# Patient Record
Sex: MALE | Race: WHITE | NOT HISPANIC OR LATINO | URBAN - METROPOLITAN AREA
[De-identification: names, ages, dates, MRNs, and addresses within clinical notes are randomized per-mention and may not be internally consistent; named-entity substitution may affect disease eponyms.]

---

## 2020-01-29 ENCOUNTER — APPOINTMENT (OUTPATIENT)
Age: 40
Setting detail: DERMATOLOGY
End: 2020-01-30

## 2020-01-29 DIAGNOSIS — D485 NEOPLASM OF UNCERTAIN BEHAVIOR OF SKIN: ICD-10-CM

## 2020-01-29 DIAGNOSIS — L57.8 OTHER SKIN CHANGES DUE TO CHRONIC EXPOSURE TO NONIONIZING RADIATION: ICD-10-CM

## 2020-01-29 DIAGNOSIS — D22 MELANOCYTIC NEVI: ICD-10-CM

## 2020-01-29 DIAGNOSIS — Z71.89 OTHER SPECIFIED COUNSELING: ICD-10-CM

## 2020-01-29 PROBLEM — D48.5 NEOPLASM OF UNCERTAIN BEHAVIOR OF SKIN: Status: ACTIVE | Noted: 2020-01-29

## 2020-01-29 PROBLEM — D22.4 MELANOCYTIC NEVI OF SCALP AND NECK: Status: ACTIVE | Noted: 2020-01-29

## 2020-01-29 PROBLEM — D22.5 MELANOCYTIC NEVI OF TRUNK: Status: ACTIVE | Noted: 2020-01-29

## 2020-01-29 PROCEDURE — OTHER MIPS QUALITY: OTHER

## 2020-01-29 PROCEDURE — OTHER COUNSELING: OTHER

## 2020-01-29 PROCEDURE — 11102 TANGNTL BX SKIN SINGLE LES: CPT

## 2020-01-29 PROCEDURE — 11103 TANGNTL BX SKIN EA SEP/ADDL: CPT

## 2020-01-29 PROCEDURE — OTHER BIOPSY BY SHAVE METHOD: OTHER

## 2020-01-29 PROCEDURE — 99203 OFFICE O/P NEW LOW 30 MIN: CPT | Mod: 25

## 2020-01-29 ASSESSMENT — LOCATION SIMPLE DESCRIPTION DERM
LOCATION SIMPLE: LEFT UPPER BACK
LOCATION SIMPLE: TRAPEZIAL NECK
LOCATION SIMPLE: LEFT CHEEK
LOCATION SIMPLE: RIGHT UPPER ARM
LOCATION SIMPLE: RIGHT POSTERIOR THIGH
LOCATION SIMPLE: CHEST
LOCATION SIMPLE: RIGHT CHEEK
LOCATION SIMPLE: ANTERIOR SCALP
LOCATION SIMPLE: LEFT UPPER ARM
LOCATION SIMPLE: UPPER BACK
LOCATION SIMPLE: POSTERIOR SCALP

## 2020-01-29 ASSESSMENT — LOCATION DETAILED DESCRIPTION DERM
LOCATION DETAILED: MID-FRONTAL SCALP
LOCATION DETAILED: RIGHT CENTRAL MALAR CHEEK
LOCATION DETAILED: MID TRAPEZIAL NECK
LOCATION DETAILED: RIGHT DISTAL POSTERIOR THIGH
LOCATION DETAILED: STERNUM
LOCATION DETAILED: LEFT DISTAL POSTERIOR UPPER ARM
LOCATION DETAILED: SUPERIOR THORACIC SPINE
LOCATION DETAILED: RIGHT DISTAL POSTERIOR UPPER ARM
LOCATION DETAILED: RIGHT INFERIOR OCCIPITAL SCALP
LOCATION DETAILED: LEFT CENTRAL MALAR CHEEK
LOCATION DETAILED: LEFT SUPERIOR MEDIAL UPPER BACK

## 2020-01-29 ASSESSMENT — LOCATION ZONE DERM
LOCATION ZONE: FACE
LOCATION ZONE: ARM
LOCATION ZONE: NECK
LOCATION ZONE: LEG
LOCATION ZONE: SCALP
LOCATION ZONE: TRUNK

## 2020-01-29 NOTE — PROCEDURE: BIOPSY BY SHAVE METHOD
Destruction After The Procedure: No
Wound Care: Vaseline
Was A Bandage Applied: Yes
X Size Of Lesion In Cm: 0
Notification Instructions: Patient will be notified of biopsy results. However, patient instructed to call the office if not contacted within 2 weeks.
Silver Nitrate Text: The wound bed was treated with silver nitrate after the biopsy was performed.
Consent: Written consent was obtained and risks were reviewed including but not limited to scarring, infection, bleeding, scabbing, incomplete removal, nerve damage and allergy to anesthesia.
Electrodesiccation And Curettage Text: The wound bed was treated with electrodesiccation and curettage after the biopsy was performed.
Biopsy Type: H and E
Depth Of Biopsy: dermis
Anesthesia Volume In Cc (Will Not Render If 0): 0.3
Post-Care Instructions: I reviewed with the patient in detail post-care instructions. Patient is to keep the biopsy site dry overnight, and then apply bacitracin twice daily until healed. Patient may apply hydrogen peroxide soaks to remove any crusting.
Anesthesia Type: 1% lidocaine with epinephrine and a 1:10 solution of 8.4% sodium bicarbonate
Detail Level: Detailed
Type Of Destruction Used: Electrodesiccation
Dressing: Band-Aid
Electrodesiccation Text: The wound bed was treated with electrodesiccation after the biopsy was performed.
Cryotherapy Text: The wound bed was treated with cryotherapy after the biopsy was performed.
Hemostasis: Drysol
Curettage Text: The wound bed was treated with curettage after the biopsy was performed.
Billing Type: Third-Party Bill
Biopsy Method: double edge Personna blade

## 2020-11-03 ENCOUNTER — OFFICE VISIT (OUTPATIENT)
Dept: INTERNAL MEDICINE CLINIC | Facility: CLINIC | Age: 40
End: 2020-11-03
Payer: COMMERCIAL

## 2020-11-03 VITALS
OXYGEN SATURATION: 98 % | BODY MASS INDEX: 28.42 KG/M2 | TEMPERATURE: 98 F | HEART RATE: 101 BPM | WEIGHT: 203 LBS | HEIGHT: 71 IN | SYSTOLIC BLOOD PRESSURE: 102 MMHG | DIASTOLIC BLOOD PRESSURE: 72 MMHG

## 2020-11-03 DIAGNOSIS — I10 ESSENTIAL HYPERTENSION: Chronic | ICD-10-CM

## 2020-11-03 DIAGNOSIS — E34.9 TESTOSTERONE DEFICIENCY: ICD-10-CM

## 2020-11-03 DIAGNOSIS — E83.110 HEREDITARY HEMOCHROMATOSIS (HCC): Chronic | ICD-10-CM

## 2020-11-03 DIAGNOSIS — E11.9 TYPE 2 DIABETES MELLITUS WITHOUT COMPLICATION, WITHOUT LONG-TERM CURRENT USE OF INSULIN (HCC): Primary | Chronic | ICD-10-CM

## 2020-11-03 LAB
LEFT EYE DIABETIC RETINOPATHY: NORMAL
LEFT EYE IMAGE QUALITY: NORMAL
LEFT EYE MACULAR EDEMA: NORMAL
LEFT EYE OTHER RETINOPATHY: NORMAL
RIGHT EYE DIABETIC RETINOPATHY: NORMAL
RIGHT EYE IMAGE QUALITY: NORMAL
RIGHT EYE MACULAR EDEMA: NORMAL
RIGHT EYE OTHER RETINOPATHY: NORMAL
SEVERITY (EYE EXAM): NORMAL

## 2020-11-03 PROCEDURE — 92250 FUNDUS PHOTOGRAPHY W/I&R: CPT | Performed by: INTERNAL MEDICINE

## 2020-11-03 PROCEDURE — 99204 OFFICE O/P NEW MOD 45 MIN: CPT | Performed by: INTERNAL MEDICINE

## 2020-11-03 RX ORDER — EMPAGLIFLOZIN 25 MG/1
25 TABLET, FILM COATED ORAL DAILY
Qty: 90 TABLET | Refills: 3 | Status: SHIPPED | OUTPATIENT
Start: 2020-11-03 | End: 2021-03-09 | Stop reason: SDUPTHER

## 2020-11-03 RX ORDER — EXENATIDE 2 MG/.85ML
2 INJECTION, SUSPENSION, EXTENDED RELEASE SUBCUTANEOUS WEEKLY
Qty: 12 PEN | Refills: 3 | Status: SHIPPED | OUTPATIENT
Start: 2020-11-03 | End: 2020-11-19

## 2020-11-03 RX ORDER — EMPAGLIFLOZIN 25 MG/1
25 TABLET, FILM COATED ORAL DAILY
COMMUNITY
End: 2020-11-03 | Stop reason: SDUPTHER

## 2020-11-03 RX ORDER — EXENATIDE 2 MG/.85ML
INJECTION, SUSPENSION, EXTENDED RELEASE SUBCUTANEOUS WEEKLY
COMMUNITY
End: 2020-11-03 | Stop reason: SDUPTHER

## 2020-11-04 ENCOUNTER — TELEPHONE (OUTPATIENT)
Dept: INTERNAL MEDICINE CLINIC | Facility: CLINIC | Age: 40
End: 2020-11-04

## 2020-11-05 ENCOUNTER — TELEPHONE (OUTPATIENT)
Dept: SURGICAL ONCOLOGY | Facility: CLINIC | Age: 40
End: 2020-11-05

## 2020-11-09 ENCOUNTER — DOCUMENTATION (OUTPATIENT)
Dept: HEMATOLOGY ONCOLOGY | Facility: CLINIC | Age: 40
End: 2020-11-09

## 2020-11-12 ENCOUNTER — OFFICE VISIT (OUTPATIENT)
Dept: ENDOCRINOLOGY | Facility: CLINIC | Age: 40
End: 2020-11-12
Payer: COMMERCIAL

## 2020-11-12 VITALS
TEMPERATURE: 97.5 F | HEIGHT: 72 IN | SYSTOLIC BLOOD PRESSURE: 110 MMHG | DIASTOLIC BLOOD PRESSURE: 68 MMHG | WEIGHT: 201 LBS | BODY MASS INDEX: 27.22 KG/M2

## 2020-11-12 DIAGNOSIS — E11.9 TYPE 2 DIABETES MELLITUS WITHOUT COMPLICATION, WITHOUT LONG-TERM CURRENT USE OF INSULIN (HCC): Primary | Chronic | ICD-10-CM

## 2020-11-12 DIAGNOSIS — E34.9 TESTOSTERONE DEFICIENCY: ICD-10-CM

## 2020-11-12 DIAGNOSIS — E04.1 THYROID NODULE: ICD-10-CM

## 2020-11-12 DIAGNOSIS — R53.83 FATIGUE, UNSPECIFIED TYPE: ICD-10-CM

## 2020-11-12 PROCEDURE — 99245 OFF/OP CONSLTJ NEW/EST HI 55: CPT | Performed by: NURSE PRACTITIONER

## 2020-11-16 ENCOUNTER — TELEPHONE (OUTPATIENT)
Dept: INTERNAL MEDICINE CLINIC | Facility: CLINIC | Age: 40
End: 2020-11-16

## 2020-11-16 DIAGNOSIS — E11.9 TYPE 2 DIABETES MELLITUS WITHOUT COMPLICATION, WITHOUT LONG-TERM CURRENT USE OF INSULIN (HCC): Primary | Chronic | ICD-10-CM

## 2020-11-17 ENCOUNTER — TELEPHONE (OUTPATIENT)
Dept: ENDOCRINOLOGY | Facility: CLINIC | Age: 40
End: 2020-11-17

## 2020-11-24 ENCOUNTER — LAB (OUTPATIENT)
Dept: LAB | Facility: HOSPITAL | Age: 40
End: 2020-11-24
Payer: COMMERCIAL

## 2020-11-24 DIAGNOSIS — R53.83 FATIGUE, UNSPECIFIED TYPE: ICD-10-CM

## 2020-11-24 DIAGNOSIS — E34.9 TESTOSTERONE DEFICIENCY: ICD-10-CM

## 2020-11-24 LAB
25(OH)D3 SERPL-MCNC: 29.5 NG/ML (ref 30–100)
FSH SERPL-ACNC: 3.9 MIU/ML (ref 0.7–10.8)
LH SERPL-ACNC: 4.9 MIU/ML (ref 1.2–10.6)
PROLACTIN SERPL-MCNC: 7.4 NG/ML (ref 2.5–17.4)
PSA SERPL-MCNC: 0.9 NG/ML (ref 0–4)
TSH SERPL DL<=0.05 MIU/L-ACNC: 1.32 UIU/ML (ref 0.36–3.74)

## 2020-11-24 PROCEDURE — 84402 ASSAY OF FREE TESTOSTERONE: CPT

## 2020-11-24 PROCEDURE — 83002 ASSAY OF GONADOTROPIN (LH): CPT

## 2020-11-24 PROCEDURE — 36415 COLL VENOUS BLD VENIPUNCTURE: CPT

## 2020-11-24 PROCEDURE — 84146 ASSAY OF PROLACTIN: CPT

## 2020-11-24 PROCEDURE — 84403 ASSAY OF TOTAL TESTOSTERONE: CPT

## 2020-11-24 PROCEDURE — 82306 VITAMIN D 25 HYDROXY: CPT

## 2020-11-24 PROCEDURE — G0103 PSA SCREENING: HCPCS

## 2020-11-24 PROCEDURE — 84443 ASSAY THYROID STIM HORMONE: CPT

## 2020-11-24 PROCEDURE — 83001 ASSAY OF GONADOTROPIN (FSH): CPT

## 2020-11-24 PROCEDURE — 84305 ASSAY OF SOMATOMEDIN: CPT

## 2020-11-25 LAB
TESTOST FREE SERPL-MCNC: 11 PG/ML (ref 8.7–25.1)
TESTOST SERPL-MCNC: 320 NG/DL (ref 264–916)

## 2020-11-26 LAB — IGF-I SERPL-MCNC: 201 NG/ML (ref 90–278)

## 2020-12-15 ENCOUNTER — TELEPHONE (OUTPATIENT)
Dept: ENDOCRINOLOGY | Facility: CLINIC | Age: 40
End: 2020-12-15

## 2020-12-15 ENCOUNTER — OFFICE VISIT (OUTPATIENT)
Dept: ENDOCRINOLOGY | Facility: CLINIC | Age: 40
End: 2020-12-15
Payer: COMMERCIAL

## 2020-12-15 ENCOUNTER — TELEPHONE (OUTPATIENT)
Dept: HEMATOLOGY ONCOLOGY | Facility: CLINIC | Age: 40
End: 2020-12-15

## 2020-12-15 VITALS
WEIGHT: 203.2 LBS | DIASTOLIC BLOOD PRESSURE: 62 MMHG | SYSTOLIC BLOOD PRESSURE: 122 MMHG | HEART RATE: 68 BPM | BODY MASS INDEX: 27.95 KG/M2

## 2020-12-15 DIAGNOSIS — E34.9 TESTOSTERONE DEFICIENCY: Primary | Chronic | ICD-10-CM

## 2020-12-15 DIAGNOSIS — I10 ESSENTIAL HYPERTENSION: Chronic | ICD-10-CM

## 2020-12-15 DIAGNOSIS — E11.9 TYPE 2 DIABETES MELLITUS WITHOUT COMPLICATION, WITHOUT LONG-TERM CURRENT USE OF INSULIN (HCC): Chronic | ICD-10-CM

## 2020-12-15 PROCEDURE — 3078F DIAST BP <80 MM HG: CPT | Performed by: NURSE PRACTITIONER

## 2020-12-15 PROCEDURE — 99214 OFFICE O/P EST MOD 30 MIN: CPT | Performed by: NURSE PRACTITIONER

## 2020-12-15 PROCEDURE — 3074F SYST BP LT 130 MM HG: CPT | Performed by: NURSE PRACTITIONER

## 2020-12-16 ENCOUNTER — CONSULT (OUTPATIENT)
Dept: HEMATOLOGY ONCOLOGY | Facility: CLINIC | Age: 40
End: 2020-12-16
Payer: COMMERCIAL

## 2020-12-16 VITALS
TEMPERATURE: 97.5 F | HEIGHT: 71 IN | WEIGHT: 202.4 LBS | DIASTOLIC BLOOD PRESSURE: 85 MMHG | SYSTOLIC BLOOD PRESSURE: 130 MMHG | BODY MASS INDEX: 28.34 KG/M2 | OXYGEN SATURATION: 99 % | HEART RATE: 115 BPM | RESPIRATION RATE: 17 BRPM

## 2020-12-16 DIAGNOSIS — E83.110 HEREDITARY HEMOCHROMATOSIS (HCC): Chronic | ICD-10-CM

## 2020-12-16 PROCEDURE — 3008F BODY MASS INDEX DOCD: CPT | Performed by: PHYSICIAN ASSISTANT

## 2020-12-16 PROCEDURE — 99243 OFF/OP CNSLTJ NEW/EST LOW 30: CPT | Performed by: PHYSICIAN ASSISTANT

## 2020-12-16 PROCEDURE — 1036F TOBACCO NON-USER: CPT | Performed by: PHYSICIAN ASSISTANT

## 2020-12-21 DIAGNOSIS — E11.9 TYPE 2 DIABETES MELLITUS WITHOUT COMPLICATION, WITHOUT LONG-TERM CURRENT USE OF INSULIN (HCC): Chronic | ICD-10-CM

## 2021-01-07 ENCOUNTER — LAB (OUTPATIENT)
Dept: LAB | Facility: HOSPITAL | Age: 41
End: 2021-01-07
Payer: COMMERCIAL

## 2021-01-07 DIAGNOSIS — E34.9 TESTOSTERONE DEFICIENCY: Chronic | ICD-10-CM

## 2021-01-07 PROCEDURE — 36415 COLL VENOUS BLD VENIPUNCTURE: CPT

## 2021-01-07 PROCEDURE — 84402 ASSAY OF FREE TESTOSTERONE: CPT

## 2021-01-07 PROCEDURE — 84403 ASSAY OF TOTAL TESTOSTERONE: CPT

## 2021-01-08 ENCOUNTER — TELEPHONE (OUTPATIENT)
Dept: HEMATOLOGY ONCOLOGY | Facility: CLINIC | Age: 41
End: 2021-01-08

## 2021-01-08 LAB
TESTOST FREE SERPL-MCNC: 9.4 PG/ML (ref 8.7–25.1)
TESTOST SERPL-MCNC: 276 NG/DL (ref 264–916)

## 2021-01-08 NOTE — TELEPHONE ENCOUNTER
Spoke to patient to let him know that his appt with Ronaldo Saucedo PA-C has been rescheduled to Dr Jey Coleman on 1/20  I had given him a 10am on this day, but after I hung up with him and went to add him to the schedule, the 10am spot had been taken  I called patient back and left a message that I would no longer be able to offer him 10am, but was able to add him to the schedule for 1:20pm that day instead  Advised patient to call back if this new time does not work for him

## 2021-01-12 NOTE — RESULT ENCOUNTER NOTE
Results reviewed  Free and total testosterone are low normal  Awaiting further work up from [facility] prior to determining therapy  Sent to Maira Silva

## 2021-01-20 ENCOUNTER — TELEPHONE (OUTPATIENT)
Dept: HEMATOLOGY ONCOLOGY | Facility: CLINIC | Age: 41
End: 2021-01-20

## 2021-01-20 ENCOUNTER — OFFICE VISIT (OUTPATIENT)
Dept: HEMATOLOGY ONCOLOGY | Facility: CLINIC | Age: 41
End: 2021-01-20
Payer: COMMERCIAL

## 2021-01-20 ENCOUNTER — LAB (OUTPATIENT)
Dept: LAB | Facility: HOSPITAL | Age: 41
End: 2021-01-20
Payer: COMMERCIAL

## 2021-01-20 VITALS
TEMPERATURE: 97 F | SYSTOLIC BLOOD PRESSURE: 120 MMHG | OXYGEN SATURATION: 99 % | DIASTOLIC BLOOD PRESSURE: 80 MMHG | BODY MASS INDEX: 28.42 KG/M2 | RESPIRATION RATE: 18 BRPM | HEIGHT: 71 IN | WEIGHT: 203 LBS | HEART RATE: 98 BPM

## 2021-01-20 DIAGNOSIS — E83.110 HEREDITARY HEMOCHROMATOSIS (HCC): Chronic | ICD-10-CM

## 2021-01-20 DIAGNOSIS — E83.110 HEREDITARY HEMOCHROMATOSIS (HCC): Primary | Chronic | ICD-10-CM

## 2021-01-20 DIAGNOSIS — E11.9 TYPE 2 DIABETES MELLITUS WITHOUT COMPLICATION, WITHOUT LONG-TERM CURRENT USE OF INSULIN (HCC): Chronic | ICD-10-CM

## 2021-01-20 LAB
ALBUMIN SERPL BCP-MCNC: 4.4 G/DL (ref 3.5–5)
ALP SERPL-CCNC: 82 U/L (ref 46–116)
ALT SERPL W P-5'-P-CCNC: 35 U/L (ref 12–78)
ANION GAP SERPL CALCULATED.3IONS-SCNC: 7 MMOL/L (ref 4–13)
AST SERPL W P-5'-P-CCNC: 34 U/L (ref 5–45)
BASOPHILS # BLD AUTO: 0.05 THOUSANDS/ΜL (ref 0–0.1)
BASOPHILS NFR BLD AUTO: 1 % (ref 0–1)
BILIRUB SERPL-MCNC: 0.7 MG/DL (ref 0.2–1)
BUN SERPL-MCNC: 18 MG/DL (ref 5–25)
CALCIUM SERPL-MCNC: 9.3 MG/DL (ref 8.3–10.1)
CHLORIDE SERPL-SCNC: 106 MMOL/L (ref 100–108)
CO2 SERPL-SCNC: 30 MMOL/L (ref 21–32)
CREAT SERPL-MCNC: 1.08 MG/DL (ref 0.6–1.3)
EOSINOPHIL # BLD AUTO: 0.18 THOUSAND/ΜL (ref 0–0.61)
EOSINOPHIL NFR BLD AUTO: 3 % (ref 0–6)
ERYTHROCYTE [DISTWIDTH] IN BLOOD BY AUTOMATED COUNT: 12.6 % (ref 11.6–15.1)
FERRITIN SERPL-MCNC: 9 NG/ML (ref 8–388)
GFR SERPL CREATININE-BSD FRML MDRD: 86 ML/MIN/1.73SQ M
GLUCOSE P FAST SERPL-MCNC: 79 MG/DL (ref 65–99)
HCT VFR BLD AUTO: 43.7 % (ref 36.5–49.3)
HGB BLD-MCNC: 14.1 G/DL (ref 12–17)
IMM GRANULOCYTES # BLD AUTO: 0.02 THOUSAND/UL (ref 0–0.2)
IMM GRANULOCYTES NFR BLD AUTO: 0 % (ref 0–2)
LYMPHOCYTES # BLD AUTO: 2.09 THOUSANDS/ΜL (ref 0.6–4.47)
LYMPHOCYTES NFR BLD AUTO: 29 % (ref 14–44)
MAGNESIUM SERPL-MCNC: 2.1 MG/DL (ref 1.6–2.6)
MCH RBC QN AUTO: 27.9 PG (ref 26.8–34.3)
MCHC RBC AUTO-ENTMCNC: 32.3 G/DL (ref 31.4–37.4)
MCV RBC AUTO: 87 FL (ref 82–98)
MONOCYTES # BLD AUTO: 0.37 THOUSAND/ΜL (ref 0.17–1.22)
MONOCYTES NFR BLD AUTO: 5 % (ref 4–12)
NEUTROPHILS # BLD AUTO: 4.4 THOUSANDS/ΜL (ref 1.85–7.62)
NEUTS SEG NFR BLD AUTO: 62 % (ref 43–75)
NRBC BLD AUTO-RTO: 0 /100 WBCS
PLATELET # BLD AUTO: 250 THOUSANDS/UL (ref 149–390)
PMV BLD AUTO: 11.4 FL (ref 8.9–12.7)
POTASSIUM SERPL-SCNC: 3.5 MMOL/L (ref 3.5–5.3)
PROT SERPL-MCNC: 7.5 G/DL (ref 6.4–8.2)
RBC # BLD AUTO: 5.05 MILLION/UL (ref 3.88–5.62)
SODIUM SERPL-SCNC: 143 MMOL/L (ref 136–145)
WBC # BLD AUTO: 7.11 THOUSAND/UL (ref 4.31–10.16)

## 2021-01-20 PROCEDURE — 82728 ASSAY OF FERRITIN: CPT

## 2021-01-20 PROCEDURE — 85025 COMPLETE CBC W/AUTO DIFF WBC: CPT

## 2021-01-20 PROCEDURE — 99215 OFFICE O/P EST HI 40 MIN: CPT | Performed by: INTERNAL MEDICINE

## 2021-01-20 PROCEDURE — 3074F SYST BP LT 130 MM HG: CPT | Performed by: INTERNAL MEDICINE

## 2021-01-20 PROCEDURE — 80053 COMPREHEN METABOLIC PANEL: CPT

## 2021-01-20 PROCEDURE — 36415 COLL VENOUS BLD VENIPUNCTURE: CPT

## 2021-01-20 PROCEDURE — 83735 ASSAY OF MAGNESIUM: CPT

## 2021-01-20 PROCEDURE — 3008F BODY MASS INDEX DOCD: CPT | Performed by: INTERNAL MEDICINE

## 2021-01-20 PROCEDURE — 81256 HFE GENE: CPT

## 2021-01-20 PROCEDURE — 3079F DIAST BP 80-89 MM HG: CPT | Performed by: INTERNAL MEDICINE

## 2021-01-20 PROCEDURE — 1036F TOBACCO NON-USER: CPT | Performed by: INTERNAL MEDICINE

## 2021-01-20 NOTE — PROGRESS NOTES
[facility] - Hematology & Medical Oncology  Outpatient Visit Encounter Note      Antonio Smith 45 y o  male [id number] Date:  1/20/2021      VEL Cullen is a 40M is seeing me today after establishing hematological care with Dr Alethea Quan team in [facility]  Since he wants local follow-up, he is here at [address] [place] [address]  In review of the last note from 12/26/2020, this patient has moved from [address] [place] [address] to [place] and sees to continue care here now  Per the notes, he was diagnosed with hereditary hemochromatosis and had been undergoing phlebotomy  Per notes, the patient is a poor historian  He does not know if he is homozygous or heterozygous for hemochromatosis  He is getting monthly phlebotomy at [facility]  He says that his brother was tested negative  He tells me that all of this started 6-8 months ago  He says that he was seen by Endocrinology for new onset T2DM and during work-up, his HFE gene was positive per patient  He says that since he lost his insurance in September 2020, he came over to [place] and started getting his insurance and care here  He says that after he got the diagnosis, he was referred to Hematology  He saw them and they ordered MRI Liver and per him, it was negative  He also got 2D Echo which he says was normal as well  He says that his last phlebotomy was on 1/6/21  He has not had CBC, Ferritin checked in a "long time"  He is seeing Endocrinology right now and they are helping him  He denies headaches, f/c/n/v/cp/ap/sob/cough  His major complain is fatigue  I have reviewed the relevant past medical, surgical, social and family history  I have also reviewed allergies and medications for this patient  Review of Systems  Review of Systems   All other systems reviewed and are negative          OBJECTIVE     Physical Exam  Vitals:    01/20/21 1324   BP: 120/80   BP Location: Left arm   Patient Position: Sitting   Cuff Size: Large   Pulse: 98   Resp: 18   Temp: (!) 97 °F (36 1 °C)   TempSrc: Tympanic   SpO2: 99%   Weight: 92 1 kg (203 lb)   Height: 5' 11" (1 803 m)       Physical Exam  Constitutional:       General: He is not in acute distress  Appearance: He is not ill-appearing, toxic-appearing or diaphoretic  HENT:      Head: Normocephalic and atraumatic  Eyes:      Extraocular Movements: Extraocular movements intact  Neck:      Musculoskeletal: Normal range of motion and neck supple  Cardiovascular:      Rate and Rhythm: Normal rate  Pulmonary:      Effort: Pulmonary effort is normal  No respiratory distress  Abdominal:      General: There is no distension  Palpations: Abdomen is soft  Musculoskeletal: Normal range of motion  General: No tenderness  Right lower leg: No edema  Left lower leg: No edema  Skin:     General: Skin is dry  Neurological:      General: No focal deficit present  Mental Status: He is alert and oriented to person, place, and time  Imaging  Relevant imaging reviewed in chart    Labs  Relevant labs reviewed in chart   ASSESSMENT & PLAN      Diagnosis ICD-10-CM Associated Orders   1  Hereditary hemochromatosis ([facility] [address] )  E83 110 Hemochromatosis mutation     CBC and differential     Magnesium     Comprehensive metabolic panel     Ferritin   2  Type 2 diabetes mellitus without complication, without long-term current use of insulin (HCC)  E11 9        Hereditary Hemochromatosis, Unknown Genetic Status (Dx: Appears Mid-2020)  · Despite countless attempts by my staff and patient himself, no records have been sent over from his treating hematologist and endocrinologist at [facility]  Our attempts are still ongoing  · In the interim, to better guide his treatment and identifying his disease profile, I have ordered the hemochromatosis mutation     · Given no disease related labs ordered, will order CBC, CMP, Ferritin  · Will await results before getting MRI Liver and 2D Echo as he states he got them few months ago  · Last phlebotomy on 1/6/21  Await results to plan out further steps  Follow Up   3-4 weeks with Dr Chirs Mata (I communicated with him about this patient and he was supportive of seeing him)      All questions were answered to the patient's satisfaction during this encounter  They appreciated and thanked me for spending time with them  The patient knows the contact information for our office and know to reach out for any relevant concerns related to this encounter  For all other listed problems and medical diagnosis in his chart - they are managed by PCP and/or other specialists, which patient acknowledges  Dr Yordan Rojas MD  Hematology & Medical Oncology

## 2021-01-20 NOTE — TELEPHONE ENCOUNTER
Patient called to confirm the [facility] office fax #, patient was provided [phone]

## 2021-01-25 LAB — HFE GENE MUT ANL BLD/T: NORMAL

## 2021-02-07 NOTE — PROGRESS NOTES
HEMATOLOGY CLINIC NOTE    Primary Care Provider: Gabriela Rivera DO  Referring Provider:    MRN: [id number]  : 1980    Assessment / Plan:   1  Hereditary hemochromatosis ([facility] [address] ) -heterozygous H63D  This is a 44 y/o male following up regarding hemochromatosis  Patient was recently confirmed as heterozygous H63D  Patient most recent ferritin was 9  Patient has been getting monthly phlebotomies at [facility] with last one 21  Patient MRI liver, Echo in past were WNL  Will have patient hold phlebotomies for now  Will recheck Ferritin, BNP checked today  Will recheck iron panel, CBCD, CMP in 3 months and have patient follow up then  Will decide long term plan at that time  Patient will resume his low iron diet  - CBC and differential; Future  - Iron Panel (Includes Ferritin, Iron Sat%, Iron, and TIBC); Future  - B-Type Natriuretic Peptide ([address]); Future  - Comprehensive metabolic panel; Future  - Ferritin; Future    2  History of polycythemia - JAK2 negative  Patient has a history of a hemoglobin of 18 3 from last year via [place] records  Patient tested JAK2 negative  Patient notes he was traveling to higher altitude, taking testosterone, vaping during that time  Patient no longer partakes in any of the preceding  This high hemoglobin has resolved  Patient most recent Hgb 14 1, Hct 43 7  Will monitor  No additional lab work needed at this time other than repeat CBC  If Hgb, HCT increases in the future, will proceed with additional workup  - CBC and differential; Future    Patient will return to the office in 3 months with Dr Dania Doran     Patient voiced understanding and agreement to the above  The patient knows to call the office with any questions or concerns regarding the above      I have spent 30 minutes with Patient  today in which greater than 50% of this time was spent in counseling/coordination of care regarding Diagnostic results, Risks and benefits of tx options, Intructions for management and Patient and family education  Reason for visit:       Chief Complaint   Patient presents with    Follow-up       History of Hematology Illness:     Carolyn Saucedo is a 45 y o  male with a PMH of type 2 DM who came in for follow up regarding hereditary hemochromatosis  1  Hereditary hemochromatosis - heterozygous HFE gene H63D mutation  Patient is a poor historian per previous notes  Patient recently moved from [place] back to [place] in Sept 2020  Patient states in July/August 2020, an endocrinologist in [place] diagnosed patient with hereditary hemochromatosis after he came back positive with genetic studies  Patient was unsure if heterozygous or homozygous  Patient unsure if he has a family history of this  Brother did test negative for this  Patient was getting phlebotomies every 2 weeks then monthly at that time, as well as consuming low iron diet  Genetic test was redrawn 1/20 - found patient had single mutation for HFE gene, H63D mutation  Patient had an MRI of the liver in 08/2020 which was negative  Patient also notes he had an echo done in the past (we do not have this result) which was negative  Labs were found from [address] [place] [address],[address] [facility] in [place] as follows:    8/2020:   - Iron studies: TIBC 365, Iron 50, Iron sat % 14  At a previous drawing, patient iron level was 178  Ferritin 107  - Hgb 15 6 (previously 18 3), Hct 45 7 (previously 52 9)  - serum testosterone 173 (most recent 320 11/24/2020 by [facility])     1/20/2021:  - patient most recent Ferritin was 9, Hgb 14 1, Hct 43 7    2  History of Polycythemia  Patient previously had a history of polycythemia as once had a Hgb 18 3 as above  Resolved now  Patient states in [place], he was traveling to [place] 2-3 times a week which is higher in altitude (about 6500 feet he notes)  Patient was also vaping at the time and taking testosterone - no longer does either       Patient is a nonsmoker, denies sleep apnea, diuretic use  Patient tested JAK2 negative in [address] [facility] 8/2020  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[address] [facility]  Patient currently unemployed  - Hgb 15 6 (previously 18 3), Hct 45 7 (previously 52 9)  - 1/20/21: Hgb 14 1, Hct 43 7    Interval History:   02/08/21: Patient came in for follow up  Patient denies headaches, CP, SOB, palpitations, fevers, chills, night sweats, lumps, bumps  Patient denies flushing, redness in hands, blurred vision  Patient has occasional fatigue and numbness/tingling in his hands bilaterally he believes to be carpal tunnel  No loss in  bilaterally  Appears to occur after waking up or during activities with increased wrist flexion  Problem list:       Patient Active Problem List   Diagnosis    Type 2 diabetes mellitus, without long-term current use of insulin (HCC)    Hereditary hemochromatosis ([facility] [address] )    Essential hypertension    Testosterone deficiency    Thyroid nodule    Fatigue       REVIEW OF SYMPTOMS:   Review of Systems   Constitutional: Positive for fatigue (occassional)  Negative for activity change, appetite change, chills, diaphoresis, fever and unexpected weight change  HENT: Negative for nosebleeds  Respiratory: Negative for apnea, cough, chest tightness and shortness of breath  Cardiovascular: Negative for chest pain, palpitations and leg swelling  Gastrointestinal: Negative for abdominal distention, abdominal pain, anal bleeding, blood in stool, constipation, diarrhea, nausea and vomiting  Endocrine: Negative for cold intolerance  Genitourinary: Negative for hematuria  Musculoskeletal: Negative for arthralgias  Skin: Negative for color change, pallor and rash  Neurological: Negative for dizziness, weakness, light-headedness and headaches  Hematological: Negative for adenopathy  Does not bruise/bleed easily         PHYSICAL EXAMINATION:     Vital Signs:   /70 (BP Location: Left arm, Patient Position: Sitting, Cuff Size: Large)   Pulse 71   Temp (!) 97 1 °F (36 2 °C)   Resp 18   Ht 5' 11" (1 803 m)   Wt 94 8 kg (209 lb)   SpO2 98%   BMI 29 15 kg/m²   Body surface area is 2 15 meters squared  Ht Readings from Last 8 Encounters:   02/08/21 5' 11" (1 803 m)   01/20/21 5' 11" (1 803 m)   12/16/20 5' 11" (1 803 m)   11/12/20 5' 11 5" (1 816 m)   11/03/20 5' 10 5" (1 791 m)       Wt Readings from Last 8 Encounters:   02/08/21 94 8 kg (209 lb)   01/20/21 92 1 kg (203 lb)   12/16/20 91 8 kg (202 lb 6 4 oz)   12/15/20 92 2 kg (203 lb 3 2 oz)   11/12/20 91 2 kg (201 lb)   11/03/20 92 1 kg (203 lb)          Physical Exam  Constitutional:       General: He is not in acute distress  Appearance: Normal appearance  He is normal weight  He is not ill-appearing  HENT:      Head: Normocephalic and atraumatic  Eyes:      General: No scleral icterus  Extraocular Movements: Extraocular movements intact  Conjunctiva/sclera: Conjunctivae normal    Neck:      Musculoskeletal: Normal range of motion and neck supple  Cardiovascular:      Rate and Rhythm: Normal rate and regular rhythm  Heart sounds: Normal heart sounds  No murmur  Pulmonary:      Effort: Pulmonary effort is normal       Breath sounds: Normal breath sounds  Abdominal:      General: Bowel sounds are normal    Lymphadenopathy:      Cervical: No cervical adenopathy  Skin:     General: Skin is warm and dry  Neurological:      Mental Status: He is alert  Psychiatric:         Mood and Affect: Mood normal          Behavior: Behavior normal          Thought Content: Thought content normal        Reviewed historical information        PAST MEDICAL HISTORY:    Past Medical History:   Diagnosis Date    Diabetes mellitus ([address] )     Hemochromatosis     Testosterone deficiency        PAST SURGICAL HISTORY:    Past Surgical History:   Procedure Laterality Date    WISDOM TOOTH EXTRACTION           CURRENT MEDICATIONS:     Current Outpatient Medications:     Dulaglutide 1 5 MG/0 5ML SOPN, Inject 0 5 mL (1 5 mg total) under the skin once a week, Disp: 4 pen, Rfl: 5    Empagliflozin (Jardiance) 25 MG TABS, Take 1 tablet (25 mg total) by mouth daily, Disp: 90 tablet, Rfl: 3    SOCIAL HISTORY:    Social History     Tobacco Use    Smoking status: Former Smoker     Packs/day: 0 25     Years: 10 00     Pack years: 2 50     Types: Cigarettes    Smokeless tobacco: Never Used   Substance Use Topics    Alcohol use: Not Currently    Drug use: Not Currently       FAMILY HISTORY:    Family History   Problem Relation Age of Onset    No Known Problems Mother     No Known Problems Father     Diabetes Family     Hypertension Family        ALLERGIES:  No Known Allergies      LAB:    Lab Results   Component Value Date    WBC 7 11 01/20/2021    HGB 14 1 01/20/2021    HCT 43 7 01/20/2021    MCV 87 01/20/2021     01/20/2021       Lab Results   Component Value Date    SODIUM 143 01/20/2021    K 3 5 01/20/2021     01/20/2021    CO2 30 01/20/2021    AGAP 7 01/20/2021    BUN 18 01/20/2021    CREATININE 1 08 01/20/2021    GLUF 79 01/20/2021    CALCIUM 9 3 01/20/2021    AST 34 01/20/2021    ALT 35 01/20/2021    ALKPHOS 82 01/20/2021    TP 7 5 01/20/2021    TBILI 0 70 01/20/2021    EGFR 86 01/20/2021       IMAGING:  No image results found

## 2021-02-08 ENCOUNTER — APPOINTMENT (OUTPATIENT)
Dept: LAB | Facility: HOSPITAL | Age: 41
End: 2021-02-08
Payer: COMMERCIAL

## 2021-02-08 ENCOUNTER — OFFICE VISIT (OUTPATIENT)
Dept: HEMATOLOGY ONCOLOGY | Facility: CLINIC | Age: 41
End: 2021-02-08
Payer: COMMERCIAL

## 2021-02-08 VITALS
HEART RATE: 71 BPM | DIASTOLIC BLOOD PRESSURE: 70 MMHG | BODY MASS INDEX: 29.26 KG/M2 | HEIGHT: 71 IN | OXYGEN SATURATION: 98 % | SYSTOLIC BLOOD PRESSURE: 120 MMHG | WEIGHT: 209 LBS | RESPIRATION RATE: 18 BRPM | TEMPERATURE: 97.1 F

## 2021-02-08 DIAGNOSIS — Z86.2 HISTORY OF POLYCYTHEMIA: ICD-10-CM

## 2021-02-08 DIAGNOSIS — E83.110 HEREDITARY HEMOCHROMATOSIS (HCC): Primary | ICD-10-CM

## 2021-02-08 LAB
ALBUMIN SERPL BCP-MCNC: 4.2 G/DL (ref 3.5–5)
ALP SERPL-CCNC: 89 U/L (ref 46–116)
ALT SERPL W P-5'-P-CCNC: 34 U/L (ref 12–78)
ANION GAP SERPL CALCULATED.3IONS-SCNC: 11 MMOL/L (ref 4–13)
AST SERPL W P-5'-P-CCNC: 20 U/L (ref 5–45)
BASOPHILS # BLD AUTO: 0.05 THOUSANDS/ΜL (ref 0–0.1)
BASOPHILS NFR BLD AUTO: 1 % (ref 0–1)
BILIRUB SERPL-MCNC: 0.6 MG/DL (ref 0.2–1)
BUN SERPL-MCNC: 15 MG/DL (ref 5–25)
CALCIUM SERPL-MCNC: 9.6 MG/DL (ref 8.3–10.1)
CHLORIDE SERPL-SCNC: 105 MMOL/L (ref 100–108)
CO2 SERPL-SCNC: 27 MMOL/L (ref 21–32)
CREAT SERPL-MCNC: 1.02 MG/DL (ref 0.6–1.3)
EOSINOPHIL # BLD AUTO: 0.18 THOUSAND/ΜL (ref 0–0.61)
EOSINOPHIL NFR BLD AUTO: 3 % (ref 0–6)
ERYTHROCYTE [DISTWIDTH] IN BLOOD BY AUTOMATED COUNT: 12.7 % (ref 11.6–15.1)
FERRITIN SERPL-MCNC: 7 NG/ML (ref 8–388)
GFR SERPL CREATININE-BSD FRML MDRD: 92 ML/MIN/1.73SQ M
GLUCOSE P FAST SERPL-MCNC: 105 MG/DL (ref 65–99)
HCT VFR BLD AUTO: 47.5 % (ref 36.5–49.3)
HGB BLD-MCNC: 15.2 G/DL (ref 12–17)
IMM GRANULOCYTES # BLD AUTO: 0.02 THOUSAND/UL (ref 0–0.2)
IMM GRANULOCYTES NFR BLD AUTO: 0 % (ref 0–2)
IRON SATN MFR SERPL: 8 %
IRON SERPL-MCNC: 39 UG/DL (ref 65–175)
LYMPHOCYTES # BLD AUTO: 2.19 THOUSANDS/ΜL (ref 0.6–4.47)
LYMPHOCYTES NFR BLD AUTO: 31 % (ref 14–44)
MCH RBC QN AUTO: 27.8 PG (ref 26.8–34.3)
MCHC RBC AUTO-ENTMCNC: 32 G/DL (ref 31.4–37.4)
MCV RBC AUTO: 87 FL (ref 82–98)
MONOCYTES # BLD AUTO: 0.39 THOUSAND/ΜL (ref 0.17–1.22)
MONOCYTES NFR BLD AUTO: 6 % (ref 4–12)
NEUTROPHILS # BLD AUTO: 4.16 THOUSANDS/ΜL (ref 1.85–7.62)
NEUTS SEG NFR BLD AUTO: 59 % (ref 43–75)
NRBC BLD AUTO-RTO: 0 /100 WBCS
NT-PROBNP SERPL-MCNC: 29 PG/ML
PLATELET # BLD AUTO: 241 THOUSANDS/UL (ref 149–390)
PMV BLD AUTO: 10.8 FL (ref 8.9–12.7)
POTASSIUM SERPL-SCNC: 4.3 MMOL/L (ref 3.5–5.3)
PROT SERPL-MCNC: 7.9 G/DL (ref 6.4–8.2)
RBC # BLD AUTO: 5.46 MILLION/UL (ref 3.88–5.62)
SODIUM SERPL-SCNC: 143 MMOL/L (ref 136–145)
TIBC SERPL-MCNC: 490 UG/DL (ref 250–450)
WBC # BLD AUTO: 6.99 THOUSAND/UL (ref 4.31–10.16)

## 2021-02-08 PROCEDURE — 36415 COLL VENOUS BLD VENIPUNCTURE: CPT | Performed by: INTERNAL MEDICINE

## 2021-02-08 PROCEDURE — 99213 OFFICE O/P EST LOW 20 MIN: CPT | Performed by: INTERNAL MEDICINE

## 2021-02-08 PROCEDURE — 85025 COMPLETE CBC W/AUTO DIFF WBC: CPT | Performed by: INTERNAL MEDICINE

## 2021-02-08 PROCEDURE — 83880 ASSAY OF NATRIURETIC PEPTIDE: CPT

## 2021-02-08 PROCEDURE — 80053 COMPREHEN METABOLIC PANEL: CPT | Performed by: INTERNAL MEDICINE

## 2021-02-08 PROCEDURE — 82728 ASSAY OF FERRITIN: CPT | Performed by: INTERNAL MEDICINE

## 2021-02-08 PROCEDURE — 83540 ASSAY OF IRON: CPT | Performed by: INTERNAL MEDICINE

## 2021-02-08 PROCEDURE — 83550 IRON BINDING TEST: CPT | Performed by: INTERNAL MEDICINE

## 2021-02-09 ENCOUNTER — TELEPHONE (OUTPATIENT)
Dept: HEMATOLOGY ONCOLOGY | Facility: CLINIC | Age: 41
End: 2021-02-09

## 2021-02-09 NOTE — TELEPHONE ENCOUNTER
Called patient and reviewed lab results  Patient understands he does not need to change anything as his iron will likely increase over next few months due to history of hemochromatosis  Patient knows to refrain from phlebotomies  Patient will continue to follow with Dr Brian Hobbs in 3 months

## 2021-03-09 ENCOUNTER — LAB (OUTPATIENT)
Dept: LAB | Facility: HOSPITAL | Age: 41
End: 2021-03-09
Payer: COMMERCIAL

## 2021-03-09 ENCOUNTER — OFFICE VISIT (OUTPATIENT)
Dept: ENDOCRINOLOGY | Facility: CLINIC | Age: 41
End: 2021-03-09
Payer: COMMERCIAL

## 2021-03-09 VITALS
HEIGHT: 71 IN | OXYGEN SATURATION: 99 % | WEIGHT: 207.8 LBS | BODY MASS INDEX: 29.09 KG/M2 | DIASTOLIC BLOOD PRESSURE: 68 MMHG | SYSTOLIC BLOOD PRESSURE: 112 MMHG | HEART RATE: 93 BPM

## 2021-03-09 DIAGNOSIS — I10 ESSENTIAL HYPERTENSION: Chronic | ICD-10-CM

## 2021-03-09 DIAGNOSIS — R53.83 FATIGUE, UNSPECIFIED TYPE: ICD-10-CM

## 2021-03-09 DIAGNOSIS — E34.9 TESTOSTERONE DEFICIENCY: Chronic | ICD-10-CM

## 2021-03-09 DIAGNOSIS — E11.9 TYPE 2 DIABETES MELLITUS WITHOUT COMPLICATION, WITHOUT LONG-TERM CURRENT USE OF INSULIN (HCC): Primary | Chronic | ICD-10-CM

## 2021-03-09 DIAGNOSIS — E04.1 THYROID NODULE: ICD-10-CM

## 2021-03-09 DIAGNOSIS — E83.110 HEREDITARY HEMOCHROMATOSIS (HCC): ICD-10-CM

## 2021-03-09 DIAGNOSIS — E11.9 TYPE 2 DIABETES MELLITUS WITHOUT COMPLICATION, WITHOUT LONG-TERM CURRENT USE OF INSULIN (HCC): Chronic | ICD-10-CM

## 2021-03-09 DIAGNOSIS — E34.9 TESTOSTERONE DEFICIENCY: ICD-10-CM

## 2021-03-09 DIAGNOSIS — Z86.2 HISTORY OF POLYCYTHEMIA: ICD-10-CM

## 2021-03-09 LAB
ALBUMIN SERPL BCP-MCNC: 4.6 G/DL (ref 3.5–5.7)
ALP SERPL-CCNC: 65 U/L (ref 40–150)
ALT SERPL W P-5'-P-CCNC: 21 U/L (ref 7–52)
ANION GAP SERPL CALCULATED.3IONS-SCNC: 8 MMOL/L (ref 4–13)
AST SERPL W P-5'-P-CCNC: 19 U/L (ref 13–39)
BASOPHILS # BLD AUTO: 0 THOUSANDS/ΜL (ref 0–0.1)
BASOPHILS NFR BLD AUTO: 1 % (ref 0–2)
BILIRUB SERPL-MCNC: 0.7 MG/DL (ref 0.2–1)
BUN SERPL-MCNC: 19 MG/DL (ref 7–25)
CALCIUM SERPL-MCNC: 9.9 MG/DL (ref 8.6–10.5)
CHLORIDE SERPL-SCNC: 102 MMOL/L (ref 98–107)
CHOLEST SERPL-MCNC: 176 MG/DL (ref 0–200)
CO2 SERPL-SCNC: 31 MMOL/L (ref 21–31)
CREAT SERPL-MCNC: 1.1 MG/DL (ref 0.7–1.3)
CREAT UR-MCNC: 142 MG/DL
EOSINOPHIL # BLD AUTO: 0.2 THOUSAND/ΜL (ref 0–0.61)
EOSINOPHIL NFR BLD AUTO: 4 % (ref 0–5)
ERYTHROCYTE [DISTWIDTH] IN BLOOD BY AUTOMATED COUNT: 13.5 % (ref 11.5–14.5)
EST. AVERAGE GLUCOSE BLD GHB EST-MCNC: 120 MG/DL
FERRITIN SERPL-MCNC: 12 NG/ML (ref 8–388)
GFR SERPL CREATININE-BSD FRML MDRD: 84 ML/MIN/1.73SQ M
GLUCOSE P FAST SERPL-MCNC: 115 MG/DL (ref 65–99)
HBA1C MFR BLD: 5.8 %
HCT VFR BLD AUTO: 45.4 % (ref 42–47)
HDLC SERPL-MCNC: 42 MG/DL
HGB BLD-MCNC: 15.1 G/DL (ref 14–18)
IRON SATN MFR SERPL: 10 %
IRON SERPL-MCNC: 48 UG/DL (ref 65–175)
LDLC SERPL CALC-MCNC: 105 MG/DL (ref 0–100)
LYMPHOCYTES # BLD AUTO: 2 THOUSANDS/ΜL (ref 0.6–4.47)
LYMPHOCYTES NFR BLD AUTO: 34 % (ref 21–51)
MCH RBC QN AUTO: 28.1 PG (ref 26–34)
MCHC RBC AUTO-ENTMCNC: 33.3 G/DL (ref 31–37)
MCV RBC AUTO: 84 FL (ref 81–99)
MICROALBUMIN UR-MCNC: 8 MG/L (ref 0–20)
MICROALBUMIN/CREAT 24H UR: 6 MG/G CREATININE (ref 0–30)
MONOCYTES # BLD AUTO: 0.3 THOUSAND/ΜL (ref 0.17–1.22)
MONOCYTES NFR BLD AUTO: 6 % (ref 2–12)
NEUTROPHILS # BLD AUTO: 3.2 THOUSANDS/ΜL (ref 1.4–6.5)
NEUTS SEG NFR BLD AUTO: 56 % (ref 42–75)
PLATELET # BLD AUTO: 198 THOUSANDS/UL (ref 149–390)
PMV BLD AUTO: 9.5 FL (ref 8.6–11.7)
POTASSIUM SERPL-SCNC: 4.1 MMOL/L (ref 3.5–5.5)
PROT SERPL-MCNC: 7.2 G/DL (ref 6.4–8.9)
RBC # BLD AUTO: 5.38 MILLION/UL (ref 4.3–5.9)
SODIUM SERPL-SCNC: 141 MMOL/L (ref 134–143)
TESTOST SERPL-MCNC: 192 NG/DL (ref 113–1065)
TIBC SERPL-MCNC: 476 UG/DL (ref 250–450)
TRIGL SERPL-MCNC: 147 MG/DL (ref 44–166)
WBC # BLD AUTO: 5.8 THOUSAND/UL (ref 4.8–10.8)

## 2021-03-09 PROCEDURE — 3044F HG A1C LEVEL LT 7.0%: CPT | Performed by: PHYSICIAN ASSISTANT

## 2021-03-09 PROCEDURE — 80053 COMPREHEN METABOLIC PANEL: CPT

## 2021-03-09 PROCEDURE — 83540 ASSAY OF IRON: CPT

## 2021-03-09 PROCEDURE — 82728 ASSAY OF FERRITIN: CPT

## 2021-03-09 PROCEDURE — 82043 UR ALBUMIN QUANTITATIVE: CPT

## 2021-03-09 PROCEDURE — 80061 LIPID PANEL: CPT

## 2021-03-09 PROCEDURE — 85025 COMPLETE CBC W/AUTO DIFF WBC: CPT

## 2021-03-09 PROCEDURE — 84403 ASSAY OF TOTAL TESTOSTERONE: CPT

## 2021-03-09 PROCEDURE — 82570 ASSAY OF URINE CREATININE: CPT

## 2021-03-09 PROCEDURE — 83550 IRON BINDING TEST: CPT

## 2021-03-09 PROCEDURE — 83036 HEMOGLOBIN GLYCOSYLATED A1C: CPT

## 2021-03-09 PROCEDURE — 3061F NEG MICROALBUMINURIA REV: CPT | Performed by: PHYSICIAN ASSISTANT

## 2021-03-09 PROCEDURE — 99214 OFFICE O/P EST MOD 30 MIN: CPT | Performed by: NURSE PRACTITIONER

## 2021-03-09 PROCEDURE — 36415 COLL VENOUS BLD VENIPUNCTURE: CPT

## 2021-03-09 RX ORDER — EMPAGLIFLOZIN 25 MG/1
25 TABLET, FILM COATED ORAL DAILY
Qty: 90 TABLET | Refills: 3 | Status: SHIPPED | OUTPATIENT
Start: 2021-03-09 | End: 2021-05-29 | Stop reason: SDUPTHER

## 2021-03-09 NOTE — RESULT ENCOUNTER NOTE
Results reviewed  HgA1C is excellent at 5 8%  No changes to current medications required  There is no protein in the urine  Lipid panel is stable  Comprehensive metabolic panel is stable  Liver function and kidney function are good  Kidney function is slightly worse than last time -remember to remain well hydrated  Also, please be sure to be taking 2000 units of vitamin-D daily  These can be purchased over-the-counter  The testosterone that was ordered by your PCP is a total testosterone  This is in the low-normal range  At this time, I would not recommend starting testosterone injections  Sent to Julito Altamirano

## 2021-03-09 NOTE — PROGRESS NOTES
Established Patient Progress Note      Chief Complaint   Patient presents with    Diabetes Type 2    Hypogonadism        History of Present Illness:   Cele Moore is a 45 y o  male with hypogonadism, Vit D deficiency, thyroid nodules, hereditary hemochromatosis, and type 2 diabetes without long term use of insulin since 2010  Denies complications of diabetes  Denies recent illness or hospitalizations  Denies recent severe hypoglycemic or severe hyperglycemic episodes  Denies any issues with his current regimen  home glucose monitoring: are performed sporadically    Patient did not bring his sugar log or glucometer to today's visit for review  He reports that his blood sugars have been well controlled ranging between 100-140  Current regimen:     Trulicity 1 5 mg weekly  Jardiance 25 mg daily    Last Eye Exam:  Overdue; patient is starting a new job in his insurance is changing  He is waiting to establish further appointments until his insurance is finalized  Last Foot Exam:   Up-to-date    For his low testosterone, patient has had variable serum testosterone levels  On 01/07/2021, free testosterone was 9 4 pg/mL with a total testosterone of 276 ng/dL  Patient's PCP repeated a total testosterone level that was completed this morning at 7:55 a m     This resulted as 192 ng /dL  He continues to report low libido but denies excessive fatigue, erectile dysfunction or loss of spontaneous erections, or heat intolerance  Patient does have a remote history of anabolic steroid use  He is following with Hematology Oncology for hereditary hemochromatosis  Phlebotomy has been suspended at this time  His next appointment is with physician Dr Milana Chatman in May  For his thyroid nodules, he is due for a repeat ultrasound of his thyroid in June  He denies any compressive symptoms      Patient Active Problem List   Diagnosis    [facility] [address] [facility]    [facility]reditary hemochromatosis [facility] [address] [facility]    Essential hypertension    Testosterone deficiency    Thyroid nodule    Fatigue    Midline low back pain without sciatica    Situational anxiety      Past Medical History:   Diagnosis Date    [facility] [address] [facility]     Hemochromatosis     Testosterone deficiency       Past Surgical History:   Procedure Laterality Date    WISDOM TOOTH EXTRACTION        Family History   Problem Relation Age of Onset    No Known Problems Mother     No Known Problems Father     Diabetes Family     Hypertension Family      Social History     Tobacco Use    Smoking status: Former Smoker     Packs/day: 0 25     Years: 10 00     Pack years: 2 50     Types: Cigarettes    Smokeless tobacco: Never Used   Substance Use Topics    Alcohol use: Not Currently     No Known Allergies      Current Outpatient Medications:     Dulaglutide 1 5 MG/0 5ML SOPN, Inject 0 5 mL (1 5 mg total) under the skin once a week, Disp: 12 pen, Rfl: 1    Empagliflozin (Jardiance) 25 MG TABS, Take 1 tablet (25 mg total) by mouth daily, Disp: 90 tablet, Rfl: 3    Review of Systems   Constitutional: Negative for activity change, appetite change, fatigue and unexpected weight change  HENT: Negative for sore throat, trouble swallowing and voice change  Eyes: Negative for visual disturbance  Respiratory: Negative for chest tightness and shortness of breath  Cardiovascular: Negative for chest pain, palpitations and leg swelling  Gastrointestinal: Negative for constipation, diarrhea, nausea and vomiting  Endocrine: Negative for cold intolerance, heat intolerance, polydipsia, polyphagia and polyuria  Genitourinary: Negative for frequency  Musculoskeletal: Negative for arthralgias, back pain, joint swelling and myalgias  Skin: Negative for wound  Neurological: Negative for dizziness, weakness, light-headedness, numbness and headaches  Psychiatric/Behavioral: Negative for decreased concentration, dysphoric mood and sleep disturbance   The patient is not nervous/anxious  Physical Exam:  Body mass index is 28 98 kg/m²  /68 (BP Location: Left arm, Cuff Size: Adult)   Pulse 93   Ht 5' 11" (1 803 m)   Wt 94 3 kg (207 lb 12 8 oz)   SpO2 99%   BMI 28 98 kg/m²    Wt Readings from Last 3 Encounters:   03/09/21 94 3 kg (207 lb 12 8 oz)   02/08/21 94 8 kg (209 lb)   01/20/21 92 1 kg (203 lb)       Physical Exam  Vitals signs reviewed  Constitutional:       Appearance: He is well-developed  HENT:      Head: Normocephalic and atraumatic  Comments: Mask in place  Eyes:      Pupils: Pupils are equal, round, and reactive to light  Neck:      Musculoskeletal: Normal range of motion and neck supple  Thyroid: No thyromegaly  Cardiovascular:      Rate and Rhythm: Normal rate and regular rhythm  Pulses: Normal pulses  Heart sounds: Normal heart sounds  No murmur  Pulmonary:      Effort: Pulmonary effort is normal       Breath sounds: Normal breath sounds  Abdominal:      General: Bowel sounds are normal  There is no distension  Palpations: Abdomen is soft  Tenderness: There is no abdominal tenderness  Musculoskeletal:      Right lower leg: No edema  Left lower leg: No edema  Lymphadenopathy:      Cervical: No cervical adenopathy  Skin:     General: Skin is warm and dry  Capillary Refill: Capillary refill takes less than 2 seconds  Neurological:      Mental Status: He is alert and oriented to person, place, and time        Gait: Gait normal    Psychiatric:         Mood and Affect: Mood normal          Behavior: Behavior normal            Labs:   Lab Results   Component Value Date    HGBA1C 5 8 (H) 03/09/2021     Lab Results   Component Value Date    CREATININE 1 10 03/09/2021    CREATININE 1 02 02/08/2021    CREATININE 1 08 01/20/2021    BUN 19 03/09/2021    K 4 1 03/09/2021     03/09/2021    CO2 31 03/09/2021     eGFR   Date Value Ref Range Status   03/09/2021 84 ml/min/1 73sq m Final Lab Results   Component Value Date    HDL 42 03/09/2021    TRIG 147 03/09/2021     Lab Results   Component Value Date    ALT 21 03/09/2021    AST 19 03/09/2021    ALKPHOS 65 03/09/2021     Lab Results   Component Value Date    [id number] 1 316 11/24/2020     No results found for: FREET4, TSI    Impression & Plan:    Problem List Items Addressed This Visit        Endocrine    Diabetes mellitus without complication ([facility] [address] )     Due to a lack of data, no changes could be addressed at the time of the appointment  Patient reports feeling well  Denies symptoms of hyper or hypoglycemia  Will review labs when they result  Continue to focus on diet and lifestyle modification  Relevant Medications    Dulaglutide 1 5 MG/0 5ML SOPN    Empagliflozin (Jardiance) 25 MG TABS    Thyroid nodule     Reminded patient to complete US after June  Cardiovascular and Mediastinum    Essential hypertension - Primary (Chronic)     BP stable at 112/68  Other    Testosterone deficiency (Chronic)     Patient continues to c/o decreased libido  However, fatigue and ED have resolved  At this juncture, in the context of evolution of care for hemochromatosis, will defer testosterone injections at this time  Fatigue     Continue 2,000 iu Vit D daily  Discussed with the patient and all questioned fully answered  He will call me if any problems arise  Follow-up appointment in 4 months       Counseled patient on diagnostic results, prognosis, risk and benefit of treatment options, instruction for management, importance of treatment compliance, Risk  factor reduction and impressions    SHABANA Díaz

## 2021-03-09 NOTE — ASSESSMENT & PLAN NOTE
Due to a lack of data, no changes could be addressed at the time of the appointment  Patient reports feeling well  Denies symptoms of hyper or hypoglycemia  Will review labs when they result  Continue to focus on diet and lifestyle modification

## 2021-03-09 NOTE — ASSESSMENT & PLAN NOTE
Patient continues to c/o decreased libido  However, fatigue and ED have resolved  At this juncture, in the context of evolution of care for hemochromatosis, will defer testosterone injections at this time

## 2021-03-10 DIAGNOSIS — Z23 ENCOUNTER FOR IMMUNIZATION: ICD-10-CM

## 2021-03-18 ENCOUNTER — IMMUNIZATIONS (OUTPATIENT)
Dept: FAMILY MEDICINE CLINIC | Facility: HOSPITAL | Age: 41
End: 2021-03-18

## 2021-03-18 DIAGNOSIS — Z23 ENCOUNTER FOR IMMUNIZATION: Primary | ICD-10-CM

## 2021-03-18 PROCEDURE — 91301 SARS-COV-2 / COVID-19 MRNA VACCINE (MODERNA) 100 MCG: CPT

## 2021-03-18 PROCEDURE — 0011A SARS-COV-2 / COVID-19 MRNA VACCINE (MODERNA) 100 MCG: CPT

## 2021-03-22 ENCOUNTER — APPOINTMENT (OUTPATIENT)
Dept: LAB | Facility: CLINIC | Age: 41
End: 2021-03-22
Payer: COMMERCIAL

## 2021-03-22 ENCOUNTER — OFFICE VISIT (OUTPATIENT)
Dept: INTERNAL MEDICINE CLINIC | Facility: CLINIC | Age: 41
End: 2021-03-22
Payer: COMMERCIAL

## 2021-03-22 VITALS
HEART RATE: 108 BPM | TEMPERATURE: 97.9 F | BODY MASS INDEX: 28.84 KG/M2 | OXYGEN SATURATION: 99 % | SYSTOLIC BLOOD PRESSURE: 110 MMHG | HEIGHT: 71 IN | DIASTOLIC BLOOD PRESSURE: 70 MMHG | WEIGHT: 206 LBS

## 2021-03-22 DIAGNOSIS — E11.9 DIABETES MELLITUS WITHOUT COMPLICATION (HCC): Primary | ICD-10-CM

## 2021-03-22 DIAGNOSIS — E83.110 HEREDITARY HEMOCHROMATOSIS (HCC): Chronic | ICD-10-CM

## 2021-03-22 DIAGNOSIS — F41.8 SITUATIONAL ANXIETY: ICD-10-CM

## 2021-03-22 DIAGNOSIS — K92.1 BLOOD IN STOOL: ICD-10-CM

## 2021-03-22 DIAGNOSIS — I10 ESSENTIAL HYPERTENSION: Chronic | ICD-10-CM

## 2021-03-22 PROCEDURE — 99214 OFFICE O/P EST MOD 30 MIN: CPT | Performed by: PHYSICIAN ASSISTANT

## 2021-03-22 PROCEDURE — 1036F TOBACCO NON-USER: CPT | Performed by: PHYSICIAN ASSISTANT

## 2021-03-22 PROCEDURE — 85025 COMPLETE CBC W/AUTO DIFF WBC: CPT

## 2021-03-22 PROCEDURE — 36415 COLL VENOUS BLD VENIPUNCTURE: CPT

## 2021-03-22 NOTE — PROGRESS NOTES
Assessment/Plan: red to maroonish blood in the stool  Recent CBC normal will check a CBC stool for blood and GI referral   Currently asymptomatic physical exam unremarkable       Diagnoses and all orders for this visit:    Diabetes mellitus without complication ([facility] [address] )    Essential hypertension    Hereditary hemochromatosis ([facility] [address] )    Situational anxiety    Blood in stool  -     CBC and differential; Future  -     Occult Blood, Fecal Immunochemical; Future  -     Ambulatory referral to Gastroenterology; Future        No problem-specific Assessment & Plan notes found for this encounter  Subjective:      Patient ID: Saray Kumar is a 45 y o  male  He has noted reddish to maroonish looking stool for the past 2 weeks getting more noticeable no rectal bleeding in between bowel movements no abdominal pain or pain in the anus or genitals no fever chills or difficulty urinating  He feels fine  History of hemochromatosis that has resolved  A diabetic followed by endocrinology diabetes very well controlled  Working every day at a physical job without any problem  No previous history of rectal or GI bleeding  No other bruising or skin rash      The following portions of the patient's history were reviewed and updated as appropriate:   He has a past medical history of Diabetes mellitus ([facility] [address] ), Hemochromatosis, and Testosterone deficiency  ,  does not have any pertinent problems on file  ,   has a past surgical history that includes Colbert tooth extraction  ,  family history includes Diabetes in his family; Hypertension in his family; No Known Problems in his father and mother  ,   reports that he has quit smoking  His smoking use included cigarettes  He has a 2 50 pack-year smoking history  He has never used smokeless tobacco  He reports previous alcohol use  He reports previous drug use ,  has No Known Allergies     Current Outpatient Medications   Medication Sig Dispense Refill    Dulaglutide 1 5 MG/0 5ML SOPN Inject 0 5 mL (1 5 mg total) under the skin once a week 12 pen 1    Empagliflozin (Jardiance) 25 MG TABS Take 1 tablet (25 mg total) by mouth daily 90 tablet 3     No current facility-administered medications for this visit  Review of Systems   Constitutional: Negative for chills and fever  HENT: Negative for ear pain and sore throat  Eyes: Negative for pain and visual disturbance  Respiratory: Negative for cough and shortness of breath  Cardiovascular: Negative for chest pain and palpitations  Gastrointestinal: Positive for blood in stool  Negative for abdominal pain, anal bleeding, constipation, diarrhea, nausea, rectal pain and vomiting  Genitourinary: Negative for dysuria and hematuria  Musculoskeletal: Negative for arthralgias and back pain  Skin: Negative for color change, pallor and rash  Neurological: Negative for seizures and syncope  All other systems reviewed and are negative  Objective:  Vitals:    03/22/21 1625   BP: 110/70   BP Location: Left arm   Patient Position: Sitting   Pulse: (!) 108   Temp: 97 9 °F (36 6 °C)   TempSrc: Tympanic   SpO2: 99%   Weight: 93 4 kg (206 lb)   Height: 5' 11" (1 803 m)     Body mass index is 28 73 kg/m²  Physical Exam  Vitals signs reviewed  Constitutional:       Appearance: He is well-developed  HENT:      Head: Normocephalic  Right Ear: Tympanic membrane and external ear normal       Left Ear: Tympanic membrane and external ear normal       Nose: Nose normal       Mouth/Throat:      Mouth: Mucous membranes are moist    Eyes:      Extraocular Movements: Extraocular movements intact  Conjunctiva/sclera: Conjunctivae normal       Pupils: Pupils are equal, round, and reactive to light  Neck:      Musculoskeletal: Normal range of motion and neck supple  Thyroid: No thyromegaly  Cardiovascular:      Rate and Rhythm: Normal rate and regular rhythm  Pulses: Normal pulses        Heart sounds: Normal heart sounds  No murmur  Pulmonary:      Effort: Pulmonary effort is normal  No respiratory distress  Breath sounds: Normal breath sounds  No stridor  No wheezing or rhonchi  Abdominal:      General: Abdomen is flat  Bowel sounds are normal  There is no distension  Palpations: Abdomen is soft  Tenderness: There is no abdominal tenderness  Musculoskeletal: Normal range of motion  General: No swelling  Skin:     General: Skin is warm and dry  Neurological:      General: No focal deficit present  Mental Status: He is alert and oriented to person, place, and time  Deep Tendon Reflexes: Reflexes are normal and symmetric  Psychiatric:         Mood and Affect: Mood normal          Thought Content:  Thought content normal          Judgment: Judgment normal

## 2021-03-23 ENCOUNTER — APPOINTMENT (OUTPATIENT)
Dept: LAB | Facility: HOSPITAL | Age: 41
End: 2021-03-23
Payer: COMMERCIAL

## 2021-03-23 DIAGNOSIS — K92.1 BLOOD IN STOOL: ICD-10-CM

## 2021-03-23 LAB
BASOPHILS # BLD AUTO: 0.07 THOUSANDS/ΜL (ref 0–0.1)
BASOPHILS NFR BLD AUTO: 1 % (ref 0–1)
EOSINOPHIL # BLD AUTO: 0.29 THOUSAND/ΜL (ref 0–0.61)
EOSINOPHIL NFR BLD AUTO: 3 % (ref 0–6)
ERYTHROCYTE [DISTWIDTH] IN BLOOD BY AUTOMATED COUNT: 12.8 % (ref 11.6–15.1)
HCT VFR BLD AUTO: 46.6 % (ref 36.5–49.3)
HEMOCCULT STL QL IA: POSITIVE
HGB BLD-MCNC: 15.1 G/DL (ref 12–17)
IMM GRANULOCYTES # BLD AUTO: 0.02 THOUSAND/UL (ref 0–0.2)
IMM GRANULOCYTES NFR BLD AUTO: 0 % (ref 0–2)
LYMPHOCYTES # BLD AUTO: 2.93 THOUSANDS/ΜL (ref 0.6–4.47)
LYMPHOCYTES NFR BLD AUTO: 34 % (ref 14–44)
MCH RBC QN AUTO: 27.6 PG (ref 26.8–34.3)
MCHC RBC AUTO-ENTMCNC: 32.4 G/DL (ref 31.4–37.4)
MCV RBC AUTO: 85 FL (ref 82–98)
MONOCYTES # BLD AUTO: 0.54 THOUSAND/ΜL (ref 0.17–1.22)
MONOCYTES NFR BLD AUTO: 6 % (ref 4–12)
NEUTROPHILS # BLD AUTO: 4.88 THOUSANDS/ΜL (ref 1.85–7.62)
NEUTS SEG NFR BLD AUTO: 56 % (ref 43–75)
NRBC BLD AUTO-RTO: 0 /100 WBCS
PLATELET # BLD AUTO: 241 THOUSANDS/UL (ref 149–390)
PMV BLD AUTO: 11.5 FL (ref 8.9–12.7)
RBC # BLD AUTO: 5.47 MILLION/UL (ref 3.88–5.62)
WBC # BLD AUTO: 8.73 THOUSAND/UL (ref 4.31–10.16)

## 2021-03-23 PROCEDURE — G0328 FECAL BLOOD SCRN IMMUNOASSAY: HCPCS

## 2021-03-31 ENCOUNTER — CONSULT (OUTPATIENT)
Dept: GASTROENTEROLOGY | Facility: CLINIC | Age: 41
End: 2021-03-31
Payer: COMMERCIAL

## 2021-03-31 ENCOUNTER — PREP FOR PROCEDURE (OUTPATIENT)
Dept: GASTROENTEROLOGY | Facility: CLINIC | Age: 41
End: 2021-03-31

## 2021-03-31 VITALS
DIASTOLIC BLOOD PRESSURE: 80 MMHG | BODY MASS INDEX: 28.77 KG/M2 | HEIGHT: 71 IN | SYSTOLIC BLOOD PRESSURE: 118 MMHG | WEIGHT: 205.5 LBS | HEART RATE: 105 BPM

## 2021-03-31 DIAGNOSIS — K92.1 BLOOD IN STOOL: ICD-10-CM

## 2021-03-31 DIAGNOSIS — K92.1 BLOOD IN STOOL: Primary | ICD-10-CM

## 2021-03-31 PROCEDURE — 99244 OFF/OP CNSLTJ NEW/EST MOD 40: CPT | Performed by: INTERNAL MEDICINE

## 2021-03-31 PROCEDURE — 3008F BODY MASS INDEX DOCD: CPT | Performed by: PHYSICIAN ASSISTANT

## 2021-03-31 NOTE — PROGRESS NOTES
[facility] - Outpatient Consultation  Zay Buenrostro 45 y o  male MRN: [id number]  Encounter: [id number]          ASSESSMENT AND PLAN:      1  Blood in stool  - Ambulatory referral to Gastroenterology    2  Hemoccult positive stool    3  History of hereditary Hemochromatosis    Schedule colonoscopy with a bowel prep    ______________________________________________________________________    HPI:  This 45year old male has noticed dark red rectal bleeding on multiple occasions over the past 3 weeks  He never had any similar symptoms in the past    There has been no melena or hematemesis  He denies any change in the bowel habits or abdominal pain  There has been no episodes of nausea, vomiting, diarrhea, constipation, bloating, gas, heartburn, dysphagia, odynophagia, pain in the rectum, palpable external hemorrhoids  He does have occasional heartburn  He has a daily bowel movement  He has never undergone colonoscopy  There is no family history of colon cancer or colon polyps  SCULOSKELETAL: Denies any muscle or joint pain  SKIN: Denies skin rashes or itching  ENDOCRINE: Denies excessive thirst  Denies intolerance to heat or cold  PSYCHOSOCIAL: Denies depression or anxiety  Denies any recent memory loss         Historical Information   Past Medical History:   Diagnosis Date    Diabetes mellitus ([facility] [address] [facility]     Hemochromatosis     Testosterone deficiency      Past Surgical History:   Procedure Laterality Date    WISDOM TOOTH EXTRACTION       Social History   Social History     Substance and Sexual Activity   Alcohol Use Not Currently     Social History     Substance and Sexual Activity   Drug Use Not Currently     Social History     Tobacco Use   Smoking Status Former Smoker    Packs/day: 0 25    Years: 10 00    Pack years: 2 50    Types: Cigarettes   Smokeless Tobacco Never Used     Family History   Problem Relation Age of Onset    No Known Problems Mother    Sri Meghan No Known Problems Father     Diabetes Family     Hypertension Family        Meds/Allergies       Current Outpatient Medications:     Dulaglutide 1 5 MG/0 5ML SOPN    Empagliflozin (Jardiance) 25 MG TABS    No Known Allergies        Objective     Blood pressure 118/80, pulse 105, height 5' 11" (1 803 m), weight 93 2 kg (205 lb 8 oz)  Body mass index is 28 66 kg/m²  PHYSICAL EXAM:      General Appearance:   Alert, cooperative, no distress   HEENT:   Normocephalic, atraumatic, anicteric      Neck:  Supple, symmetrical, trachea midline   Lungs:   Clear to auscultation bilaterally; no rales, rhonchi or wheezing; respirations unlabored    Heart[de-identified]   Regular rate and rhythm; no murmur, rub, or gallop  Abdomen:   Soft, non-tender, non-distended; normal bowel sounds; no masses, no organomegaly    Genitalia:   Deferred    Rectal:   Deferred    Extremities:  No cyanosis, clubbing or edema    Pulses:  2+ and symmetric    Skin:  No jaundice, rashes, or lesions    Lymph nodes:  No palpable cervical lymphadenopathy        Lab Results:   No visits with results within 1 Day(s) from this visit  Latest known visit with results is:   Appointment on 03/23/2021   Component Date Value    OCCULT BLD, FECAL IMMUNO* 03/23/2021 Positive*         Radiology Results:   No results found

## 2021-04-13 ENCOUNTER — HOSPITAL ENCOUNTER (OUTPATIENT)
Dept: GASTROENTEROLOGY | Facility: HOSPITAL | Age: 41
Setting detail: OUTPATIENT SURGERY
Discharge: HOME | End: 2021-04-13
Attending: INTERNAL MEDICINE
Payer: COMMERCIAL

## 2021-04-13 ENCOUNTER — ANESTHESIA EVENT (OUTPATIENT)
Dept: GASTROENTEROLOGY | Facility: HOSPITAL | Age: 41
End: 2021-04-13

## 2021-04-13 ENCOUNTER — APPOINTMENT (OUTPATIENT)
Dept: LAB | Facility: HOSPITAL | Age: 41
End: 2021-04-13
Attending: INTERNAL MEDICINE
Payer: COMMERCIAL

## 2021-04-13 ENCOUNTER — TREATMENT (OUTPATIENT)
Dept: GASTROENTEROLOGY | Facility: CLINIC | Age: 41
End: 2021-04-13

## 2021-04-13 ENCOUNTER — ANESTHESIA (OUTPATIENT)
Dept: GASTROENTEROLOGY | Facility: HOSPITAL | Age: 41
End: 2021-04-13

## 2021-04-13 VITALS
BODY MASS INDEX: 28.97 KG/M2 | DIASTOLIC BLOOD PRESSURE: 70 MMHG | HEIGHT: 70 IN | TEMPERATURE: 97.7 F | RESPIRATION RATE: 18 BRPM | OXYGEN SATURATION: 99 % | SYSTOLIC BLOOD PRESSURE: 113 MMHG | WEIGHT: 202.38 LBS | HEART RATE: 68 BPM

## 2021-04-13 DIAGNOSIS — C18.7 CANCER OF SIGMOID COLON (HCC): ICD-10-CM

## 2021-04-13 DIAGNOSIS — C18.7 CANCER OF SIGMOID COLON (HCC): Primary | ICD-10-CM

## 2021-04-13 DIAGNOSIS — K92.1 BLOOD IN STOOL: ICD-10-CM

## 2021-04-13 LAB
CEA SERPL-MCNC: <0.5 NG/ML (ref 0–3)
GLUCOSE SERPL-MCNC: 107 MG/DL (ref 65–140)

## 2021-04-13 PROCEDURE — 88305 TISSUE EXAM BY PATHOLOGIST: CPT | Performed by: PATHOLOGY

## 2021-04-13 PROCEDURE — 45385 COLONOSCOPY W/LESION REMOVAL: CPT | Performed by: INTERNAL MEDICINE

## 2021-04-13 PROCEDURE — 88341 IMHCHEM/IMCYTCHM EA ADD ANTB: CPT | Performed by: PATHOLOGY

## 2021-04-13 PROCEDURE — 88342 IMHCHEM/IMCYTCHM 1ST ANTB: CPT | Performed by: PATHOLOGY

## 2021-04-13 PROCEDURE — 82948 REAGENT STRIP/BLOOD GLUCOSE: CPT

## 2021-04-13 PROCEDURE — 45381 COLONOSCOPY SUBMUCOUS NJX: CPT | Performed by: INTERNAL MEDICINE

## 2021-04-13 PROCEDURE — 36415 COLL VENOUS BLD VENIPUNCTURE: CPT

## 2021-04-13 PROCEDURE — 82378 CARCINOEMBRYONIC ANTIGEN: CPT

## 2021-04-13 PROCEDURE — 45380 COLONOSCOPY AND BIOPSY: CPT | Performed by: INTERNAL MEDICINE

## 2021-04-13 RX ORDER — PROPOFOL 10 MG/ML
INJECTION, EMULSION INTRAVENOUS AS NEEDED
Status: DISCONTINUED | OUTPATIENT
Start: 2021-04-13 | End: 2021-04-13

## 2021-04-13 RX ORDER — LIDOCAINE HYDROCHLORIDE 10 MG/ML
INJECTION, SOLUTION EPIDURAL; INFILTRATION; INTRACAUDAL; PERINEURAL AS NEEDED
Status: DISCONTINUED | OUTPATIENT
Start: 2021-04-13 | End: 2021-04-13

## 2021-04-13 RX ORDER — SODIUM CHLORIDE, SODIUM LACTATE, POTASSIUM CHLORIDE, CALCIUM CHLORIDE 600; 310; 30; 20 MG/100ML; MG/100ML; MG/100ML; MG/100ML
125 INJECTION, SOLUTION INTRAVENOUS CONTINUOUS
Status: CANCELLED | OUTPATIENT
Start: 2021-04-13

## 2021-04-13 RX ORDER — SODIUM CHLORIDE, SODIUM LACTATE, POTASSIUM CHLORIDE, CALCIUM CHLORIDE 600; 310; 30; 20 MG/100ML; MG/100ML; MG/100ML; MG/100ML
INJECTION, SOLUTION INTRAVENOUS CONTINUOUS PRN
Status: DISCONTINUED | OUTPATIENT
Start: 2021-04-13 | End: 2021-04-13

## 2021-04-13 RX ADMIN — PROPOFOL 30 MG: 10 INJECTION, EMULSION INTRAVENOUS at 07:54

## 2021-04-13 RX ADMIN — PROPOFOL 40 MG: 10 INJECTION, EMULSION INTRAVENOUS at 07:48

## 2021-04-13 RX ADMIN — PROPOFOL 20 MG: 10 INJECTION, EMULSION INTRAVENOUS at 08:05

## 2021-04-13 RX ADMIN — PROPOFOL 30 MG: 10 INJECTION, EMULSION INTRAVENOUS at 07:51

## 2021-04-13 RX ADMIN — PROPOFOL 30 MG: 10 INJECTION, EMULSION INTRAVENOUS at 07:57

## 2021-04-13 RX ADMIN — PROPOFOL 40 MG: 10 INJECTION, EMULSION INTRAVENOUS at 07:45

## 2021-04-13 RX ADMIN — SODIUM CHLORIDE, SODIUM LACTATE, POTASSIUM CHLORIDE, AND CALCIUM CHLORIDE: .6; .31; .03; .02 INJECTION, SOLUTION INTRAVENOUS at 06:53

## 2021-04-13 RX ADMIN — PROPOFOL 120 MG: 10 INJECTION, EMULSION INTRAVENOUS at 07:43

## 2021-04-13 RX ADMIN — LIDOCAINE HYDROCHLORIDE 50 MG: 10 INJECTION, SOLUTION EPIDURAL; INFILTRATION; INTRACAUDAL; PERINEURAL at 07:43

## 2021-04-13 RX ADMIN — PROPOFOL 30 MG: 10 INJECTION, EMULSION INTRAVENOUS at 08:03

## 2021-04-13 RX ADMIN — PROPOFOL 30 MG: 10 INJECTION, EMULSION INTRAVENOUS at 08:00

## 2021-04-13 NOTE — DISCHARGE INSTRUCTIONS
Colonoscopy   WHAT YOU NEED TO KNOW:   A colonoscopy is a procedure to examine the inside of your colon (intestine) with a scope  Polyps or tissue growths may have been removed during your colonoscopy  It is normal to feel bloated and to have some abdominal discomfort  You should be passing gas  If you have hemorrhoids or you had polyps removed, you may have a small amount of bleeding  DISCHARGE INSTRUCTIONS:   Call your doctor if:   · You have a large amount of bright red blood in your bowel movements  · Your abdomen is hard and firm and you have severe pain  · You have sudden trouble breathing  · You develop a rash or hives  · You have a fever within 24 hours of your procedure  · You have not had a bowel movement for 3 days after your procedure  · You have questions or concerns about your condition or care  After your colonoscopy:   · Do not lift, strain, or run  for 3 days  · Rest as much as possible  You have been given medicine to relax you  Do not  drive or make important decisions for at least 24 hours  Return to your normal activity as directed  · Relieve gas and discomfort from bloating  by lying on your left side with a heating pad on your abdomen  You may need to take short walks to help the gas move out  Eat small meals until bloating is relieved  If you had polyps removed: For 7 days after your procedure:  · Do not  take aspirin  · Do not  go on long car rides  Help prevent constipation:   · Eat a variety of healthy foods  Healthy foods include fruit, vegetables, whole-grain breads, low-fat dairy products, beans, lean meat, and fish  Ask if you need to be on a special diet  Your healthcare provider may recommend that you eat high-fiber foods such as cooked beans  Fiber helps you have regular bowel movements  · Drink liquids as directed  Adults should drink between 9 and 13 eight-ounce cups of liquid every day  Ask what amount is best for you   For most people, good liquids to drink are water, juice, and milk  · Exercise as directed  Talk to your healthcare provider about the best exercise plan for you  Exercise can help prevent constipation, decrease your blood pressure and improve your health  Follow up with your healthcare provider as directed:  Write down your questions so you remember to ask them during your visits  © Copyright [address] Information is for End User's use only and may not be sold, redistributed or otherwise used for commercial purposes  All illustrations and images included in CareNotes® are the copyrighted property of A D A M , Inc  or [address] [place], [place] [address]ronald Alarcon   The above information is an  only  It is not intended as medical advice for individual conditions or treatments  Talk to your doctor, nurse or pharmacist before following any medical regimen to see if it is safe and effective for you

## 2021-04-13 NOTE — H&P
History and Physical - [facility]  Clarice Mccord 37 y o  male MRN: [id number]      HPI: Clarice Mccord is a 37y o  year old male who presents for evaluation of rectal bleeding      REVIEW OF SYSTEMS: Per the HPI, and otherwise unremarkable  Historical Information   Past Medical History:   Diagnosis Date    [facility] [address] [facility]     Hemochromatosis     Testosterone deficiency      Past Surgical History:   Procedure Laterality Date    WISDOM TOOTH EXTRACTION       Social History   Social History     Substance and Sexual Activity   Alcohol Use Not Currently     Social History     Substance and Sexual Activity   Drug Use Not Currently     Social History     Tobacco Use   Smoking Status Former Smoker    Packs/day: 0 25    Years: 10 00    Pack years: 2 50    Types: Cigarettes   Smokeless Tobacco Never Used     Family History   Problem Relation Age of Onset    No Known Problems Mother     No Known Problems Father     Diabetes Family     Hypertension Family        Meds/Allergies     (Not in a hospital admission)      No Known Allergies    Objective     Blood pressure 120/67, pulse 86, temperature 97 9 °F (36 6 °C), temperature source Temporal, resp  rate 20, height 5' 10" (1 778 m), weight 91 8 kg (202 lb 6 1 oz), SpO2 98 %  PHYSICAL EXAM    Gen: NAD  CV: RRR  CHEST: Clear  ABD: soft, NT/ND  EXT: no edema      ASSESSMENT/PLAN:  This is a 37y o  year old male here for colonoscopy, and he is stable and optimized for his procedure

## 2021-04-13 NOTE — ANESTHESIA PREPROCEDURE EVALUATION
Procedure:  COLONOSCOPY    Relevant Problems   MUSCULOSKELETAL   (+) Midline low back pain without sciatica      NEURO/PSYCH   (+) Situational anxiety      Other   (+) Diabetes mellitus without complication (HCC)   (+) Hereditary hemochromatosis (HCC)        Physical Exam    Airway    Mallampati score: II  TM Distance: >3 FB  Neck ROM: full     Dental   Comment: Denies loose teeth,     Cardiovascular  Cardiovascular exam normal    Pulmonary  Pulmonary exam normal     Other Findings  Portions of exam deferred due to low yield and/or unknown COVID status      Anesthesia Plan  ASA Score- 2     Anesthesia Type- IV sedation with anesthesia with ASA Monitors  Additional Monitors:   Airway Plan:           Plan Factors-Exercise tolerance (METS): >4 METS  Chart reviewed  Existing labs reviewed  Patient summary reviewed  Patient is not a current smoker  Induction- intravenous  Postoperative Plan-     Informed Consent- Anesthetic plan and risks discussed with patient  I personally reviewed this patient with the CRNA  Discussed and agreed on the Anesthesia Plan with the CRNA  Hillary Walker

## 2021-04-13 NOTE — ANESTHESIA POSTPROCEDURE EVALUATION
Post-Op Assessment Note    CV Status:  Stable  Pain Score: 0    Pain management: adequate     Mental Status:  Sleepy   Hydration Status:  Euvolemic   PONV Controlled:  Controlled   Airway Patency:  Patent and adequate      Post Op Vitals Reviewed: Yes      Staff: CRNA         No complications documented      BP 95/58 (04/13/21 0810)    Temp 97 7 °F (36 5 °C) (04/13/21 0810)    Pulse 75 (04/13/21 0810)   Resp 18 (04/13/21 0810)    SpO2 98 % (04/13/21 0810)

## 2021-04-14 ENCOUNTER — TELEPHONE (OUTPATIENT)
Dept: HEMATOLOGY ONCOLOGY | Facility: CLINIC | Age: 41
End: 2021-04-14

## 2021-04-14 NOTE — TELEPHONE ENCOUNTER
New Patient GI Form   Patient Details:  Reola Jeans  1980  [id number]     Background Information:  [address] starts by opening a telephone encounter and gathering the following information   Who is calling to schedule and relationship?  self   To which speciality is the referral?  Surgical Oncology   Reason for Visit? New Diagnosis   Tumor Type? Cancer of sigmoid colon   Is there a confimed diagnosis from biopsy/tissue reviewed by Pathology? Yes   Date of Tissue Diagnosis  (If done outside of [facility] please obtain report and slides)  (If no tissue diagnosis, please stop and discuss with Navigator prior to scheduling)  4/13 - results pending   Is patient aware of the diagnosis? Yes   Has Imaging been completed? Yes   If YES, where was the imaging done? (If outside [place] [address] obtain records)  CT - 4/19   Have any endoscopies been done (colonoscopy, EGD, EUS)  Yes   If YES where were they performed? (If outside of [address] [place] [address] obtain the records)  4/13   Has blood work been done? Yes    If YES, where was the blood work done? (If outside of [facility] obtain records)  [facility]   Is there a personal history of cancer? (If YES please list type)  No   Is there a family history of cancer? (If YES please list type)  Yes - prostate pa gfather   Scheduling Information:   Preferred [facility]   Are there any days the patient cannot be seen? Miscellaneous: first available   After completing the above information, please route to finance, nurse navigation and clinical trials for review

## 2021-04-16 ENCOUNTER — IMMUNIZATIONS (OUTPATIENT)
Dept: FAMILY MEDICINE CLINIC | Facility: HOSPITAL | Age: 41
End: 2021-04-16

## 2021-04-16 DIAGNOSIS — Z23 ENCOUNTER FOR IMMUNIZATION: Primary | ICD-10-CM

## 2021-04-16 PROCEDURE — 0012A SARS-COV-2 / COVID-19 MRNA VACCINE (MODERNA) 100 MCG: CPT

## 2021-04-16 PROCEDURE — 91301 SARS-COV-2 / COVID-19 MRNA VACCINE (MODERNA) 100 MCG: CPT

## 2021-04-19 ENCOUNTER — HOSPITAL ENCOUNTER (OUTPATIENT)
Dept: RADIOLOGY | Facility: HOSPITAL | Age: 41
Discharge: HOME | End: 2021-04-19
Payer: COMMERCIAL

## 2021-04-19 ENCOUNTER — TRANSCRIBE ORDERS (OUTPATIENT)
Dept: RADIOLOGY | Facility: HOSPITAL | Age: 41
End: 2021-04-19

## 2021-04-19 DIAGNOSIS — C18.7 CANCER OF SIGMOID COLON (HCC): ICD-10-CM

## 2021-04-19 PROCEDURE — 74177 CT ABD & PELVIS W/CONTRAST: CPT

## 2021-04-19 PROCEDURE — 71260 CT THORAX DX C+: CPT

## 2021-04-19 PROCEDURE — G1004 CDSM NDSC: HCPCS

## 2021-04-19 RX ADMIN — IOHEXOL 100 ML: 350 INJECTION, SOLUTION INTRAVENOUS at 17:23

## 2021-04-20 PROBLEM — C18.7 CANCER OF SIGMOID COLON (HCC): Status: ACTIVE | Noted: 2021-04-20

## 2021-04-22 ENCOUNTER — OFFICE VISIT (OUTPATIENT)
Dept: LAB | Facility: HOSPITAL | Age: 41
End: 2021-04-22
Attending: STUDENT IN AN ORGANIZED HEALTH CARE EDUCATION/TRAINING PROGRAM
Payer: COMMERCIAL

## 2021-04-22 ENCOUNTER — CONSULT (OUTPATIENT)
Dept: SURGICAL ONCOLOGY | Facility: CLINIC | Age: 41
End: 2021-04-22
Payer: COMMERCIAL

## 2021-04-22 ENCOUNTER — TELEPHONE (OUTPATIENT)
Dept: GASTROENTEROLOGY | Facility: CLINIC | Age: 41
End: 2021-04-22

## 2021-04-22 VITALS
SYSTOLIC BLOOD PRESSURE: 130 MMHG | HEIGHT: 70 IN | WEIGHT: 208.8 LBS | BODY MASS INDEX: 29.89 KG/M2 | TEMPERATURE: 98.7 F | OXYGEN SATURATION: 97 % | RESPIRATION RATE: 18 BRPM | DIASTOLIC BLOOD PRESSURE: 74 MMHG | HEART RATE: 86 BPM

## 2021-04-22 DIAGNOSIS — C18.7 CANCER OF SIGMOID COLON (HCC): ICD-10-CM

## 2021-04-22 DIAGNOSIS — C18.7 CANCER OF SIGMOID COLON (HCC): Primary | ICD-10-CM

## 2021-04-22 LAB
ABO GROUP BLD: NORMAL
ANION GAP SERPL CALCULATED.3IONS-SCNC: 6 MMOL/L (ref 4–13)
APTT PPP: 31 SECONDS (ref 23–37)
ATRIAL RATE: 76 BPM
BASOPHILS # BLD AUTO: 0.06 THOUSANDS/ΜL (ref 0–0.1)
BASOPHILS NFR BLD AUTO: 1 % (ref 0–1)
BLD GP AB SCN SERPL QL: NEGATIVE
BUN SERPL-MCNC: 19 MG/DL (ref 5–25)
CALCIUM SERPL-MCNC: 9.9 MG/DL (ref 8.3–10.1)
CHLORIDE SERPL-SCNC: 105 MMOL/L (ref 100–108)
CO2 SERPL-SCNC: 28 MMOL/L (ref 21–32)
CREAT SERPL-MCNC: 1.01 MG/DL (ref 0.6–1.3)
EOSINOPHIL # BLD AUTO: 0.19 THOUSAND/ΜL (ref 0–0.61)
EOSINOPHIL NFR BLD AUTO: 3 % (ref 0–6)
ERYTHROCYTE [DISTWIDTH] IN BLOOD BY AUTOMATED COUNT: 12.9 % (ref 11.6–15.1)
GFR SERPL CREATININE-BSD FRML MDRD: 93 ML/MIN/1.73SQ M
GLUCOSE P FAST SERPL-MCNC: 85 MG/DL (ref 65–99)
HCT VFR BLD AUTO: 47.9 % (ref 36.5–49.3)
HGB BLD-MCNC: 15.2 G/DL (ref 12–17)
IMM GRANULOCYTES # BLD AUTO: 0.02 THOUSAND/UL (ref 0–0.2)
IMM GRANULOCYTES NFR BLD AUTO: 0 % (ref 0–2)
INR PPP: 1.01 (ref 0.84–1.19)
LYMPHOCYTES # BLD AUTO: 2.45 THOUSANDS/ΜL (ref 0.6–4.47)
LYMPHOCYTES NFR BLD AUTO: 35 % (ref 14–44)
MCH RBC QN AUTO: 27 PG (ref 26.8–34.3)
MCHC RBC AUTO-ENTMCNC: 31.7 G/DL (ref 31.4–37.4)
MCV RBC AUTO: 85 FL (ref 82–98)
MONOCYTES # BLD AUTO: 0.44 THOUSAND/ΜL (ref 0.17–1.22)
MONOCYTES NFR BLD AUTO: 6 % (ref 4–12)
NEUTROPHILS # BLD AUTO: 3.88 THOUSANDS/ΜL (ref 1.85–7.62)
NEUTS SEG NFR BLD AUTO: 55 % (ref 43–75)
NRBC BLD AUTO-RTO: 0 /100 WBCS
P AXIS: 65 DEGREES
PLATELET # BLD AUTO: 257 THOUSANDS/UL (ref 149–390)
PMV BLD AUTO: 11.3 FL (ref 8.9–12.7)
POTASSIUM SERPL-SCNC: 4.2 MMOL/L (ref 3.5–5.3)
PR INTERVAL: 176 MS
PROTHROMBIN TIME: 13.3 SECONDS (ref 11.6–14.5)
QRS AXIS: 81 DEGREES
QRSD INTERVAL: 90 MS
QT INTERVAL: 378 MS
QTC INTERVAL: 425 MS
RBC # BLD AUTO: 5.62 MILLION/UL (ref 3.88–5.62)
RH BLD: POSITIVE
SODIUM SERPL-SCNC: 139 MMOL/L (ref 136–145)
SPECIMEN EXPIRATION DATE: NORMAL
T WAVE AXIS: 58 DEGREES
VENTRICULAR RATE: 76 BPM
WBC # BLD AUTO: 7.04 THOUSAND/UL (ref 4.31–10.16)

## 2021-04-22 PROCEDURE — 3008F BODY MASS INDEX DOCD: CPT | Performed by: STUDENT IN AN ORGANIZED HEALTH CARE EDUCATION/TRAINING PROGRAM

## 2021-04-22 PROCEDURE — 85025 COMPLETE CBC W/AUTO DIFF WBC: CPT

## 2021-04-22 PROCEDURE — 3078F DIAST BP <80 MM HG: CPT | Performed by: STUDENT IN AN ORGANIZED HEALTH CARE EDUCATION/TRAINING PROGRAM

## 2021-04-22 PROCEDURE — 85730 THROMBOPLASTIN TIME PARTIAL: CPT

## 2021-04-22 PROCEDURE — 93010 ELECTROCARDIOGRAM REPORT: CPT | Performed by: INTERNAL MEDICINE

## 2021-04-22 PROCEDURE — 99245 OFF/OP CONSLTJ NEW/EST HI 55: CPT | Performed by: STUDENT IN AN ORGANIZED HEALTH CARE EDUCATION/TRAINING PROGRAM

## 2021-04-22 PROCEDURE — 36415 COLL VENOUS BLD VENIPUNCTURE: CPT

## 2021-04-22 PROCEDURE — 3075F SYST BP GE 130 - 139MM HG: CPT | Performed by: STUDENT IN AN ORGANIZED HEALTH CARE EDUCATION/TRAINING PROGRAM

## 2021-04-22 PROCEDURE — 86900 BLOOD TYPING SEROLOGIC ABO: CPT

## 2021-04-22 PROCEDURE — 85610 PROTHROMBIN TIME: CPT

## 2021-04-22 PROCEDURE — 93005 ELECTROCARDIOGRAM TRACING: CPT

## 2021-04-22 PROCEDURE — 86850 RBC ANTIBODY SCREEN: CPT

## 2021-04-22 PROCEDURE — 1036F TOBACCO NON-USER: CPT | Performed by: STUDENT IN AN ORGANIZED HEALTH CARE EDUCATION/TRAINING PROGRAM

## 2021-04-22 PROCEDURE — 86901 BLOOD TYPING SEROLOGIC RH(D): CPT

## 2021-04-22 PROCEDURE — 80048 BASIC METABOLIC PNL TOTAL CA: CPT

## 2021-04-22 RX ORDER — NEOMYCIN SULFATE 500 MG/1
2000 TABLET ORAL ONCE
Qty: 4 TABLET | Refills: 0 | Status: SHIPPED | OUTPATIENT
Start: 2021-04-22 | End: 2021-04-22

## 2021-04-22 RX ORDER — METRONIDAZOLE 500 MG/1
2000 TABLET ORAL ONCE
Qty: 4 TABLET | Refills: 0 | Status: SHIPPED | OUTPATIENT
Start: 2021-04-22 | End: 2021-04-22

## 2021-04-22 RX ORDER — CEFAZOLIN SODIUM 2 G/50ML
2000 SOLUTION INTRAVENOUS ONCE
Status: CANCELLED | OUTPATIENT
Start: 2021-04-22

## 2021-04-22 NOTE — H&P (VIEW-ONLY)
Surgical Oncology Consultation    [facility] CANCER CARE SURGICAL ONCOLOGY [address] [facility]  [phone]    Patient:  Becca Pace  1980  [id number]    Primary Care provider:  Joby Mena DO  [address] [address]    Referring provider:  Tamie Castrejon DO  [address]  [address],  [address]    Diagnoses and all orders for this visit:    Cancer of sigmoid colon [facility])  -     Ambulatory referral to Surgical Oncology  -     Ambulatory Referral to Oncology Genetics; Future  -     Case request operating room: RESECTION SIGMOID COLON LAPAROSCOPIC W ROBOTICS; Standing  -     Type and screen; Future  -     CBC and differential; Future  -     APTT; Future  -     Protime-INR; Future  -     metroNIDAZOLE (FLAGYL) 500 mg tablet; Take 4 tablets (2,000 mg total) by mouth once for 1 dose Take 4 tablets at bedtime on the evening prior to surgery  -     neomycin (MYCIFRADIN) 500 mg tablet; Take 4 tablets (2,000 mg total) by mouth once for 1 dose Take 4 tablets on the evening prior to surgery  -     EKG 12 lead; Future  -     Case request operating room: RESECTION SIGMOID COLON LAPAROSCOPIC W ROBOTICS    Other orders  -     Incentive spirometry; Standing  -     Insert and maintain IV line; Standing  -     Void On-Call to O R ; Standing  -     Place sequential compression device; Standing  -     Cancel: PAT Covid Screening; Future  -     Cancel: Comprehensive metabolic panel; Future  -     Nursing communication Please give pre-op Carbohydrate drink to patient 2-4 hours prior to surgery (Drink is provided by [facility]); Standing  -     ceFAZolin (ANCEF) IVPB (premix in dextrose) 2,000 mg 50 mL  -     metroNIDAZOLE (FLAGYL) IVPB (premix) 500 mg 100 mL        Chief Complaint   Patient presents with   Sharee Roberts     Pt is here for consult for newly diagnosed colon cancer    Reports bloody stools x 2 months  Denies N/V, weight loss or abd pain  No follow-ups on file  Oncology History    No history exists  History of Present Illness  : Roxy Skiff is a 45-year-old gentleman seen today with a new diagnosis of colon cancer  He had been experiencing bleeding per rectum for about two months  No other symptoms of pain, weight loss, n/v, changes in bowel habits, bloating  Colonoscopy performed revealing two benign polyps and an adenocarcinoma of the sigmoid colon, MATTHEW  The colonoscopy was complete to the cecum and no other polyps were seen  On cross-sectional imaging there was some mild thickening of the sigmoid colon noted  There was also a 5 mm nondistinct liver lesion noted as well as two subcentimeter lung nodules  CEA undetectable    The patient has a personal history of hemochromatosis for which she previously phlebotomy has not in some time  Follows Dr Clara Andre and his team for management of this  He also has a personal history of poorly controlled type 2 diabetes however recently this is much better controlled  No surgical history  Has family history prostate cancer in his grandparents  I personally reviewed the patient's imaging, colonoscopy, pathology  I discussed the patient with my colleague Scout Josue  Review of Systems  Complete ROS Surg Onc:   Constitutional: The patient denies new or recent history of general fatigue, no recent weight loss, no change in appetite  Eyes: No complaints of visual problems, no scleral icterus  ENT: No complaints of ear pain, no hoarseness, no difficulty swallowing,  no tinnitus and no new masses in head, oral cavity, or neck  Cardiovascular: No complaints of chest pain, no palpitations, no ankle edema  Respiratory: No complaints of shortness of breath, no cough  Gastrointestinal: No complaints of jaundice, no bloody stools, no pale stools     Genitourinary: No complaints of dysuria, no hematuria, no nocturia, no frequent urination, no urethral discharge  Musculoskeletal: No complaints of weakness, paralysis, joint stiffness or arthralgias  Integumentary: No complaints of rash, no new lesions  Neurological: No complaints of convulsions, no seizures, no dizziness  Hematologic/Lymphatic: No complaints of easy bruising  Endocrine:  No hot or cold intolerance  No polydipsia, polyphagia, or polyuria  Allergy/immunology:  No environmental allergies  No food allergies  Not immunocompromised        Patient Active Problem List   Diagnosis    Diabetes mellitus without complication ([place] [address] )    Hereditary hemochromatosis ([place] [address] )    Essential hypertension    Testosterone deficiency    Thyroid nodule    Fatigue    Midline low back pain without sciatica    Situational anxiety    Cancer of sigmoid colon ([place] [address] )     Past Medical History:   Diagnosis Date    Diabetes mellitus ([place] [address] )     Hemochromatosis     Testosterone deficiency      Past Surgical History:   Procedure Laterality Date    WISDOM TOOTH EXTRACTION       Family History   Problem Relation Age of Onset    Colon polyps Mother     No Known Problems Father     Colon polyps Maternal Grandmother     Prostate cancer Maternal Grandfather     Lung cancer Paternal Grandmother     Diabetes Paternal Grandmother     Prostate cancer Paternal Grandfather     Heart disease Paternal Grandfather     Kidney failure Paternal Grandfather     Diabetes Family     Hypertension Family      Social History     Socioeconomic History    Marital status:      Spouse name: Not on file    Number of children: Not on file    Years of education: Not on file    Highest education level: Not on file   Occupational History    Not on file   Social Needs    Financial resource strain: Not on file    Food insecurity     Worry: Not on file     Inability: Not on file    Transportation needs     Medical: Not on file     Non-medical: Not on file   Tobacco Use    Smoking status: Former Smoker     Packs/day: 0 25     Years: 10 00     Pack years: 2 50     Types: Cigarettes    Smokeless tobacco: Never Used   Substance and Sexual Activity    Alcohol use: Not Currently    Drug use: Not Currently    Sexual activity: Yes     Partners: Female     Birth control/protection: Condom Male   Lifestyle    Physical activity     Days per week: 5 days     Minutes per session: 60 min    Stress: Only a little   Relationships    Social connections     Talks on phone: Not on file     Gets together: Not on file     Attends Anabaptist service: Not on file     Active member of club or organization: Not on file     Attends meetings of clubs or organizations: Not on file     Relationship status: Not on file    Intimate partner violence     Fear of current or ex partner: Not on file     Emotionally abused: Not on file     Physically abused: Not on file     Forced sexual activity: Not on file   Other Topics Concern    Not on file   Social History Narrative    Not on file       Current Outpatient Medications:     Dulaglutide 1 5 MG/0 5ML SOPN, Inject 0 5 mL (1 5 mg total) under the skin once a week, Disp: 12 pen, Rfl: 1    Empagliflozin (Jardiance) 25 MG TABS, Take 1 tablet (25 mg total) by mouth daily, Disp: 90 tablet, Rfl: 3    metroNIDAZOLE (FLAGYL) 500 mg tablet, Take 4 tablets (2,000 mg total) by mouth once for 1 dose Take 4 tablets at bedtime on the evening prior to surgery  , Disp: 4 tablet, Rfl: 0    neomycin (MYCIFRADIN) 500 mg tablet, Take 4 tablets (2,000 mg total) by mouth once for 1 dose Take 4 tablets on the evening prior to surgery  , Disp: 4 tablet, Rfl: 0  No Known Allergies    Vitals:    04/22/21 0849   BP: 130/74   Pulse: 86   Resp: 18   Temp: 98 7 °F (37 1 °C)   SpO2: 97%       Physical Exam   General: Appears well, appears stated age  Skin: Warm, anicteric  HEENT: Normocephalic, atraumatic; sclera aniceteric, mucous membranes moist; cervical nodes without adenopathy  Cardiopulmonary: RRR, Easy WOB, no BLE edema  Abd: Flat and soft, nontender, no masses appreciated, no hepatosplenomegaly  MSK: Symmetric, no cyanosis, no overt weakness  Lymphatic: No cervical, axillary or inguinal lymphadenopathy  Neuro: Affect appropriate, no gross motor abnormalities    IMPRESSION:  1   malignant mass at 40 cm from the anal verge, sigmoid colon, biopsied and tattooed   2  Polyp of the proximal transverse colon status post snare polypectomy and retrieval  3  Polyp of the distal transverse colon status post snare polypectomy and retrieval    Pathology:  Final Diagnosis   A  Proximal transverse colon polyp (cold snare polypectomy x2):      - Portions of tubular adenomas; negative for high-grade dysplasia  B  Distal transverse colon polyp (cold snare polypectomy):      - Tubular adenoma; negative for high-grade dysplasia  C  Sigmoid colon at 40 cm (biopsy):     - Adenocarcinoma, at least intramucosal          Labs: Reviewed in EPIC    Imaging  Ct Chest Abdomen Pelvis W Contrast    Result Date: 4/21/2021  Narrative: CT CHEST, ABDOMEN AND PELVIS WITH IV CONTRAST INDICATION:   C18 7: Malignant neoplasm of sigmoid colon  History of sigmoid colon cancer initial workup COMPARISON:  None  TECHNIQUE: CT examination of the chest, abdomen and pelvis was performed  Axial, sagittal, and coronal 2D reformatted images were created from the source data and submitted for interpretation  Radiation dose length product (DLP) for this visit:  999 9 mGy-cm   This examination, like all CT scans performed in the [facility], was performed utilizing techniques to minimize radiation dose exposure, including the use of iterative reconstruction and automated exposure control  IV Contrast:  100 mL of iohexol (OMNIPAQUE) Enteric Contrast: Enteric contrast was administered  FINDINGS: CHEST LUNGS:  3 mm left upper lobe nodule image 44, series 3  4 mm right upper lobe nodule image 41  There is no tracheal or endobronchial lesion  PLEURA:  Unremarkable  HEART/GREAT VESSELS:  Unremarkable for patient's age  MEDIASTINUM AND JIAME:  Unremarkable  CHEST WALL AND LOWER NECK:   Unremarkable  ABDOMEN LIVER/BILIARY TREE:  Tiny hypoattenuating lesion in segment 6 of the liver on image 67 measures 5 mm in size  This is too small to characterize  GALLBLADDER:  Punctate gallstone appears to be present within the gallbladder which is not well distended  SPLEEN:  Unremarkable  PANCREAS:  Unremarkable  ADRENAL GLANDS:  Unremarkable  KIDNEYS/URETERS:  Unremarkable  No hydronephrosis  STOMACH AND BOWEL: No small bowel dilatation is demonstrated  The colon is remarkable for slight thickening of the sigmoid on image 101 but this could be related to under distention  On endoscopy report a 3 4 cm lesion was identified which is not clearly visible on the CT  APPENDIX:  No findings to suggest appendicitis  ABDOMINOPELVIC CAVITY:  No ascites  No pneumoperitoneum  No lymphadenopathy  VESSELS:  Unremarkable for patient's age  PELVIS REPRODUCTIVE ORGANS:  Unremarkable for patient's age  URINARY BLADDER:  Unremarkable  ABDOMINAL WALL/INGUINAL REGIONS:  Unremarkable  OSSEOUS STRUCTURES:  No acute fracture or destructive osseous lesion  Bilateral pars defects are noted at the lumbar sacral junction  Impression: 2 tiny lung nodules  1 tiny liver lesion is not well characterized  Sigmoid lesion noted on endoscopy is not well demonstrated on CT  Some slight thickening of the sigmoid region may be present  Based on current Fleischner Society 2017 Guidelines on incidental pulmonary nodule, patients with a known malignancy are at increased risk of metastasis and should receive initial three month follow-up chest CT  Workstation performed: [id number]       I independently reviewed and interpreted the above laboratory and imaging data  Discussion/Summary: This is a 37 old gentleman with a new diagnosis of sigmoid cancer at this time doing well a symptom standpoint    His cross-sectional imaging reveals no evidence of metastatic disease  It does reveal 2 very small pulmonary nodules and 1 very liver lesion, neither of which would be better visualized on or PET scan  Will follow these lesions with serial imaging  Discussed sigmoid colectomy with the patient and his partner and the risks benefits prognosis and alternatives to include bleeding, infection, anastomotic leak, minimally invasive versus open procedure and the risks of both, mi, stroke, death  Will discuss with Dr Yajaira Eckert whether the additional workup is needed preoperatively for his history of hemochromatosis  Then proceed with robotic versus open sigmoid colectomy at the earliest mutual date  Consent was obtained today

## 2021-04-22 NOTE — PROGRESS NOTES
Surgical Oncology Consultation    [id number] CANCER CARE SURGICAL ONCOLOGY [facility]  [address] [facility]  [phone]    Patient:  Kip Trinidad  1980  [id number]    Primary Care provider:  Anthony Jennings DO  [address] [place] [address]    Referring provider:  Tyrone Jauregui DO  [address]  [address],  [address]    Diagnoses and all orders for this visit:    Cancer of sigmoid colon [facility])  -     Ambulatory referral to Surgical Oncology  -     Ambulatory Referral to Oncology Genetics; Future  -     Case request operating room: RESECTION SIGMOID COLON LAPAROSCOPIC W ROBOTICS; Standing  -     Type and screen; Future  -     CBC and differential; Future  -     APTT; Future  -     Protime-INR; Future  -     metroNIDAZOLE (FLAGYL) 500 mg tablet; Take 4 tablets (2,000 mg total) by mouth once for 1 dose Take 4 tablets at bedtime on the evening prior to surgery  -     neomycin (MYCIFRADIN) 500 mg tablet; Take 4 tablets (2,000 mg total) by mouth once for 1 dose Take 4 tablets on the evening prior to surgery  -     EKG 12 lead; Future  -     Case request operating room: RESECTION SIGMOID COLON LAPAROSCOPIC W ROBOTICS    Other orders  -     Incentive spirometry; Standing  -     Insert and maintain IV line; Standing  -     Void On-Call to O R ; Standing  -     Place sequential compression device; Standing  -     Cancel: PAT Covid Screening; Future  -     Cancel: Comprehensive metabolic panel; Future  -     Nursing communication Please give pre-op Carbohydrate drink to patient 2-4 hours prior to surgery (Drink is provided by [facility]); Standing  -     ceFAZolin (ANCEF) IVPB (premix in dextrose) 2,000 mg 50 mL  -     metroNIDAZOLE (FLAGYL) IVPB (premix) 500 mg 100 mL        Chief Complaint   Patient presents with   Wendy Santosh     Pt is here for consult for newly diagnosed colon cancer    Reports bloody stools x 2 months  Denies N/V, weight loss or abd pain  No follow-ups on file  Oncology History    No history exists  History of Present Illness  : Leo Carter is a 50-year-old gentleman seen today with a new diagnosis of colon cancer  He had been experiencing bleeding per rectum for about two months  No other symptoms of pain, weight loss, n/v, changes in bowel habits, bloating  Colonoscopy performed revealing two benign polyps and an adenocarcinoma of the sigmoid colon, MATTHEW  The colonoscopy was complete to the cecum and no other polyps were seen  On cross-sectional imaging there was some mild thickening of the sigmoid colon noted  There was also a 5 mm nondistinct liver lesion noted as well as two subcentimeter lung nodules  CEA undetectable    The patient has a personal history of hemochromatosis for which she previously phlebotomy has not in some time  Follows Dr Franco Valladares and his team for management of this  He also has a personal history of poorly controlled type 2 diabetes however recently this is much better controlled  No surgical history  Has family history prostate cancer in his grandparents  I personally reviewed the patient's imaging, colonoscopy, pathology  I discussed the patient with my colleague Yash Hayes  Review of Systems  Complete ROS Surg Onc:   Constitutional: The patient denies new or recent history of general fatigue, no recent weight loss, no change in appetite  Eyes: No complaints of visual problems, no scleral icterus  ENT: No complaints of ear pain, no hoarseness, no difficulty swallowing,  no tinnitus and no new masses in head, oral cavity, or neck  Cardiovascular: No complaints of chest pain, no palpitations, no ankle edema  Respiratory: No complaints of shortness of breath, no cough  Gastrointestinal: No complaints of jaundice, no bloody stools, no pale stools     Genitourinary: No complaints of dysuria, no hematuria, no nocturia, no frequent urination, no urethral discharge  Musculoskeletal: No complaints of weakness, paralysis, joint stiffness or arthralgias  Integumentary: No complaints of rash, no new lesions  Neurological: No complaints of convulsions, no seizures, no dizziness  Hematologic/Lymphatic: No complaints of easy bruising  Endocrine:  No hot or cold intolerance  No polydipsia, polyphagia, or polyuria  Allergy/immunology:  No environmental allergies  No food allergies  Not immunocompromised        Patient Active Problem List   Diagnosis    Diabetes mellitus without complication ([place] [address] )    Hereditary hemochromatosis ([place] [address] )    Essential hypertension    Testosterone deficiency    Thyroid nodule    Fatigue    Midline low back pain without sciatica    Situational anxiety    Cancer of sigmoid colon ([place] [address] )     Past Medical History:   Diagnosis Date    Diabetes mellitus ([place] [address] )     Hemochromatosis     Testosterone deficiency      Past Surgical History:   Procedure Laterality Date    WISDOM TOOTH EXTRACTION       Family History   Problem Relation Age of Onset    Colon polyps Mother     No Known Problems Father     Colon polyps Maternal Grandmother     Prostate cancer Maternal Grandfather     Lung cancer Paternal Grandmother     Diabetes Paternal Grandmother     Prostate cancer Paternal Grandfather     Heart disease Paternal Grandfather     Kidney failure Paternal Grandfather     Diabetes Family     Hypertension Family      Social History     Socioeconomic History    Marital status:      Spouse name: Not on file    Number of children: Not on file    Years of education: Not on file    Highest education level: Not on file   Occupational History    Not on file   Social Needs    Financial resource strain: Not on file    Food insecurity     Worry: Not on file     Inability: Not on file    Transportation needs     Medical: Not on file     Non-medical: Not on file   Tobacco Use    Smoking status: Former Smoker     Packs/day: 0 25     Years: 10 00     Pack years: 2 50     Types: Cigarettes    Smokeless tobacco: Never Used   Substance and Sexual Activity    Alcohol use: Not Currently    Drug use: Not Currently    Sexual activity: Yes     Partners: Female     Birth control/protection: Condom Male   Lifestyle    Physical activity     Days per week: 5 days     Minutes per session: 60 min    Stress: Only a little   Relationships    Social connections     Talks on phone: Not on file     Gets together: Not on file     Attends [employer] service: Not on file     Active member of club or organization: Not on file     Attends meetings of clubs or organizations: Not on file     Relationship status: Not on file    Intimate partner violence     Fear of current or ex partner: Not on file     Emotionally abused: Not on file     Physically abused: Not on file     Forced sexual activity: Not on file   Other Topics Concern    Not on file   Social History Narrative    Not on file       Current Outpatient Medications:     Dulaglutide 1 5 MG/0 5ML SOPN, Inject 0 5 mL (1 5 mg total) under the skin once a week, Disp: 12 pen, Rfl: 1    Empagliflozin (Jardiance) 25 MG TABS, Take 1 tablet (25 mg total) by mouth daily, Disp: 90 tablet, Rfl: 3    metroNIDAZOLE (FLAGYL) 500 mg tablet, Take 4 tablets (2,000 mg total) by mouth once for 1 dose Take 4 tablets at bedtime on the evening prior to surgery  , Disp: 4 tablet, Rfl: 0    neomycin (MYCIFRADIN) 500 mg tablet, Take 4 tablets (2,000 mg total) by mouth once for 1 dose Take 4 tablets on the evening prior to surgery  , Disp: 4 tablet, Rfl: 0  No Known Allergies    Vitals:    04/22/21 0849   BP: 130/74   Pulse: 86   Resp: 18   Temp: 98 7 °F (37 1 °C)   SpO2: 97%       Physical Exam   General: Appears well, appears stated age  Skin: Warm, anicteric  HEENT: Normocephalic, atraumatic; sclera aniceteric, mucous membranes moist; cervical nodes without adenopathy  Cardiopulmonary: RRR, Easy WOB, no BLE edema  Abd: Flat and soft, nontender, no masses appreciated, no hepatosplenomegaly  MSK: Symmetric, no cyanosis, no overt weakness  Lymphatic: No cervical, axillary or inguinal lymphadenopathy  Neuro: Affect appropriate, no gross motor abnormalities    IMPRESSION:  1   malignant mass at 40 cm from the anal verge, sigmoid colon, biopsied and tattooed   2  Polyp of the proximal transverse colon status post snare polypectomy and retrieval  3  Polyp of the distal transverse colon status post snare polypectomy and retrieval    Pathology:  Final Diagnosis   A  Proximal transverse colon polyp (cold snare polypectomy x2):      - Portions of tubular adenomas; negative for high-grade dysplasia  B  Distal transverse colon polyp (cold snare polypectomy):      - Tubular adenoma; negative for high-grade dysplasia  C  Sigmoid colon at 40 cm (biopsy):     - Adenocarcinoma, at least intramucosal          Labs: Reviewed in EPIC    Imaging  Ct Chest Abdomen Pelvis W Contrast    Result Date: 4/21/2021  Narrative: CT CHEST, ABDOMEN AND PELVIS WITH IV CONTRAST INDICATION:   C18 7: Malignant neoplasm of sigmoid colon  History of sigmoid colon cancer initial workup COMPARISON:  None  TECHNIQUE: CT examination of the chest, abdomen and pelvis was performed  Axial, sagittal, and coronal 2D reformatted images were created from the source data and submitted for interpretation  Radiation dose length product (DLP) for this visit:  999 9 mGy-cm   This examination, like all CT scans performed in the [facility], was performed utilizing techniques to minimize radiation dose exposure, including the use of iterative reconstruction and automated exposure control  IV Contrast:  100 mL of iohexol (OMNIPAQUE) Enteric Contrast: Enteric contrast was administered  FINDINGS: CHEST LUNGS:  3 mm left upper lobe nodule image 44, series 3  4 mm right upper lobe nodule image 41  There is no tracheal or endobronchial lesion  PLEURA:  Unremarkable  HEART/GREAT VESSELS:  Unremarkable for patient's age  MEDIASTINUM AND JAIME:  Unremarkable  CHEST WALL AND LOWER NECK:   Unremarkable  ABDOMEN LIVER/BILIARY TREE:  Tiny hypoattenuating lesion in segment 6 of the liver on image 67 measures 5 mm in size  This is too small to characterize  GALLBLADDER:  Punctate gallstone appears to be present within the gallbladder which is not well distended  SPLEEN:  Unremarkable  PANCREAS:  Unremarkable  ADRENAL GLANDS:  Unremarkable  KIDNEYS/URETERS:  Unremarkable  No hydronephrosis  STOMACH AND BOWEL: No small bowel dilatation is demonstrated  The colon is remarkable for slight thickening of the sigmoid on image 101 but this could be related to under distention  On endoscopy report a 3 4 cm lesion was identified which is not clearly visible on the CT  APPENDIX:  No findings to suggest appendicitis  ABDOMINOPELVIC CAVITY:  No ascites  No pneumoperitoneum  No lymphadenopathy  VESSELS:  Unremarkable for patient's age  PELVIS REPRODUCTIVE ORGANS:  Unremarkable for patient's age  URINARY BLADDER:  Unremarkable  ABDOMINAL WALL/INGUINAL REGIONS:  Unremarkable  OSSEOUS STRUCTURES:  No acute fracture or destructive osseous lesion  Bilateral pars defects are noted at the lumbar sacral junction  Impression: 2 tiny lung nodules  1 tiny liver lesion is not well characterized  Sigmoid lesion noted on endoscopy is not well demonstrated on CT  Some slight thickening of the sigmoid region may be present  Based on current Fleischner Society 2017 Guidelines on incidental pulmonary nodule, patients with a known malignancy are at increased risk of metastasis and should receive initial three month follow-up chest CT  Workstation performed: [id number]       I independently reviewed and interpreted the above laboratory and imaging data  Discussion/Summary: This is a 37 old gentleman with a new diagnosis of sigmoid cancer at this time doing well a symptom standpoint    His cross-sectional imaging reveals no evidence of metastatic disease  It does reveal 2 very small pulmonary nodules and 1 very liver lesion, neither of which would be better visualized on or PET scan  Will follow these lesions with serial imaging  Discussed sigmoid colectomy with the patient and his partner and the risks benefits prognosis and alternatives to include bleeding, infection, anastomotic leak, minimally invasive versus open procedure and the risks of both, mi, stroke, death  Will discuss with Dr Arely Merritt whether the additional workup is needed preoperatively for his history of hemochromatosis  Then proceed with robotic versus open sigmoid colectomy at the earliest mutual date  Consent was obtained today

## 2021-04-22 NOTE — LETTER
April 23, 2021     Mason Callow, DO  [address] [place] [address],[address]    Patient: Felicia Tsang   YOB: 1980   Date of Visit: 4/22/2021       Dear Dr Abby Dawson:    Thank you for referring Felicia Tsang to me for evaluation  Below are my notes for this consultation  If you have questions, please do not hesitate to call me  I look forward to following your patient along with you  Sincerely,        Chandni Gordon MD        CC: Jesse Saeed MD PhD  Chandni Gordon MD  4/23/2021  7:31 AM  Sign when Signing Visit  Surgical Oncology Consultation    [place] [address] [place] [address]  [phone]    Patient:  Felicia Tsang  1980  [id number]    Primary Care provider:  Abbe Fu DO  [address] [place] [address]    Referring provider:  Mason Callow, [address]  [address] [facility],  [address]    Diagnoses and all orders for this visit:    Cancer of sigmoid colon [facility])  -     Ambulatory referral to Surgical Oncology  -     Ambulatory Referral to Oncology Genetics; Future  -     Case request operating room: RESECTION SIGMOID COLON LAPAROSCOPIC W ROBOTICS; Standing  -     Type and screen; Future  -     CBC and differential; Future  -     APTT; Future  -     Protime-INR; Future  -     metroNIDAZOLE (FLAGYL) 500 mg tablet; Take 4 tablets (2,000 mg total) by mouth once for 1 dose Take 4 tablets at bedtime on the evening prior to surgery  -     neomycin (MYCIFRADIN) 500 mg tablet; Take 4 tablets (2,000 mg total) by mouth once for 1 dose Take 4 tablets on the evening prior to surgery  -     EKG 12 lead;  Future  -     Case request operating room: RESECTION SIGMOID COLON LAPAROSCOPIC W ROBOTICS    Other orders  -     Incentive spirometry; Standing  -     Insert and maintain IV line; Standing  -     Void On-Call to O R ; Standing  -     Place sequential compression device; Standing  -     Cancel: PAT Covid Screening; Future  -     Cancel: Comprehensive metabolic panel; Future  -     Nursing communication Please give pre-op Carbohydrate drink to patient 2-4 hours prior to surgery (Drink is provided by [address] [place] [address]); Standing  -     ceFAZolin (ANCEF) IVPB (premix in dextrose) 2,000 mg 50 mL  -     metroNIDAZOLE (FLAGYL) IVPB (premix) 500 mg 100 mL        Chief Complaint   Patient presents with   Yuko Pedro     Pt is here for consult for newly diagnosed colon cancer  Reports bloody stools x 2 months  Denies N/V, weight loss or abd pain  No follow-ups on file  Oncology History    No history exists  History of Present Illness  : Martha León is a 28-year-old gentleman seen today with a new diagnosis of colon cancer  He had been experiencing bleeding per rectum for about two months  No other symptoms of pain, weight loss, n/v, changes in bowel habits, bloating  Colonoscopy performed revealing two benign polyps and an adenocarcinoma of the sigmoid colon, MATTHEW  The colonoscopy was complete to the cecum and no other polyps were seen  On cross-sectional imaging there was some mild thickening of the sigmoid colon noted  There was also a 5 mm nondistinct liver lesion noted as well as two subcentimeter lung nodules  The patient has a personal history of hemochromatosis for which she previously phlebotomy has not in some time  Follows Dr Jimmy Da Silva and his team for management of this  He also has a personal history of poorly controlled type 2 diabetes however recently this is much better controlled  No surgical history  Has family history prostate cancer in his grandparents  I personally reviewed the patient's imaging, colonoscopy, pathology  I discussed the patient with my colleague Sueellen Habermann       Review of Systems  Complete ROS Surg Onc:   Constitutional: The patient denies new or recent history of general fatigue, no recent weight loss, no change in appetite  Eyes: No complaints of visual problems, no scleral icterus  ENT: No complaints of ear pain, no hoarseness, no difficulty swallowing,  no tinnitus and no new masses in head, oral cavity, or neck  Cardiovascular: No complaints of chest pain, no palpitations, no ankle edema  Respiratory: No complaints of shortness of breath, no cough  Gastrointestinal: No complaints of jaundice, no bloody stools, no pale stools  Genitourinary: No complaints of dysuria, no hematuria, no nocturia, no frequent urination, no urethral discharge  Musculoskeletal: No complaints of weakness, paralysis, joint stiffness or arthralgias  Integumentary: No complaints of rash, no new lesions  Neurological: No complaints of convulsions, no seizures, no dizziness  Hematologic/Lymphatic: No complaints of easy bruising  Endocrine:  No hot or cold intolerance  No polydipsia, polyphagia, or polyuria  Allergy/immunology:  No environmental allergies  No food allergies  Not immunocompromised        Patient Active Problem List   Diagnosis    Diabetes mellitus without complication ([facility] )    Hereditary hemochromatosis ([facility] )    Essential hypertension    Testosterone deficiency    Thyroid nodule    Fatigue    Midline low back pain without sciatica    Situational anxiety    Cancer [facility] )     Past Medical History:   Diagnosis Date    Diabetes mellitus [facility]     Hemochromatosis     Testosterone deficiency      Past Surgical History:   Procedure Laterality Date    WISDOM TOOTH EXTRACTION       Family History   Problem Relation Age of Onset    Colon polyps Mother     No Known Problems Father     Colon polyps Maternal Grandmother     Prostate cancer Maternal Grandfather     Lung cancer Paternal Grandmother     Diabetes Paternal Grandmother     Prostate cancer Paternal Grandfather     Heart disease Paternal Grandfather     Kidney failure Paternal Grandfather     Diabetes Family     Hypertension Family      Social History     Socioeconomic History    Marital status:      Spouse name: Not on file    Number of children: Not on file    Years of education: Not on file    Highest education level: Not on file   Occupational History    Not on file   Social Needs    Financial resource strain: Not on file    Food insecurity     Worry: Not on file     Inability: Not on file   [employer] needs     Medical: Not on file     Non-medical: Not on file   Tobacco Use    Smoking status: Former Smoker     Packs/day: 0 25     Years: 10 00     Pack years: 2 50     Types: Cigarettes    Smokeless tobacco: Never Used   Substance and Sexual Activity    Alcohol use: Not Currently    Drug use: Not Currently    Sexual activity: Yes     Partners: Female     Birth control/protection: Condom Male   Lifestyle    Physical activity     Days per week: 5 days     Minutes per session: 60 min    Stress: Only a little   Relationships    Social connections     Talks on phone: Not on file     Gets together: Not on file     Attends Church service: Not on file     Active member of club or organization: Not on file     Attends meetings of clubs or organizations: Not on file     Relationship status: Not on file    Intimate partner violence     Fear of current or ex partner: Not on file     Emotionally abused: Not on file     Physically abused: Not on file     Forced sexual activity: Not on file   Other Topics Concern    Not on file   Social History Narrative    Not on file       Current Outpatient Medications:     Dulaglutide 1 5 MG/0 5ML SOPN, Inject 0 5 mL (1 5 mg total) under the skin once a week, Disp: 12 pen, Rfl: 1    Empagliflozin (Jardiance) 25 MG TABS, Take 1 tablet (25 mg total) by mouth daily, Disp: 90 tablet, Rfl: 3    metroNIDAZOLE (FLAGYL) 500 mg tablet, Take 4 tablets (2,000 mg total) by mouth once for 1 dose Take 4 tablets at bedtime on the evening prior to surgery  , Disp: 4 tablet, Rfl: 0    neomycin (MYCIFRADIN) 500 mg tablet, Take 4 tablets (2,000 mg total) by mouth once for 1 dose Take 4 tablets on the evening prior to surgery  , Disp: 4 tablet, Rfl: 0  No Known Allergies    Vitals:    04/22/21 0849   BP: 130/74   Pulse: 86   Resp: 18   Temp: 98 7 °F (37 1 °C)   SpO2: 97%       Physical Exam   General: Appears well, appears stated age  Skin: Warm, anicteric  HEENT: Normocephalic, atraumatic; sclera aniceteric, mucous membranes moist; cervical nodes without adenopathy  Cardiopulmonary: RRR, Easy WOB, no BLE edema  Abd: Flat and soft, nontender, no masses appreciated, no hepatosplenomegaly  MSK: Symmetric, no cyanosis, no overt weakness  Lymphatic: No cervical, axillary or inguinal lymphadenopathy  Neuro: Affect appropriate, no gross motor abnormalities    IMPRESSION:  1   malignant mass at 40 cm from the anal verge, sigmoid colon, biopsied and tattooed   2  Polyp of the proximal transverse colon status post snare polypectomy and retrieval  3  Polyp of the distal transverse colon status post snare polypectomy and retrieval    Pathology:  Final Diagnosis   A  Proximal transverse colon polyp (cold snare polypectomy x2):      - Portions of tubular adenomas; negative for high-grade dysplasia  B  Distal transverse colon polyp (cold snare polypectomy):      - Tubular adenoma; negative for high-grade dysplasia  C  Sigmoid colon at 40 cm (biopsy):     - Adenocarcinoma, at least intramucosal          Labs: Reviewed in EPIC    Imaging  Ct Chest Abdomen Pelvis W Contrast    Result Date: 4/21/2021  Narrative: CT CHEST, ABDOMEN AND PELVIS WITH IV CONTRAST INDICATION:   C18 7: Malignant neoplasm of sigmoid colon  History of sigmoid colon cancer initial workup COMPARISON:  None  TECHNIQUE: CT examination of the chest, abdomen and pelvis was performed  Axial, sagittal, and coronal 2D reformatted images were created from the source data and submitted for interpretation   Radiation dose length product (DLP) for this visit:  999 9 mGy-cm   This examination, like all CT scans performed in the [facility], was performed utilizing techniques to minimize radiation dose exposure, including the use of iterative reconstruction and automated exposure control  IV Contrast:  100 mL of iohexol (OMNIPAQUE) Enteric Contrast: Enteric contrast was administered  FINDINGS: CHEST LUNGS:  3 mm left upper lobe nodule image 44, series 3  4 mm right upper lobe nodule image 41  There is no tracheal or endobronchial lesion  PLEURA:  Unremarkable  HEART/GREAT VESSELS:  Unremarkable for patient's age  MEDIASTINUM AND JAIME:  Unremarkable  CHEST WALL AND LOWER NECK:   Unremarkable  ABDOMEN LIVER/BILIARY TREE:  Tiny hypoattenuating lesion in segment 6 of the liver on image 67 measures 5 mm in size  This is too small to characterize  GALLBLADDER:  Punctate gallstone appears to be present within the gallbladder which is not well distended  SPLEEN:  Unremarkable  PANCREAS:  Unremarkable  ADRENAL GLANDS:  Unremarkable  KIDNEYS/URETERS:  Unremarkable  No hydronephrosis  STOMACH AND BOWEL: No small bowel dilatation is demonstrated  The colon is remarkable for slight thickening of the sigmoid on image 101 but this could be related to under distention  On endoscopy report a 3 4 cm lesion was identified which is not clearly visible on the CT  APPENDIX:  No findings to suggest appendicitis  ABDOMINOPELVIC CAVITY:  No ascites  No pneumoperitoneum  No lymphadenopathy  VESSELS:  Unremarkable for patient's age  PELVIS REPRODUCTIVE ORGANS:  Unremarkable for patient's age  URINARY BLADDER:  Unremarkable  ABDOMINAL WALL/INGUINAL REGIONS:  Unremarkable  OSSEOUS STRUCTURES:  No acute fracture or destructive osseous lesion  Bilateral pars defects are noted at the lumbar sacral junction  Impression: 2 tiny lung nodules  1 tiny liver lesion is not well characterized  Sigmoid lesion noted on endoscopy is not well demonstrated on CT    Some slight thickening of the sigmoid region may be present  Based on current Fleischner Society 2017 Guidelines on incidental pulmonary nodule, patients with a known malignancy are at increased risk of metastasis and should receive initial three month follow-up chest CT  Workstation performed: [id number]       I independently reviewed and interpreted the above laboratory and imaging data  Discussion/Summary: This is a 37 old gentleman with a new diagnosis of sigmoid cancer at this time doing well a symptom standpoint  His cross-sectional imaging reveals no evidence of metastatic disease  It does reveal 2 very small pulmonary nodules and 1 very liver lesion, neither of which would be better visualized on or PET scan  Will follow these lesions with serial imaging  Discussed sigmoid colectomy with the patient and his partner and the risks benefits prognosis and alternatives to include bleeding, infection, anastomotic leak, minimally invasive versus open procedure and the risks of both, mi, stroke, death  Will discuss with Dr Allyssa Borden whether the additional workup is needed preoperatively for his history of hemochromatosis  Then proceed with robotic versus open sigmoid colectomy at the earliest mutual date  Consent was obtained today

## 2021-04-23 ENCOUNTER — TELEPHONE (OUTPATIENT)
Dept: HEMATOLOGY ONCOLOGY | Facility: CLINIC | Age: 41
End: 2021-04-23

## 2021-04-23 NOTE — TELEPHONE ENCOUNTER
Genetics New Patient Intake Form     Patient Details:     Cahtie Frost     1980     [id number]     Background Information:           Who is calling to schedule?                                            self   If not self, relationship to patient? na   Referring Provider Dr Sabina Broderick   Is the referral marked STAT No   Is patient newly diagnosed with cancer, have metastatic disease, or pending surgery? Yes   If yes, which is it? Newly Diagnosed   If the patient is pending surgery Needs to be scheduled within 48 hours   If none available, schedule patient then email Stat cancer genetics   If the patient is metastatic or newly diagnosed Needs to be scheduled within 2 weeks   If none available, schedule patient then email Stat cancer genetics   Have you had genetic testing that showed a positive genetic mutation? (If yes, schedule within 2 weeks or email STAT cancer genetics) No   Has your family member had genetic testing that resulted in a positive genetic mutation? (If yes in the last 6 months, schedule within 3 weeks )  (If yes but over 6 months, schedule as usual) No   Is this a personal or family history?  family   What is the type of tumor? prostate   Scheduling Information:   Preferred [place]:  [place]   Are there any dates/time the patient cannot be seen? No   Did the patient schedule an appointment? Yes   If yes, list appointment date and provider name 6/3/21 - Corinne   If no, briefly state why na   Miscellaneous Pt  Scheduled 5/5 for surgery with Dr Sabina Broderick -- consult prior to surgery not necessary     After completing the above information, please route to Oncology Genetics

## 2021-05-03 ENCOUNTER — TELEPHONE (OUTPATIENT)
Dept: HEMATOLOGY ONCOLOGY | Facility: CLINIC | Age: 41
End: 2021-05-03

## 2021-05-03 RX ORDER — METRONIDAZOLE 500 MG/1
500 TABLET ORAL ONCE
COMMUNITY
End: 2021-05-07 | Stop reason: HOSPADM

## 2021-05-03 NOTE — PRE-PROCEDURE INSTRUCTIONS
Pre-Surgery Instructions:   Medication Instructions    Dulaglutide 1 5 MG/0 5ML SOPN Patient was instructed by Physician and understands   Empagliflozin (Jardiance) 25 MG TABS Patient was instructed by Physician and understands   metroNIDAZOLE (FLAGYL) 500 mg tablet Patient was instructed by Physician and understands   NEOMYCIN SULFATE PO Patient was instructed by Physician and understands  Patient has no medications to take morning of surgery  Instructed no NSAIDS, aspirins, vitamins, or supplements prior to surgery  Instructed to take antibiotics as ordered per Dr Felix Contreras

## 2021-05-03 NOTE — TELEPHONE ENCOUNTER
Spoke to patient to let him know that his appt with Dr Evelio Lorenz has been rescheduled for 5/20 @ 3pm  Patient aware and okay with this appt

## 2021-05-04 ENCOUNTER — ANESTHESIA EVENT (OUTPATIENT)
Dept: PERIOP | Facility: HOSPITAL | Age: 41
DRG: 331 | End: 2021-05-04
Payer: COMMERCIAL

## 2021-05-04 RX ORDER — ACETAMINOPHEN 325 MG/1
975 TABLET ORAL ONCE
Status: CANCELLED | OUTPATIENT
Start: 2021-05-04 | End: 2021-05-04

## 2021-05-05 ENCOUNTER — ANESTHESIA (OUTPATIENT)
Dept: PERIOP | Facility: HOSPITAL | Age: 41
DRG: 331 | End: 2021-05-05
Payer: COMMERCIAL

## 2021-05-05 ENCOUNTER — HOSPITAL ENCOUNTER (INPATIENT)
Facility: HOSPITAL | Age: 41
LOS: 2 days | Discharge: HOME | DRG: 331 | End: 2021-05-07
Attending: STUDENT IN AN ORGANIZED HEALTH CARE EDUCATION/TRAINING PROGRAM | Admitting: STUDENT IN AN ORGANIZED HEALTH CARE EDUCATION/TRAINING PROGRAM
Payer: COMMERCIAL

## 2021-05-05 DIAGNOSIS — C18.7 CANCER OF SIGMOID COLON (HCC): ICD-10-CM

## 2021-05-05 LAB
ABO GROUP BLD: NORMAL
GLUCOSE SERPL-MCNC: 141 MG/DL (ref 65–140)
GLUCOSE SERPL-MCNC: 152 MG/DL (ref 65–140)
GLUCOSE SERPL-MCNC: 95 MG/DL (ref 65–140)
PLATELET # BLD AUTO: 244 THOUSANDS/UL (ref 149–390)
PMV BLD AUTO: 10.7 FL (ref 8.9–12.7)
RH BLD: POSITIVE

## 2021-05-05 PROCEDURE — 82948 REAGENT STRIP/BLOOD GLUCOSE: CPT

## 2021-05-05 PROCEDURE — 0DTN4ZZ RESECTION OF SIGMOID COLON, PERCUTANEOUS ENDOSCOPIC APPROACH: ICD-10-PCS | Performed by: STUDENT IN AN ORGANIZED HEALTH CARE EDUCATION/TRAINING PROGRAM

## 2021-05-05 PROCEDURE — 88309 TISSUE EXAM BY PATHOLOGIST: CPT | Performed by: PATHOLOGY

## 2021-05-05 PROCEDURE — 88342 IMHCHEM/IMCYTCHM 1ST ANTB: CPT | Performed by: PATHOLOGY

## 2021-05-05 PROCEDURE — 85049 AUTOMATED PLATELET COUNT: CPT | Performed by: ORTHOPAEDIC SURGERY

## 2021-05-05 PROCEDURE — 99024 POSTOP FOLLOW-UP VISIT: CPT | Performed by: STUDENT IN AN ORGANIZED HEALTH CARE EDUCATION/TRAINING PROGRAM

## 2021-05-05 PROCEDURE — C9290 INJ, BUPIVACAINE LIPOSOME: HCPCS | Performed by: ORTHOPAEDIC SURGERY

## 2021-05-05 PROCEDURE — 8E0W4CZ ROBOTIC ASSISTED PROCEDURE OF TRUNK REGION, PERCUTANEOUS ENDOSCOPIC APPROACH: ICD-10-PCS | Performed by: STUDENT IN AN ORGANIZED HEALTH CARE EDUCATION/TRAINING PROGRAM

## 2021-05-05 PROCEDURE — NC001 PR NO CHARGE: Performed by: PHYSICIAN ASSISTANT

## 2021-05-05 PROCEDURE — 44204 LAPARO PARTIAL COLECTOMY: CPT | Performed by: PHYSICIAN ASSISTANT

## 2021-05-05 PROCEDURE — 44204 LAPARO PARTIAL COLECTOMY: CPT | Performed by: STUDENT IN AN ORGANIZED HEALTH CARE EDUCATION/TRAINING PROGRAM

## 2021-05-05 RX ORDER — SODIUM CHLORIDE 9 MG/ML
INJECTION, SOLUTION INTRAVENOUS CONTINUOUS PRN
Status: DISCONTINUED | OUTPATIENT
Start: 2021-05-05 | End: 2021-05-05

## 2021-05-05 RX ORDER — LIDOCAINE HYDROCHLORIDE 10 MG/ML
0.5 INJECTION, SOLUTION EPIDURAL; INFILTRATION; INTRACAUDAL; PERINEURAL ONCE AS NEEDED
Status: COMPLETED | OUTPATIENT
Start: 2021-05-05 | End: 2021-05-05

## 2021-05-05 RX ORDER — KETAMINE HYDROCHLORIDE 50 MG/ML
INJECTION, SOLUTION, CONCENTRATE INTRAMUSCULAR; INTRAVENOUS AS NEEDED
Status: DISCONTINUED | OUTPATIENT
Start: 2021-05-05 | End: 2021-05-05

## 2021-05-05 RX ORDER — NEOSTIGMINE METHYLSULFATE 1 MG/ML
INJECTION INTRAVENOUS AS NEEDED
Status: DISCONTINUED | OUTPATIENT
Start: 2021-05-05 | End: 2021-05-05

## 2021-05-05 RX ORDER — ONDANSETRON 2 MG/ML
INJECTION INTRAMUSCULAR; INTRAVENOUS AS NEEDED
Status: DISCONTINUED | OUTPATIENT
Start: 2021-05-05 | End: 2021-05-05

## 2021-05-05 RX ORDER — HYDROMORPHONE HCL/PF 1 MG/ML
0.5 SYRINGE (ML) INJECTION
Status: DISCONTINUED | OUTPATIENT
Start: 2021-05-05 | End: 2021-05-05 | Stop reason: HOSPADM

## 2021-05-05 RX ORDER — HYDROMORPHONE HCL 110MG/55ML
PATIENT CONTROLLED ANALGESIA SYRINGE INTRAVENOUS AS NEEDED
Status: DISCONTINUED | OUTPATIENT
Start: 2021-05-05 | End: 2021-05-05

## 2021-05-05 RX ORDER — LIDOCAINE HYDROCHLORIDE 10 MG/ML
INJECTION, SOLUTION EPIDURAL; INFILTRATION; INTRACAUDAL; PERINEURAL AS NEEDED
Status: DISCONTINUED | OUTPATIENT
Start: 2021-05-05 | End: 2021-05-05

## 2021-05-05 RX ORDER — HEPARIN SODIUM 5000 [USP'U]/ML
5000 INJECTION, SOLUTION INTRAVENOUS; SUBCUTANEOUS EVERY 8 HOURS SCHEDULED
Status: DISCONTINUED | OUTPATIENT
Start: 2021-05-05 | End: 2021-05-07 | Stop reason: HOSPADM

## 2021-05-05 RX ORDER — INDOCYANINE GREEN AND WATER 25 MG
KIT INJECTION AS NEEDED
Status: DISCONTINUED | OUTPATIENT
Start: 2021-05-05 | End: 2021-05-05

## 2021-05-05 RX ORDER — ROCURONIUM BROMIDE 10 MG/ML
INJECTION, SOLUTION INTRAVENOUS AS NEEDED
Status: DISCONTINUED | OUTPATIENT
Start: 2021-05-05 | End: 2021-05-05

## 2021-05-05 RX ORDER — FENTANYL CITRATE 50 UG/ML
INJECTION, SOLUTION INTRAMUSCULAR; INTRAVENOUS AS NEEDED
Status: DISCONTINUED | OUTPATIENT
Start: 2021-05-05 | End: 2021-05-05

## 2021-05-05 RX ORDER — GLYCOPYRROLATE 0.2 MG/ML
INJECTION INTRAMUSCULAR; INTRAVENOUS AS NEEDED
Status: DISCONTINUED | OUTPATIENT
Start: 2021-05-05 | End: 2021-05-05

## 2021-05-05 RX ORDER — ONDANSETRON 2 MG/ML
4 INJECTION INTRAMUSCULAR; INTRAVENOUS ONCE AS NEEDED
Status: DISCONTINUED | OUTPATIENT
Start: 2021-05-05 | End: 2021-05-05 | Stop reason: HOSPADM

## 2021-05-05 RX ORDER — METHOCARBAMOL 500 MG/1
500 TABLET, FILM COATED ORAL EVERY 6 HOURS PRN
Status: DISCONTINUED | OUTPATIENT
Start: 2021-05-05 | End: 2021-05-07 | Stop reason: HOSPADM

## 2021-05-05 RX ORDER — SODIUM CHLORIDE, SODIUM LACTATE, POTASSIUM CHLORIDE, CALCIUM CHLORIDE 600; 310; 30; 20 MG/100ML; MG/100ML; MG/100ML; MG/100ML
125 INJECTION, SOLUTION INTRAVENOUS CONTINUOUS
Status: DISCONTINUED | OUTPATIENT
Start: 2021-05-05 | End: 2021-05-06

## 2021-05-05 RX ORDER — MIDAZOLAM HYDROCHLORIDE 2 MG/2ML
INJECTION, SOLUTION INTRAMUSCULAR; INTRAVENOUS AS NEEDED
Status: DISCONTINUED | OUTPATIENT
Start: 2021-05-05 | End: 2021-05-05

## 2021-05-05 RX ORDER — HYDROMORPHONE HCL/PF 1 MG/ML
0.5 SYRINGE (ML) INJECTION ONCE
Status: COMPLETED | OUTPATIENT
Start: 2021-05-06 | End: 2021-05-06

## 2021-05-05 RX ORDER — DEXAMETHASONE SODIUM PHOSPHATE 10 MG/ML
INJECTION, SOLUTION INTRAMUSCULAR; INTRAVENOUS AS NEEDED
Status: DISCONTINUED | OUTPATIENT
Start: 2021-05-05 | End: 2021-05-05

## 2021-05-05 RX ORDER — ONDANSETRON 2 MG/ML
4 INJECTION INTRAMUSCULAR; INTRAVENOUS EVERY 6 HOURS PRN
Status: DISCONTINUED | OUTPATIENT
Start: 2021-05-05 | End: 2021-05-07 | Stop reason: HOSPADM

## 2021-05-05 RX ORDER — FENTANYL CITRATE/PF 50 MCG/ML
50 SYRINGE (ML) INJECTION
Status: COMPLETED | OUTPATIENT
Start: 2021-05-05 | End: 2021-05-05

## 2021-05-05 RX ORDER — SODIUM CHLORIDE, SODIUM LACTATE, POTASSIUM CHLORIDE, CALCIUM CHLORIDE 600; 310; 30; 20 MG/100ML; MG/100ML; MG/100ML; MG/100ML
INJECTION, SOLUTION INTRAVENOUS CONTINUOUS PRN
Status: DISCONTINUED | OUTPATIENT
Start: 2021-05-05 | End: 2021-05-05

## 2021-05-05 RX ORDER — CEFAZOLIN SODIUM 2 G/50ML
2000 SOLUTION INTRAVENOUS ONCE
Status: COMPLETED | OUTPATIENT
Start: 2021-05-05 | End: 2021-05-05

## 2021-05-05 RX ORDER — PROPOFOL 10 MG/ML
INJECTION, EMULSION INTRAVENOUS AS NEEDED
Status: DISCONTINUED | OUTPATIENT
Start: 2021-05-05 | End: 2021-05-05

## 2021-05-05 RX ORDER — GABAPENTIN 300 MG/1
300 CAPSULE ORAL 3 TIMES DAILY
Status: DISCONTINUED | OUTPATIENT
Start: 2021-05-05 | End: 2021-05-07 | Stop reason: HOSPADM

## 2021-05-05 RX ORDER — ACETAMINOPHEN 325 MG/1
975 TABLET ORAL EVERY 6 HOURS SCHEDULED
Status: DISCONTINUED | OUTPATIENT
Start: 2021-05-05 | End: 2021-05-07 | Stop reason: HOSPADM

## 2021-05-05 RX ORDER — DEXMEDETOMIDINE HYDROCHLORIDE 100 UG/ML
INJECTION, SOLUTION INTRAVENOUS AS NEEDED
Status: DISCONTINUED | OUTPATIENT
Start: 2021-05-05 | End: 2021-05-05

## 2021-05-05 RX ORDER — BUPIVACAINE HYDROCHLORIDE 2.5 MG/ML
INJECTION, SOLUTION EPIDURAL; INFILTRATION; INTRACAUDAL AS NEEDED
Status: DISCONTINUED | OUTPATIENT
Start: 2021-05-05 | End: 2021-05-05 | Stop reason: HOSPADM

## 2021-05-05 RX ORDER — OXYCODONE HYDROCHLORIDE 5 MG/1
5 TABLET ORAL EVERY 4 HOURS PRN
Status: DISCONTINUED | OUTPATIENT
Start: 2021-05-05 | End: 2021-05-06

## 2021-05-05 RX ADMIN — HYDROMORPHONE HYDROCHLORIDE 0.4 MG: 2 INJECTION, SOLUTION INTRAMUSCULAR; INTRAVENOUS; SUBCUTANEOUS at 18:08

## 2021-05-05 RX ADMIN — HYDROMORPHONE HYDROCHLORIDE 0.5 MG: 2 INJECTION, SOLUTION INTRAMUSCULAR; INTRAVENOUS; SUBCUTANEOUS at 16:26

## 2021-05-05 RX ADMIN — SODIUM CHLORIDE, SODIUM LACTATE, POTASSIUM CHLORIDE, AND CALCIUM CHLORIDE: .6; .31; .03; .02 INJECTION, SOLUTION INTRAVENOUS at 12:50

## 2021-05-05 RX ADMIN — DEXMEDETOMIDINE HCL 4 MCG: 100 INJECTION INTRAVENOUS at 13:06

## 2021-05-05 RX ADMIN — INDOCYANINE GREEN AND WATER 5 MG: KIT at 15:47

## 2021-05-05 RX ADMIN — FENTANYL CITRATE 50 MCG: 50 INJECTION INTRAMUSCULAR; INTRAVENOUS at 17:07

## 2021-05-05 RX ADMIN — ROCURONIUM BROMIDE 30 MG: 50 INJECTION, SOLUTION INTRAVENOUS at 13:53

## 2021-05-05 RX ADMIN — LIDOCAINE HYDROCHLORIDE 0.2 ML: 10 INJECTION, SOLUTION EPIDURAL; INFILTRATION; INTRACAUDAL; PERINEURAL at 11:47

## 2021-05-05 RX ADMIN — GABAPENTIN 300 MG: 300 CAPSULE ORAL at 20:39

## 2021-05-05 RX ADMIN — FENTANYL CITRATE 50 MCG: 50 INJECTION INTRAMUSCULAR; INTRAVENOUS at 12:57

## 2021-05-05 RX ADMIN — KETAMINE HYDROCHLORIDE 10 MG: 50 INJECTION, SOLUTION INTRAMUSCULAR; INTRAVENOUS at 14:00

## 2021-05-05 RX ADMIN — CEFAZOLIN SODIUM 2000 MG: 2 SOLUTION INTRAVENOUS at 16:52

## 2021-05-05 RX ADMIN — Medication 50 MCG: at 19:15

## 2021-05-05 RX ADMIN — HYDROMORPHONE HYDROCHLORIDE 0.2 MG: 2 INJECTION, SOLUTION INTRAMUSCULAR; INTRAVENOUS; SUBCUTANEOUS at 17:59

## 2021-05-05 RX ADMIN — HEPARIN SODIUM 5000 UNITS: 5000 INJECTION INTRAVENOUS; SUBCUTANEOUS at 22:10

## 2021-05-05 RX ADMIN — PHENYLEPHRINE HYDROCHLORIDE 20 MCG/MIN: 10 INJECTION INTRAVENOUS at 13:12

## 2021-05-05 RX ADMIN — NEOSTIGMINE METHYLSULFATE 4 MG: 1 INJECTION INTRAVENOUS at 17:39

## 2021-05-05 RX ADMIN — MIDAZOLAM 2 MG: 1 INJECTION INTRAMUSCULAR; INTRAVENOUS at 12:50

## 2021-05-05 RX ADMIN — LIDOCAINE HYDROCHLORIDE 100 MG: 10 INJECTION, SOLUTION EPIDURAL; INFILTRATION; INTRACAUDAL; PERINEURAL at 12:57

## 2021-05-05 RX ADMIN — CEFAZOLIN SODIUM 2000 MG: 2 SOLUTION INTRAVENOUS at 13:00

## 2021-05-05 RX ADMIN — KETAMINE HYDROCHLORIDE 10 MG: 50 INJECTION, SOLUTION INTRAMUSCULAR; INTRAVENOUS at 15:08

## 2021-05-05 RX ADMIN — SODIUM CHLORIDE, SODIUM LACTATE, POTASSIUM CHLORIDE, AND CALCIUM CHLORIDE: .6; .31; .03; .02 INJECTION, SOLUTION INTRAVENOUS at 16:55

## 2021-05-05 RX ADMIN — DEXMEDETOMIDINE HCL 4 MCG: 100 INJECTION INTRAVENOUS at 13:02

## 2021-05-05 RX ADMIN — PROPOFOL 40 MG: 10 INJECTION, EMULSION INTRAVENOUS at 17:37

## 2021-05-05 RX ADMIN — DEXAMETHASONE SODIUM PHOSPHATE 10 MG: 10 INJECTION, SOLUTION INTRAMUSCULAR; INTRAVENOUS at 12:57

## 2021-05-05 RX ADMIN — SODIUM CHLORIDE, SODIUM LACTATE, POTASSIUM CHLORIDE, AND CALCIUM CHLORIDE 125 ML/HR: .6; .31; .03; .02 INJECTION, SOLUTION INTRAVENOUS at 11:47

## 2021-05-05 RX ADMIN — GLYCOPYRROLATE 0.8 MG: 0.2 INJECTION, SOLUTION INTRAMUSCULAR; INTRAVENOUS at 17:39

## 2021-05-05 RX ADMIN — Medication 50 MCG: at 18:21

## 2021-05-05 RX ADMIN — ROCURONIUM BROMIDE 20 MG: 50 INJECTION, SOLUTION INTRAVENOUS at 15:13

## 2021-05-05 RX ADMIN — ONDANSETRON 4 MG: 2 INJECTION INTRAMUSCULAR; INTRAVENOUS at 17:07

## 2021-05-05 RX ADMIN — INSULIN HUMAN 2 UNITS: 100 INJECTION, SOLUTION PARENTERAL at 16:40

## 2021-05-05 RX ADMIN — DEXMEDETOMIDINE HCL 8 MCG: 100 INJECTION INTRAVENOUS at 13:40

## 2021-05-05 RX ADMIN — METRONIDAZOLE 500 MG: 500 INJECTION, SOLUTION INTRAVENOUS at 13:15

## 2021-05-05 RX ADMIN — ACETAMINOPHEN 975 MG: 325 TABLET, FILM COATED ORAL at 20:39

## 2021-05-05 RX ADMIN — INDOCYANINE GREEN AND WATER 5 MG: KIT at 15:54

## 2021-05-05 RX ADMIN — SODIUM CHLORIDE, SODIUM LACTATE, POTASSIUM CHLORIDE, AND CALCIUM CHLORIDE 125 ML/HR: .6; .31; .03; .02 INJECTION, SOLUTION INTRAVENOUS at 19:28

## 2021-05-05 RX ADMIN — KETAMINE HYDROCHLORIDE 30 MG: 50 INJECTION, SOLUTION INTRAMUSCULAR; INTRAVENOUS at 12:57

## 2021-05-05 RX ADMIN — DEXMEDETOMIDINE HCL 4 MCG: 100 INJECTION INTRAVENOUS at 13:53

## 2021-05-05 RX ADMIN — SODIUM CHLORIDE 0.3 MCG/KG/HR: 9 INJECTION, SOLUTION INTRAVENOUS at 14:04

## 2021-05-05 RX ADMIN — ROCURONIUM BROMIDE 50 MG: 50 INJECTION, SOLUTION INTRAVENOUS at 12:58

## 2021-05-05 RX ADMIN — ROCURONIUM BROMIDE 20 MG: 50 INJECTION, SOLUTION INTRAVENOUS at 14:18

## 2021-05-05 RX ADMIN — OXYCODONE HYDROCHLORIDE 5 MG: 5 TABLET ORAL at 22:10

## 2021-05-05 RX ADMIN — SODIUM CHLORIDE, SODIUM LACTATE, POTASSIUM CHLORIDE, AND CALCIUM CHLORIDE: .6; .31; .03; .02 INJECTION, SOLUTION INTRAVENOUS at 15:36

## 2021-05-05 RX ADMIN — ROCURONIUM BROMIDE 20 MG: 50 INJECTION, SOLUTION INTRAVENOUS at 16:08

## 2021-05-05 RX ADMIN — SODIUM CHLORIDE: 0.9 INJECTION, SOLUTION INTRAVENOUS at 13:03

## 2021-05-05 RX ADMIN — HYDROMORPHONE HYDROCHLORIDE 0.5 MG: 2 INJECTION, SOLUTION INTRAMUSCULAR; INTRAVENOUS; SUBCUTANEOUS at 16:52

## 2021-05-05 RX ADMIN — PROPOFOL 200 MG: 10 INJECTION, EMULSION INTRAVENOUS at 12:57

## 2021-05-05 NOTE — ANESTHESIA POSTPROCEDURE EVALUATION
Post-Op Assessment Note    CV Status:  Stable  Pain Score: 4    Pain management: adequate     Mental Status:  Awake   Hydration Status:  Stable   PONV Controlled:  None   Airway Patency:  Patent and adequate      Post Op Vitals Reviewed: Yes      Staff: CRNA         No complications documented      BP   100/48   Temp 98 5 °F (36 9 °C) (05/05/21 1806)    Pulse 84 (05/05/21 1806)   Resp 18 (05/05/21 1806)    SpO2 99 % (05/05/21 1806)

## 2021-05-05 NOTE — INTERVAL H&P NOTE
H&P reviewed  After examining the patient I find no changes in the patients condition since the H&P had been written      Vitals:    05/05/21 1105   BP: 116/67   Pulse: 82   Resp: 18   Temp: (!) 96 9 °F (36 1 °C)   SpO2: 100%

## 2021-05-05 NOTE — ANESTHESIA PREPROCEDURE EVALUATION
Procedure:  ROBOTIC VS OPEN SIGMOID COLECTOMY (N/A Abdomen)  RESECTION COLON SIGMOID (N/A Abdomen)    Relevant Problems   ANESTHESIA (within normal limits)   (-) History of anesthesia complications      CARDIO   (+) Essential hypertension   (-) Chest pain   (-) VILLARREAL (dyspnea on exertion)      GI/HEPATIC   (+) Cancer of sigmoid colon (HCC)      /RENAL (within normal limits)      MUSCULOSKELETAL   (+) Midline low back pain without sciatica      NEURO/PSYCH   (+) Situational anxiety      PULMONARY   (-) Shortness of breath   (-) URI (upper respiratory infection)      Other   (+) Diabetes mellitus without complication (HCC)   (+) Hereditary hemochromatosis (HCC)        Physical Exam    Airway    Mallampati score: I  TM Distance: >3 FB  Neck ROM: full     Dental   No notable dental hx     Cardiovascular      Pulmonary      Other Findings        Anesthesia Plan  ASA Score- 2     Anesthesia Type- general with ASA Monitors  Additional Monitors: arterial line  Airway Plan: ETT  Plan Factors-Exercise tolerance (METS): >4 METS  Chart reviewed  EKG reviewed  Existing labs reviewed  Induction- intravenous  Postoperative Plan-     Informed Consent- Anesthetic plan and risks discussed with patient  I personally reviewed this patient with the CRNA  Discussed and agreed on the Anesthesia Plan with the CRNA  Opal Bills

## 2021-05-06 LAB
ANION GAP SERPL CALCULATED.3IONS-SCNC: 6 MMOL/L (ref 4–13)
BUN SERPL-MCNC: 16 MG/DL (ref 5–25)
CALCIUM SERPL-MCNC: 8.2 MG/DL (ref 8.3–10.1)
CHLORIDE SERPL-SCNC: 111 MMOL/L (ref 100–108)
CO2 SERPL-SCNC: 19 MMOL/L (ref 21–32)
CREAT SERPL-MCNC: 1.08 MG/DL (ref 0.6–1.3)
ERYTHROCYTE [DISTWIDTH] IN BLOOD BY AUTOMATED COUNT: 13.2 % (ref 11.6–15.1)
GFR SERPL CREATININE-BSD FRML MDRD: 85 ML/MIN/1.73SQ M
GLUCOSE SERPL-MCNC: 103 MG/DL (ref 65–140)
GLUCOSE SERPL-MCNC: 107 MG/DL (ref 65–140)
GLUCOSE SERPL-MCNC: 120 MG/DL (ref 65–140)
GLUCOSE SERPL-MCNC: 123 MG/DL (ref 65–140)
GLUCOSE SERPL-MCNC: 131 MG/DL (ref 65–140)
GLUCOSE SERPL-MCNC: 135 MG/DL (ref 65–140)
GLUCOSE SERPL-MCNC: 147 MG/DL (ref 65–140)
GLUCOSE SERPL-MCNC: 187 MG/DL (ref 65–140)
HCT VFR BLD AUTO: 41.5 % (ref 36.5–49.3)
HGB BLD-MCNC: 13.6 G/DL (ref 12–17)
MAGNESIUM SERPL-MCNC: 2 MG/DL (ref 1.6–2.6)
MCH RBC QN AUTO: 28 PG (ref 26.8–34.3)
MCHC RBC AUTO-ENTMCNC: 32.8 G/DL (ref 31.4–37.4)
MCV RBC AUTO: 85 FL (ref 82–98)
PHOSPHATE SERPL-MCNC: 4.1 MG/DL (ref 2.7–4.5)
PLATELET # BLD AUTO: 253 THOUSANDS/UL (ref 149–390)
PMV BLD AUTO: 11.1 FL (ref 8.9–12.7)
POTASSIUM SERPL-SCNC: 4.6 MMOL/L (ref 3.5–5.3)
RBC # BLD AUTO: 4.86 MILLION/UL (ref 3.88–5.62)
SODIUM SERPL-SCNC: 136 MMOL/L (ref 136–145)
WBC # BLD AUTO: 11.59 THOUSAND/UL (ref 4.31–10.16)

## 2021-05-06 PROCEDURE — 82948 REAGENT STRIP/BLOOD GLUCOSE: CPT

## 2021-05-06 PROCEDURE — 99024 POSTOP FOLLOW-UP VISIT: CPT | Performed by: STUDENT IN AN ORGANIZED HEALTH CARE EDUCATION/TRAINING PROGRAM

## 2021-05-06 PROCEDURE — 84100 ASSAY OF PHOSPHORUS: CPT | Performed by: ORTHOPAEDIC SURGERY

## 2021-05-06 PROCEDURE — 85027 COMPLETE CBC AUTOMATED: CPT | Performed by: ORTHOPAEDIC SURGERY

## 2021-05-06 PROCEDURE — 83735 ASSAY OF MAGNESIUM: CPT | Performed by: ORTHOPAEDIC SURGERY

## 2021-05-06 PROCEDURE — 80048 BASIC METABOLIC PNL TOTAL CA: CPT | Performed by: ORTHOPAEDIC SURGERY

## 2021-05-06 RX ORDER — HYDROMORPHONE HCL/PF 1 MG/ML
0.5 SYRINGE (ML) INJECTION EVERY 4 HOURS PRN
Status: DISCONTINUED | OUTPATIENT
Start: 2021-05-06 | End: 2021-05-07 | Stop reason: HOSPADM

## 2021-05-06 RX ORDER — KETOROLAC TROMETHAMINE 30 MG/ML
30 INJECTION, SOLUTION INTRAMUSCULAR; INTRAVENOUS EVERY 6 HOURS SCHEDULED
Status: DISCONTINUED | OUTPATIENT
Start: 2021-05-06 | End: 2021-05-07 | Stop reason: HOSPADM

## 2021-05-06 RX ORDER — OXYCODONE HYDROCHLORIDE 10 MG/1
10 TABLET ORAL EVERY 4 HOURS PRN
Status: DISCONTINUED | OUTPATIENT
Start: 2021-05-06 | End: 2021-05-07 | Stop reason: HOSPADM

## 2021-05-06 RX ORDER — OXYCODONE HYDROCHLORIDE 5 MG/1
5 TABLET ORAL EVERY 4 HOURS PRN
Status: DISCONTINUED | OUTPATIENT
Start: 2021-05-06 | End: 2021-05-07 | Stop reason: HOSPADM

## 2021-05-06 RX ADMIN — ACETAMINOPHEN 975 MG: 325 TABLET, FILM COATED ORAL at 21:15

## 2021-05-06 RX ADMIN — KETOROLAC TROMETHAMINE 30 MG: 30 INJECTION, SOLUTION INTRAMUSCULAR at 23:57

## 2021-05-06 RX ADMIN — HYDROMORPHONE HYDROCHLORIDE 0.5 MG: 1 INJECTION, SOLUTION INTRAMUSCULAR; INTRAVENOUS; SUBCUTANEOUS at 00:10

## 2021-05-06 RX ADMIN — HYDROMORPHONE HYDROCHLORIDE 0.5 MG: 1 INJECTION, SOLUTION INTRAMUSCULAR; INTRAVENOUS; SUBCUTANEOUS at 09:46

## 2021-05-06 RX ADMIN — HEPARIN SODIUM 5000 UNITS: 5000 INJECTION INTRAVENOUS; SUBCUTANEOUS at 22:12

## 2021-05-06 RX ADMIN — METHOCARBAMOL 500 MG: 500 TABLET, FILM COATED ORAL at 00:10

## 2021-05-06 RX ADMIN — GABAPENTIN 300 MG: 300 CAPSULE ORAL at 21:16

## 2021-05-06 RX ADMIN — OXYCODONE HYDROCHLORIDE 10 MG: 10 TABLET ORAL at 14:01

## 2021-05-06 RX ADMIN — HEPARIN SODIUM 5000 UNITS: 5000 INJECTION INTRAVENOUS; SUBCUTANEOUS at 14:01

## 2021-05-06 RX ADMIN — OXYCODONE HYDROCHLORIDE 5 MG: 5 TABLET ORAL at 02:46

## 2021-05-06 RX ADMIN — OXYCODONE HYDROCHLORIDE 10 MG: 10 TABLET ORAL at 21:16

## 2021-05-06 RX ADMIN — SODIUM CHLORIDE, SODIUM LACTATE, POTASSIUM CHLORIDE, AND CALCIUM CHLORIDE 125 ML/HR: .6; .31; .03; .02 INJECTION, SOLUTION INTRAVENOUS at 02:47

## 2021-05-06 RX ADMIN — METHOCARBAMOL 500 MG: 500 TABLET, FILM COATED ORAL at 08:36

## 2021-05-06 RX ADMIN — HYDROMORPHONE HYDROCHLORIDE 0.5 MG: 1 INJECTION, SOLUTION INTRAMUSCULAR; INTRAVENOUS; SUBCUTANEOUS at 22:12

## 2021-05-06 RX ADMIN — GABAPENTIN 300 MG: 300 CAPSULE ORAL at 16:54

## 2021-05-06 RX ADMIN — HEPARIN SODIUM 5000 UNITS: 5000 INJECTION INTRAVENOUS; SUBCUTANEOUS at 06:04

## 2021-05-06 RX ADMIN — ACETAMINOPHEN 975 MG: 325 TABLET, FILM COATED ORAL at 08:36

## 2021-05-06 RX ADMIN — KETOROLAC TROMETHAMINE 30 MG: 30 INJECTION, SOLUTION INTRAMUSCULAR at 17:00

## 2021-05-06 RX ADMIN — KETOROLAC TROMETHAMINE 30 MG: 30 INJECTION, SOLUTION INTRAMUSCULAR at 11:11

## 2021-05-06 RX ADMIN — ACETAMINOPHEN 975 MG: 325 TABLET, FILM COATED ORAL at 01:43

## 2021-05-06 RX ADMIN — OXYCODONE HYDROCHLORIDE 10 MG: 10 TABLET ORAL at 16:54

## 2021-05-06 RX ADMIN — ACETAMINOPHEN 975 MG: 325 TABLET, FILM COATED ORAL at 14:01

## 2021-05-06 RX ADMIN — OXYCODONE HYDROCHLORIDE 10 MG: 10 TABLET ORAL at 06:27

## 2021-05-06 RX ADMIN — GABAPENTIN 300 MG: 300 CAPSULE ORAL at 08:36

## 2021-05-06 NOTE — UTILIZATION REVIEW
Initial Clinical Review    Elective  Inpatient  surgical procedure  Age/Sex: 37 y o  male  Surgery Date: 5/5/2021    Procedure: ROBOTIC SIGMOID COLECTOMY, MOBILIZATION OF SPLENIC FLEXURE    Anesthesia:  General     Operative Findings: Cancer of descending colon amenable with robotic removal    POD#1 Progress Note: 5/6/2021 - Post op pt is doing well, he has some complaint of pain around his incisions  No nausea or vomiting  Diet clear liquids advance diet, LR @ 125 ml/hr d/c'd  Campo cath d/c'd this am  OOB ambulate, IS 10 X hour  Monitor labs  Correct electrolytes  Admission Orders: Date/Time/Statement:   Admission Orders (From admission, onward)     Ordered        05/05/21 1812  Inpatient Admission  Once                   Orders Placed This Encounter   Procedures    Inpatient Admission     Standing Status:   Standing     Number of Occurrences:   1     Order Specific Question:   Level of Care     Answer:   Med Surg [16]     Order Specific Question:   Estimated length of stay     Answer:   More than 2 Midnights     Order Specific Question:   Certification     Answer:   I certify that inpatient services are medically necessary for this patient for a duration of greater than two midnights  See H&P and MD Progress Notes for additional information about the patient's course of treatment       Vital Signs: /61   Pulse 58   Temp 98 4 °F (36 9 °C)   Resp 19   Ht 5' 10" (1 778 m)   Wt 94 3 kg (208 lb)   SpO2 98%   BMI 29 84 kg/m²   Date/Time  Temp  Pulse  Resp  BP  MAP (mmHg)  SpO2  O2 Flow Rate (L/min)  O2 Device   05/06/21 08:09:40  98 4 °F (36 9 °C)  58  19  110/61  77  98 %  --  --   05/06/21 05:07:29  98 1 °F (36 7 °C)  65  18  112/61  78  97 %  --  --   05/06/21 0213  98 1 °F (36 7 °C)  88  18  107/62  --  96 %  --  --   05/06/21 01:30:50  98 6 °F (37 °C)  84  --  103/58  73  95 %  --  --   05/05/21 23:07:40  98 8 °F (37 1 °C)  81  20  107/63  78  95 %  --  --   05/05/21 21:58:22  98 5 °F (36 9 °C) 83  18  111/68  82  95 %  --  --   05/05/21 21:17:50  98 6 °F (37 °C)  85  18  113/71  85  96 %  --  --   05/05/21 2030  --  --  --  --  --  --  --  None (Room air)   05/05/21 19:50:20  98 2 °F (36 8 °C)  90  18  115/71  86  94 %  --  --   05/05/21 1930  --  92  16  118/75  --  94 %  --  None (Room air)   05/05/21 1915  98 4 °F (36 9 °C)  92  21  120/77  --  95 %  --  None (Room air)   05/05/21 1900  --  86  12  121/74  --  94 %  --  None (Room air)   05/05/21 1845  --  82  14  107/75  --  96 %  2 L/min  Nasal cannula   05/05/21 1830  --  84  15  116/66  --  99 %  2 L/min  Nasal cannula   05/05/21 1815  --  86  13  119/72  --  97 %  2 L/min  Nasal cannula   05/05/21 1806  98 5 °F (36 9 °C)  84  18  100/48Abnormal   --  99 %  6 L/min  Simple mask           Pertinent Labs/Diagnostic Test Results:       Results from last 7 days   Lab Units 05/06/21  0614 05/05/21  2128   WBC Thousand/uL 11 59*  --    HEMOGLOBIN g/dL 13 6  --    HEMATOCRIT % 41 5  --    PLATELETS Thousands/uL 253 244         Results from last 7 days   Lab Units 05/06/21  0503   SODIUM mmol/L 136   POTASSIUM mmol/L 4 6   CHLORIDE mmol/L 111*   CO2 mmol/L 19*   ANION GAP mmol/L 6   BUN mg/dL 16   CREATININE mg/dL 1 08   EGFR ml/min/1 73sq m 85   CALCIUM mg/dL 8 2*   MAGNESIUM mg/dL 2 0   PHOSPHORUS mg/dL 4 1         Results from last 7 days   Lab Units 05/06/21  0835 05/05/21  2122 05/05/21  1813 05/05/21  1727 05/05/21  1634 05/05/21  1411 05/05/21  1114   POC GLUCOSE mg/dl 135 141* 152* 147* 187* 103 95     Results from last 7 days   Lab Units 05/06/21  0503   GLUCOSE RANDOM mg/dL 123         Diet:  Surgical  Clear liquids - toast & crackers - no carbonation - started am 5/6    Mobility:  Up as tolerated     DVT Prophylaxis:  SCD's to le's - heparin sc q8      Medications/Pain Control:   Scheduled Medications:  acetaminophen, 975 mg, Oral, Q6H BYRON  gabapentin, 300 mg, Oral, TID  heparin (porcine), 5,000 Units, Subcutaneous, Q8H BYRON  insulin lispro, 1-6 Units, Subcutaneous, TID AC  insulin lispro, 1-6 Units, Subcutaneous, HS  ketorolac, 30 mg, Intravenous, Q6H [address]      Continuous IV Infusions:    lactated ringers infusion   Rate: 125 mL/hr Dose: 125 mL/hr  Freq: Continuous Route: IV  Last Dose: 125 mL/hr (05/06/21 0247)  Start: 05/05/21 1815 End: 05/06/21 0857        PRN Meds:  HYDROmorphone, 0 5 mg, Intravenous, Q4H PRN  lactated ringers, 1,000 mL, Intravenous, Once PRN    And  lactated ringers, 1,000 mL, Intravenous, Once PRN  methocarbamol, 500 mg, Oral, Q6H PRN - 5/6 x 2   ondansetron, 4 mg, Intravenous, Q6H PRN  oxyCODONE, 10 mg, Oral, Q4H PRN - 5/6 x 1   oxyCODONE, 5 mg, Oral, Q4H PRN - 5/5 x 1 - 5/6 x 1   sodium chloride, 1,000 mL, Intravenous, Once PRN    And  sodium chloride, 1,000 mL, Intravenous, Once PRN    Nursing Orders -  VS  - I & O q4 - SCD's to rodrigo's- [facility] - d/c'd am 5/6 -     Network Utilization Review Department  ATTENTION: Please call with any questions or concerns to [phone] and carefully listen to the prompts so that you are directed to the right person  All voicemails are confidential   Alvarez Judy all requests for admission clinical reviews, approved or denied determinations and any other requests to dedicated fax number below belonging to the campus where the patient is receiving treatment   List of dedicated fax numbers for the Facilities:  [address] DENIALS (Administrative/Medical Necessity) [phone]   [address] (Maternity/NICU/Pediatrics) [phone]   [address] [place] [address] [address] [place] [address] [id number] [place] [address] [phone]   Alayna Wisdom [address] [address] [address] [place] [address] [phone]

## 2021-05-06 NOTE — CASE MANAGEMENT
Met with pt, explained CM program/CM role  LOS-1  Bundle-no  Unplanned readmission color-green  30 day readmission-no  Resides with parents, ranch home, 2 JON  I PTA for ADL's/ambulation, drives, employed  PCP [facility]  Pharmacy-[facility]-would like [facility] for DC meds  Denies HC/DME/IP Rehab  Denies MH illness, D&A abuse  Primary contact mother [phone]  No POA/LW  Family will transport home    CM reviewed d/c planning process including the following: identifying help at home, patient preference for d/c planning needs, Discharge Lounge, [facility] Meds to Bed program, availability of treatment team to discuss questions or concerns patient and/or family may have regarding understanding medications and recognizing signs and symptoms once discharged  CM also encouraged patient to follow up with all recommended appointments after discharge  Patient advised of importance for patient and family to participate in managing patients medical well being  Patient/caregiver received discharge checklist  Content reviewed  Patient/caregiver encouraged to participate in discharge plan of care prior to discharge home

## 2021-05-06 NOTE — QUICK NOTE
Post Op Check:    Patient is s/p robotic left colectomy  Patient is doing well post operatively  He is complaining of some pain around his incisions  No nausea or vomiting  No fevers/chills  General: NAD  HENT: NCAT MMM  Neck: supple, no JVD  CV: nl rate  Lungs: nl wob  No resp distress  ABD: Soft, leon-incisional tenderness, nondistended  Incisions c/d/i    Extrem: No CCE  : garcia in place  Neuro: AAOx3     Plan:  Diet Clear Liquid   [email or web address]  Maintain garcia  Continue to monitor  Pain and nausea control PRN    Cooper Veliz, DO  Surgery, PGY-1

## 2021-05-06 NOTE — PLAN OF CARE
Problem: PAIN - ADULT  Goal: Verbalizes/displays adequate comfort level or baseline comfort level  Description: Interventions:  - Encourage patient to monitor pain and request assistance  - Assess pain using appropriate pain scale  - Administer analgesics based on type and severity of pain and evaluate response  - Implement non-pharmacological measures as appropriate and evaluate response  - Consider cultural and social influences on pain and pain management  - Notify physician/advanced practitioner if interventions unsuccessful or patient reports new pain  Outcome: Progressing     Problem: INFECTION - ADULT  Goal: Absence or prevention of progression during hospitalization  Description: INTERVENTIONS:  - Assess and monitor for signs and symptoms of infection  - Monitor lab/diagnostic results  - Monitor all insertion sites, i e  indwelling lines, tubes, and drains  - Monitor endotracheal if appropriate and nasal secretions for changes in amount and color  - [place] appropriate cooling/warming therapies per order  - Administer medications as ordered  - Instruct and encourage patient and family to use good hand hygiene technique  - Identify and instruct in appropriate isolation precautions for identified infection/condition  Outcome: Progressing     Problem: SAFETY ADULT  Goal: Patient will remain free of falls  Description: INTERVENTIONS:  - Assess patient frequently for physical needs  -  Identify cognitive and physical deficits and behaviors that affect risk of falls    -  [place] fall precautions as indicated by assessment   - Educate patient/family on patient safety including physical limitations  - Instruct patient to call for assistance with activity based on assessment  - Modify environment to reduce risk of injury  - Consider OT/PT consult to assist with strengthening/mobility  Outcome: Progressing  Goal: Maintain or return to baseline ADL function  Description: INTERVENTIONS:  -  Assess patient's ability to carry out ADLs; assess patient's baseline for ADL function and identify physical deficits which impact ability to perform ADLs (bathing, care of mouth/teeth, toileting, grooming, dressing, etc )  - Assess/evaluate cause of self-care deficits   - Assess range of motion  - Assess patient's mobility; develop plan if impaired  - Assess patient's need for assistive devices and provide as appropriate  - Encourage maximum independence but intervene and supervise when necessary  - Involve family in performance of ADLs  - Assess for home care needs following discharge   - Consider OT consult to assist with ADL evaluation and planning for discharge  - Provide patient education as appropriate  Outcome: Progressing  Goal: Maintain or return mobility status to optimal level  Description: INTERVENTIONS:  - Assess patient's baseline mobility status (ambulation, transfers, stairs, etc )    - Identify cognitive and physical deficits and behaviors that affect mobility  - Identify mobility aids required to assist with transfers and/or ambulation (gait belt, sit-to-stand, lift, walker, cane, etc )  - [place] fall precautions as indicated by assessment  - Record patient progress and toleration of activity level on Mobility SBAR; progress patient to next Phase/Stage  - Instruct patient to call for assistance with activity based on assessment  - Consider rehabilitation consult to assist with strengthening/weightbearing, etc   Outcome: Progressing     Problem: DISCHARGE PLANNING  Goal: Discharge to home or other facility with appropriate resources  Description: INTERVENTIONS:  - Identify barriers to discharge w/patient and caregiver  - Arrange for needed discharge resources and transportation as appropriate  - Identify discharge learning needs (meds, wound care, etc )  - Arrange for interpretive services to assist at discharge as needed  - Refer to Case Management Department for coordinating discharge planning if the patient needs post-hospital services based on physician/advanced practitioner order or complex needs related to functional status, cognitive ability, or social support system  Outcome: Progressing     Problem: Knowledge Deficit  Goal: Patient/family/caregiver demonstrates understanding of disease process, treatment plan, medications, and discharge instructions  Description: Complete learning assessment and assess knowledge base    Interventions:  - Provide teaching at level of understanding  - Provide teaching via preferred learning methods  Outcome: Progressing

## 2021-05-06 NOTE — PLAN OF CARE
Problem: PAIN - ADULT  Goal: Verbalizes/displays adequate comfort level or baseline comfort level  Description: Interventions:  - Encourage patient to monitor pain and request assistance  - Assess pain using appropriate pain scale  - Administer analgesics based on type and severity of pain and evaluate response  - Implement non-pharmacological measures as appropriate and evaluate response  - Consider cultural and social influences on pain and pain management  - Notify physician/advanced practitioner if interventions unsuccessful or patient reports new pain  Outcome: Progressing     Problem: INFECTION - ADULT  Goal: Absence or prevention of progression during hospitalization  Description: INTERVENTIONS:  - Assess and monitor for signs and symptoms of infection  - Monitor lab/diagnostic results  - Monitor all insertion sites, i e  indwelling lines, tubes, and drains  - Monitor endotracheal if appropriate and nasal secretions for changes in amount and color  - [place] appropriate cooling/warming therapies per order  - Administer medications as ordered  - Instruct and encourage patient and family to use good hand hygiene technique  - Identify and instruct in appropriate isolation precautions for identified infection/condition  Outcome: Progressing     Problem: SAFETY ADULT  Goal: Patient will remain free of falls  Description: INTERVENTIONS:  - Assess patient frequently for physical needs  -  Identify cognitive and physical deficits and behaviors that affect risk of falls    -  [place] fall precautions as indicated by assessment   - Educate patient/family on patient safety including physical limitations  - Instruct patient to call for assistance with activity based on assessment  - Modify environment to reduce risk of injury  - Consider OT/PT consult to assist with strengthening/mobility  Outcome: Progressing  Goal: Maintain or return to baseline ADL function  Description: INTERVENTIONS:  -  Assess patient's ability to carry out ADLs; assess patient's baseline for ADL function and identify physical deficits which impact ability to perform ADLs (bathing, care of mouth/teeth, toileting, grooming, dressing, etc )  - Assess/evaluate cause of self-care deficits   - Assess range of motion  - Assess patient's mobility; develop plan if impaired  - Assess patient's need for assistive devices and provide as appropriate  - Encourage maximum independence but intervene and supervise when necessary  - Involve family in performance of ADLs  - Assess for home care needs following discharge   - Consider OT consult to assist with ADL evaluation and planning for discharge  - Provide patient education as appropriate  Outcome: Progressing  Goal: Maintain or return mobility status to optimal level  Description: INTERVENTIONS:  - Assess patient's baseline mobility status (ambulation, transfers, stairs, etc )    - Identify cognitive and physical deficits and behaviors that affect mobility  - Identify mobility aids required to assist with transfers and/or ambulation (gait belt, sit-to-stand, lift, walker, cane, etc )  - [place] fall precautions as indicated by assessment  - Record patient progress and toleration of activity level on Mobility SBAR; progress patient to next Phase/Stage  - Instruct patient to call for assistance with activity based on assessment  - Consider rehabilitation consult to assist with strengthening/weightbearing, etc   Outcome: Progressing     Problem: DISCHARGE PLANNING  Goal: Discharge to home or other facility with appropriate resources  Description: INTERVENTIONS:  - Identify barriers to discharge w/patient and caregiver  - Arrange for needed discharge resources and transportation as appropriate  - Identify discharge learning needs (meds, wound care, etc )  - Arrange for interpretive services to assist at discharge as needed  - Refer to Case Management Department for coordinating discharge planning if the patient needs post-hospital services based on physician/advanced practitioner order or complex needs related to functional status, cognitive ability, or social support system  Outcome: Progressing     Problem: Knowledge Deficit  Goal: Patient/family/caregiver demonstrates understanding of disease process, treatment plan, medications, and discharge instructions  Description: Complete learning assessment and assess knowledge base    Interventions:  - Provide teaching at level of understanding  - Provide teaching via preferred learning methods  Outcome: Progressing

## 2021-05-06 NOTE — PROGRESS NOTES
Progress Note - Norberto Arora 37 y o  male MRN: [id number]    Unit/Bed#: [facility] 909-01 Encounter: [id number]      Assessment:  38 yo male s/p robotic L colectomy on 5/5 2/2 to colon cancer    AVSS  Uo- 1 755L    Plan:  Can advance diet today, likely fulls/tst/cx  Transition to MIVF  DC garcia  DVT ppx  Will adjust patient's pain regimen for better pain control  OOB and ambulate  IS 10xhour  Follow up labs, correct electrolytes prn K>4, Mg>2, P>3    Subjective:   Patient states he had a lot of pain last night  Only was controlled with dose of dilaudid  No nausea or vomiting  No flatus or bms  Complaining of pain from his catheter  Objective:     Vitals: Blood pressure 112/61, pulse 65, temperature 98 1 °F (36 7 °C), resp  rate 18, height 5' 10" (1 778 m), weight 94 3 kg (208 lb), SpO2 97 %  ,Body mass index is 29 84 kg/m²  Intake/Output Summary (Last 24 hours) at 5/6/2021 0614  Last data filed at 5/6/2021 0600  Gross per 24 hour   Intake 4532 5 ml   Output 1755 ml   Net 2777 5 ml       Physical Exam:   General: NAD  HENT: NCAT MMM  Neck: supple, no JVD  CV: nl rate  Lungs: nl wob  No resp distress  ABD: Soft, incisional tenderness, nondistended  Incisions c/d/i  Extrem: No CCE  Neuro: AAOx3        Invasive Devices     Peripheral Intravenous Line            Peripheral IV 05/05/21 Left Hand less than 1 day    Peripheral IV 05/05/21 Right Hand less than 1 day          Drain            NG/OG/Enteral Tube Orogastric 18 Fr Center mouth less than 1 day                Lab, Imaging and other studies: I have personally reviewed pertinent reports

## 2021-05-07 ENCOUNTER — TRANSITIONAL CARE MANAGEMENT (OUTPATIENT)
Dept: INTERNAL MEDICINE CLINIC | Facility: CLINIC | Age: 41
End: 2021-05-07

## 2021-05-07 VITALS
SYSTOLIC BLOOD PRESSURE: 125 MMHG | BODY MASS INDEX: 29.78 KG/M2 | WEIGHT: 208 LBS | HEART RATE: 90 BPM | TEMPERATURE: 98.3 F | HEIGHT: 70 IN | DIASTOLIC BLOOD PRESSURE: 92 MMHG | OXYGEN SATURATION: 97 % | RESPIRATION RATE: 18 BRPM

## 2021-05-07 LAB
BASOPHILS # BLD AUTO: 0.03 THOUSANDS/ΜL (ref 0–0.1)
BASOPHILS NFR BLD AUTO: 0 % (ref 0–1)
EOSINOPHIL # BLD AUTO: 0.17 THOUSAND/ΜL (ref 0–0.61)
EOSINOPHIL NFR BLD AUTO: 2 % (ref 0–6)
ERYTHROCYTE [DISTWIDTH] IN BLOOD BY AUTOMATED COUNT: 13 % (ref 11.6–15.1)
GLUCOSE SERPL-MCNC: 104 MG/DL (ref 65–140)
GLUCOSE SERPL-MCNC: 159 MG/DL (ref 65–140)
HCT VFR BLD AUTO: 41.7 % (ref 36.5–49.3)
HGB BLD-MCNC: 13.2 G/DL (ref 12–17)
IMM GRANULOCYTES # BLD AUTO: 0.01 THOUSAND/UL (ref 0–0.2)
IMM GRANULOCYTES NFR BLD AUTO: 0 % (ref 0–2)
LYMPHOCYTES # BLD AUTO: 2.76 THOUSANDS/ΜL (ref 0.6–4.47)
LYMPHOCYTES NFR BLD AUTO: 35 % (ref 14–44)
MCH RBC QN AUTO: 28 PG (ref 26.8–34.3)
MCHC RBC AUTO-ENTMCNC: 31.7 G/DL (ref 31.4–37.4)
MCV RBC AUTO: 89 FL (ref 82–98)
MONOCYTES # BLD AUTO: 0.69 THOUSAND/ΜL (ref 0.17–1.22)
MONOCYTES NFR BLD AUTO: 9 % (ref 4–12)
NEUTROPHILS # BLD AUTO: 4.29 THOUSANDS/ΜL (ref 1.85–7.62)
NEUTS SEG NFR BLD AUTO: 54 % (ref 43–75)
NRBC BLD AUTO-RTO: 0 /100 WBCS
PLATELET # BLD AUTO: 203 THOUSANDS/UL (ref 149–390)
PMV BLD AUTO: 10.7 FL (ref 8.9–12.7)
RBC # BLD AUTO: 4.71 MILLION/UL (ref 3.88–5.62)
WBC # BLD AUTO: 7.95 THOUSAND/UL (ref 4.31–10.16)

## 2021-05-07 PROCEDURE — 85025 COMPLETE CBC W/AUTO DIFF WBC: CPT | Performed by: SURGERY

## 2021-05-07 PROCEDURE — NC001 PR NO CHARGE: Performed by: STUDENT IN AN ORGANIZED HEALTH CARE EDUCATION/TRAINING PROGRAM

## 2021-05-07 PROCEDURE — 82948 REAGENT STRIP/BLOOD GLUCOSE: CPT

## 2021-05-07 PROCEDURE — 99024 POSTOP FOLLOW-UP VISIT: CPT | Performed by: STUDENT IN AN ORGANIZED HEALTH CARE EDUCATION/TRAINING PROGRAM

## 2021-05-07 RX ORDER — ACETAMINOPHEN 325 MG/1
975 TABLET ORAL EVERY 6 HOURS SCHEDULED
Qty: 60 TABLET | Refills: 0 | Status: SHIPPED | OUTPATIENT
Start: 2021-05-07 | End: 2021-06-16

## 2021-05-07 RX ORDER — OXYCODONE HYDROCHLORIDE 5 MG/1
5 TABLET ORAL EVERY 6 HOURS PRN
Qty: 20 TABLET | Refills: 0 | Status: SHIPPED | OUTPATIENT
Start: 2021-05-07 | End: 2021-05-12 | Stop reason: SDUPTHER

## 2021-05-07 RX ORDER — LANOLIN ALCOHOL/MO/W.PET/CERES
3 CREAM (GRAM) TOPICAL
Status: DISCONTINUED | OUTPATIENT
Start: 2021-05-07 | End: 2021-05-07 | Stop reason: HOSPADM

## 2021-05-07 RX ORDER — METHOCARBAMOL 500 MG/1
500 TABLET, FILM COATED ORAL EVERY 6 HOURS SCHEDULED
Qty: 20 TABLET | Refills: 0 | Status: SHIPPED | OUTPATIENT
Start: 2021-05-07 | End: 2021-05-12 | Stop reason: SDUPTHER

## 2021-05-07 RX ORDER — GABAPENTIN 300 MG/1
300 CAPSULE ORAL 3 TIMES DAILY
Qty: 15 CAPSULE | Refills: 0 | Status: SHIPPED | OUTPATIENT
Start: 2021-05-07 | End: 2021-05-12 | Stop reason: SDUPTHER

## 2021-05-07 RX ORDER — IBUPROFEN 200 MG
600 TABLET ORAL EVERY 6 HOURS SCHEDULED
Qty: 60 TABLET | Refills: 0 | Status: SHIPPED | OUTPATIENT
Start: 2021-05-07 | End: 2021-05-28

## 2021-05-07 RX ADMIN — ACETAMINOPHEN 975 MG: 325 TABLET, FILM COATED ORAL at 15:25

## 2021-05-07 RX ADMIN — HYDROMORPHONE HYDROCHLORIDE 0.5 MG: 1 INJECTION, SOLUTION INTRAMUSCULAR; INTRAVENOUS; SUBCUTANEOUS at 06:20

## 2021-05-07 RX ADMIN — HEPARIN SODIUM 5000 UNITS: 5000 INJECTION INTRAVENOUS; SUBCUTANEOUS at 13:11

## 2021-05-07 RX ADMIN — KETOROLAC TROMETHAMINE 30 MG: 30 INJECTION, SOLUTION INTRAMUSCULAR at 05:01

## 2021-05-07 RX ADMIN — HEPARIN SODIUM 5000 UNITS: 5000 INJECTION INTRAVENOUS; SUBCUTANEOUS at 05:01

## 2021-05-07 RX ADMIN — KETOROLAC TROMETHAMINE 30 MG: 30 INJECTION, SOLUTION INTRAMUSCULAR at 12:59

## 2021-05-07 RX ADMIN — OXYCODONE HYDROCHLORIDE 10 MG: 10 TABLET ORAL at 15:25

## 2021-05-07 RX ADMIN — GABAPENTIN 300 MG: 300 CAPSULE ORAL at 15:25

## 2021-05-07 RX ADMIN — MELATONIN TAB 3 MG 3 MG: 3 TAB at 00:16

## 2021-05-07 RX ADMIN — ACETAMINOPHEN 975 MG: 325 TABLET, FILM COATED ORAL at 08:13

## 2021-05-07 RX ADMIN — GABAPENTIN 300 MG: 300 CAPSULE ORAL at 08:13

## 2021-05-07 RX ADMIN — OXYCODONE HYDROCHLORIDE 10 MG: 10 TABLET ORAL at 10:06

## 2021-05-07 RX ADMIN — OXYCODONE HYDROCHLORIDE 10 MG: 10 TABLET ORAL at 04:54

## 2021-05-07 NOTE — PLAN OF CARE
Problem: PAIN - ADULT  Goal: Verbalizes/displays adequate comfort level or baseline comfort level  Description: Interventions:  - Encourage patient to monitor pain and request assistance  - Assess pain using appropriate pain scale  - Administer analgesics based on type and severity of pain and evaluate response  - Implement non-pharmacological measures as appropriate and evaluate response  - Consider cultural and social influences on pain and pain management  - Notify physician/advanced practitioner if interventions unsuccessful or patient reports new pain  Outcome: Progressing     Problem: INFECTION - ADULT  Goal: Absence or prevention of progression during hospitalization  Description: INTERVENTIONS:  - Assess and monitor for signs and symptoms of infection  - Monitor lab/diagnostic results  - Monitor all insertion sites, i e  indwelling lines, tubes, and drains  - Monitor endotracheal if appropriate and nasal secretions for changes in amount and color  - [place] appropriate cooling/warming therapies per order  - Administer medications as ordered  - Instruct and encourage patient and family to use good hand hygiene technique  - Identify and instruct in appropriate isolation precautions for identified infection/condition  Outcome: Progressing     Problem: SAFETY ADULT  Goal: Patient will remain free of falls  Description: INTERVENTIONS:  - Assess patient frequently for physical needs  -  Identify cognitive and physical deficits and behaviors that affect risk of falls    -  [place] fall precautions as indicated by assessment   - Educate patient/family on patient safety including physical limitations  - Instruct patient to call for assistance with activity based on assessment  - Modify environment to reduce risk of injury  - Consider OT/PT consult to assist with strengthening/mobility  Outcome: Progressing  Goal: Maintain or return to baseline ADL function  Description: INTERVENTIONS:  -  Assess patient's ability to carry out ADLs; assess patient's baseline for ADL function and identify physical deficits which impact ability to perform ADLs (bathing, care of mouth/teeth, toileting, grooming, dressing, etc )  - Assess/evaluate cause of self-care deficits   - Assess range of motion  - Assess patient's mobility; develop plan if impaired  - Assess patient's need for assistive devices and provide as appropriate  - Encourage maximum independence but intervene and supervise when necessary  - Involve family in performance of ADLs  - Assess for home care needs following discharge   - Consider OT consult to assist with ADL evaluation and planning for discharge  - Provide patient education as appropriate  Outcome: Progressing  Goal: Maintain or return mobility status to optimal level  Description: INTERVENTIONS:  - Assess patient's baseline mobility status (ambulation, transfers, stairs, etc )    - Identify cognitive and physical deficits and behaviors that affect mobility  - Identify mobility aids required to assist with transfers and/or ambulation (gait belt, sit-to-stand, lift, walker, cane, etc )  - [place] fall precautions as indicated by assessment  - Record patient progress and toleration of activity level on Mobility SBAR; progress patient to next Phase/Stage  - Instruct patient to call for assistance with activity based on assessment  - Consider rehabilitation consult to assist with strengthening/weightbearing, etc   Outcome: Progressing     Problem: DISCHARGE PLANNING  Goal: Discharge to home or other facility with appropriate resources  Description: INTERVENTIONS:  - Identify barriers to discharge w/patient and caregiver  - Arrange for needed discharge resources and transportation as appropriate  - Identify discharge learning needs (meds, wound care, etc )  - Arrange for interpretive services to assist at discharge as needed  - Refer to Case Management Department for coordinating discharge planning if the patient needs post-hospital services based on physician/advanced practitioner order or complex needs related to functional status, cognitive ability, or social support system  Outcome: Progressing     Problem: Knowledge Deficit  Goal: Patient/family/caregiver demonstrates understanding of disease process, treatment plan, medications, and discharge instructions  Description: Complete learning assessment and assess knowledge base    Interventions:  - Provide teaching at level of understanding  - Provide teaching via preferred learning methods  Outcome: Progressing

## 2021-05-07 NOTE — PROGRESS NOTES
Progress Note - Jenni Jarquin 37 y o  male MRN: [id number]    Unit/Bed#: [facility] 909-01 Encounter: [id number]      Assessment:  36 yo male s/p robotic L colectomy on 5/5 2/2 to colon cancer    AVSS  Multiple unmeasured voids yday  2 bms    Plan:  Advance to lo res  Pain and nausea control prn  oob and ambulate  Continue IS  Dispo planning likely home today  Subjective:   No acute events  Patient is doing well this morning, states pain is better controlled  Reports cramping  Passing flatus and having bms  No nausea or vomiting  Denies fevers/chills  Patient would like to go home today  Objective:     Vitals: Blood pressure 108/63, pulse 69, temperature 98 °F (36 7 °C), temperature source Oral, resp  rate 19, height 5' 10" (1 778 m), weight 94 3 kg (208 lb), SpO2 96 %  ,Body mass index is 29 84 kg/m²  Intake/Output Summary (Last 24 hours) at 5/7/2021 2625  Last data filed at 5/6/2021 2115  Gross per 24 hour   Intake 1080 ml   Output 2 ml   Net 1078 ml       Physical Exam:   General: NAD  HENT: NCAT MMM  Neck: supple, no JVD  CV: nl rate  Lungs: nl wob  No resp distress  ABD: Soft, periincisional tenderness, nondistended  Incisions c/d/i  Extrem: No CCE  Neuro: AAOx3        Invasive Devices     Peripheral Intravenous Line            Peripheral IV 05/05/21 Left Hand 1 day                Lab, Imaging and other studies: I have personally reviewed pertinent reports

## 2021-05-07 NOTE — DISCHARGE INSTR - AVS FIRST PAGE
DISCHARGE INSTRUCTIONS    Follow Up: Follow up with Dr Lilly Love on 5/20 at [address] :  Low residue/low-fiber diet for 5 more days then may resume regular diet    Pain:  Tylenol 975 mg every 6 hours, Advil 600 mg every 6 hours, gabapentin 300 mg 3 times a day, oxycodone 5 mg as needed every 6 hours for moderate/severe pain    Shower: You may shower over the wound, unless there are drain tubes left in place  Do not bathe or use a pool or hot tub until cleared by the physician  Activity: As tolerated  You may go up and down stairs, walk as much as you are comfortable, but walk at least 3 times each day  Do not lift anything heavier than 15 pounds for at least 2-4 weeks, unless cleared by the doctor  Driving: Do not drive or make any important decisions while on narcotic pain medication  Generally, you may drive when your off all narcotic pain medications  Medications: Resume all of your previous medications, unless told otherwise by the doctor  You do not need to take the narcotic pain medications unless you are having significant pain and discomfort  Call the office: If you are experiencing any of the following, fevers above 101 5° or chills, significant nausea or vomiting, increase in abdominal pain, if the wound develops drainage and/or is excessive redness around the wound, or if you have significant diarrhea or other worsening symptoms

## 2021-05-07 NOTE — DISCHARGE SUMMARY
Discharge Summary - Surgical Oncology   Lorne Muhammad 37 y o  male MRN: [id number]  Unit/Bed#: [facility] 909-01 Encounter: [id number]    Admission Date: 5/5/2021     Admitting Diagnosis: Cancer of sigmoid colon ([address] ) [C18 7]    HPI:  50-year-old male with past medical history of hemochromatosis, diabetes mellitus, and testosterone deficiency presented to the outpatient office of Dr Justin Villagomez for a new diagnosis of colon cancer  Patient had been experiencing multiple bloody bowel movements for approximately 2 months  He denied any other symptoms including pain, weight changes, nausea, vomiting, change in bowel or bladder habits  He had a colonoscopy done which revealed 2 benign polyps and adenocarcinoma of the sigmoid colon  CEA was undetectable  Risks and benefits were discussed with the patient for surgical intervention of a sigmoid colectomy, patient was in agreement with plan to proceed with surgical intervention  Procedures Performed:   5/5 robotic sigmoid colectomy by Dr Caroline Kothari Course:  Patient underwent surgical intervention as mentioned above robotic sigmoid colectomy by Dr Justin Villagomez without complications  Immediately postoperatively he was started on a clear liquid diet maintained on IV fluids, had a Campo in place for adequate urinary output measurements, had a p o  And IV pain control regimen  He was encouraged to be out of bed and ambulating as well as using his incentive spirometer  His Camp[place] was removed on postoperative day 1  By postoperative day 2 patient's diet was advanced to a low residue/low-fiber diet without nausea or vomiting, he was having bowel movements and passing flatus as well as urinating without any difficulties  He was out of bed and ambulating without assistance, he was utilizing his incentive spirometer, and his pain was controlled on his p o  Pain control regimen    Discharge instructions as well as postoperative follow-up appointments were discussed with the patient, patient was in agreement with plan  Scripts were sent to [facility] pharmacy for  upon discharge  These included Tylenol 975 mg every 6 hours scheduled, Advil 600 mg every 6 hours scheduled, gabapentin 300 mg t i d , oxycodone 5 mg as needed every 6 hours for moderate to severe pain, 20 tablets, no refills  Significant Findings, Care, Treatment and Services Provided:  Awaiting pathology results    Lab Results: I have personally reviewed pertinent lab results:  Yes    Complications:  None    Discharge Diagnosis:  Colon cancer status post robotic left colectomy by Dr Ray Vaughn    Discharge Date:    Condition at Discharge:  Good     Discharge instructions/Information to patient and family:   See after visit summary for information provided to patient and family  Provisions for Follow-Up Care:  See after visit summary for information related to follow-up care and any pertinent home health orders  Disposition:  Home with support    Planned Readmission:  No    Discharge Statement   I spent 30 minutes discharging the patient  This time was spent on the day of discharge  I had direct contact with the patient on the day of discharge  Additional documentation is required if more than 30 minutes were spent on discharge  Discharge Medications:  See after visit summary for reconciled discharge medications provided to patient and family        Gonzella Seip, PA-C

## 2021-05-07 NOTE — DISCHARGE INSTRUCTIONS
DISCHARGE INSTRUCTIONS    Follow Up: [facility]  Low residue/low-fiber diet for 5 more days then may resume regular diet    Pain:  Tylenol 975 mg every 6 hours, Advil 600 mg every 6 hours, gabapentin 300 mg 3 times a day, oxycodone 5 mg as needed every 6 hours for moderate/severe pain    Shower: You may shower over the wound, unless there are drain tubes left in place  Do not bathe or use a pool or hot tub until cleared by the physician  Activity: As tolerated  You may go up and down stairs, walk as much as you are comfortable, but walk at least 3 times each day  Do not lift anything heavier than 15 pounds for at least 2-4 weeks, unless cleared by the doctor  Driving: Do not drive or make any important decisions while on narcotic pain medication  Generally, you may drive when your off all narcotic pain medications  Medications: Resume all of your previous medications, unless told otherwise by the doctor  You do not need to take the narcotic pain medications unless you are having significant pain and discomfort  Call the office: If you are experiencing any of the following, fevers above 101 5° or chills, significant nausea or vomiting, increase in abdominal pain, if the wound develops drainage and/or is excessive redness around the wound, or if you have significant diarrhea or other worsening symptoms  Low Fiber Diet   WHAT YOU NEED TO KNOW:   A low-fiber diet limits foods that are high in fiber  Fiber is the part of fruits, vegetables, and grains that is not broken down by your body  You may need to follow this diet after surgery on your intestines  You may also need to follow this diet for certain conditions such as Crohn disease or ulcerative colitis  Ask your healthcare provider or dietitian how much fiber you can have each day  DISCHARGE INSTRUCTIONS:   Foods to include:  Read food labels to check the amount of fiber that are found in foods   Some foods that are low in fiber include the following:  · Grains:  Choose grains that have less than 2 grams of fiber in each serving  Examples include the following:     ? Cream of wheat and finely ground grits    ? Dry cereal made from rice     ? White bread, white pasta, and white rice    ? Crackers, bagels, and rolls made from white or refined flour    · Other foods:      ? Canned and well-cooked fruit without skins or seeds, and juice without pulp    ? Ripe bananas and melons    ? Canned and well-cooked vegetables without skins or seeds, and vegetable juice    ? Cow's milk, lactose-free milk, soy milk, and rice milk    ? Yogurt without nuts, fruit, or granola    ? Eggs, poultry (such as chicken and turkey), fish, and tender, ground, well-cooked beef     ? Tofu and smooth peanut butter    ? Broth and strained soups made of low-fiber foods    Foods to avoid:   · Breads, cereals, crackers, and pasta made with whole wheat or whole grains (such as whole oats)    · Mcc[employer], wild rice, quinoa, kasha, and barley    · All fresh fruit with skin, except banana and melons     · Dried fruits and fruit juice with pulp    · Canned pineapple    · Raw vegetables    · Nuts, seeds, and popcorn    · Beans, nuts, peas, and lentils    · Tough meats    · Coconut and avocado    What else you should know about a low-fiber diet:  A low-fiber diet can decrease the amount of bowel movements you have  Drink liquids as directed to avoid constipation  Ask how much liquid to drink each day and which liquids are best for you  © Copyright [address] Information is for End User's use only and may not be sold, redistributed or otherwise used for commercial purposes  All illustrations and images included in CareNotes® are the copyrighted property of [employer]  or [facility] Stephanie Alarcon   The above information is an  only  It is not intended as medical advice for individual conditions or treatments   Talk to your doctor, nurse or pharmacist before following any medical regimen to see if it is safe and effective for you

## 2021-05-11 ENCOUNTER — TELEPHONE (OUTPATIENT)
Dept: SURGICAL ONCOLOGY | Facility: CLINIC | Age: 41
End: 2021-05-11

## 2021-05-11 NOTE — TELEPHONE ENCOUNTER
Returned patient's call from a voicemail  Patient stated he has 1 bloody bowel movement over the weekend  It was a bright red color and he was straining during the bowel movement  Patient stated it has not happened since  I asked patient to let us know if it happens again  Patient also asked about the pathology report  Pathology reviewed with patient  All questions answered

## 2021-05-12 ENCOUNTER — OFFICE VISIT (OUTPATIENT)
Dept: INTERNAL MEDICINE CLINIC | Facility: CLINIC | Age: 41
End: 2021-05-12
Payer: COMMERCIAL

## 2021-05-12 ENCOUNTER — TELEPHONE (OUTPATIENT)
Dept: HEMATOLOGY ONCOLOGY | Facility: CLINIC | Age: 41
End: 2021-05-12

## 2021-05-12 VITALS
BODY MASS INDEX: 29.78 KG/M2 | HEIGHT: 70 IN | WEIGHT: 208 LBS | HEART RATE: 96 BPM | OXYGEN SATURATION: 100 % | DIASTOLIC BLOOD PRESSURE: 78 MMHG | SYSTOLIC BLOOD PRESSURE: 106 MMHG | TEMPERATURE: 97.7 F

## 2021-05-12 DIAGNOSIS — C18.7 CANCER OF SIGMOID COLON (HCC): ICD-10-CM

## 2021-05-12 DIAGNOSIS — C18.7 CANCER OF SIGMOID COLON (HCC): Primary | ICD-10-CM

## 2021-05-12 PROBLEM — I10 ESSENTIAL HYPERTENSION: Status: RESOLVED | Noted: 2020-11-03 | Resolved: 2021-05-12

## 2021-05-12 PROCEDURE — 99496 TRANSJ CARE MGMT HIGH F2F 7D: CPT | Performed by: NURSE PRACTITIONER

## 2021-05-12 RX ORDER — METHOCARBAMOL 500 MG/1
500 TABLET, FILM COATED ORAL EVERY 6 HOURS SCHEDULED
Qty: 20 TABLET | Refills: 0 | Status: SHIPPED | OUTPATIENT
Start: 2021-05-12 | End: 2021-05-28

## 2021-05-12 RX ORDER — IBUPROFEN 600 MG/1
600 TABLET ORAL EVERY 6 HOURS PRN
Qty: 30 TABLET | Refills: 0 | Status: SHIPPED | OUTPATIENT
Start: 2021-05-12 | End: 2021-06-16 | Stop reason: ALTCHOICE

## 2021-05-12 RX ORDER — OXYCODONE HYDROCHLORIDE 5 MG/1
5 TABLET ORAL EVERY 6 HOURS PRN
Qty: 20 TABLET | Refills: 0 | Status: SHIPPED | OUTPATIENT
Start: 2021-05-12 | End: 2021-05-17 | Stop reason: SDUPTHER

## 2021-05-12 RX ORDER — GABAPENTIN 300 MG/1
300 CAPSULE ORAL 3 TIMES DAILY
Qty: 15 CAPSULE | Refills: 0 | Status: SHIPPED | OUTPATIENT
Start: 2021-05-12 | End: 2021-06-16 | Stop reason: SDUPTHER

## 2021-05-12 NOTE — PROGRESS NOTES
INTERNAL MEDICINE TRANSITION OF CARE OFFICE VISIT  [facility] - [facility]    NAME: Clarice Mccord  AGE: 37 y o  SEX: male  : 1980     DATE: 2021     Assessment and Plan:   Patient healing well after colectomy, pain is at reasonable level  Advised to continue ibuprofen, robaxin, and oxycodone prn  Refills provided  Patient has follow up with surgeon   Advised to call surgeon about continuing gabapentin at this time  Can slowly add fiber back to diet as tolerated  Problem List Items Addressed This Visit        Digestive    Cancer of sigmoid colon ([address] ) - Primary    Relevant Medications    oxyCODONE (ROXICODONE) 5 mg immediate release tablet    methocarbamol (ROBAXIN) 500 mg tablet    ibuprofen (MOTRIN) 600 mg tablet           Transitional Care Management Review:     Clarice Mccord is a 37 y o  male here for TCM follow-up    During the TCM phone call patient stated:    TCM Call (since 2021)     Date and time call was made  5/10/2021 10:54 AM    Hospital care reviewed  Records reviewed    Patient was hospitialized at  [facility]        Date of Admission  21    Date of discharge  21    Diagnosis  Cancer of sigmoid colon>SIGMOID COLECTOMY    Disposition  Home    Current Symptoms  Middle abdominal pain; Swelling (Comment)  swelling at incision, clight blood in bm, instructed to contact surgeon immediately    Middle abdominal pain severity  Mild    Middle abdominal pain onset  Ongoing      TCM Call (since 2021)     Clinical progress swelling  Improving    Post hospital issues  None    Scheduled for follow up?   Yes    Patients specialists  Other (comment)    Other specialists names  Surg De Mcnamara MD, [phone] / Tremayne Altamirano MD PhD, [phone] / Yashira Silver CONSULT-Corrine Lazarus Edman, [address]    Do you need help managing your prescriptions or medications  No    Is transportation to your appointment needed  No    I have advised the patient to call PCP with any new or worsening symptoms  nupur ulloa lpn    Living Arrangements  Spouse or Significiant other    Are you recieving any outpatient services  No    Are you recieving home care services  No    Interperter language line needed  No    Counseling  Patient    Counseling topics  Importance of RX compliance           HPI:     Patient was seen in march for possible rectal bleeding, change in color of stool  Had colonoscopy 4/13 and found to have cancer of sigmoid colon  Consulted with surgical oncology and on 5/5 was admitted for a two day stay for colectomy  Prior testing showed small lesions on the lung and liver  Patient is to follow up with oncology  He is doing well since procedure, but still having discomfort  Patient states "I am a baby and don't handle pain well " Has been taking the motrin gabapentin and robaxin as prescribed  He has been taking two of the oxycodone at night to sleep  States he is out of the gabapentin today  Has a few motrin and robaxin but would like refills if possible  Patient is having normal bowel movements  He had one episode of bleeding over the weekend and contacted surgeon  Has had no further issues since this time  States he was straining for a bowel movement when this happened  He has been following a low fiber diet but would like to start adding fiber  States he is going to try a salad tonight  Advised that if he is adding vegetables that he should cook these first to make easier to digest           The following portions of the patient's history were reviewed and updated as appropriate: allergies, current medications, past family history, past medical history, past social history, past surgical history and problem list      Review of Systems:     Review of Systems   Constitutional: Negative for chills and fatigue  Respiratory: Negative for shortness of breath  Gastrointestinal: Positive for abdominal pain   Negative for blood in stool and constipation  Genitourinary: Negative for dysuria  Skin: Positive for wound (surgical sites)  Psychiatric/Behavioral: Negative for sleep disturbance  The patient is not nervous/anxious  Problem List:     Patient Active Problem List   Diagnosis    Diabetes mellitus without complication (HCC)    [facility]reditary hemochromatosis ([facility] [address]    Testosterone deficiency    Thyroid nodule    Fatigue    [facility]        Objective:     /78 (BP Location: Left arm, Patient Position: Sitting) Comment: fsdfdsf  Pulse 96   Temp 97 7 °F (36 5 °C) (Tympanic) Comment: fdsfsdf  Ht 5' 10" (1 778 m)   Wt 94 3 kg (208 lb)   SpO2 100%   BMI 29 84 kg/m²     Physical Exam  Vitals signs reviewed  Constitutional:       General: He is not in acute distress  Appearance: Normal appearance  He is not ill-appearing  HENT:      Head: Normocephalic and atraumatic  Cardiovascular:      Rate and Rhythm: Normal rate and regular rhythm  Heart sounds: Normal heart sounds, S1 normal and S2 normal    Pulmonary:      Effort: Pulmonary effort is normal  No accessory muscle usage  Breath sounds: Normal breath sounds  No wheezing  Abdominal:      General: Abdomen is flat  A surgical scar is present  Bowel sounds are normal       Palpations: Abdomen is soft  Tenderness: There is abdominal tenderness in the right lower quadrant  Comments: Multiple steri strips in place  No signs of infection  Skin:     General: Skin is warm and dry  Capillary Refill: Capillary refill takes less than 2 seconds  Findings: No ecchymosis, erythema or rash  Neurological:      General: No focal deficit present  Mental Status: He is alert and oriented to person, place, and time  Motor: Motor function is intact     Psychiatric:         Attention and Perception: Attention and perception normal          Mood and Affect: Mood and affect normal          Speech: Speech normal  Behavior: Behavior normal  Behavior is cooperative  Thought Content: Thought content normal          Laboratory Results: I have personally reviewed the pertinent laboratory results/reports     Radiology/Other Diagnostic Testing Results: I have personally reviewed pertinent reports  No results found  Current Medications:     Outpatient Medications Prior to Visit   Medication Sig Dispense Refill    acetaminophen (TYLENOL) 325 mg tablet Take 3 tablets (975 mg total) by mouth every 6 (six) hours 60 tablet 0    Empagliflozin (Jardiance) 25 MG TABS Take 1 tablet (25 mg total) by mouth daily 90 tablet 3    gabapentin (NEURONTIN) 300 mg capsule Take 1 capsule (300 mg total) by mouth 3 (three) times a day for 5 days 15 capsule 0    ibuprofen (MOTRIN) 200 mg tablet Take 3 tablets (600 mg total) by mouth every 6 (six) hours for 5 days 60 tablet 0    methocarbamol (ROBAXIN) 500 mg tablet Take 1 tablet (500 mg total) by mouth every 6 (six) hours for 5 days 20 tablet 0    oxyCODONE (ROXICODONE) 5 mg immediate release tablet Take 1 tablet (5 mg total) by mouth every 6 (six) hours as needed for moderate pain for up to 5 daysMax Daily Amount: 20 mg 20 tablet 0    Dulaglutide 1 5 MG/0 5ML SOPN Inject 0 5 mL (1 5 mg total) under the skin once a week (Patient not taking: Reported on 5/12/2021) 12 pen 1     No facility-administered medications prior to visit          SHABANA Yeung  [facility]

## 2021-05-12 NOTE — TELEPHONE ENCOUNTER
Pt had called in to find out if he can continue taking gabapentin  Spoke with pt, explained that if he is still having pain, to continue taking it  Prefer he uses that instead of a narcotic  Pt requesting refill be sent to his pharmacy

## 2021-05-12 NOTE — PATIENT INSTRUCTIONS
Low Fiber Diet   AMBULATORY CARE:   A low-fiber diet  limits foods that are high in fiber  Fiber is the part of fruits, vegetables, and grains that is not broken down by your body  You may need to follow this diet after surgery on your intestines  You may also need to follow this diet for certain conditions such as Crohn disease or ulcerative colitis  Ask your healthcare provider or dietitian how much fiber you can have each day  Foods to include:  Read food labels to check the amount of fiber that are found in foods  Some foods that are low in fiber include the following:  · Grains:  Choose grains that have less than 2 grams of fiber in each serving  Examples include the following:     ? Cream of wheat and finely ground grits    ? Dry cereal made from rice     ? White bread, white pasta, and white rice    ? Crackers, bagels, and rolls made from white or refined flour    · Other foods:      ? Canned and well-cooked fruit without skins or seeds, and juice without pulp    ? Ripe bananas and melons    ? Canned and well-cooked vegetables without skins or seeds, and vegetable juice    ? Cow's milk, lactose-free milk, soy milk, and rice milk    ? Yogurt without nuts, fruit, or granola    ? Eggs, poultry (such as chicken and turkey), fish, and tender, ground, well-cooked beef     ? Tofu and smooth peanut butter    ? Broth and strained soups made of low-fiber foods    Foods to avoid:   · Breads, cereals, crackers, and pasta made with whole wheat or whole grains (such as whole oats)    · [employer], wild rice, quinoa, kasha, and barley    · All fresh fruit with skin, except banana and melons     · Dried fruits and fruit juice with pulp    · Canned pineapple    · Raw vegetables    · Nuts, seeds, and popcorn    · Beans, nuts, peas, and lentils    · Tough meats    · Coconut and avocado    What else you should know about a low-fiber diet:  A low-fiber diet can decrease the amount of bowel movements you have   Drink liquids as directed to avoid constipation  Ask how much liquid to drink each day and which liquids are best for you  © Copyright [address] Information is for End User's use only and may not be sold, redistributed or otherwise used for commercial purposes  All illustrations and images included in CareNotes® are the copyrighted property of [employer]  or Paul Araujo  The above information is an  only  It is not intended as medical advice for individual conditions or treatments  Talk to your doctor, nurse or pharmacist before following any medical regimen to see if it is safe and effective for you

## 2021-05-12 NOTE — TELEPHONE ENCOUNTER
The patient phoned in and reports that he saw his PCP yesterday and the PCP wanted him to call to Dr Loc Jauregui office to find out if he should still be taking his Gabapentin  The best call back number is [phone]

## 2021-05-17 DIAGNOSIS — C18.7 CANCER OF SIGMOID COLON (HCC): ICD-10-CM

## 2021-05-17 DIAGNOSIS — E11.9 TYPE 2 DIABETES MELLITUS WITHOUT COMPLICATION, WITHOUT LONG-TERM CURRENT USE OF INSULIN (HCC): Chronic | ICD-10-CM

## 2021-05-17 RX ORDER — OXYCODONE HYDROCHLORIDE 5 MG/1
5 TABLET ORAL EVERY 12 HOURS PRN
Qty: 10 TABLET | Refills: 0 | Status: SHIPPED | OUTPATIENT
Start: 2021-05-17 | End: 2021-05-17 | Stop reason: SDUPTHER

## 2021-05-17 RX ORDER — OXYCODONE HYDROCHLORIDE 5 MG/1
5 TABLET ORAL EVERY 12 HOURS PRN
Qty: 10 TABLET | Refills: 0 | Status: SHIPPED | OUTPATIENT
Start: 2021-05-17 | End: 2021-05-20 | Stop reason: SDUPTHER

## 2021-05-17 NOTE — TELEPHONE ENCOUNTER
Controlled Substance Review    [place] PDMP or [place]  reviewed: No red flags were identified; safe to proceed with prescription      PDMP Review       Value Time User    PDMP Reviewed  Yes 5/17/2021  5:20 PM Gianluca Jack DO

## 2021-05-17 NOTE — TELEPHONE ENCOUNTER
PT  SAID   THE  [facility]  [place]  NEVER    GOT  HIS  RX  THIS   MORNING   CAN  IT  BE  RE  SENT     OXYCODONE

## 2021-05-20 ENCOUNTER — OFFICE VISIT (OUTPATIENT)
Dept: HEMATOLOGY ONCOLOGY | Facility: CLINIC | Age: 41
End: 2021-05-20
Payer: COMMERCIAL

## 2021-05-20 ENCOUNTER — OFFICE VISIT (OUTPATIENT)
Dept: SURGICAL ONCOLOGY | Facility: CLINIC | Age: 41
End: 2021-05-20

## 2021-05-20 VITALS
WEIGHT: 207.5 LBS | RESPIRATION RATE: 18 BRPM | HEIGHT: 70 IN | OXYGEN SATURATION: 98 % | BODY MASS INDEX: 29.71 KG/M2 | DIASTOLIC BLOOD PRESSURE: 80 MMHG | SYSTOLIC BLOOD PRESSURE: 138 MMHG | HEART RATE: 104 BPM

## 2021-05-20 VITALS
BODY MASS INDEX: 29.49 KG/M2 | HEIGHT: 70 IN | WEIGHT: 206 LBS | HEART RATE: 98 BPM | SYSTOLIC BLOOD PRESSURE: 130 MMHG | TEMPERATURE: 98.4 F | RESPIRATION RATE: 16 BRPM | DIASTOLIC BLOOD PRESSURE: 76 MMHG

## 2021-05-20 DIAGNOSIS — C18.7 CANCER OF SIGMOID COLON (HCC): ICD-10-CM

## 2021-05-20 DIAGNOSIS — D75.1 POLYCYTHEMIA: ICD-10-CM

## 2021-05-20 DIAGNOSIS — E83.110 HEREDITARY HEMOCHROMATOSIS (HCC): Chronic | ICD-10-CM

## 2021-05-20 DIAGNOSIS — K76.89 LIVER NODULE: ICD-10-CM

## 2021-05-20 DIAGNOSIS — C77.2 COLON CANCER METASTASIZED TO INTRA-ABDOMINAL LYMPH NODE (HCC): Primary | ICD-10-CM

## 2021-05-20 DIAGNOSIS — C18.7 CANCER OF SIGMOID COLON (HCC): Primary | ICD-10-CM

## 2021-05-20 DIAGNOSIS — R91.1 LUNG NODULE: ICD-10-CM

## 2021-05-20 DIAGNOSIS — C18.9 COLON CANCER METASTASIZED TO INTRA-ABDOMINAL LYMPH NODE (HCC): Primary | ICD-10-CM

## 2021-05-20 PROCEDURE — 99215 OFFICE O/P EST HI 40 MIN: CPT | Performed by: INTERNAL MEDICINE

## 2021-05-20 PROCEDURE — 1036F TOBACCO NON-USER: CPT | Performed by: INTERNAL MEDICINE

## 2021-05-20 PROCEDURE — 99024 POSTOP FOLLOW-UP VISIT: CPT | Performed by: STUDENT IN AN ORGANIZED HEALTH CARE EDUCATION/TRAINING PROGRAM

## 2021-05-20 RX ORDER — DEXTROSE MONOHYDRATE 50 MG/ML
20 INJECTION, SOLUTION INTRAVENOUS ONCE
Status: CANCELLED | OUTPATIENT
Start: 2021-08-11

## 2021-05-20 RX ORDER — SODIUM CHLORIDE 9 MG/ML
20 INJECTION, SOLUTION INTRAVENOUS ONCE AS NEEDED
Status: CANCELLED | OUTPATIENT
Start: 2021-09-24

## 2021-05-20 RX ORDER — DEXTROSE MONOHYDRATE 50 MG/ML
20 INJECTION, SOLUTION INTRAVENOUS ONCE
Status: CANCELLED | OUTPATIENT
Start: 2021-06-04

## 2021-05-20 RX ORDER — SODIUM CHLORIDE 9 MG/ML
20 INJECTION, SOLUTION INTRAVENOUS ONCE AS NEEDED
Status: CANCELLED | OUTPATIENT
Start: 2021-06-25

## 2021-05-20 RX ORDER — SODIUM CHLORIDE 9 MG/ML
20 INJECTION, SOLUTION INTRAVENOUS ONCE AS NEEDED
Status: CANCELLED | OUTPATIENT
Start: 2021-09-03

## 2021-05-20 RX ORDER — DEXTROSE MONOHYDRATE 50 MG/ML
20 INJECTION, SOLUTION INTRAVENOUS ONCE
Status: CANCELLED | OUTPATIENT
Start: 2021-09-03

## 2021-05-20 RX ORDER — DEXTROSE MONOHYDRATE 50 MG/ML
20 INJECTION, SOLUTION INTRAVENOUS ONCE
Status: CANCELLED | OUTPATIENT
Start: 2021-07-16

## 2021-05-20 RX ORDER — SODIUM CHLORIDE 9 MG/ML
20 INJECTION, SOLUTION INTRAVENOUS ONCE AS NEEDED
Status: CANCELLED | OUTPATIENT
Start: 2021-06-04

## 2021-05-20 RX ORDER — DEXTROSE MONOHYDRATE 50 MG/ML
20 INJECTION, SOLUTION INTRAVENOUS ONCE
Status: CANCELLED | OUTPATIENT
Start: 2021-06-25

## 2021-05-20 RX ORDER — DEXTROSE MONOHYDRATE 50 MG/ML
20 INJECTION, SOLUTION INTRAVENOUS ONCE
Status: CANCELLED | OUTPATIENT
Start: 2021-11-05

## 2021-05-20 RX ORDER — OXYCODONE HYDROCHLORIDE 5 MG/1
5 TABLET ORAL EVERY 12 HOURS PRN
Qty: 20 TABLET | Refills: 0 | Status: SHIPPED | OUTPATIENT
Start: 2021-05-20 | End: 2021-05-28 | Stop reason: SDUPTHER

## 2021-05-20 RX ORDER — SODIUM CHLORIDE 9 MG/ML
20 INJECTION, SOLUTION INTRAVENOUS ONCE AS NEEDED
Status: CANCELLED | OUTPATIENT
Start: 2021-08-11

## 2021-05-20 RX ORDER — SODIUM CHLORIDE 9 MG/ML
20 INJECTION, SOLUTION INTRAVENOUS ONCE AS NEEDED
Status: CANCELLED | OUTPATIENT
Start: 2021-11-05

## 2021-05-20 RX ORDER — DEXTROSE MONOHYDRATE 50 MG/ML
20 INJECTION, SOLUTION INTRAVENOUS ONCE
Status: CANCELLED | OUTPATIENT
Start: 2021-09-24

## 2021-05-20 RX ORDER — SODIUM CHLORIDE 9 MG/ML
20 INJECTION, SOLUTION INTRAVENOUS ONCE AS NEEDED
Status: CANCELLED | OUTPATIENT
Start: 2021-10-15

## 2021-05-20 RX ORDER — DEXTROSE MONOHYDRATE 50 MG/ML
20 INJECTION, SOLUTION INTRAVENOUS ONCE
Status: CANCELLED | OUTPATIENT
Start: 2021-10-15

## 2021-05-20 RX ORDER — CEFAZOLIN SODIUM 2 G/50ML
2000 SOLUTION INTRAVENOUS ONCE
Status: CANCELLED | OUTPATIENT
Start: 2021-05-20 | End: 2021-05-20

## 2021-05-20 RX ORDER — SODIUM CHLORIDE 9 MG/ML
20 INJECTION, SOLUTION INTRAVENOUS ONCE AS NEEDED
Status: CANCELLED | OUTPATIENT
Start: 2021-07-16

## 2021-05-20 NOTE — PROGRESS NOTES
Surgical Oncology Follow Up    [address] [facility] [place]  [address] [facility] [address]  [place] [address] [phone]    Alonso Celestin  1980  [id number]    Diagnoses and all orders for this visit:    Cancer of sigmoid colon [facility])  -     Case request operating room: INSERTION VENOUS PORT (PORT-A-CATH); Standing  -     Case request operating room: INSERTION VENOUS PORT (PORT-A-CATH)    Other orders  -     Incentive spirometry; Standing  -     Insert and maintain IV line; Standing  -     Void On-Call to O R ; Standing  -     Place sequential compression device; Standing  -     ceFAZolin (ANCEF) IVPB (premix in dextrose) 2,000 mg 50 mL        Chief Complaint   Patient presents with    Post-op       No follow-ups on file  Oncology History   Cancer of sigmoid colon ([facility] [address] )   4/13/2021 Initial Diagnosis    [facility] colon ([facility] )    RESULTS OF IMMUNOHISTOCHEMICAL ANALYSIS FOR MISMATCH REPAIR PROTEIN LOSS  INTERPRETATION: NO LOSS OF NUCLEAR EXPRESSION OF MMR PROTEINS: LOW PROBABILITY OF MSI-H   RESULTS:  AntibodyClone Description Results  MLH1 M1 Mismatch repair protein Intact nuclear expression  MSH2 [id number] Mismatch repair protein Intact nuclear expression  MSH6 40 Mismatch repair protein Intact nuclear expression  PMS2 [id number] Mismatch repair protein Intact nuclear expression     5/5/2021 Surgery    Descending colon, partial colectomy:  - Adenocarcinoma (1 9 cm), moderately differentiated, arising in adenocarcinoma with high grade dysplasia  - Tumor invades the submucosa (through the muscularis mucosa in to deeper one third of submucosa, pT1, Sm3)   - All margins are negative for tumor   - One  of twenty eight lymph nodes is positive for tumor (1/28)         Staging: T1N1M0 colon cancer  Treatment history: resection 5/2021  Current treatment: eval for chemo  Disease status:     History of Present Illness: 36 yo s/p robo left colectomy for final path T1N1 colon cancer  Patient is doing very well today  He did have 1 bloody in 1 dark stool after discharge from the hospital   That is not continued  He is eating well, no nausea or vomiting  His pain is well controlled  No concerns or or questions about the appearance of his incisions  Review of Systems  Complete ROS Surg Onc:   Constitutional: The patient denies new or recent history of general fatigue, no recent weight loss, no change in appetite  Eyes: No complaints of visual problems, no scleral icterus  ENT: no complaints of ear pain, no hoarseness, no difficulty swallowing,  no tinnitus and no new masses in head, oral cavity, or neck  Cardiovascular: No complaints of chest pain, no palpitations, no ankle edema  Respiratory: No complaints of shortness of breath, no cough  Gastrointestinal: No complaints of jaundice, no bloody stools, no pale stools  Genitourinary: No complaints of dysuria, no hematuria, no nocturia, no frequent urination, no urethral discharge  Musculoskeletal: No complaints of weakness, paralysis, joint stiffness or arthralgias  Integumentary: No complaints of rash, no new lesions  Neurological: No complaints of convulsions, no seizures, no dizziness  Hematologic/Lymphatic: No complaints of easy bruising  Endocrine:  No hot or cold intolerance  No polydipsia, polyphagia, or polyuria  Allergy/immunology:  No environmental allergies  No food allergies  Not immunocompromised  Skin:  No pallor or rash  No wound          Patient Active Problem List   Diagnosis    Diabetes mellitus without complication ([facility] )    Hereditary hemochromatosis ([facility] )    Testosterone deficiency    Thyroid nodule    Fatigue    [email or web address]     Past Medical History:   Diagnosis Date    Cancer ([facility] )     colon cancer    Diabetes mellitus ([facility] )     Hemochromatosis     Testosterone deficiency      Past Surgical History:   Procedure Laterality Date    COLONOSCOPY      Becca 70 W/ ANASTOMOSIS N/A 5/5/2021    Procedure: ROBOTIC SIGMOID COLECTOMY, MOBILIZATION OF SPLENIC FLEXURE;  Surgeon: Nate Torres MD;  Location: [facility];  Service: Surgical Oncology    WISDOM TOOTH EXTRACTION       Family History   Problem Relation Age of Onset    Colon polyps Mother     No Known Problems Father     Colon polyps Maternal Grandmother     Prostate cancer Maternal Grandfather     Lung cancer Paternal Grandmother     Diabetes Paternal Grandmother     Prostate cancer Paternal Grandfather     Heart disease Paternal Grandfather     Kidney failure Paternal Grandfather     Diabetes Family     Hypertension Family      Social History     Socioeconomic History    Marital status:      Spouse name: Not on file    Number of children: Not on file    Years of education: Not on file    Highest education level: Not on file   Occupational History    Not on file   Social Needs    Financial resource strain: Not on file    Food insecurity     Worry: Not on file     Inability: Not on file   [employer] needs     Medical: Not on file     Non-medical: Not on file   Tobacco Use    Smoking status: Former Smoker     Packs/day: 0 25     Years: 10 00     Pack years: 2 50     Types: Cigarettes    Smokeless tobacco: Never Used   Substance and Sexual Activity    Alcohol use: Not Currently    Drug use: Not Currently    Sexual activity: Yes     Partners: Female     Birth control/protection: Condom Male   Lifestyle    Physical activity     Days per week: 5 days     Minutes per session: 60 min    Stress:  Only a little   Relationships    Social connections     Talks on phone: Not on file     Gets together: Not on file     Attends [employer] service: Not on file     Active member of club or organization: Not on file     Attends meetings of clubs or organizations: Not on file     Relationship status: Not on file    Intimate partner violence     Fear of current or ex partner: Not on file     Emotionally abused: Not on file     Physically abused: Not on file     Forced sexual activity: Not on file   Other Topics Concern    Not on file   Social History Narrative    Not on file       Current Outpatient Medications:     acetaminophen (TYLENOL) 325 mg tablet, Take 3 tablets (975 mg total) by mouth every 6 (six) hours, Disp: 60 tablet, Rfl: 0    Dulaglutide 1 5 MG/0 5ML SOPN, Inject 0 5 mL (1 5 mg total) under the skin once a week, Disp: 6 mL, Rfl: 1    Empagliflozin (Jardiance) 25 MG TABS, Take 1 tablet (25 mg total) by mouth daily, Disp: 90 tablet, Rfl: 3    ibuprofen (MOTRIN) 600 mg tablet, Take 1 tablet (600 mg total) by mouth every 6 (six) hours as needed for mild pain or moderate pain, Disp: 30 tablet, Rfl: 0    methocarbamol (ROBAXIN) 500 mg tablet, Take 1 tablet (500 mg total) by mouth every 6 (six) hours for 5 days, Disp: 20 tablet, Rfl: 0    gabapentin (NEURONTIN) 300 mg capsule, Take 1 capsule (300 mg total) by mouth 3 (three) times a day for 5 days, Disp: 15 capsule, Rfl: 0    ibuprofen (MOTRIN) 200 mg tablet, Take 3 tablets (600 mg total) by mouth every 6 (six) hours for 5 days, Disp: 60 tablet, Rfl: 0    oxyCODONE (ROXICODONE) 5 mg immediate release tablet, Take 1 tablet (5 mg total) by mouth every 12 (twelve) hours as needed for severe pain for up to 5 daysMax Daily Amount: 10 mg (Patient not taking: Reported on 5/20/2021), Disp: 10 tablet, Rfl: 0  No Known Allergies  Vitals:    05/20/21 0903   BP: 130/76   Pulse: 98   Resp: 16   Temp: 98 4 °F (36 9 °C)       Physical Exam  Constitutional: Patient is well-developed and well-nourished who appears the stated age in no acute distress  Patient is pleasant and talkative  HEENT:  Normocephalic  Sclerae are anicteric  Mucous membranes are moist  Neck is supple without adenopathy  No JVD  Chest: Equal chest rise, easy WOB  Cardiac: Heart is regular rate  Abdomen: Abdomen is non-tender, non-distended and without masses  Incisions are healing well  Extremities: There is no clubbing or cyanosis  There is no edema  Symmetric  Neuro: Grossly nonfocal  Gait is normal      Lymphatic: No evidence of cervical adenopathy bilaterally  No evidence of axillary adenopathy bilaterally  No evidence of inguinal adenopathy bilaterally  Skin: Warm, anicteric  Psych:  Patient is pleasant and talkative  Pathology:  A  Descending colon, partial colectomy:  - Adenocarcinoma (1 9 cm), moderately differentiated, arising in adenocarcinoma with high grade dysplasia  - Tumor invades the submucosa (through the muscularis mucosa in to deeper one third of submucosa, pT1, Sm3)   - All margins are negative for tumor   - One  of twenty eight lymph nodes is positive for tumor (1/28)  Labs:  Reviewed in [employer] personally    Imaging  No results found  I reviewed the above laboratory and imaging data  Discussion/Summary:  4year-old male status post left colectomy for final path T1 N1 colon cancer  Path was reviewed today in full with the patient and his significant other present today  I discussed the finding of a superficial colon cancer, however, he did have node positive disease  For this reason, we would recommend evaluation for chemotherapy  He is set to see Dr Desi Douglas this afternoon for ongoing evaluation of his hemochromatosis, and Dr Desi Douglas will be able to discuss this with him  I described what node is positive disease means and what the chemotherapy goals are  I briefly described the drugs as well as side effects, however Dr Desi Douglas will be able to discuss this in more detail with him  I discussed placement of port for ease of chemotherapy delivery as well as lab draws  I discussed the risks to include bleeding, infection, pneumothorax  He will like to proceed with port should he decide to proceed with chemotherapy    Finally, we discussed that he will need a colonoscopy in 1 years time, as well as CT scans at the 6 month postop junction whether he proceed with chemotherapy or not  I will discuss follow-up with Dr Chinyere Stephen, and will otherwise see the patient in 6 months time with repeat scans

## 2021-05-20 NOTE — TELEPHONE ENCOUNTER
Controlled Substance Review    [place] PDMP or [place]  reviewed: No red flags were identified; safe to proceed with prescription      PDMP Review       Value Time User    PDMP Reviewed  Yes 5/20/2021  4:26 PM Mago Downing DO

## 2021-05-20 NOTE — LETTER
May 20, 2021     Jenna Chavez DO  [address]    Patient: Reola Jeans   YOB: 1980   Date of Visit: 5/20/2021       Dear Dr Duy Rivera:    Thank you for referring Reola Jeans to me for evaluation  Below are my notes for this consultation  If you have questions, please do not hesitate to call me  I look forward to following your patient along with you  Sincerely,        Jared Stoll MD        CC: No Recipients  Jared Stoll MD  5/20/2021 11:19 AM  Sign when Signing Visit  Surgical Oncology Follow Up    [address]  [facility] [address] [address] [phone]    Reola Jeans  1980  [id number]    Diagnoses and all orders for this visit:    Cancer of sigmoid colon [facility])  -     Case request operating room: INSERTION VENOUS PORT (PORT-A-CATH); Standing  -     Case request operating room: INSERTION VENOUS PORT (PORT-A-CATH)    Other orders  -     Incentive spirometry; Standing  -     Insert and maintain IV line; Standing  -     Void On-Call to O R ; Standing  -     Place sequential compression device; Standing  -     ceFAZolin (ANCEF) IVPB (premix in dextrose) 2,000 mg 50 mL        Chief Complaint   Patient presents with    Post-op       No follow-ups on file        Oncology History   Cancer of sigmoid colon ([facility] [address] )   4/13/2021 Initial Diagnosis    Cancer of sigmoid colon ([facility] [address] )    RESULTS OF IMMUNOHISTOCHEMICAL ANALYSIS FOR MISMATCH REPAIR PROTEIN LOSS  INTERPRETATION: NO LOSS OF NUCLEAR EXPRESSION OF MMR PROTEINS: LOW PROBABILITY OF MSI-H   RESULTS:  AntibodyClone Description Results  MLH1 M1 Mismatch repair protein Intact nuclear expression  MSH2 [id number] Mismatch repair protein Intact nuclear expression  MSH6 40 Mismatch repair protein Intact nuclear expression  PMS2 [id number] Mismatch repair protein Intact nuclear expression     5/5/2021 Surgery    Descending colon, partial colectomy:  - Adenocarcinoma (1 9 cm), moderately differentiated, arising in adenocarcinoma with high grade dysplasia  - Tumor invades the submucosa (through the muscularis mucosa in to deeper one third of submucosa, pT1, Sm3)   - All margins are negative for tumor   - One  of twenty eight lymph nodes is positive for tumor (1/28)  Staging: T1N1M0 colon cancer  Treatment history: resection 5/2021  Current treatment: eval for chemo  Disease status:     History of Present Illness: 36 yo s/p robo left colectomy for final path T1N1 colon cancer  Patient is doing very well today  He did have 1 bloody in 1 dark stool after discharge from the hospital   That is not continued  He is eating well, no nausea or vomiting  His pain is well controlled  No concerns or or questions about the appearance of his incisions  Review of Systems  Complete ROS Surg Onc:   Constitutional: The patient denies new or recent history of general fatigue, no recent weight loss, no change in appetite  Eyes: No complaints of visual problems, no scleral icterus  ENT: no complaints of ear pain, no hoarseness, no difficulty swallowing,  no tinnitus and no new masses in head, oral cavity, or neck  Cardiovascular: No complaints of chest pain, no palpitations, no ankle edema  Respiratory: No complaints of shortness of breath, no cough  Gastrointestinal: No complaints of jaundice, no bloody stools, no pale stools  Genitourinary: No complaints of dysuria, no hematuria, no nocturia, no frequent urination, no urethral discharge  Musculoskeletal: No complaints of weakness, paralysis, joint stiffness or arthralgias  Integumentary: No complaints of rash, no new lesions  Neurological: No complaints of convulsions, no seizures, no dizziness  Hematologic/Lymphatic: No complaints of easy bruising  Endocrine:  No hot or cold intolerance  No polydipsia, polyphagia, or polyuria    Allergy/immunology:  No environmental allergies  No food allergies  Not immunocompromised  Skin:  No pallor or rash  No wound          Patient Active Problem List   Diagnosis    Diabetes mellitus without complication (HCC)    [facility]reditary hemochromatosis ([facility] )    Testosterone deficiency    Thyroid nodule    Fatigue    [facility]     Past Medical History:   Diagnosis Date    Cancer ([facility] )     colon cancer    Diabetes mellitus ([facility] )     Hemochromatosis     Testosterone deficiency      Past Surgical History:   Procedure Laterality Date    COLONOSCOPY      HEMICOLOECTOMY W/ ANASTOMOSIS N/A 5/5/2021    Procedure: ROBOTIC SIGMOID COLECTOMY, MOBILIZATION OF SPLENIC FLEXURE;  Surgeon: Yoav George MD;  Location: [facility];  Service: Surgical Oncology    WISDOM TOOTH EXTRACTION       Family History   Problem Relation Age of Onset    Colon polyps Mother     No Known Problems Father     Colon polyps Maternal Grandmother     Prostate cancer Maternal Grandfather     Lung cancer Paternal Grandmother     Diabetes Paternal Grandmother     Prostate cancer Paternal Grandfather     Heart disease Paternal Grandfather     Kidney failure Paternal Grandfather     Diabetes Family     Hypertension Family      Social History     Socioeconomic History    Marital status:      Spouse name: Not on file    Number of children: Not on file    Years of education: Not on file    Highest education level: Not on file   Occupational History    Not on file   Social Needs    Financial resource strain: Not on file    Food insecurity     Worry: Not on file     Inability: Not on file    Transportation needs     Medical: Not on file     Non-medical: Not on file   Tobacco Use    Smoking status: Former Smoker     Packs/day: 0 25     Years: 10 00     Pack years: 2 50     Types: Cigarettes    Smokeless tobacco: Never Used   Substance and Sexual Activity    Alcohol use: Not Currently    Drug use: Not Currently    Sexual activity: Yes     Partners: Female     Birth control/protection: Condom Male   Lifestyle    Physical activity     Days per week: 5 days     Minutes per session: 60 min    Stress:  Only a little   Relationships    Social connections     Talks on phone: Not on file     Gets together: Not on file     Attends Sikh service: Not on file     Active member of club or organization: Not on file     Attends meetings of clubs or organizations: Not on file     Relationship status: Not on file    Intimate partner violence     Fear of current or ex partner: Not on file     Emotionally abused: Not on file     Physically abused: Not on file     Forced sexual activity: Not on file   Other Topics Concern    Not on file   Social History Narrative    Not on file       Current Outpatient Medications:     acetaminophen (TYLENOL) 325 mg tablet, Take 3 tablets (975 mg total) by mouth every 6 (six) hours, Disp: 60 tablet, Rfl: 0    Dulaglutide 1 5 MG/0 5ML SOPN, Inject 0 5 mL (1 5 mg total) under the skin once a week, Disp: 6 mL, Rfl: 1    Empagliflozin (Jardiance) 25 MG TABS, Take 1 tablet (25 mg total) by mouth daily, Disp: 90 tablet, Rfl: 3    ibuprofen (MOTRIN) 600 mg tablet, Take 1 tablet (600 mg total) by mouth every 6 (six) hours as needed for mild pain or moderate pain, Disp: 30 tablet, Rfl: 0    methocarbamol (ROBAXIN) 500 mg tablet, Take 1 tablet (500 mg total) by mouth every 6 (six) hours for 5 days, Disp: 20 tablet, Rfl: 0    gabapentin (NEURONTIN) 300 mg capsule, Take 1 capsule (300 mg total) by mouth 3 (three) times a day for 5 days, Disp: 15 capsule, Rfl: 0    ibuprofen (MOTRIN) 200 mg tablet, Take 3 tablets (600 mg total) by mouth every 6 (six) hours for 5 days, Disp: 60 tablet, Rfl: 0    oxyCODONE (ROXICODONE) 5 mg immediate release tablet, Take 1 tablet (5 mg total) by mouth every 12 (twelve) hours as needed for severe pain for up to 5 daysMax Daily Amount: 10 mg (Patient not taking: Reported on 5/20/2021), Disp: 10 tablet, Rfl: 0  No Known Allergies  Vitals:    05/20/21 0903   BP: 130/76   Pulse: 98   Resp: 16   Temp: 98 4 °F (36 9 °C)       Physical Exam  Constitutional: Patient is well-developed and well-nourished who appears the stated age in no acute distress  Patient is pleasant and talkative  HEENT:  Normocephalic  Sclerae are anicteric  Mucous membranes are moist  Neck is supple without adenopathy  No JVD  Chest: Equal chest rise, easy WOB  Cardiac: Heart is regular rate  Abdomen: Abdomen is non-tender, non-distended and without masses  Incisions are healing well  Extremities: There is no clubbing or cyanosis  There is no edema  Symmetric  Neuro: Grossly nonfocal  Gait is normal      Lymphatic: No evidence of cervical adenopathy bilaterally  No evidence of axillary adenopathy bilaterally  No evidence of inguinal adenopathy bilaterally  Skin: Warm, anicteric  Psych:  Patient is pleasant and talkative  Pathology:  A  Descending colon, partial colectomy:  - Adenocarcinoma (1 9 cm), moderately differentiated, arising in adenocarcinoma with high grade dysplasia  - Tumor invades the submucosa (through the muscularis mucosa in to deeper one third of submucosa, pT1, Sm3)   - All margins are negative for tumor   - One  of twenty eight lymph nodes is positive for tumor (1/28)  Labs:  Reviewed in [employer] personally    Imaging  No results found  I reviewed the above laboratory and imaging data  Discussion/Summary:  4year-old male status post left colectomy for final path T1 N1 colon cancer  Path was reviewed today in full with the patient and his significant other present today  I discussed the finding of a superficial colon cancer, however, he did have node positive disease  For this reason, we would recommend evaluation for chemotherapy  He is set to see Dr Yessy Reynolds this afternoon for ongoing evaluation of his hemochromatosis, and Dr Yessy Reynolds will be able to discuss this with him    I described what node is positive disease means and what the chemotherapy goals are  I briefly described the drugs as well as side effects, however Dr Adrienne Retana will be able to discuss this in more detail with him  I discussed placement of port for ease of chemotherapy delivery as well as lab draws  I discussed the risks to include bleeding, infection, pneumothorax  He will like to proceed with port should he decide to proceed with chemotherapy  Finally, we discussed that he will need a colonoscopy in 1 years time, as well as CT scans at the 6 month postop junction whether he proceed with chemotherapy or not  I will discuss follow-up with Dr Adrienne Retana, and will otherwise see the patient in 6 months time with repeat scans

## 2021-05-20 NOTE — H&P (VIEW-ONLY)
Surgical Oncology Follow Up    [address] [facility] Annalise Curry  [address] [id number] [phone]    Marian Samaritan  1980  [id number]    Diagnoses and all orders for this visit:    Cancer of sigmoid colon [facility])  -     Case request operating room: INSERTION VENOUS PORT (PORT-A-CATH); Standing  -     Case request operating room: INSERTION VENOUS PORT (PORT-A-CATH)    Other orders  -     Incentive spirometry; Standing  -     Insert and maintain IV line; Standing  -     Void On-Call to O R ; Standing  -     Place sequential compression device; Standing  -     ceFAZolin (ANCEF) IVPB (premix in dextrose) 2,000 mg 50 mL        Chief Complaint   Patient presents with    Post-op       No follow-ups on file  Oncology History   Cancer of sigmoid colon ([facility] )   4/13/2021 Initial Diagnosis    [facility] )    RESULTS OF IMMUNOHISTOCHEMICAL ANALYSIS FOR MISMATCH REPAIR PROTEIN LOSS  INTERPRETATION: NO LOSS OF NUCLEAR EXPRESSION OF MMR PROTEINS: LOW PROBABILITY OF MSI-H   RESULTS:  AntibodyClone Description Results  MLH1 M1 Mismatch repair protein Intact nuclear expression  MSH2 [id number] Mismatch repair protein Intact nuclear expression  MSH6 40 Mismatch repair protein Intact nuclear expression  PMS2 [id number] Mismatch repair protein Intact nuclear expression     5/5/2021 Surgery    Descending colon, partial colectomy:  - Adenocarcinoma (1 9 cm), moderately differentiated, arising in adenocarcinoma with high grade dysplasia  - Tumor invades the submucosa (through the muscularis mucosa in to deeper one third of submucosa, pT1, Sm3)   - All margins are negative for tumor   - One  of twenty eight lymph nodes is positive for tumor (1/28)         Staging: T1N1M0 colon cancer  Treatment history: resection 5/2021  Current treatment: eval for chemo  Disease status:     History of Present Illness: 38 yo s/p robo left colectomy for final path T1N1 colon cancer  Patient is doing very well today  He did have 1 bloody in 1 dark stool after discharge from the hospital   That is not continued  He is eating well, no nausea or vomiting  His pain is well controlled  No concerns or or questions about the appearance of his incisions  Review of Systems  Complete ROS Surg Onc:   Constitutional: The patient denies new or recent history of general fatigue, no recent weight loss, no change in appetite  Eyes: No complaints of visual problems, no scleral icterus  ENT: no complaints of ear pain, no hoarseness, no difficulty swallowing,  no tinnitus and no new masses in head, oral cavity, or neck  Cardiovascular: No complaints of chest pain, no palpitations, no ankle edema  Respiratory: No complaints of shortness of breath, no cough  Gastrointestinal: No complaints of jaundice, no bloody stools, no pale stools  Genitourinary: No complaints of dysuria, no hematuria, no nocturia, no frequent urination, no urethral discharge  Musculoskeletal: No complaints of weakness, paralysis, joint stiffness or arthralgias  Integumentary: No complaints of rash, no new lesions  Neurological: No complaints of convulsions, no seizures, no dizziness  Hematologic/Lymphatic: No complaints of easy bruising  Endocrine:  No hot or cold intolerance  No polydipsia, polyphagia, or polyuria  Allergy/immunology:  No environmental allergies  No food allergies  Not immunocompromised  Skin:  No pallor or rash  No wound          Patient Active Problem List   Diagnosis    Diabetes mellitus without complication ([facility] )    Hereditary hemochromatosis ([facility] )    Testosterone deficiency    Thyroid nodule    Fatigue    [facility]     Past Medical History:   Diagnosis Date    Cancer ([facility] )     colon cancer    Diabetes mellitus ([facility] )     Hemochromatosis     Testosterone deficiency      Past Surgical History:   Procedure Laterality Date    COLONOSCOPY      Becca 70 W/ ANASTOMOSIS N/A 5/5/2021    Procedure: ROBOTIC SIGMOID COLECTOMY, MOBILIZATION OF SPLENIC FLEXURE;  Surgeon: Zackary Bhatia MD;  [facility]: [facility];  Service: Surgical Oncology    WISDOM TOOTH EXTRACTION       Family History   Problem Relation Age of Onset    Colon polyps Mother     No Known Problems Father     Colon polyps Maternal Grandmother     Prostate cancer Maternal Grandfather     Lung cancer Paternal Grandmother     Diabetes Paternal Grandmother     Prostate cancer Paternal Grandfather     Heart disease Paternal Grandfather     Kidney failure Paternal Grandfather     Diabetes Family     Hypertension Family      Social History     Socioeconomic History    Marital status:      Spouse name: Not on file    Number of children: Not on file    Years of education: Not on file    Highest education level: Not on file   Occupational History    Not on file   Social Needs    Financial resource strain: Not on file    Food insecurity     Worry: Not on file     Inability: Not on file   [employer] needs     Medical: Not on file     Non-medical: Not on file   Tobacco Use    Smoking status: Former Smoker     Packs/day: 0 25     Years: 10 00     Pack years: 2 50     Types: Cigarettes    Smokeless tobacco: Never Used   Substance and Sexual Activity    Alcohol use: Not Currently    Drug use: Not Currently    Sexual activity: Yes     Partners: Female     Birth control/protection: Condom Male   Lifestyle    Physical activity     Days per week: 5 days     Minutes per session: 60 min    Stress:  Only a little   Relationships    Social connections     Talks on phone: Not on file     Gets together: Not on file     Attends Oriental orthodox service: Not on file     Active member of club or organization: Not on file     Attends meetings of clubs or organizations: Not on file     Relationship status: Not on file    Intimate partner violence     Fear of current or ex partner: Not on file     Emotionally abused: Not on file     Physically abused: Not on file     Forced sexual activity: Not on file   Other Topics Concern    Not on file   Social History Narrative    Not on file       Current Outpatient Medications:     acetaminophen (TYLENOL) 325 mg tablet, Take 3 tablets (975 mg total) by mouth every 6 (six) hours, Disp: 60 tablet, Rfl: 0    Dulaglutide 1 5 MG/0 5ML SOPN, Inject 0 5 mL (1 5 mg total) under the skin once a week, Disp: 6 mL, Rfl: 1    Empagliflozin (Jardiance) 25 MG TABS, Take 1 tablet (25 mg total) by mouth daily, Disp: 90 tablet, Rfl: 3    ibuprofen (MOTRIN) 600 mg tablet, Take 1 tablet (600 mg total) by mouth every 6 (six) hours as needed for mild pain or moderate pain, Disp: 30 tablet, Rfl: 0    methocarbamol (ROBAXIN) 500 mg tablet, Take 1 tablet (500 mg total) by mouth every 6 (six) hours for 5 days, Disp: 20 tablet, Rfl: 0    gabapentin (NEURONTIN) 300 mg capsule, Take 1 capsule (300 mg total) by mouth 3 (three) times a day for 5 days, Disp: 15 capsule, Rfl: 0    ibuprofen (MOTRIN) 200 mg tablet, Take 3 tablets (600 mg total) by mouth every 6 (six) hours for 5 days, Disp: 60 tablet, Rfl: 0    oxyCODONE (ROXICODONE) 5 mg immediate release tablet, Take 1 tablet (5 mg total) by mouth every 12 (twelve) hours as needed for severe pain for up to 5 daysMax Daily Amount: 10 mg (Patient not taking: Reported on 5/20/2021), Disp: 10 tablet, Rfl: 0  No Known Allergies  Vitals:    05/20/21 0903   BP: 130/76   Pulse: 98   Resp: 16   Temp: 98 4 °F (36 9 °C)       Physical Exam  Constitutional: Patient is well-developed and well-nourished who appears the stated age in no acute distress  Patient is pleasant and talkative  HEENT:  Normocephalic  Sclerae are anicteric  Mucous membranes are moist  Neck is supple without adenopathy  No JVD  Chest: Equal chest rise, easy WOB  Cardiac: Heart is regular rate  Abdomen: Abdomen is non-tender, non-distended and without masses  Incisions are healing well  Extremities: There is no clubbing or cyanosis  There is no edema  Symmetric  Neuro: Grossly nonfocal  Gait is normal      Lymphatic: No evidence of cervical adenopathy bilaterally  No evidence of axillary adenopathy bilaterally  No evidence of inguinal adenopathy bilaterally  Skin: Warm, anicteric  Psych:  Patient is pleasant and talkative  Pathology:  A  Descending colon, partial colectomy:  - Adenocarcinoma (1 9 cm), moderately differentiated, arising in adenocarcinoma with high grade dysplasia  - Tumor invades the submucosa (through the muscularis mucosa in to deeper one third of submucosa, pT1, Sm3)   - All margins are negative for tumor   - One  of twenty eight lymph nodes is positive for tumor (1/28)  Labs:  Reviewed in [email or web address] personally    Imaging  No results found  I reviewed the above laboratory and imaging data  Discussion/Summary:  4year-old male status post left colectomy for final path T1 N1 colon cancer  Path was reviewed today in full with the patient and his significant other present today  I discussed the finding of a superficial colon cancer, however, he did have node positive disease  For this reason, we would recommend evaluation for chemotherapy  He is set to see Dr Mike Robles this afternoon for ongoing evaluation of his hemochromatosis, and Dr Mike Robles will be able to discuss this with him  I described what node is positive disease means and what the chemotherapy goals are  I briefly described the drugs as well as side effects, however Dr Mike Robles will be able to discuss this in more detail with him  I discussed placement of port for ease of chemotherapy delivery as well as lab draws  I discussed the risks to include bleeding, infection, pneumothorax  He will like to proceed with port should he decide to proceed with chemotherapy    Finally, we discussed that he will need a colonoscopy in 1 years time, as well as CT scans at the 6 month postop junction whether he proceed with chemotherapy or not  I will discuss follow-up with Dr Jayson Rodriguez, and will otherwise see the patient in 6 months time with repeat scans

## 2021-05-20 NOTE — PROGRESS NOTES
HEMATOLOGY / ONCOLOGY CLINIC NOTE    Primary Care Provider: Juan Jose Mejía DO  Referring Provider: Hermann Whitmore  MRN: [id number]  : 1980    Reason for Encounter:  Chief Complaint   Patient presents with    Follow-up         History of Hematology / Oncology Illness:     Suni Ramos is a 37 y o  male who came in  to establish care with oncology    1, stage III T1 N1 descending colon adenocarcinoma    - presented with GI bleeding, biopsy showed adenocarcinoma status post hemicolectomy, without colostomy bag  Biopsy showed adenocarcinoma grade 2   lymph node positive  CT chest has no metastatic disease however showed lung nodules and liver nodule    - No strong family history of malignancy,  genetics evaluation to be done  - planning to start adjuvant chemotherapy CapeOx x8 cycles  2, hemochromatosis, heterozygous, H63D positive  - diagnosed in , status post monthly phlebotomy  Last in 2021, this was managed in [place]  - 2021 : partially due to GI bleeding, patient actually having iron deficiency now, hemoglobin is normal     3, polycythemia  - likely due to testosterone supplement  Patient has been of testosterone  4, lung nodule    5, liver nodule    6, alcohol drinking habits in the past       Assessment / Plan:     1  Colon cancer metastasized to intra-abdominal lymph node ([facility] [address] )     - had a long discussion with patient regarding diagnosis prognosis treatment options, risk benefit for each option    Made patient aware this is stage III colon cancer curable  In general given lymph node involvement, patient will need adjuvant chemotherapy to lower risk for recurrence increased survival   Patient currently working has a active lifestyle  We decided to give patient CAPOX regimen  Made patient aware the potential toxicities including fatigue cytopenia increased risk for infection potential GI side effects alopecia, neuropathy skin rash hand-foot syndrome etcetera  Patient agreed to proceed signed consent    Plan to start treatment in 2 weeks, follow patient on or before 2nd cycle; patient will check labs before every cycle    Made patient aware that since he has colon cancer diagnosed at such a young age, without clear family history, likely this is due to genetic mutation  Patient had medical genetics evaluation to be done soon  Patient 2 kids  Made patient aware that his children need to start colonoscopy surveillance at age 27  - Infusion Calculated Appointment Request; Future  - CBC and differential; Future  - Comprehensive metabolic panel; Future  - Infusion Calculated Appointment Request; Future  - CBC and differential; Future  - Comprehensive metabolic panel; Future  - Infusion Calculated Appointment Request; Future  - CBC and differential; Future  - Comprehensive metabolic panel; Future  - Infusion Calculated Appointment Request; Future  - CBC and differential; Future  - Comprehensive metabolic panel; Future  - Infusion Calculated Appointment Request; Future  - CBC and differential; Future  - Comprehensive metabolic panel; Future  - Infusion Calculated Appointment Request; Future  - CBC and differential; Future  - Comprehensive metabolic panel; Future  - Infusion Calculated Appointment Request; Future  - CBC and differential; Future  - Comprehensive metabolic panel; Future  - Infusion Calculated Appointment Request; Future  - CBC and differential; Future  - Comprehensive metabolic panel; Future    2  Cancer of sigmoid colon ([facility] )     - Infusion Calculated Appointment Request; Future  - CBC and differential; Future  - Comprehensive metabolic panel; Future  - Infusion Calculated Appointment Request; Future  - CBC and differential; Future  - Comprehensive metabolic panel; Future  - Infusion Calculated Appointment Request; Future  - CBC and differential; Future  - Comprehensive metabolic panel;  Future  - Infusion Calculated Appointment Request; Future  - CBC and differential; Future  - Comprehensive metabolic panel; Future  - Infusion Calculated Appointment Request; Future  - CBC and differential; Future  - Comprehensive metabolic panel; Future  - Infusion Calculated Appointment Request; Future  - CBC and differential; Future  - Comprehensive metabolic panel; Future  - Infusion Calculated Appointment Request; Future  - CBC and differential; Future  - Comprehensive metabolic panel; Future  - Infusion Calculated Appointment Request; Future  - CBC and differential; Future  - Comprehensive metabolic panel; Future      3, hemochromatosis  - as above      4, lung nodule/liver nodule    - will closely monitor        Made patient aware regarding side effects of chemotherapy, including but not limited to fatigue cytopenia, increased risk for infection, potential kidney, liver injuries neuropathies et al    Made patient aware to call MD or go to ED for any fever,  Chills, bleeding, easy bruise, unhealed wound, chest pain, abdominal pain et al                       minutes were spent face to face with patient with greater than 50% of the time spent in counseling or coordination of care including discussions of treatment instructions  All of the patient's questions were answered to their satisfactory during this discussion  Advised pt to call if there is any further questions  Interval History:     5/20/2021 :  Patient came in for follow-up, subjectively has been doing very well no issues recovered from surgery very well  Passing gas normal bowel movement habits  No abdominal pain  No skin color change  Patient does not smoke or drink alcohol regular basis anymore  Patient had 2 kids, 6and 9years old          Problem list:     Patient Active Problem List   Diagnosis    Diabetes mellitus without complication ([address] )    Hereditary hemochromatosis ([address] )    Testosterone deficiency    Thyroid nodule    Fatigue    Cancer of sigmoid colon ([address] )    Colon cancer metastasized to intra-abdominal lymph node (H[facility])         PHYSICIAL EXAMINATION:     Vital Signs:   /80   Pulse 104   Resp 18   Ht 5' 10" (1 778 m)   Wt 94 1 kg (207 lb 8 oz)   SpO2 98%   BMI 29 77 kg/m²   Body surface area is 2 12 meters squared  Ht Readings from Last 3 Encounters:   05/20/21 5' 10" (1 778 m)   05/20/21 5' 10" (1 778 m)   05/12/21 5' 10" (1 778 m)       Wt Readings from Last 3 Encounters:   05/20/21 94 1 kg (207 lb 8 oz)   05/20/21 93 4 kg (206 lb)   05/12/21 94 3 kg (208 lb)        Temp Readings from Last 3 Encounters:   05/20/21 98 4 °F (36 9 °C) (Tympanic)   05/12/21 97 7 °F (36 5 °C) (Tympanic)   05/07/21 98 3 °F (36 8 °C)        BP Readings from Last 3 Encounters:   05/20/21 138/80   05/20/21 130/76   05/12/21 106/78         Pulse Readings from Last 3 Encounters:   05/20/21 104   05/20/21 98   05/12/21 96         Appears comfortable, recovered well no issues      GEN: Alert, awake oriented x3, in no acute distress  HEENT- No pallor, icterus, cyanosis, no oral mucosal lesions,   LAD - no palpable cervical, clavicle, axillary, inguinal LAD  Heart- normal S1 S2, regular rate and rhythm, No murmur, rubs  Lungs- decreased breathing sound bilateral    Abdomen- soft, Non tender, bowel sounds present  Extremities- No cyanosis, clubbing, edema  Neuro- No focal neurological deficit           PAST MEDICAL HISTORY:   has a past medical history of Cancer ([address] ), Diabetes mellitus ([address] ), Hemochromatosis, and Testosterone deficiency  PAST SURGICAL HISTORY:   has a past surgical history that includes [place] tooth extraction; Colonoscopy; and Hemicoloectomy w/ anastomosis (N/A, 5/5/2021)      CURRENT MEDICATIONS:   Current Outpatient Medications   Medication Sig Dispense Refill    acetaminophen (TYLENOL) 325 mg tablet Take 3 tablets (975 mg total) by mouth every 6 (six) hours 60 tablet 0    Dulaglutide 1 5 MG/0 5ML SOPN Inject 0 5 mL (1 5 mg total) under the skin once a week 6 mL 1    Empagliflozin (Jardiance) 25 MG TABS Take 1 tablet (25 mg total) by mouth daily 90 tablet 3    ibuprofen (MOTRIN) 600 mg tablet Take 1 tablet (600 mg total) by mouth every 6 (six) hours as needed for mild pain or moderate pain 30 tablet 0    methocarbamol (ROBAXIN) 500 mg tablet Take 1 tablet (500 mg total) by mouth every 6 (six) hours for 5 days 20 tablet 0    oxyCODONE (ROXICODONE) 5 mg immediate release tablet Take 1 tablet (5 mg total) by mouth every 12 (twelve) hours as needed for severe pain for up to 5 daysMax Daily Amount: 10 mg 10 tablet 0    gabapentin (NEURONTIN) 300 mg capsule Take 1 capsule (300 mg total) by mouth 3 (three) times a day for 5 days 15 capsule 0    ibuprofen (MOTRIN) 200 mg tablet Take 3 tablets (600 mg total) by mouth every 6 (six) hours for 5 days 60 tablet 0     No current facility-administered medications for this visit  [unfilled]    SOCIAL HISTORY:   reports that he has quit smoking  His smoking use included cigarettes  He has a 2 50 pack-year smoking history  He has never used smokeless tobacco  He reports previous alcohol use  He reports previous drug use  FAMILY HISTORY:  family history includes Colon polyps in his maternal grandmother and mother; Diabetes in his family and paternal grandmother; Heart disease in his paternal grandfather; Hypertension in his family; Kidney failure in his paternal grandfather; Lung cancer in his paternal grandmother; No Known Problems in his father; Prostate cancer in his maternal grandfather and paternal grandfather  ALLERGIES:  has No Known Allergies  REVIEW OF SYSTEMS:  Please note that a 14-point review of systems was performed to include Constitutional, HEENT, Respiratory, CVS, GI, , Musculoskeletal, Integumentary, Neurologic, Rheumatologic, Endocrinologic, Psychiatric, Lymphatic, and Hematologic/Oncologic systems were reviewed and are negative unless otherwise stated in HPI   Positive and negative findings pertinent to this evaluation are incorporated into the history of present illness  LAB:  Lab Results   Component Value Date    WBC 7 95 05/07/2021    HGB 13 2 05/07/2021    HCT 41 7 05/07/2021    MCV 89 05/07/2021     05/07/2021     Lab Results   Component Value Date    SODIUM 136 05/06/2021    K 4 6 05/06/2021     (H) 05/06/2021    CO2 19 (L) 05/06/2021    AGAP 6 05/06/2021    BUN 16 05/06/2021    CREATININE 1 08 05/06/2021    GLUC 123 05/06/2021    GLUF 85 04/22/2021    CALCIUM 8 2 (L) 05/06/2021    AST 19 03/09/2021    ALT 21 03/09/2021    ALKPHOS 65 03/09/2021    TP 7 2 03/09/2021    TBILI 0 70 03/09/2021    EGFR 85 05/06/2021       CBC with diff:       Invalid input(s):  RBC, TOTALCELLSCOUNTED, SEGS%, GRANS%, LYMPHS%, EOS%, BASO%, ABNEUT, ABGRANS, ABLYMPHS, ABMOMOS, ABEOS, ABBASO    CMP:      Invalid input(s): ALBUMIN    IMAGING:  No orders to display     No results found

## 2021-05-21 ENCOUNTER — DOCUMENTATION (OUTPATIENT)
Dept: HEMATOLOGY ONCOLOGY | Facility: CLINIC | Age: 41
End: 2021-05-21

## 2021-05-21 DIAGNOSIS — C18.9 COLON CANCER METASTASIZED TO INTRA-ABDOMINAL LYMPH NODE (HCC): Primary | ICD-10-CM

## 2021-05-21 DIAGNOSIS — C77.2 COLON CANCER METASTASIZED TO INTRA-ABDOMINAL LYMPH NODE (HCC): Primary | ICD-10-CM

## 2021-05-21 NOTE — PROGRESS NOTES
rcvd email from clinical patient to start    start Capecitabine 1500 mg BID 2 weeks on 1 week off for his colon cancer  [facility] was not able to obtain PA requirements or Copay  Per [facility] pt has to be fwd to [facility]  Fwd email and fax to [facility]  Waiting for reply if they are able to fill, if PA is needed and copay amount

## 2021-05-23 RX ORDER — CAPECITABINE 500 MG/1
1500 TABLET, FILM COATED ORAL 2 TIMES DAILY
Qty: 84 TABLET | Refills: 3 | Status: SHIPPED | OUTPATIENT
Start: 2021-05-23 | End: 2021-08-31 | Stop reason: SDUPTHER

## 2021-05-25 ENCOUNTER — TELEPHONE (OUTPATIENT)
Dept: SURGICAL ONCOLOGY | Facility: CLINIC | Age: 41
End: 2021-05-25

## 2021-05-25 NOTE — TELEPHONE ENCOUNTER
Patient called and made aware of port placement scheduled for 6/1/2021 at [facility] with Dr Silvia Blake  Patient familiar with campus and of surgery instructions  Patient appreciative of phone call  Credentialing manger approved surgery via verbal phone call and email

## 2021-05-26 ENCOUNTER — DOCUMENTATION (OUTPATIENT)
Dept: HEMATOLOGY ONCOLOGY | Facility: CLINIC | Age: 41
End: 2021-05-26

## 2021-05-26 NOTE — PROGRESS NOTES
rcvd email from clinical patient to start     start Capecitabine 1500 mg BID 2 weeks on 1 week off for his colon cancer       [facility] was not able to obtain PA requirements or Copay  [facility]      Fwd email and fax to [facility]  Waiting for reply if they are able to fill, if PA is needed and copay amount  UPDATE  PER DEEPAK FROM [facility] PATIENT WILL NEED A PRIOR AUTH  SUBMITTED AND PENDING VIA COVER MY MEDS Key: [facility]     UPDATE 05/26/2021  THIS HAS BEEN APPROVED 05/26/2021-05/26/2022  CLINICAL, PHARMACY AND FINANCE AWARE

## 2021-05-28 DIAGNOSIS — C18.7 CANCER OF SIGMOID COLON (HCC): ICD-10-CM

## 2021-05-28 RX ORDER — OXYCODONE HYDROCHLORIDE 5 MG/1
5 TABLET ORAL EVERY 12 HOURS PRN
Qty: 20 TABLET | Refills: 0 | Status: SHIPPED | OUTPATIENT
Start: 2021-05-28 | End: 2021-06-07 | Stop reason: SDUPTHER

## 2021-05-28 NOTE — PRE-PROCEDURE INSTRUCTIONS
Pre-Surgery Instructions:   Medication Instructions    acetaminophen (TYLENOL) 325 mg tablet Instructed patient per Anesthesia Guidelines   Dulaglutide 1 5 MG/0 5ML SOPN Patient was instructed by Physician and understands   Empagliflozin (Jardiance) 25 MG TABS Patient was instructed by Physician and understands   ibuprofen (MOTRIN) 600 mg tablet Patient was instructed by Physician and understands   oxyCODONE (ROXICODONE) 5 mg immediate release tablet Instructed patient per Anesthesia Guidelines  Pre op and bathing instructions reviewed  Pt has hibiclens  Pt  Verbalized understanding of current visitor restrictions  Pt  Verbalized an understanding of all instructions reviewed and offers no concerns at this time  Instructed to avoid all ASA/NSAIDs and OTC Vit/Supp from now until after surgery   Tylenol ok prn  Instructed to take per normal schedule except DOS

## 2021-05-28 NOTE — TELEPHONE ENCOUNTER
Controlled Substance Review    [place]MP or [place]  reviewed: No red flags were identified; safe to proceed with prescription      PDMP Review       Value Time User    PDMP Reviewed  Yes 5/28/2021 10:35 AM Michelle Lose, DO

## 2021-05-29 DIAGNOSIS — E11.9 TYPE 2 DIABETES MELLITUS WITHOUT COMPLICATION, WITHOUT LONG-TERM CURRENT USE OF INSULIN (HCC): Chronic | ICD-10-CM

## 2021-06-01 ENCOUNTER — HOSPITAL ENCOUNTER (OUTPATIENT)
Facility: HOSPITAL | Age: 41
Setting detail: OUTPATIENT SURGERY
Discharge: HOME | End: 2021-06-01
Attending: SURGERY | Admitting: SURGERY
Payer: COMMERCIAL

## 2021-06-01 ENCOUNTER — APPOINTMENT (OUTPATIENT)
Dept: RADIOLOGY | Facility: HOSPITAL | Age: 41
End: 2021-06-01
Payer: COMMERCIAL

## 2021-06-01 ENCOUNTER — ANESTHESIA (OUTPATIENT)
Dept: PERIOP | Facility: HOSPITAL | Age: 41
End: 2021-06-01
Payer: COMMERCIAL

## 2021-06-01 ENCOUNTER — TELEPHONE (OUTPATIENT)
Dept: SURGICAL ONCOLOGY | Facility: CLINIC | Age: 41
End: 2021-06-01

## 2021-06-01 ENCOUNTER — ANESTHESIA EVENT (OUTPATIENT)
Dept: PERIOP | Facility: HOSPITAL | Age: 41
End: 2021-06-01
Payer: COMMERCIAL

## 2021-06-01 ENCOUNTER — APPOINTMENT (OUTPATIENT)
Dept: LAB | Facility: HOSPITAL | Age: 41
End: 2021-06-01
Attending: INTERNAL MEDICINE
Payer: COMMERCIAL

## 2021-06-01 VITALS
WEIGHT: 205.25 LBS | HEIGHT: 70 IN | SYSTOLIC BLOOD PRESSURE: 116 MMHG | OXYGEN SATURATION: 99 % | BODY MASS INDEX: 29.38 KG/M2 | DIASTOLIC BLOOD PRESSURE: 75 MMHG | HEART RATE: 76 BPM | TEMPERATURE: 97.5 F | RESPIRATION RATE: 22 BRPM

## 2021-06-01 DIAGNOSIS — C18.7 CANCER OF SIGMOID COLON (HCC): ICD-10-CM

## 2021-06-01 DIAGNOSIS — C77.2 COLON CANCER METASTASIZED TO INTRA-ABDOMINAL LYMPH NODE (HCC): ICD-10-CM

## 2021-06-01 DIAGNOSIS — C18.9 COLON CANCER METASTASIZED TO INTRA-ABDOMINAL LYMPH NODE (HCC): ICD-10-CM

## 2021-06-01 LAB
ALBUMIN SERPL BCP-MCNC: 4.1 G/DL (ref 3.5–5)
ALP SERPL-CCNC: 87 U/L (ref 46–116)
ALT SERPL W P-5'-P-CCNC: 32 U/L (ref 12–78)
ANION GAP SERPL CALCULATED.3IONS-SCNC: 10 MMOL/L (ref 4–13)
AST SERPL W P-5'-P-CCNC: 20 U/L (ref 5–45)
BASOPHILS # BLD AUTO: 0.03 THOUSANDS/ΜL (ref 0–0.1)
BASOPHILS NFR BLD AUTO: 0 % (ref 0–1)
BILIRUB SERPL-MCNC: 0.82 MG/DL (ref 0.2–1)
BUN SERPL-MCNC: 14 MG/DL (ref 5–25)
CALCIUM SERPL-MCNC: 8.8 MG/DL (ref 8.3–10.1)
CHLORIDE SERPL-SCNC: 103 MMOL/L (ref 100–108)
CO2 SERPL-SCNC: 27 MMOL/L (ref 21–32)
CREAT SERPL-MCNC: 1.06 MG/DL (ref 0.6–1.3)
EOSINOPHIL # BLD AUTO: 0.18 THOUSAND/ΜL (ref 0–0.61)
EOSINOPHIL NFR BLD AUTO: 3 % (ref 0–6)
ERYTHROCYTE [DISTWIDTH] IN BLOOD BY AUTOMATED COUNT: 12.8 % (ref 11.6–15.1)
GFR SERPL CREATININE-BSD FRML MDRD: 87 ML/MIN/1.73SQ M
GLUCOSE SERPL-MCNC: 117 MG/DL (ref 65–140)
GLUCOSE SERPL-MCNC: 120 MG/DL (ref 65–140)
GLUCOSE SERPL-MCNC: 92 MG/DL (ref 65–140)
HCT VFR BLD AUTO: 41.6 % (ref 36.5–49.3)
HGB BLD-MCNC: 13.6 G/DL (ref 12–17)
IMM GRANULOCYTES # BLD AUTO: 0.02 THOUSAND/UL (ref 0–0.2)
IMM GRANULOCYTES NFR BLD AUTO: 0 % (ref 0–2)
LYMPHOCYTES # BLD AUTO: 1.82 THOUSANDS/ΜL (ref 0.6–4.47)
LYMPHOCYTES NFR BLD AUTO: 26 % (ref 14–44)
MCH RBC QN AUTO: 27.3 PG (ref 26.8–34.3)
MCHC RBC AUTO-ENTMCNC: 32.7 G/DL (ref 31.4–37.4)
MCV RBC AUTO: 83 FL (ref 82–98)
MONOCYTES # BLD AUTO: 0.41 THOUSAND/ΜL (ref 0.17–1.22)
MONOCYTES NFR BLD AUTO: 6 % (ref 4–12)
NEUTROPHILS # BLD AUTO: 4.59 THOUSANDS/ΜL (ref 1.85–7.62)
NEUTS SEG NFR BLD AUTO: 65 % (ref 43–75)
NRBC BLD AUTO-RTO: 0 /100 WBCS
PLATELET # BLD AUTO: 231 THOUSANDS/UL (ref 149–390)
PMV BLD AUTO: 10.6 FL (ref 8.9–12.7)
POTASSIUM SERPL-SCNC: 4.3 MMOL/L (ref 3.5–5.3)
PROT SERPL-MCNC: 7.3 G/DL (ref 6.4–8.2)
RBC # BLD AUTO: 4.99 MILLION/UL (ref 3.88–5.62)
SODIUM SERPL-SCNC: 140 MMOL/L (ref 136–145)
WBC # BLD AUTO: 7.05 THOUSAND/UL (ref 4.31–10.16)

## 2021-06-01 PROCEDURE — 85025 COMPLETE CBC W/AUTO DIFF WBC: CPT

## 2021-06-01 PROCEDURE — 80053 COMPREHEN METABOLIC PANEL: CPT

## 2021-06-01 PROCEDURE — 82948 REAGENT STRIP/BLOOD GLUCOSE: CPT

## 2021-06-01 PROCEDURE — C1894 INTRO/SHEATH, NON-LASER: HCPCS | Performed by: SURGERY

## 2021-06-01 PROCEDURE — 36415 COLL VENOUS BLD VENIPUNCTURE: CPT

## 2021-06-01 PROCEDURE — 76000 FLUOROSCOPY <1 HR PHYS/QHP: CPT

## 2021-06-01 PROCEDURE — C1788 PORT, INDWELLING, IMP: HCPCS | Performed by: SURGERY

## 2021-06-01 PROCEDURE — 77001 FLUOROGUIDE FOR VEIN DEVICE: CPT | Performed by: SURGERY

## 2021-06-01 PROCEDURE — 36561 INSERT TUNNELED CV CATH: CPT | Performed by: SURGERY

## 2021-06-01 DEVICE — 8F PLASTIC DIGNITY® MID-SIZED CT PORT W/SILICONE FILLED SUTURE HOLES W/ATTACHABLE CHRONOFLEX® POLYURETHANE CATHETER
Type: IMPLANTABLE DEVICE | Site: CHEST | Status: NON-FUNCTIONAL
Brand: DIGNITY® MID-SIZED CT PORT
Removed: 2021-09-22

## 2021-06-01 RX ORDER — EMPAGLIFLOZIN 25 MG/1
25 TABLET, FILM COATED ORAL DAILY
Qty: 90 TABLET | Refills: 0 | Status: SHIPPED | OUTPATIENT
Start: 2021-06-01 | End: 2021-12-20

## 2021-06-01 RX ORDER — PROPOFOL 10 MG/ML
INJECTION, EMULSION INTRAVENOUS AS NEEDED
Status: DISCONTINUED | OUTPATIENT
Start: 2021-06-01 | End: 2021-06-01

## 2021-06-01 RX ORDER — KETOROLAC TROMETHAMINE 30 MG/ML
INJECTION, SOLUTION INTRAMUSCULAR; INTRAVENOUS AS NEEDED
Status: DISCONTINUED | OUTPATIENT
Start: 2021-06-01 | End: 2021-06-01

## 2021-06-01 RX ORDER — IBUPROFEN 600 MG/1
600 TABLET ORAL ONCE
Status: COMPLETED | OUTPATIENT
Start: 2021-06-01 | End: 2021-06-01

## 2021-06-01 RX ORDER — PROMETHAZINE HYDROCHLORIDE 25 MG/ML
25 INJECTION, SOLUTION INTRAMUSCULAR; INTRAVENOUS ONCE AS NEEDED
Status: DISCONTINUED | OUTPATIENT
Start: 2021-06-01 | End: 2021-06-01 | Stop reason: HOSPADM

## 2021-06-01 RX ORDER — SODIUM CHLORIDE, SODIUM LACTATE, POTASSIUM CHLORIDE, CALCIUM CHLORIDE 600; 310; 30; 20 MG/100ML; MG/100ML; MG/100ML; MG/100ML
125 INJECTION, SOLUTION INTRAVENOUS CONTINUOUS
Status: DISCONTINUED | OUTPATIENT
Start: 2021-06-01 | End: 2021-06-01 | Stop reason: HOSPADM

## 2021-06-01 RX ORDER — ONDANSETRON 2 MG/ML
INJECTION INTRAMUSCULAR; INTRAVENOUS AS NEEDED
Status: DISCONTINUED | OUTPATIENT
Start: 2021-06-01 | End: 2021-06-01

## 2021-06-01 RX ORDER — MIDAZOLAM HYDROCHLORIDE 2 MG/2ML
INJECTION, SOLUTION INTRAMUSCULAR; INTRAVENOUS AS NEEDED
Status: DISCONTINUED | OUTPATIENT
Start: 2021-06-01 | End: 2021-06-01

## 2021-06-01 RX ORDER — FENTANYL CITRATE 50 UG/ML
INJECTION, SOLUTION INTRAMUSCULAR; INTRAVENOUS AS NEEDED
Status: DISCONTINUED | OUTPATIENT
Start: 2021-06-01 | End: 2021-06-01

## 2021-06-01 RX ORDER — SODIUM CHLORIDE, SODIUM LACTATE, POTASSIUM CHLORIDE, CALCIUM CHLORIDE 600; 310; 30; 20 MG/100ML; MG/100ML; MG/100ML; MG/100ML
125 INJECTION, SOLUTION INTRAVENOUS CONTINUOUS
Status: CANCELLED | OUTPATIENT
Start: 2021-06-01 | End: 2021-06-01

## 2021-06-01 RX ORDER — FENTANYL CITRATE/PF 50 MCG/ML
25 SYRINGE (ML) INJECTION
Status: DISCONTINUED | OUTPATIENT
Start: 2021-06-01 | End: 2021-06-01 | Stop reason: HOSPADM

## 2021-06-01 RX ORDER — LIDOCAINE HYDROCHLORIDE 10 MG/ML
INJECTION, SOLUTION EPIDURAL; INFILTRATION; INTRACAUDAL; PERINEURAL AS NEEDED
Status: DISCONTINUED | OUTPATIENT
Start: 2021-06-01 | End: 2021-06-01

## 2021-06-01 RX ORDER — CEFAZOLIN SODIUM 2 G/50ML
2000 SOLUTION INTRAVENOUS ONCE
Status: COMPLETED | OUTPATIENT
Start: 2021-06-01 | End: 2021-06-01

## 2021-06-01 RX ADMIN — IBUPROFEN 600 MG: 600 TABLET, FILM COATED ORAL at 14:20

## 2021-06-01 RX ADMIN — LIDOCAINE HYDROCHLORIDE 50 MG: 10 INJECTION, SOLUTION EPIDURAL; INFILTRATION; INTRACAUDAL; PERINEURAL at 11:37

## 2021-06-01 RX ADMIN — PHENYLEPHRINE HYDROCHLORIDE 100 MCG: 10 INJECTION INTRAVENOUS at 11:59

## 2021-06-01 RX ADMIN — FENTANYL CITRATE 50 MCG: 50 INJECTION, SOLUTION INTRAMUSCULAR; INTRAVENOUS at 11:37

## 2021-06-01 RX ADMIN — FENTANYL CITRATE 50 MCG: 50 INJECTION, SOLUTION INTRAMUSCULAR; INTRAVENOUS at 12:21

## 2021-06-01 RX ADMIN — PROPOFOL 200 MG: 10 INJECTION, EMULSION INTRAVENOUS at 11:37

## 2021-06-01 RX ADMIN — ONDANSETRON 4 MG: 2 INJECTION INTRAMUSCULAR; INTRAVENOUS at 11:46

## 2021-06-01 RX ADMIN — SODIUM CHLORIDE, SODIUM LACTATE, POTASSIUM CHLORIDE, AND CALCIUM CHLORIDE 125 ML/HR: .6; .31; .03; .02 INJECTION, SOLUTION INTRAVENOUS at 11:27

## 2021-06-01 RX ADMIN — MIDAZOLAM HYDROCHLORIDE 2 MG: 1 INJECTION, SOLUTION INTRAMUSCULAR; INTRAVENOUS at 11:33

## 2021-06-01 RX ADMIN — CEFAZOLIN SODIUM 2000 MG: 2 SOLUTION INTRAVENOUS at 11:31

## 2021-06-01 RX ADMIN — KETOROLAC TROMETHAMINE 30 MG: 30 INJECTION, SOLUTION INTRAMUSCULAR at 12:32

## 2021-06-01 NOTE — ANESTHESIA POSTPROCEDURE EVALUATION
Post-Op Assessment Note    CV Status:  Stable  Pain Score: 0    Pain management: adequate     Mental Status:  Sleepy and arousable   Hydration Status:  Stable   PONV Controlled:  None   Airway Patency:  Patent and adequate       Staff: CRNA   Comments: 6LPM/Mask        No complications documented      BP   116/72   Temp   97 5   Pulse  78   Resp   15   SpO2   100

## 2021-06-01 NOTE — OP NOTE
OPERATIVE REPORT  PATIENT NAME: Maria D Walker    :  1980  MRN: [id number]  Pt Location: [facility] OR ROOM 02    SURGERY DATE: 2021    Surgeon(s) and Role:     * Wen Ortiz MD - Primary    Preop Diagnosis:  Colon cancer metastasized to intra-abdominal lymph node ([address] ) [C18 9, C77 2]    Post-Op Diagnosis Codes:     * Colon cancer metastasized to intra-abdominal lymph node ([address] ) [C18 9, C77 2]    Procedure(s) (LRB):  INSERTION VENOUS PORT (PORT-A-CATH) Ultrasound guided Left Internal Jugular Vein Access (Left)    Specimen(s):  * No specimens in log *    Estimated Blood Loss:   30 mL    Drains:  [REMOVED] NG/OG/Enteral Tube Orogastric 18 Fr Center mouth (Removed)   Number of days: 1       [REMOVED] Urethral Catheter Double-lumen; Latex 16 Fr  (Removed)   Number of days: 1       Anesthesia Type:   General    Operative Indications:  Colon cancer metastasized to intra-abdominal lymph node ([address] ) [C18 9, C77 2]  For port placement    Operative Findings:  No complications  Complications:   None  None  Procedure and Technique:  37 yom with recent diagnosis of sigmoid colon cancer was referred to me for port placement  Consent was obtained after explaining benefits alternative procedure and possible complications  All patient's questions were answered  He was taken to the operating room where he was placed on the OR table in supine position proper time-out and prophylactic antibiotics were given  LMA anesthesia was induced  Both arms were tucked on either side  Bilateral chest and neck were prepped and draped in a sterile normal fashion  Given patient is right handed I  opted to go on the left side  Left subclavian vein was accessed without any difficulty  However we were unable to pass the wire as wire was coiling  At this point I decided  To access left internal jugular vein under ultrasound guidance  This was done by utilizing micropuncture kit 5 [facility]    Soft tissue  sequentially dilated direct fluoroscopy  Catheter was passed over the sheath  and sheath was peeled and removed completely  3 cm skin incision was made the left side chest wall and pocket was created  Hemostasis rechecked and achieved  Catheter was tunneled from vein access site to port sitet  Catheter was cut to the appropriate size and connected to the port  Port was placed in the pocket and secured on either side  Port was accessed with heparinized saline good venous blood return  Wound was closed in layered fashion is skin was  The skin was closed with 4-0 Monocryl and Dermabond in place  Patient was taken to the PACU in stable condition and we will obtain a chest x-ray in the PACU prior to discharge     I was present for the entire procedure    Patient Disposition:  PACU     SIGNATURE: Eladia Kebede MD  DATE: June 1, 2021  TIME: 1:00 PM

## 2021-06-01 NOTE — ANESTHESIA PREPROCEDURE EVALUATION
Procedure:  INSERTION VENOUS PORT (PORT-A-CATH) (N/A Chest)    Relevant Problems   GI/HEPATIC   (+) Cancer of sigmoid colon (HCC)   (+) Colon cancer metastasized to intra-abdominal lymph node (HCC)      Other   (+) Diabetes mellitus without complication (HCC)   (+) Hereditary hemochromatosis ([facility] )        Physical Exam    Airway    Mallampati score: II  TM Distance: >3 FB  Neck ROM: full     Dental   Comment: Denies loose teeth,     Cardiovascular  Cardiovascular exam normal    Pulmonary  Pulmonary exam normal     Other Findings  Portions of exam deferred due to low yield and/or unknown COVID status      Anesthesia Plan  ASA Score- 3     Anesthesia Type- general with ASA Monitors  Additional Monitors:   Airway Plan: LMA  Plan Factors-Exercise tolerance (METS): >4 METS  Chart reviewed  Existing labs reviewed  Patient summary reviewed  Patient is not a current smoker  Induction- intravenous  Postoperative Plan-     Informed Consent- Anesthetic plan and risks discussed with patient  I personally reviewed this patient with the CRNA  Discussed and agreed on the Anesthesia Plan with the CRNA  Vanessa Jon

## 2021-06-01 NOTE — INTERVAL H&P NOTE
H&P reviewed  After examining the patient I find no changes in the patients condition since the H&P had been written  Consent was obtained for port placement after explaining benefits, alternative, procedure and possible complications  All patient questions were answered    Vitals:    06/01/21 1108   BP: 123/74   Pulse: 86   Resp: 16   Temp: 98 °F (36 7 °C)   SpO2: 98%

## 2021-06-01 NOTE — DISCHARGE INSTRUCTIONS
Implanted Venous Access Port   WHAT YOU NEED TO KNOW:   An implanted venous access port is a device used to give treatments and take blood  It may also be called a central venous access device (CVAD)  The port is a small container that is placed under your skin, usually in your upper chest  A port can also be placed in your arm or abdomen  The port is attached to a catheter that enters a large vein  Your healthcare provider may show you or a family member how to give medicines or liquids through your port  A healthcare provider may also visit you at home to give you medicines or treatments  DISCHARGE INSTRUCTIONS:   Call your local emergency number (911 in the [address] [facility]) if:   · You have pain in your arm, neck, shoulder, or chest     · You have trouble breathing that is getting worse over time  Call your healthcare provider if:   · Blood soaks through your bandage  · You hear a bubbling noise when your port is flushed  · The skin over or around your port breaks open  · Your heart is jumping or fluttering  · You have a headache, blurred vision, and feel confused  · You have a fever  · Your port site is red, swollen, or draining pus  · Your port site turns cold, changes color, or you cannot feel it  · The veins in your neck or chest bulge  · You have questions or concerns about your condition or care  Prevent an infection:   · Wash your hands often  Use soap and water  Clean your hands before and after you care for your port  Remind everyone who cares for your port to wash their hands  · Clean the skin around your port every day  Ask your healthcare provider what to use to clean your skin  · Check your skin for infection every day  Look for redness, swelling, or fluid oozing from the port site  Follow up with your healthcare provider as directed: You may need to return to have your stitches removed in 1 week  Dissolvable stitches will not need to be removed   Your body will absorb the stitches, or they will fall out on their own  Medical glue will peel off on its own in 5 to 10 days  Write down your questions so you remember to ask them during your visits  Implanted venous access information card: Your healthcare provider will give you a card with information about your port  Keep the card in a safe place that is easy to find  Activity:  You may return to your daily activities when the area heals  You will be able to bathe, shower, or swim after the area heals  © Copyright [address] Information is for End User's use only and may not be sold, redistributed or otherwise used for commercial purposes  All illustrations and images included in CareNotes® are the copyrighted property of [employer]  or [facility] Stephanie Araujo  The above information is an  only  It is not intended as medical advice for individual conditions or treatments  Talk to your doctor, nurse or pharmacist before following any medical regimen to see if it is safe and effective for you

## 2021-06-02 ENCOUNTER — TELEPHONE (OUTPATIENT)
Dept: SURGICAL ONCOLOGY | Facility: CLINIC | Age: 41
End: 2021-06-02

## 2021-06-02 ENCOUNTER — TELEPHONE (OUTPATIENT)
Dept: ENDOCRINOLOGY | Facility: CLINIC | Age: 41
End: 2021-06-02

## 2021-06-02 ENCOUNTER — TELEPHONE (OUTPATIENT)
Dept: HEMATOLOGY ONCOLOGY | Facility: CLINIC | Age: 41
End: 2021-06-02

## 2021-06-02 NOTE — TELEPHONE ENCOUNTER
Received message patient in pain and tylenol and ibuprofen are not working to decrease pain after port placement  PCP filled prescription of oxycodone on 5/28 for 20 tablets and the order expires on 6/7  Patient called and told to try some aleve but oxycodone will not be refilled through surgical oncology office  Patient stated he threw the prescription of oxycodone the PCP prescribed in the garbage  Patient understanding and appreciative of phone call

## 2021-06-02 NOTE — TELEPHONE ENCOUNTER
Received a fax that Klaudia Preston was denied  PA initiated through [facility]    Key - [id number]

## 2021-06-02 NOTE — TELEPHONE ENCOUNTER
Will re-route request to Surgical Oncology  Port was placed yesterday by Dr Ana Christy  Task sent to Dr Lester Tustin team to address with MD Dr Ashwini Simms RNs notified as Early Estrada only

## 2021-06-02 NOTE — TELEPHONE ENCOUNTER
Patient calling to request pain medication following his port surgery with Dr Jairno Perdomo  Patient stated that he called Dr Tahmina Diaz office and they advised he call us for this   Patient can be reached at [phone]

## 2021-06-02 NOTE — TELEPHONE ENCOUNTER
Dr Carlos Henriquez performed a surgery yesterday for Blayne Newell, he said the ibuprofen and motrin are not doing anything for him, he would like to know if he can be prescribed something stronger

## 2021-06-03 ENCOUNTER — CLINICAL SUPPORT (OUTPATIENT)
Dept: GENETICS | Facility: CLINIC | Age: 41
End: 2021-06-03

## 2021-06-03 DIAGNOSIS — C18.7 CANCER OF SIGMOID COLON (HCC): Primary | ICD-10-CM

## 2021-06-03 DIAGNOSIS — Z80.0 FAMILY HISTORY OF COLON CANCER: ICD-10-CM

## 2021-06-03 PROCEDURE — NC001 PR NO CHARGE: Performed by: GENETIC COUNSELOR, MS

## 2021-06-03 NOTE — LETTER
2021     Senthil Smith, [address] [facility]    Patient: Kenia Hussein  YOB: 1980  Date of Visit: 6/3/2021      Dear Dr Zane Galarza: Thank you for referring Kenia Hussein to me for evaluation  Below are my notes for this consultation  If you have questions, please do not hesitate to call me  I look forward to following your patient along with you  Sincerely,        Bhakti Beckham GC        CC: No Recipients        Pre-Test Genetic Counseling Consult Note    Patient Name: Kenia Hussein   /Age: 1980/41 y o  Referring Provider: Senthil Smith MD    Date of Service: 6/3/2021  Genetic Counselor: Ganesh Brar 87, Collin [address], [facility] Counseling Intern  Interpretation Services: None  Location: In-person consult at [facility] of Visit: 61 minutes      Becca Aguilar was referred to the [address] [place] [address] and Genetic Assessment Program due to his personal history of colon cancer and family history of colon and prostate cancer and colon polyps  He presents today to discuss the possibility of a hereditary cancer syndrome, options for genetic testing, and implications for him and his family  Cancer History and Treatment:     Personal History: Personal history of colon cancer at age 37 and 2-3 adenomas  Becca Aguilar reports having a history of hereditary hemochromatosis     Colon, descending colon, partial colectomy 2021           Final Diagnosis   A  Descending colon, partial colectomy:  - Adenocarcinoma (1 9 cm), moderately differentiated, arising in adenocarcinoma with high grade dysplasia  - Tumor invades the submucosa (through the muscularis mucosa in to deeper one third of submucosa, pT1, Sm3)   - All margins are negative for tumor   - One  of twenty eight lymph nodes is positive for tumor ()        Colonoscopy 21:   Final Diagnosis   A   Proximal transverse colon polyp (cold snare polypectomy x2):      - Portions of tubular adenomas; negative for high-grade dysplasia  B  Distal transverse colon polyp (cold snare polypectomy):      - Tubular adenoma; negative for high-grade dysplasia  C  Sigmoid colon at 40 cm (biopsy):     - Adenocarcinoma, at least intramucosal         Addendum   PART C:     RESULTS OF IMMUNOHISTOCHEMICAL ANALYSIS FOR MISMATCH REPAIR PROTEIN LOSS     INTERPRETATION: NO LOSS OF NUCLEAR EXPRESSION OF MMR PROTEINS: LOW PROBABILITY OF MSI-H      RESULTS:  Antibody            Clone                 Description                                  Results  MLH1                 M1                     Mismatch repair protein                Intact nuclear expression  MSH2                [id number]        Mismatch repair protein                Intact nuclear expression  MSH6                40                      Mismatch repair protein                Intact nuclear expression  PMS2                 [id number]           Mismatch repair protein                Intact nuclear expression     Comment:   A negative control and a positive control for each antibody have been reviewed and accepted      [facility] Specimen ID: [id number]  Evaluator:  RASHAWN Head MD     Screening Hx:     Skin:  Skin cancer screening: None     Prostate:  Prostate screening: None      Medical and Surgical History  Pertinent surgical history:   Past Surgical History:   Procedure Laterality Date    COLONOSCOPY      HEMICOLOECTOMY W/ ANASTOMOSIS N/A 5/5/2021    Procedure: ROBOTIC SIGMOID COLECTOMY, MOBILIZATION OF SPLENIC FLEXURE;  Surgeon: Yudi Clifton MD;  Location:  MAIN OR;  Service: Surgical Oncology    TUNNELED VENOUS PORT PLACEMENT Left 6/1/2021    Procedure: INSERTION VENOUS PORT (PORT-A-CATH) Ultrasound guided Left Internal Jugular Vein Access;  Surgeon: Kimber Meng MD;  Location: [facility] MAIN OR;  Service: Surgical Oncology    WISDOM TOOTH EXTRACTION        Pertinent medical history:  Past Medical History:   Diagnosis Date    Cancer [facility])     colon cancer    Diabetes mellitus ([address] )     Hemochromatosis     Testosterone deficiency        Other History:  Height:   Ht Readings from Last 1 Encounters:   06/01/21 5' 10" (1 778 m)     Weight:   Wt Readings from Last 1 Encounters:   06/01/21 93 1 kg (205 lb 4 oz)     Relevant Family History   Patient reports no Ashkenazi Druze ancestry  - Mother: Age 59 with no history of cancer; she has a history of 1-2 colon polyps   - Maternal Uncle: Lung cancer and exposure to agent orange  - Maternal Grandmother: Cancer NOS; history of tobacco use  - Maternal Grandfather: Cancer NOS; history of tobacco use      - Father: Age 77 with no history of cancer  - Paternal Uncle: Age 58 with colon cancer at age 58 and skin cancer   - Paternal Grandfather: Progressive multifocal leukoencephalopathy (PML)    Please refer to the scanned pedigree in the Media Tab for a complete family history     *All history is reported as provided by the patient; records are not available for review, except where indicated  Assessment:  We discussed sporadic, familial and hereditary cancer  We also discussed the many factors that influence our risk for cancer such as age, environmental exposures, lifestyle choices and family history  We reviewed the indications suggestive of a hereditary predisposition to cancer  Genetic testing is indicated for Yanna Stallings based on the following criteria: Meets NCCN V1 2021 Testing Criteria for the Evaluation of Pham syndrome based on his personal history of colon cancer at age 37 along with his paternal uncle (second-degree relative) diagnosis of colon cancer  The risks, benefits, and limitations of genetic testing were reviewed with the patient, as well as genetic discrimination laws, and possible test results (positive, negative, variants of uncertain significance) and their clinical implications   If positive for a mutation, options for managing cancer risk including increased surveillance, chemoprevention, and in some cases prophylactic surgery were discussed  Fermín Chapman was informed that if a hereditary cancer syndrome was identified in him, first degree relatives (parents, siblings, and children) have a chance of also inheriting the condition  Genetic testing would allow for predictive genetic testing in other relatives, who may also be at risk depending on their degree of relation  Plan: Patient decided to proceed with testing and provided consent  Summary:     Sample Collection:  A saliva kit was provided to the patient    Genetic Testing Preformed: CancerNext (36 genes): APC, ELAN, AXIN2 BARD1, BRCA1, BRCA2, BRIP1, BMPR1A, CDH1, CDK4, CDKN2A, CHEK2, DICER1, EPCAM, GREM1, HOXB13, MLH1, MSH2, MSH3, MSH6, MUTYH, NBN, NF1, NTHL1, PALB2, PMS2, POLD1, POLE, PTEN, RAD51C, RAD51D, RECQL SMAD4, SMARCA4, STK11, TP53    Results take approximately 2-3 weeks to complete once test is started  We will contact Fermín Chapman once results are available  Additional recommendations for surveillance/medical management will be made pending genetic test results

## 2021-06-03 NOTE — PROGRESS NOTES
Pre-Test Genetic Counseling Consult Note    Patient Name: Rupa Hennessy DOB/Age: 1980/41 y o  Referring Provider: Timbo Pitts MD    Date of Service: 6/3/2021  Genetic Counselor: Ganesh Martínez 87, Collin 47, Harry [employer] [employer] [employer] Counseling Intern  Interpretation Services: None  Location: In-person consult at [facility] of Visit: 61 minutes      Iesha Mckenna was referred to the [address] [place] [address] and Genetic Assessment Program due to his personal history of colon cancer and family history of colon and prostate cancer and colon polyps  He presents today to discuss the possibility of a hereditary cancer syndrome, options for genetic testing, and implications for him and his family  Cancer History and Treatment:     Personal History: Personal history of colon cancer at age 37 and 2-3 adenomas  Iesha Mckenna reports having a history of hereditary hemochromatosis     Colon, descending colon, partial colectomy 2021           Final Diagnosis   A  Descending colon, partial colectomy:  - Adenocarcinoma (1 9 cm), moderately differentiated, arising in adenocarcinoma with high grade dysplasia  - Tumor invades the submucosa (through the muscularis mucosa in to deeper one third of submucosa, pT1, Sm3)   - All margins are negative for tumor   - One  of twenty eight lymph nodes is positive for tumor ()        Colonoscopy 21:   Final Diagnosis   A  Proximal transverse colon polyp (cold snare polypectomy x2):      - Portions of tubular adenomas; negative for high-grade dysplasia  B  Distal transverse colon polyp (cold snare polypectomy):      - Tubular adenoma; negative for high-grade dysplasia      C  Sigmoid colon at 40 cm (biopsy):     - Adenocarcinoma, at least intramucosal         Addendum   PART C:     RESULTS OF IMMUNOHISTOCHEMICAL ANALYSIS FOR MISMATCH REPAIR PROTEIN LOSS     INTERPRETATION: NO LOSS OF NUCLEAR EXPRESSION OF MMR PROTEINS: LOW PROBABILITY OF MSI-H      RESULTS:  Antibody            Clone                 Description                                  Results  MLH1                 M1                     Mismatch repair protein                Intact nuclear expression  MSH2                [id number]        Mismatch repair protein                Intact nuclear expression  MSH6                40                      Mismatch repair protein                Intact nuclear expression  PMS2                 [id number]           Mismatch repair protein                Intact nuclear expression     Comment:   A negative control and a positive control for each antibody have been reviewed and accepted      GenPath Specimen ID: [id number]  Evaluator:  RASHAWN Mak MD     Screening Hx:     Skin:  Skin cancer screening: None     Prostate:  Prostate screening: None      Medical and Surgical History  Pertinent surgical history:   Past Surgical History:   Procedure Laterality Date    COLONOSCOPY      HEMICOLOECTOMY W/ ANASTOMOSIS N/A 5/5/2021    Procedure: ROBOTIC SIGMOID COLECTOMY, MOBILIZATION OF SPLENIC FLEXURE;  Surgeon: Zackary Bhatia MD;  Location:  MAIN OR;  Service: Surgical Oncology    TUNNELED VENOUS PORT PLACEMENT Left 6/1/2021    Procedure: INSERTION VENOUS PORT (PORT-A-CATH) Ultrasound guided Left Internal Jugular Vein Access;  Surgeon: Sarah Maxwell MD;  Location: [facility] MAIN OR;  Service: Surgical Oncology    WISDOM TOOTH EXTRACTION        Pertinent medical history:  Past Medical History:   Diagnosis Date    Cancer [facility])     colon cancer    Diabetes mellitus ([address] )     Hemochromatosis     Testosterone deficiency        Other History:  Height:   Ht Readings from Last 1 Encounters:   06/01/21 5' 10" (1 778 m)     Weight:   Wt Readings from Last 1 Encounters:   06/01/21 93 1 kg (205 lb 4 oz)     Relevant Family History   Patient reports no Ashkenazi Congregational ancestry       - Mother: Age 59 with no history of cancer; she has a history of 1-2 colon polyps   - Maternal Uncle: Lung cancer and exposure to agent orange  - Maternal Grandmother: Cancer NOS; history of tobacco use  - Maternal Grandfather: Cancer NOS; history of tobacco use      - Father: Age 77 with no history of cancer  - Paternal Uncle: Age 58 with colon cancer at age 58 and skin cancer   - Paternal Grandfather: Progressive multifocal leukoencephalopathy (PML)    Please refer to the scanned pedigree in the Media Tab for a complete family history     *All history is reported as provided by the patient; records are not available for review, except where indicated  Assessment:  We discussed sporadic, familial and hereditary cancer  We also discussed the many factors that influence our risk for cancer such as age, environmental exposures, lifestyle choices and family history  We reviewed the indications suggestive of a hereditary predisposition to cancer  Genetic testing is indicated for Melissa Mcallister based on the following criteria: Meets NCCN V1 2021 Testing Criteria for the Evaluation of Pham syndrome based on his personal history of colon cancer at age 37 along with his paternal uncle (second-degree relative) diagnosis of colon cancer  The risks, benefits, and limitations of genetic testing were reviewed with the patient, as well as genetic discrimination laws, and possible test results (positive, negative, variants of uncertain significance) and their clinical implications  If positive for a mutation, options for managing cancer risk including increased surveillance, chemoprevention, and in some cases prophylactic surgery were discussed  Melissa Mcallister was informed that if a hereditary cancer syndrome was identified in him, first degree relatives (parents, siblings, and children) have a chance of also inheriting the condition  Genetic testing would allow for predictive genetic testing in other relatives, who may also be at risk depending on their degree of relation       Plan: Patient decided to proceed with testing and provided consent  Summary:     Sample Collection:  A saliva kit was provided to the patient    Genetic Testing Preformed: CancerNext (36 genes): APC, ELAN, AXIN2 BARD1, BRCA1, BRCA2, BRIP1, BMPR1A, CDH1, CDK4, CDKN2A, CHEK2, DICER1, EPCAM, GREM1, HOXB13, MLH1, MSH2, MSH3, MSH6, MUTYH, NBN, NF1, NTHL1, PALB2, PMS2, POLD1, POLE, PTEN, RAD51C, RAD51D, RECQL SMAD4, SMARCA4, STK11, TP53    Results take approximately 2-3 weeks to complete once test is started  We will contact Corina Mayo once results are available  Additional recommendations for surveillance/medical management will be made pending genetic test results

## 2021-06-04 ENCOUNTER — HOSPITAL ENCOUNTER (OUTPATIENT)
Dept: INFUSION CENTER | Facility: CLINIC | Age: 41
Discharge: HOME | End: 2021-06-04
Payer: COMMERCIAL

## 2021-06-04 VITALS
BODY MASS INDEX: 29.54 KG/M2 | SYSTOLIC BLOOD PRESSURE: 121 MMHG | HEIGHT: 70 IN | TEMPERATURE: 97.7 F | HEART RATE: 92 BPM | RESPIRATION RATE: 18 BRPM | DIASTOLIC BLOOD PRESSURE: 81 MMHG | WEIGHT: 206.35 LBS

## 2021-06-04 DIAGNOSIS — C77.2 COLON CANCER METASTASIZED TO INTRA-ABDOMINAL LYMPH NODE (HCC): ICD-10-CM

## 2021-06-04 DIAGNOSIS — C18.9 COLON CANCER METASTASIZED TO INTRA-ABDOMINAL LYMPH NODE (HCC): ICD-10-CM

## 2021-06-04 DIAGNOSIS — Z95.828 PORT-A-CATH IN PLACE: ICD-10-CM

## 2021-06-04 DIAGNOSIS — C18.7 CANCER OF SIGMOID COLON (HCC): Primary | ICD-10-CM

## 2021-06-04 DIAGNOSIS — R52 PAIN AT APPLICATION SITE: Primary | ICD-10-CM

## 2021-06-04 PROCEDURE — 96367 TX/PROPH/DG ADDL SEQ IV INF: CPT

## 2021-06-04 PROCEDURE — 96415 CHEMO IV INFUSION ADDL HR: CPT

## 2021-06-04 PROCEDURE — 96413 CHEMO IV INFUSION 1 HR: CPT

## 2021-06-04 RX ORDER — LIDOCAINE AND PRILOCAINE 25; 25 MG/G; MG/G
CREAM TOPICAL AS NEEDED
Qty: 30 G | Refills: 0 | Status: SHIPPED | OUTPATIENT
Start: 2021-06-04 | End: 2021-09-20

## 2021-06-04 RX ORDER — DEXTROSE MONOHYDRATE 50 MG/ML
20 INJECTION, SOLUTION INTRAVENOUS ONCE
Status: COMPLETED | OUTPATIENT
Start: 2021-06-04 | End: 2021-06-04

## 2021-06-04 RX ORDER — SODIUM CHLORIDE 9 MG/ML
20 INJECTION, SOLUTION INTRAVENOUS ONCE AS NEEDED
Status: DISCONTINUED | OUTPATIENT
Start: 2021-06-04 | End: 2021-06-07 | Stop reason: HOSPADM

## 2021-06-04 RX ADMIN — OXALIPLATIN 275.6 MG: 5 INJECTION, SOLUTION INTRAVENOUS at 11:57

## 2021-06-04 RX ADMIN — DEXTROSE 20 ML/HR: 5 SOLUTION INTRAVENOUS at 11:38

## 2021-06-04 RX ADMIN — DEXAMETHASONE SODIUM PHOSPHATE: 10 INJECTION, SOLUTION INTRAMUSCULAR; INTRAVENOUS at 11:18

## 2021-06-04 RX ADMIN — SODIUM CHLORIDE 20 ML/HR: 0.9 INJECTION, SOLUTION INTRAVENOUS at 11:16

## 2021-06-04 NOTE — PLAN OF CARE
Problem: SAFETY ADULT  Goal: Patient will remain free of falls  Description: INTERVENTIONS:  - Assess patient frequently for physical needs  -  Identify cognitive and physical deficits and behaviors that affect risk of falls  -  [place] fall precautions as indicated by assessment   - Educate patient/family on patient safety including physical limitations  - Instruct patient to call for assistance with activity based on assessment  - Modify environment to reduce risk of injury  - Consider OT/PT consult to assist with strengthening/mobility  Outcome: Progressing     Problem: Knowledge Deficit  Goal: Patient/family/caregiver demonstrates understanding of disease process, treatment plan, medications, and discharge instructions  Description: Complete learning assessment and assess knowledge base    Interventions:  - Provide teaching at level of understanding  - Provide teaching via preferred learning methods  Outcome: Progressing

## 2021-06-04 NOTE — PROGRESS NOTES
Pt presents for Oxali  Offers no complaints  Pt tolerated tx with out incident  AVS declined  Pt requesting Emla cream office notified of same  RX sent to pharmacy per Daija Russell  Pt  Aware of next appt  Dc'd  in stable condition

## 2021-06-06 DIAGNOSIS — C18.7 CANCER OF SIGMOID COLON (HCC): ICD-10-CM

## 2021-06-07 ENCOUNTER — TELEPHONE (OUTPATIENT)
Dept: HEMATOLOGY ONCOLOGY | Facility: CLINIC | Age: 41
End: 2021-06-07

## 2021-06-07 DIAGNOSIS — R11.0 CHEMOTHERAPY-INDUCED NAUSEA: Primary | ICD-10-CM

## 2021-06-07 DIAGNOSIS — T45.1X5A CHEMOTHERAPY-INDUCED NAUSEA: Primary | ICD-10-CM

## 2021-06-07 DIAGNOSIS — R11.0 NAUSEA: Primary | ICD-10-CM

## 2021-06-07 DIAGNOSIS — C18.7 CANCER OF SIGMOID COLON (HCC): ICD-10-CM

## 2021-06-07 RX ORDER — OXYCODONE HYDROCHLORIDE 5 MG/1
5 TABLET ORAL EVERY 12 HOURS PRN
Qty: 20 TABLET | Refills: 0 | OUTPATIENT
Start: 2021-06-07 | End: 2021-06-17

## 2021-06-07 RX ORDER — ONDANSETRON 4 MG/1
4 TABLET, FILM COATED ORAL EVERY 8 HOURS PRN
Qty: 20 TABLET | Refills: 1 | Status: SHIPPED | OUTPATIENT
Start: 2021-06-07 | End: 2021-06-16 | Stop reason: SDUPTHER

## 2021-06-07 RX ORDER — OXYCODONE HYDROCHLORIDE 5 MG/1
5 TABLET ORAL EVERY 12 HOURS PRN
Qty: 20 TABLET | Refills: 0 | Status: SHIPPED | OUTPATIENT
Start: 2021-06-07 | End: 2021-06-16 | Stop reason: SDUPTHER

## 2021-06-07 RX ORDER — ONDANSETRON 4 MG/1
8 TABLET, FILM COATED ORAL EVERY 8 HOURS PRN
Qty: 20 TABLET | Refills: 0 | Status: SHIPPED | OUTPATIENT
Start: 2021-06-07 | End: 2021-06-16 | Stop reason: SDUPTHER

## 2021-06-07 NOTE — TELEPHONE ENCOUNTER
Patient called back  He is also requesting a Rx for antinausea medication  He states he had nausea for a few days after his first treatment  Nausea has improved    He would like to have something at home to take PRN  Will send to Rn to update  I will pend a Rx to Dr Gabbie Hisnon for Zofran

## 2021-06-07 NOTE — TELEPHONE ENCOUNTER
Patients mother calling with concerns about patients request for refill of oxycodone  Mother states patient was a previous opioid addict for 15 years  Per mother he has not used for 5 years  Mother states patient is asking for medication but is not having pain  He is working and carrying out his normal daily activities  He recevied #20 from Dr Roro Meyer on 5/28/21  She is unsure if he is taking these as prescribed  Mother wants to make sure Dr Murali Smith is aware    She states it is not good for patient to be prescribed these types of medications because of his history    I will forward this information to Rn to update Dr Murali Smith  Call back number for mother if needed - [phone]

## 2021-06-07 NOTE — TELEPHONE ENCOUNTER
Patient's mother Marcio Bowen is concerned about her son's use of Oxycodone  I did review earlier documentation about Oxycodone 6/7/21 at 3:47 PM with Marcio Bowen that Dr Chapo Villasenor RNs were notified to review with MD Marcio Bowen stated that patient needs a lot of support  I did offer to make a referral to Oncology Social Worker  She will discuss it with patient tonight and call us back tomorrow if patient is interested  Apolinar Dany phone number given to Marcio Bowen for herself to use as care taker support  [facility] that Zofran prescription was signed off  Will forward to Dr Chapo Villasenor RN as Myriam Vu

## 2021-06-07 NOTE — TELEPHONE ENCOUNTER
Dr Adrienne Retana was willing to refill oxycodone 5 mg Q12 hour  Zofran was also pended to Dr Adrienne Retana for signature  Called and notified patient, he aggreed to the plan

## 2021-06-07 NOTE — TELEPHONE ENCOUNTER
Mother Caren called and advised Zofran prescription is pending Dr Sharmon Shone  IM sent to Dr Raquel Liz RNs to have hime sign

## 2021-06-07 NOTE — TELEPHONE ENCOUNTER
Medication Refill     Who is Calling  Patient   Medication oxyCODONE (ROXICODONE)    Zofran   How many pills left    Preferred Pharmacy / Address [facility]    Call back number  [phone]   Relevant Information

## 2021-06-07 NOTE — TELEPHONE ENCOUNTER
His mother called and said that Mariah Dickens can take extra strength Tylenol     He was clean for 5 yrs  Went for 1st chemo treatment on Fri  He's self medicating because of the cancer       He was also trying to get oxycodone from cancer

## 2021-06-07 NOTE — TELEPHONE ENCOUNTER
Patient is calling requesting PRN Zofran to have at home while on treatment  Patient is requesting a refill on his Oxycodone  Dr Nelson Kelly was not the original prescribing doctor  Oxycodone is for port site pain  Reviewed Oxaliplatin precautions with patient  Patient verbalized understanding of above      Will forward request to Dr Hieu Pompa RN to review with MD     Pharmacy:  [address] [place] [address], [place] [address] [address]   Phone:  [phone]

## 2021-06-08 ENCOUNTER — TELEPHONE (OUTPATIENT)
Dept: HEMATOLOGY ONCOLOGY | Facility: CLINIC | Age: 41
End: 2021-06-08

## 2021-06-08 ENCOUNTER — TELEPHONE (OUTPATIENT)
Dept: NUTRITION | Facility: CLINIC | Age: 41
End: 2021-06-08

## 2021-06-08 DIAGNOSIS — R52 PAIN: ICD-10-CM

## 2021-06-08 DIAGNOSIS — C18.9 COLON CANCER METASTASIZED TO INTRA-ABDOMINAL LYMPH NODE (HCC): Primary | ICD-10-CM

## 2021-06-08 DIAGNOSIS — C77.2 COLON CANCER METASTASIZED TO INTRA-ABDOMINAL LYMPH NODE (HCC): Primary | ICD-10-CM

## 2021-06-08 NOTE — TELEPHONE ENCOUNTER
Reached out to Ezra Miranda to touch base in regards to his nutrition  He states that he has been experiencing decreased appetite, nausea, some vomiting  Again reviewed the same medical nutrition therapy for nausea, vomiting, and decreased appetite as was reviewed with Marcio Bowen (need note below for specifics)  Stebbins Ruben is appreciative of call and information, he will be looking forward to information sent in the mail  He is agreeable to meeting with this RD during his infusion on 6/25/21  Appointment scheduled

## 2021-06-08 NOTE — TELEPHONE ENCOUNTER
Call from patient regarding status of oxycodone  Escribe status:    E-Prescribing Status: Receipt confirmed by pharmacy (6/7/2021  1:04 PM EDT)    Call placed to Bryon Cox to pharmacy technician at [facility]  Oxycodone script was not received  Oxycodone will need to be resent    Will send to Dr Lanette Gosselin RNs    Patient aware of plan

## 2021-06-08 NOTE — TELEPHONE ENCOUNTER
Patients mother calling to have referral placed for social work to reach out to the patient  Jo Witt spoke with the patient last evening and he states he was very interested in speaking with someone  I will place the referral - Mother states the [facility] [employer] will be best for the patient

## 2021-06-08 NOTE — TELEPHONE ENCOUNTER
Contacted Betito to discuss his nutrition after receiving notification by Jenny Katz RN on 6/8/21 that pt is appropriate for oncology nutrition care (reason for referral: pts mother requesting nutrition referral )  Spoke to GIANA [employer] mother Esteban Meraz who reports that Matthew Parry has been struggling with nausea, vomiting, diarrhea, and decreased appetite  Provided nutrition therapy for nausea/vomiting/diarrhea, suggestions include:   Eat 5-6 smaller meals throughout the day instead of 3 large meals   Sip on calorie containing fluids throughout the day   o Fruit juice, Gatorade, and other clear liquids (see page 41 of Eating Hints Book for other examples)  o Caution with carbonated beverages   Eat bland foods and foods easy on the stomach such as vanilla yogurt, toast, crackers, oatmeal, pasta   Consume foods and drinks at room temperature  Try to avoid eating/drinking anything too hot or cold   Try to avoid foods with strong odors   Suck on tart candies such as lemon drops to help relieve nausea   Jyoti can help ease nausea   Foods to avoid:  o High sugar foods such as soda, candies, desserts  o Greasy/fried foods  o Gas producing foods such as broccoli, cabbage, [place] sprouts, raw fruits/vegetables, beans  o Caution with diary products unless they are low lactose or lactose free    o Alcohol  o Spicy foods   o Avoid eating your favorite foods when you feel nauseous so you don't develop a dislike of those foods   Refer to pages 21-22 for nausea, page 15 for diarrhea, and page 31 for vomiting in [address] for additional suggestions      Also iscussed ways to increase calorie and protein intake when Matthew Parry is not experiencing the above symptoms, suggestions include: eating smaller/more frequent meals, including high calorie foods in diet (cheese, whole milk, peanut butter), including high protein foods in diet (eggs, chicken, fish, beans/legumes, nuts/nut butters), eating when feeling most hungry, sipping on calorie containing beverages, utilizing oral nutrition supplements, and keeping non perishable foods nearby to snack on  Carmen Cormier is appreciative of suggestions and requests that this RD also reach out to Becca Aguilar to review this information  She has RD contact info and will reach out with future nutrition related questions/concerns  Materials Provided: Eating Hints Book, Increasing Calorie and Protein handout, Oncology Nutrition Milkshake and Smoothie Recipes, Nausea and Vomiting handout, Diarrhea handout - mailed to [facility] home where Becca Aguilar is currently residing

## 2021-06-08 NOTE — TELEPHONE ENCOUNTER
Patient's mother Torsten Piña is calling to request a referral to the Oncology Dietitian  Referral placed  Patient's mother is requesting that they call her  Referral made to Layne Wright per Sue's request     Will notify Dr Nevin Flynn RNs as an [facility]

## 2021-06-08 NOTE — TELEPHONE ENCOUNTER
Called patient and reviewed that Dr Antonio Billy felt this prescription was being given too close to his previous dosage  Also reviewed with the patient yesterday port placement should not be causing this much pain  If it is, we would like to see him in the office to evaluate it  Pt refuses office follow up apt  Pt stated is not actually pain pain, its uncomfortable  He then proceeded to say, its not actually his port its more his shoulder  Stated again it should not be causing pain to that area  Pt stated "It's not an addiction thing but I want to feel comfortable at night"  Pt presented frustrated that he didn't get any pain medication for his port placement  Reviewed with him that is not in our normal practice and OTC medication should relieve the pain  He did state he was taking oxycodone prescribed by his PCP two pills in the morning and two at night  After reviewing his chart this is not how it was prescribed  Again offered the patient a f/u apt or consult with our palliative care team to help manage symptoms  Pt then stated the pain isn't at his port site where the needle goes, its actually in his neck and hurts when he sneezes  Pt again declined a f/u apt but was willing to see palliative care, I will place consult  Advised patient he can be seen in the office if needed, he should just give us a call

## 2021-06-11 ENCOUNTER — PATIENT OUTREACH (OUTPATIENT)
Dept: CASE MANAGEMENT | Facility: HOSPITAL | Age: 41
End: 2021-06-11

## 2021-06-11 NOTE — PROGRESS NOTES
MSW met with pt's mother Rey Faustin this morning after she called looking for advice and support  Pt is in recovery from various addictions and has moved home following his last rehab stay  He does not have custody of his two children, who live in [place] with his ex wife  Pt is working full time and has continued to do so since starting treatment, but Rey Fausitn shared that he is struggling with nausea, vomiting, and fatigue  We did review that this is entirely normal and expected for someone who is doing chemotherapy  She is also trying to be mindful of his sobriety while managing his pain  She shared that she feels awkward keeping count of his medications or discussing his past with pt's physicians, as she feels that pt should be sharing this information  She says that pt is slowly starting to reconnect with old friends in this area and is occasionally attending [place] [id number] meetings  He does have a girlfriend as well  She is unsure if his fatigue is more related to his treatments or to depression, and would like advice on how to best support him  I suggested that she encourage him to regularly attend meetings and establish with a sponsor, however she does need to take into consideration that his symptoms are very normal for someone in cancer treatment, especially someone who is new to cancer treatment  We did discuss some strategies for eating and nutrition, FARNAZ Garcia is already involved as well  I did also suggest a consult with palliative care to help manage his symptoms or pain as needed moving forward  Pt will be back to infusion in 2 weeks for his next treatment, and I will plan to meet him then and assess for any needs  Pt's mother has my office and cell phone contact information and agrees to reach out should she need support or guidance moving forward  No other needs at this time

## 2021-06-14 ENCOUNTER — TELEPHONE (OUTPATIENT)
Dept: HEMATOLOGY ONCOLOGY | Facility: CLINIC | Age: 41
End: 2021-06-14

## 2021-06-14 DIAGNOSIS — M25.512 ACUTE PAIN OF LEFT SHOULDER: Primary | ICD-10-CM

## 2021-06-14 DIAGNOSIS — R52 PAIN: ICD-10-CM

## 2021-06-14 NOTE — TELEPHONE ENCOUNTER
Reviewed over Zofran instructions with patient  Patient verbalized understanding of above  Patient is questioning if he can use CBD oil on his left shoulder to help with pain?     Will forward to Dr Leny Abel RNs to review with MD

## 2021-06-14 NOTE — TELEPHONE ENCOUNTER
Reviewed information with Dr Gabriela Viramontes, per him he would like the patient to have a chest x ray completed and follow up with palliative care  Dr Gabriela Viramontes would like to rule out any abnormalities with the port or surrounding area as port placement should not cause this much discomfort  Called patient to discuss symptoms  Asked patient where his pain is located, which he stated "I'm not sure exactly like above my port and up in my armpit like underneath it " Pt denied any redness, warmth or swelling to the area  Asked patient what his pain level was on a scale of 0 to 10, which he stated "If I really get it going about a 5 or 6 but I don't know it's just like a discomfort " Reviewed with the patient that Dr Gabriela Viramontes wants to proceed with a chest x ray to ensure there isn't anything else going on  Notified with him that it does not require an apt and he can go whenever its convenient to him  Patient was given palliative care phone number to follow up with a consult apt  I did request our check out personal to reach out to them as well, since referral was placed on 6/08/21  Pt stated he thinks it might be from maybe the position he was in during the port placement, he doesn't know for sure but he does have a history of shoulder issues  Stated we will proceed with the x ray, if that warrants further f/u we can order further testing  Pt verbalized understanding and was agreeable the above plan

## 2021-06-14 NOTE — TELEPHONE ENCOUNTER
Task received from [facility](logged under 6/2/21 epic note)  "Pt called back regarding pain from port placement, states that he was waiting on a call back(Suki Morgan)  Patient is complaining of left shoulder and chest wall pain with movement  Patient reports left axilla and chest wall by axilla is tender to touch  Denies redness at port site  Patient stated that OTC Ibuprofen is not effective in controlling his pain  Patient stated that he does not have any Oxycodone left  Patient stated "It's not an addiction thing but I want to feel comfortable at night"    Patient has not yet been contacted by Palliative Care      Will forward to Dr Tanner Madison RNs to review with Dr Mayra Barron  Detail Level: Zone Detail Level: Detailed Detail Level: Generalized Detail Level: Simple

## 2021-06-16 ENCOUNTER — CONSULT (OUTPATIENT)
Dept: PALLIATIVE MEDICINE | Facility: CLINIC | Age: 41
End: 2021-06-16
Payer: COMMERCIAL

## 2021-06-16 VITALS
OXYGEN SATURATION: 98 % | DIASTOLIC BLOOD PRESSURE: 80 MMHG | HEART RATE: 92 BPM | SYSTOLIC BLOOD PRESSURE: 118 MMHG | WEIGHT: 204.59 LBS | TEMPERATURE: 98.1 F | BODY MASS INDEX: 29.29 KG/M2 | HEIGHT: 70 IN | RESPIRATION RATE: 18 BRPM

## 2021-06-16 DIAGNOSIS — C77.2 COLON CANCER METASTASIZED TO INTRA-ABDOMINAL LYMPH NODE (HCC): ICD-10-CM

## 2021-06-16 DIAGNOSIS — C18.7 CANCER OF SIGMOID COLON (HCC): Primary | ICD-10-CM

## 2021-06-16 DIAGNOSIS — C18.9 COLON CANCER METASTASIZED TO INTRA-ABDOMINAL LYMPH NODE (HCC): ICD-10-CM

## 2021-06-16 DIAGNOSIS — E83.110 HEREDITARY HEMOCHROMATOSIS (HCC): Chronic | ICD-10-CM

## 2021-06-16 DIAGNOSIS — D75.1 POLYCYTHEMIA: ICD-10-CM

## 2021-06-16 DIAGNOSIS — R52 PAIN: ICD-10-CM

## 2021-06-16 DIAGNOSIS — E11.9 DIABETES MELLITUS WITHOUT COMPLICATION (HCC): ICD-10-CM

## 2021-06-16 PROCEDURE — 99244 OFF/OP CNSLTJ NEW/EST MOD 40: CPT | Performed by: NURSE PRACTITIONER

## 2021-06-16 PROCEDURE — 3008F BODY MASS INDEX DOCD: CPT | Performed by: INTERNAL MEDICINE

## 2021-06-16 RX ORDER — ACETAMINOPHEN 325 MG/1
1000 TABLET ORAL EVERY 6 HOURS PRN
Qty: 60 TABLET | Refills: 0 | Status: SHIPPED | OUTPATIENT
Start: 2021-06-16 | End: 2021-09-20

## 2021-06-16 RX ORDER — GABAPENTIN 100 MG/1
200 CAPSULE ORAL 2 TIMES DAILY
Qty: 120 CAPSULE | Refills: 0 | Status: SHIPPED | OUTPATIENT
Start: 2021-06-16 | End: 2021-06-30

## 2021-06-16 RX ORDER — SENNA PLUS 8.6 MG/1
1 TABLET ORAL DAILY
Qty: 30 TABLET | Refills: 1 | Status: SHIPPED | OUTPATIENT
Start: 2021-06-16 | End: 2022-07-13 | Stop reason: ALTCHOICE

## 2021-06-16 RX ORDER — OXYCODONE HYDROCHLORIDE 5 MG/1
TABLET ORAL
Qty: 60 TABLET | Refills: 0 | Status: SHIPPED | OUTPATIENT
Start: 2021-06-16 | End: 2021-06-28 | Stop reason: SDUPTHER

## 2021-06-16 NOTE — PATIENT INSTRUCTIONS
-Tylenol 1000mg by mouth up to every 6 hours as needed for mild/moderate pain  - Stop Ibuprofen  - Oxycodone 5mg for moderate pain or 10mg for severe pain as needed for moderate or severe pain  - Moderate work outs are ok  - Senna 1 tab by mouth daily  - Gabapentin 200mg by mouth every morning and evening

## 2021-06-16 NOTE — PROGRESS NOTES
Outpatient Consultation - Palliative and Supportive Care   Felicia Tsang 37 y o  male [id number]    Assessment & Plan  Problem List Items Addressed This Visit        Digestive    Cancer of sigmoid colon [facility]) - Primary    Relevant Medications    gabapentin (NEURONTIN) 100 mg capsule    acetaminophen (TYLENOL) 325 mg tablet    oxyCODONE (ROXICODONE) 5 mg immediate release tablet    senna (SENOKOT) 8 6 MG tablet    Colon cancer metastasized to intra-abdominal lymph node (HCC)       Endocrine    Diabetes mellitus without complication (HCC)       Other    Hereditary hemochromatosis (HCC) (Chronic)    Polycythemia      Other Visit Diagnoses     Pain            - Counseling on health screening and disease prevention, COVID-19 specific (CPT V65 49)  - Stop Ibuprofen  - May take Tylenol 1000mg four times daily by mouth as needed for mild/moderate pain  - Oxycodone 5 or 10mg PO as needed for moderate/severe pain up to every 6 hours  - Senna, 1 tab daily  - May continue to work out moderately however monitor weight and increase protein      Medications adjusted this encounter:  Requested Prescriptions     Signed Prescriptions Disp Refills    gabapentin (NEURONTIN) 100 mg capsule 120 capsule 0     Sig: Take 2 capsules (200 mg total) by mouth 2 (two) times a day for 5 days    acetaminophen (TYLENOL) 325 mg tablet 60 tablet 0     Sig: Take 3 tablets (975 mg total) by mouth every 6 (six) hours as needed for mild pain or moderate pain    oxyCODONE (ROXICODONE) 5 mg immediate release tablet 60 tablet 0     Sig: Take 1 tablet by mouth for moderate pain, OR 2 tablets by mouth for severe pain, as needed up to every 6 hours   senna (SENOKOT) 8 6 MG tablet 30 tablet 1     Sig: Take 1 tablet (8 6 mg total) by mouth daily     No orders of the defined types were placed in this encounter      Medications Discontinued During This Encounter   Medication Reason    ondansetron (ZOFRAN) 4 mg tablet Duplicate order    ondansetron (ZOFRAN) 4 mg tablet Duplicate order    ibuprofen (MOTRIN) 600 mg tablet Therapy completed    acetaminophen (TYLENOL) 325 mg tablet     gabapentin (NEURONTIN) 300 mg capsule Reorder    oxyCODONE (ROXICODONE) 5 mg immediate release tablet Reorder       PPS: 90      Cathie Frost was seen today for symptoms and planning cares related to above illnesses  Above orders were sent electronically, or provided in hardcopy in clinic  I have reviewed the patient's controlled substance dispensing history in the Prescription Drug Monitoring Program in compliance with the [facility] regulations before prescribing any controlled substances  We appreciate the referral, and wish for him to continue to follow with us  If there are questions or concerns, please contact us through our clinic/answering service 24 hours a day, seven days a week  SHABANA Chong  [facility] Palliative and Supportive [facility]          Visit Information    Accompanied By: No one    Source of History: Self, Medical record    History Limitations: None      History of Present Illness      Cathie Frost is a 37 y o  male who presents as a referral from Dr Shari Santiago of Oncology for primary palliative diagnosis of stage III colon adenocarcinoma  Consultation is requested for: symptom management, goal of care assessment and decisional support, disease process education and discussion of prognosis, advance care planning, emotional support in the setting of serious illness  Yaimlet Stevenson was diagnosed in May of 2021 with adenocarcinoma after undergoing hemicolectomy  On CT imaging, chest and liver nodules were seen  He has a further PMH of hemochromatosis diagnosed in 2020 treated with intermittent phlebotomy, DM2 controlled well with Jardiance and alcohol abstinence, and polycythemia which is thought to be related to a previous testosterone supplement  He is currently on chemo therapy with CAPOX    He has been having nausea related to the chemotherapy along with fatigue and poor appetite  He relates Zofran to his tiredness but has been taking it when the nausea gets severe  He reports pain distal to his port in his left shoulder  He is scheduled to get an Xray of the area in the near future and will follow up with Dr Maradiaga Foot office for the results  He has not been using Oxycodone and was instead taking Ibuprofen for pain  We discussed risks of Ibuprofen and all NSAIDS  He will continue to use OTC Tylenol as needed but is agreeable to restarting Oxycodone for pain management  Discussed safety precautions related to opiate use in detail  He understands not to operate heavy machinery while using this medication  Jose M Mckeon has been having some tingling "pins and needles" feelings in his fingers, especially when touching cold items  He was told that this is related to his chemotherapy treatments  He has completed his post-operative course of gabapentin, however is agreeable to restarting at a lower dose to help both his pain and neuropathy  He has 2 children, ages 6 and 9  He is  and lives with his parents  His mother, Asaf Parker, is a physical therapist and supports him  If he could not make his own medical decisions, he would like his mother to make decisions for him  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  He is right handed  He is scheduled to speak to the nutritionist on his next chemo appointment and will address work-out options and macronutrient needs  Jose M Mckeon is encouraged to call our office with any needs, including uncontrolled or worsening symptoms  We will continue to follow with him during his cancer treatments and support him      Pertinent Palliative Care Domains    Physical Symptoms:ambulates    Psychological Symptoms:mild anxiety    Social Aspects: support system Wife, 2 children    Spiritual Aspects: Protestant/spiritual  Tenriism    Code Status: Full  Advance Directive and Living Will:    Five Wishes document pending completion    Historical Data  Past Medical History:   Diagnosis Date    Cancer ([facility] [address] )     colon cancer    Diabetes mellitus ([facility] [address] )     Hemochromatosis     Testosterone deficiency      Past Surgical History:   Procedure Laterality Date    COLONOSCOPY      HEMICOLOECTOMY W/ ANASTOMOSIS N/A 5/5/2021    Procedure: ROBOTIC SIGMOID COLECTOMY, MOBILIZATION OF SPLENIC FLEXURE;  Surgeon: Jacques Evangelista MD;  Location:  MAIN OR;  Service: Surgical Oncology    TUNNELED VENOUS PORT PLACEMENT Left 6/1/2021    Procedure: INSERTION VENOUS PORT (PORT-A-CATH) Ultrasound guided Left Internal Jugular Vein Access;  Surgeon: Ray Esposito MD;  Location: [facility] MAIN OR;  Service: Surgical Oncology    WISDOM TOOTH EXTRACTION       Social History     Socioeconomic History    Marital status:      Spouse name: Not on file    Number of children: Not on file    Years of education: Not on file    Highest education level: Not on file   Occupational History    Not on file   Tobacco Use    Smoking status: Former Smoker     Packs/day: 0 25     Years: 10 00     Pack years: 2 50     Types: Cigarettes     Quit date: 5/28/2011     Years since quitting: 10 0    Smokeless tobacco: Never Used   Vaping Use    Vaping Use: Never used   Substance and Sexual Activity    Alcohol use: Not Currently    Drug use: Not Currently    Sexual activity: Yes     Partners: Female     Birth control/protection: Condom Male   Other Topics Concern    Not on file   Social History Narrative    Not on file     Social Determinants of Health     Financial Resource Strain:     Difficulty of Paying Living Expenses:    Food Insecurity:     Worried About Running Out of Food in the Last Year:     Ran Out of Food in the Last Year:    Transportation Needs:     Lack of Transportation (Medical):      Lack of Transportation (Non-Medical):    Physical Activity: Sufficiently Active    Days of Exercise per Week: 5 days    Minutes of Exercise per Session: 60 min   Stress: No Stress Concern Present    Feeling of Stress : Only a little   Social Connections:     Frequency of Communication with Friends and Family:     Frequency of Social Gatherings with Friends and Family:     Attends Mormonism Services:     Active Member of Clubs or Organizations:     Attends Club or Organization Meetings:     Marital Status:    Intimate Partner Violence:     Fear of Current or Ex-Partner:     Emotionally Abused:     Physically Abused:     Sexually Abused:      Family History   Problem Relation Age of Onset    Colon polyps Mother     No Known Problems Father     Colon polyps Maternal Grandmother     Prostate cancer Maternal Grandfather     Lung cancer Paternal Grandmother     Diabetes Paternal Grandmother     Prostate cancer Paternal Grandfather     Heart disease Paternal Grandfather     Kidney failure Paternal Grandfather     Diabetes Family     Hypertension Family      No Known Allergies  Current Outpatient Medications   Medication Sig Dispense Refill    capecitabine (XELODA) 500 MG tablet Take 3 tablets (1,500 mg total) by mouth 2 (two) times a day For 2 weeks on then 1 week off 84 tablet 3    Dulaglutide 1 5 MG/0 5ML SOPN Inject 0 5 mL (1 5 mg total) under the skin once a week (Patient taking differently: Inject 1 5 mg under the skin once a week ) 6 mL 1    Empagliflozin (Jardiance) 25 MG TABS Take 1 tablet (25 mg total) by mouth daily 90 tablet 0    acetaminophen (TYLENOL) 325 mg tablet Take 3 tablets (975 mg total) by mouth every 6 (six) hours as needed for mild pain or moderate pain 60 tablet 0    gabapentin (NEURONTIN) 100 mg capsule Take 2 capsules (200 mg total) by mouth 2 (two) times a day for 5 days 120 capsule 0    lidocaine-prilocaine (EMLA) cream Apply topically as needed for mild pain On day of chemotherapy treatment   30 g 0    oxyCODONE (ROXICODONE) 5 mg immediate release tablet Take 1 tablet by mouth for moderate pain, OR 2 tablets by mouth for severe pain, as needed up to every 6 hours  60 tablet 0    senna (SENOKOT) 8 6 MG tablet Take 1 tablet (8 6 mg total) by mouth daily 30 tablet 1     No current facility-administered medications for this visit  Review of Systems   Constitutional: Positive for decreased appetite, malaise/fatigue and weight loss  HENT: Negative for hoarse voice and sore throat  Eyes: Negative for blurred vision and visual disturbance  Cardiovascular: Negative for chest pain, dyspnea on exertion and leg swelling  Respiratory: Negative for cough, shortness of breath, sleep disturbances due to breathing and wheezing  Skin: Negative for dry skin and rash  Musculoskeletal: Positive for myalgias  Negative for falls and muscle weakness  Gastrointestinal: Positive for anorexia, heartburn and nausea  Negative for abdominal pain, constipation, diarrhea, dysphagia, hematemesis, hematochezia and vomiting  Genitourinary: Negative for bladder incontinence, dysuria, hematuria and pelvic pain  Neurological: Positive for excessive daytime sleepiness and paresthesias  Negative for difficulty with concentration, disturbances in coordination and weakness  Psychiatric/Behavioral: Negative for depression and substance abuse  Vital Signs    /80 (BP Location: Right arm, Patient Position: Sitting, Cuff Size: Standard)   Pulse 92   Temp 98 1 °F (36 7 °C) (Temporal)   Resp 18   Ht 5' 10" (1 778 m)   Wt 92 8 kg (204 lb 9 4 oz)   SpO2 98%   BMI 29 36 kg/m²     Physical Exam and Objective Data  Physical Exam  Constitutional:       Appearance: Normal appearance  HENT:      Head: Normocephalic  Right Ear: External ear normal       Left Ear: External ear normal       Nose: Nose normal       Mouth/Throat:      Mouth: Mucous membranes are moist       Pharynx: Oropharynx is clear     Eyes:      Pupils: Pupils are equal, round, and reactive to light  Cardiovascular:      Rate and Rhythm: Regular rhythm  Tachycardia present  Pulses: Normal pulses  Pulmonary:      Effort: Pulmonary effort is normal  No respiratory distress  Abdominal:      General: There is no distension  Palpations: Abdomen is soft  Musculoskeletal:         General: Normal range of motion  Cervical back: Normal range of motion  Skin:     General: Skin is warm  Capillary Refill: Capillary refill takes less than 2 seconds  Neurological:      General: No focal deficit present  Mental Status: He is alert and oriented to person, place, and time  Psychiatric:         Mood and Affect: Mood normal          Behavior: Behavior normal          Thought Content: Thought content normal          Judgment: Judgment normal            45 minutes was spent face to face with Antwon Patterson with greater than 50% of the time spent in counseling or coordination of care including discussions of prognosis of diagnosis, risks and benefits of treatment, instructions for disease self management, treatment instructions, follow up requirements, risk factors and risk reduction of disease, patient and family counseling/involvement in care and compliance with treatment regimen  All of the patient's questions were answered during this discussion

## 2021-06-17 ENCOUNTER — TELEPHONE (OUTPATIENT)
Dept: GASTROENTEROLOGY | Facility: CLINIC | Age: 41
End: 2021-06-17

## 2021-06-17 NOTE — TELEPHONE ENCOUNTER
Patient called stating he has switched insurances and now his Apoorva Aquiles is over $300, the pharmacist told him to call our office to see if we had any coupons  Dolores Fregoso gave the coupons they have and patient received

## 2021-06-21 ENCOUNTER — TELEPHONE (OUTPATIENT)
Dept: HEMATOLOGY ONCOLOGY | Facility: CLINIC | Age: 41
End: 2021-06-21

## 2021-06-21 NOTE — TELEPHONE ENCOUNTER
Transferring Call to Another Office   Who is Calling  Patient    Wants to speak with Genetic counseling    Transferred to [facility]

## 2021-06-22 ENCOUNTER — APPOINTMENT (OUTPATIENT)
Dept: LAB | Facility: HOSPITAL | Age: 41
End: 2021-06-22
Payer: COMMERCIAL

## 2021-06-22 ENCOUNTER — HOSPITAL ENCOUNTER (OUTPATIENT)
Dept: RADIOLOGY | Facility: HOSPITAL | Age: 41
Discharge: HOME | End: 2021-06-22
Attending: INTERNAL MEDICINE
Payer: COMMERCIAL

## 2021-06-22 DIAGNOSIS — R52 PAIN: ICD-10-CM

## 2021-06-22 DIAGNOSIS — Z86.2 HISTORY OF POLYCYTHEMIA: ICD-10-CM

## 2021-06-22 DIAGNOSIS — C77.2 COLON CANCER METASTASIZED TO INTRA-ABDOMINAL LYMPH NODE (HCC): ICD-10-CM

## 2021-06-22 DIAGNOSIS — C18.9 COLON CANCER METASTASIZED TO INTRA-ABDOMINAL LYMPH NODE (HCC): ICD-10-CM

## 2021-06-22 DIAGNOSIS — M25.512 ACUTE PAIN OF LEFT SHOULDER: ICD-10-CM

## 2021-06-22 DIAGNOSIS — E83.110 HEREDITARY HEMOCHROMATOSIS (HCC): ICD-10-CM

## 2021-06-22 DIAGNOSIS — C18.7 CANCER OF SIGMOID COLON (HCC): ICD-10-CM

## 2021-06-22 LAB
ALBUMIN SERPL BCP-MCNC: 4.2 G/DL (ref 3.5–5)
ALP SERPL-CCNC: 81 U/L (ref 46–116)
ALT SERPL W P-5'-P-CCNC: 31 U/L (ref 12–78)
ANION GAP SERPL CALCULATED.3IONS-SCNC: 6 MMOL/L (ref 4–13)
AST SERPL W P-5'-P-CCNC: 28 U/L (ref 5–45)
BASOPHILS # BLD AUTO: 0.03 THOUSANDS/ΜL (ref 0–0.1)
BASOPHILS NFR BLD AUTO: 0 % (ref 0–1)
BILIRUB SERPL-MCNC: 0.74 MG/DL (ref 0.2–1)
BUN SERPL-MCNC: 15 MG/DL (ref 5–25)
CALCIUM SERPL-MCNC: 9.3 MG/DL (ref 8.3–10.1)
CHLORIDE SERPL-SCNC: 106 MMOL/L (ref 100–108)
CO2 SERPL-SCNC: 28 MMOL/L (ref 21–32)
CREAT SERPL-MCNC: 1.07 MG/DL (ref 0.6–1.3)
EOSINOPHIL # BLD AUTO: 0.22 THOUSAND/ΜL (ref 0–0.61)
EOSINOPHIL NFR BLD AUTO: 3 % (ref 0–6)
ERYTHROCYTE [DISTWIDTH] IN BLOOD BY AUTOMATED COUNT: 15.4 % (ref 11.6–15.1)
FERRITIN SERPL-MCNC: 33 NG/ML (ref 8–388)
GFR SERPL CREATININE-BSD FRML MDRD: 86 ML/MIN/1.73SQ M
GLUCOSE P FAST SERPL-MCNC: 126 MG/DL (ref 65–99)
HCT VFR BLD AUTO: 41.1 % (ref 36.5–49.3)
HGB BLD-MCNC: 13.6 G/DL (ref 12–17)
IMM GRANULOCYTES # BLD AUTO: 0.02 THOUSAND/UL (ref 0–0.2)
IMM GRANULOCYTES NFR BLD AUTO: 0 % (ref 0–2)
IRON SATN MFR SERPL: 10 %
IRON SERPL-MCNC: 45 UG/DL (ref 65–175)
LYMPHOCYTES # BLD AUTO: 2.57 THOUSANDS/ΜL (ref 0.6–4.47)
LYMPHOCYTES NFR BLD AUTO: 37 % (ref 14–44)
MCH RBC QN AUTO: 28.5 PG (ref 26.8–34.3)
MCHC RBC AUTO-ENTMCNC: 33.1 G/DL (ref 31.4–37.4)
MCV RBC AUTO: 86 FL (ref 82–98)
MONOCYTES # BLD AUTO: 0.56 THOUSAND/ΜL (ref 0.17–1.22)
MONOCYTES NFR BLD AUTO: 8 % (ref 4–12)
NEUTROPHILS # BLD AUTO: 3.63 THOUSANDS/ΜL (ref 1.85–7.62)
NEUTS SEG NFR BLD AUTO: 52 % (ref 43–75)
NRBC BLD AUTO-RTO: 0 /100 WBCS
PLATELET # BLD AUTO: 310 THOUSANDS/UL (ref 149–390)
PMV BLD AUTO: 10.5 FL (ref 8.9–12.7)
POTASSIUM SERPL-SCNC: 4.1 MMOL/L (ref 3.5–5.3)
PROT SERPL-MCNC: 7.3 G/DL (ref 6.4–8.2)
RBC # BLD AUTO: 4.77 MILLION/UL (ref 3.88–5.62)
SODIUM SERPL-SCNC: 140 MMOL/L (ref 136–145)
TIBC SERPL-MCNC: 465 UG/DL (ref 250–450)
WBC # BLD AUTO: 7.03 THOUSAND/UL (ref 4.31–10.16)

## 2021-06-22 PROCEDURE — 80053 COMPREHEN METABOLIC PANEL: CPT

## 2021-06-22 PROCEDURE — 71046 X-RAY EXAM CHEST 2 VIEWS: CPT

## 2021-06-22 PROCEDURE — 82728 ASSAY OF FERRITIN: CPT

## 2021-06-22 PROCEDURE — 36415 COLL VENOUS BLD VENIPUNCTURE: CPT

## 2021-06-22 PROCEDURE — 83540 ASSAY OF IRON: CPT

## 2021-06-22 PROCEDURE — 83550 IRON BINDING TEST: CPT

## 2021-06-22 PROCEDURE — 85025 COMPLETE CBC W/AUTO DIFF WBC: CPT

## 2021-06-23 ENCOUNTER — TELEPHONE (OUTPATIENT)
Dept: GENETICS | Facility: CLINIC | Age: 41
End: 2021-06-23

## 2021-06-23 NOTE — TELEPHONE ENCOUNTER
Post-Test Genetic Counseling Consult Note  Today I spoke with Gaudencio Dillard over the phone to review the results of his genetic test for hereditary cancer  We met previously on 6/3/2021 for pre-test counseling  SUMMARY:    Test(s): CancerNext (36 genes): APC, ELAN, AXIN2 BARD1, BRCA1, BRCA2, BRIP1, BMPR1A, CDH1, CDK4, CDKN2A, CHEK2, DICER1, EPCAM, GREM1, HOXB13, MLH1, MSH2, MSH3, MSH6, MUTYH, NBN, NF1, NTHL1, PALB2, PMS2, POLD1, POLE, PTEN, RAD51C, RAD51D, RECQL SMAD4, SMARCA4, STK11, TP53    Result: Negative - No Clinically Significant Variants Detected      Assessment:   A negative result significantly reduces the likelihood that Gaudencio Dillard has a hereditary cancer syndrome  However, this testing is unable to completely rule out the presence of hereditary cancer  It remains possible that:  - There is a variant in an area of a gene which was not tested or there is a variant not detectable due to technical limitations of this test      - There is a variant in another gene that was not included in this test or in a gene not known to be linked to cancer or tumors  - A family member has a genetic variant that the patient did not inherit  - The cancer in the family is sporadic and is related to non-hereditary factors  Risks and Testing for Family Members:    Despite a negative result, Betito's first-degree relatives may be at increased risk for colon cancer based on the family history  Specifically Betito's children and brother may consider beginning colonoscopy screening earlier and more frequent based on Betito's history of colon cancer at age 37  We recommend they discuss screening and management recommendations with their healthcare providers  At this time we do not recommend testing for Gaudencio Vegas children based on his negative test result  Gaudencio Vegas children still need to consider the history of cancer on the other side of their family when determining their risks       Additional Information:  A healthy lifestyle will improve overall health and reduce risk for illness  Eating a healthy diet and exercising for 4 hours per week is recommended  Both diet and exercise have been shown to help maintain a healthy weight  Moderate to heavy alcohol use can increase the risk for some cancers  Smoking cigarettes can also increase risk for breast, lung, prostate, pancreatic and other cancers  Plan:   There are no additional recommendations based on Betito's negative result  he should continue cancer screening and medical management as clinically indicated and as determined appropriate by his healthcare providers  Negative Result: Ilsa Head was strongly encouraged to contact us regarding any changes in his personal or family history of cancer as these changes could alter our recommendation regarding genetic testing and/or cancer screening

## 2021-06-24 ENCOUNTER — OFFICE VISIT (OUTPATIENT)
Dept: HEMATOLOGY ONCOLOGY | Facility: CLINIC | Age: 41
End: 2021-06-24
Payer: COMMERCIAL

## 2021-06-24 ENCOUNTER — TELEPHONE (OUTPATIENT)
Dept: GENETICS | Facility: CLINIC | Age: 41
End: 2021-06-24

## 2021-06-24 VITALS
HEART RATE: 97 BPM | RESPIRATION RATE: 18 BRPM | HEIGHT: 70 IN | WEIGHT: 202 LBS | BODY MASS INDEX: 28.92 KG/M2 | OXYGEN SATURATION: 98 % | SYSTOLIC BLOOD PRESSURE: 118 MMHG | DIASTOLIC BLOOD PRESSURE: 86 MMHG | TEMPERATURE: 98.2 F

## 2021-06-24 DIAGNOSIS — E83.110 HEREDITARY HEMOCHROMATOSIS (HCC): Chronic | ICD-10-CM

## 2021-06-24 DIAGNOSIS — M25.512 ACUTE PAIN OF LEFT SHOULDER: Primary | ICD-10-CM

## 2021-06-24 DIAGNOSIS — C18.7 CANCER OF SIGMOID COLON (HCC): ICD-10-CM

## 2021-06-24 DIAGNOSIS — R91.1 LUNG NODULE: ICD-10-CM

## 2021-06-24 DIAGNOSIS — K76.89 LIVER NODULE: ICD-10-CM

## 2021-06-24 PROCEDURE — 99215 OFFICE O/P EST HI 40 MIN: CPT | Performed by: INTERNAL MEDICINE

## 2021-06-24 NOTE — PROGRESS NOTES
HEMATOLOGY / ONCOLOGY CLINIC NOTE    Primary Care Provider: Candice Ellis DO  Referring Provider:    MRN: [id number]  : 1980    Reason for Encounter:    Chief Complaint   Patient presents with    Follow-up     Metastatic colon cancer         History of Hematology / Oncology Illness:     Marian Mata is a 37 y o  male who came in  to establish care with oncology    1, stage III T1 N1 descending colon adenocarcinoma  - oncology genetics neg      - presented with GI bleeding, biopsy showed adenocarcinoma status post hemicolectomy, without colostomy bag  Biopsy showed adenocarcinoma grade 2   lymph node positive  CT chest has no metastatic disease however showed lung nodules and liver nodule    - No strong family history of malignancy,  genetics evaluation to be done  -  adjuvant chemotherapy CapOx x8 cycles C# 1 : 6/3  ( last cycle 10/29)     2, hemochromatosis, heterozygous, H63D positive  - diagnosed in , status post monthly phlebotomy  Last in 2021, this was managed in [place]  - 2021 : partially due to GI bleeding, patient actually having iron deficiency now, hemoglobin is normal   - 2021: ferritin  33      3, polycythemia  - likely due to testosterone supplement  Patient has been of testosterone  4, lung nodule    5, liver nodule    6, alcohol drinking habits in the past       Assessment / Plan:     1  Acute pain of left shoulder  - low threshold for doppler  No evidence of thrombosis for now  Per pt request, will refer to ortho  - Ambulatory referral to Orthopedic Surgery; Future    2  Cancer of sigmoid colon ([facility] [address] )   - tolerated 1st cycle of chemo  Continue treatment, no change  3  Lung nodule  - will monitor , repeat CT scan in 2021     4  Hereditary hemochromatosis ([facility] )   - monitor iron level Q 3m  Consider restart phlebotomy after chemo finished       5  Liver nodule  - monitor by repeat CT in 2021            Made patient aware regarding side effects of chemotherapy, including but not limited to fatigue cytopenia, increased risk for infection, potential kidney, liver injuries neuropathies et al    Made patient aware to call MD or go to ED for any fever,  Chills, bleeding, easy bruise, unhealed wound, chest pain, abdominal pain et al                       minutes were spent face to face with patient with greater than 50% of the time spent in counseling or coordination of care including discussions of treatment instructions  All of the patient's questions were answered to their satisfactory during this discussion  Advised pt to call if there is any further questions  Interval History:     5/20/2021 :  Patient came in for follow-up, subjectively has been doing very well no issues recovered from surgery very well  Passing gas normal bowel movement habits  No abdominal pain  No skin color change  Patient does not smoke or drink alcohol regular basis anymore  Patient had 2 kids, 6and 9years old     6/24/2021 : patient came in for F/U; doing well  Weight stable  No abd pain  Mild neuropathy  Reported having shoulder pain since port placed  Problem list:       Patient Active Problem List   Diagnosis    [facility]    [facility]atosis [facility]    Testosterone deficiency    Thyroid nodule    Fatigue    [facility] sigmoid colon ([address] [facility]    Colon cancer metastasized to intra-abdominal lymph node (HCC)    Liver nodule    Lung nodule    Polycythemia         PHYSICIAL EXAMINATION:     Vital Signs:   /86 (BP Location: Right arm)   Pulse 97   Temp 98 2 °F (36 8 °C)   Resp 18   Ht 5' 10" (1 778 m)   Wt 91 6 kg (202 lb)   SpO2 98%   BMI 28 98 kg/m²   Body surface area is 2 1 meters squared     Ht Readings from Last 3 Encounters:   06/24/21 5' 10" (1 778 m)   06/16/21 5' 10" (1 778 m)   06/04/21 5' 10" (1 778 m)       Wt Readings from Last 3 Encounters:   06/24/21 91 6 kg (202 lb)   06/16/21 92 8 kg (204 lb 9 4 oz)   06/04/21 93 6 kg (206 lb 5 6 oz)        Temp Readings from Last 3 Encounters:   06/24/21 98 2 °F (36 8 °C)   06/16/21 98 1 °F (36 7 °C) (Temporal)   06/04/21 97 7 °F (36 5 °C) (Temporal)        BP Readings from Last 3 Encounters:   06/24/21 118/86   06/16/21 118/80   06/04/21 121/81         Pulse Readings from Last 3 Encounters:   06/24/21 97   06/16/21 92   06/04/21 92         Appears comfortable, recovered well no issues  No neck swelling  GEN: Alert, awake oriented x3, in no acute distress  HEENT- No pallor, icterus, cyanosis, no oral mucosal lesions,   LAD - no palpable cervical, clavicle, axillary, inguinal LAD  Heart- normal S1 S2, regular rate and rhythm, No murmur, rubs  Lungs- decreased breathing sound bilateral    Abdomen- soft, Non tender, bowel sounds present  Extremities- No cyanosis, clubbing, edema  Neuro- No focal neurological deficit           PAST MEDICAL HISTORY:   has a past medical history of Cancer ([facility] ), Diabetes mellitus ([address] ), Hemochromatosis, and Testosterone deficiency  PAST SURGICAL HISTORY:   has a past surgical history that includes Hallock tooth extraction; Colonoscopy; Hemicoloectomy w/ anastomosis (N/A, 5/5/2021); and Tunneled venous port placement (Left, 6/1/2021)      CURRENT MEDICATIONS:     Current Outpatient Medications   Medication Sig Dispense Refill    acetaminophen (TYLENOL) 325 mg tablet Take 3 tablets (975 mg total) by mouth every 6 (six) hours as needed for mild pain or moderate pain 60 tablet 0    capecitabine (XELODA) 500 MG tablet Take 3 tablets (1,500 mg total) by mouth 2 (two) times a day For 2 weeks on then 1 week off 84 tablet 3    Empagliflozin (Jardiance) 25 MG TABS Take 1 tablet (25 mg total) by mouth daily 90 tablet 0    gabapentin (NEURONTIN) 100 mg capsule Take 2 capsules (200 mg total) by mouth 2 (two) times a day for 5 days 120 capsule 0    lidocaine-prilocaine (EMLA) cream Apply topically as needed for mild pain On day of chemotherapy treatment  30 g 0    oxyCODONE (ROXICODONE) 5 mg immediate release tablet Take 1 tablet by mouth for moderate pain, OR 2 tablets by mouth for severe pain, as needed up to every 6 hours  60 tablet 0    senna (SENOKOT) 8 6 MG tablet Take 1 tablet (8 6 mg total) by mouth daily 30 tablet 1    Dulaglutide 1 5 MG/0 5ML SOPN Inject 0 5 mL (1 5 mg total) under the skin once a week (Patient not taking: Reported on 6/24/2021) 6 mL 1     No current facility-administered medications for this visit  [unfilled]    SOCIAL HISTORY:   reports that he quit smoking about 10 years ago  His smoking use included cigarettes  He has a 2 50 pack-year smoking history  He has never used smokeless tobacco  He reports previous alcohol use  He reports previous drug use  FAMILY HISTORY:  family history includes Colon polyps in his maternal grandmother and mother; Diabetes in his family and paternal grandmother; Heart disease in his paternal grandfather; Hypertension in his family; Kidney failure in his paternal grandfather; Lung cancer in his paternal grandmother; No Known Problems in his father; Prostate cancer in his maternal grandfather and paternal grandfather  ALLERGIES:  has No Known Allergies  REVIEW OF SYSTEMS:  Please note that a 14-point review of systems was performed to include Constitutional, HEENT, Respiratory, CVS, GI, , Musculoskeletal, Integumentary, Neurologic, Rheumatologic, Endocrinologic, Psychiatric, Lymphatic, and Hematologic/Oncologic systems were reviewed and are negative unless otherwise stated in HPI  Positive and negative findings pertinent to this evaluation are incorporated into the history of present illness              LAB:    Lab Results   Component Value Date    WBC 7 03 06/22/2021    HGB 13 6 06/22/2021    HCT 41 1 06/22/2021    MCV 86 06/22/2021     06/22/2021       Lab Results   Component Value Date    SODIUM 140 06/22/2021    K 4 1 06/22/2021     06/22/2021    CO2 28 06/22/2021    AGAP 6 06/22/2021    BUN 15 06/22/2021    CREATININE 1 07 06/22/2021    GLUC 117 06/01/2021    GLUF 126 (H) 06/22/2021    CALCIUM 9 3 06/22/2021    AST 28 06/22/2021    ALT 31 06/22/2021    ALKPHOS 81 06/22/2021    TP 7 3 06/22/2021    TBILI 0 74 06/22/2021    EGFR 86 06/22/2021       CBC with diff:   Results from last 7 days   Lab Units 06/22/21  1505   WBC Thousand/uL 7 03   HEMOGLOBIN g/dL 13 6   HEMATOCRIT % 41 1   MCV fL 86   PLATELETS Thousands/uL 310   MCH pg 28 5   MCHC g/dL 33 1   RDW % 15 4*   MPV fL 10 5   NRBC AUTO /100 WBCs 0       CMP:  Results from last 7 days   Lab Units 06/22/21  1505   POTASSIUM mmol/L 4 1   CHLORIDE mmol/L 106   CO2 mmol/L 28   BUN mg/dL 15   CREATININE mg/dL 1 07   CALCIUM mg/dL 9 3   AST U/L 28   ALT U/L 31   ALK PHOS U/L 81   EGFR ml/min/1 73sq m 86       IMAGING:    No orders to display     No results found

## 2021-06-24 NOTE — TELEPHONE ENCOUNTER
----- [facility]ares[facility] sent at 6/23/2021 12:01 PM EDT -----  Regarding: mail test result and consult note and upload to [facility]

## 2021-06-25 ENCOUNTER — HOSPITAL ENCOUNTER (OUTPATIENT)
Dept: INFUSION CENTER | Facility: CLINIC | Age: 41
Discharge: HOME | End: 2021-06-25
Payer: COMMERCIAL

## 2021-06-25 ENCOUNTER — NUTRITION (OUTPATIENT)
Dept: NUTRITION | Facility: CLINIC | Age: 41
End: 2021-06-25

## 2021-06-25 ENCOUNTER — PATIENT OUTREACH (OUTPATIENT)
Dept: CASE MANAGEMENT | Facility: HOSPITAL | Age: 41
End: 2021-06-25

## 2021-06-25 VITALS
SYSTOLIC BLOOD PRESSURE: 127 MMHG | DIASTOLIC BLOOD PRESSURE: 71 MMHG | HEIGHT: 70 IN | BODY MASS INDEX: 28.85 KG/M2 | TEMPERATURE: 97.4 F | WEIGHT: 201.5 LBS | RESPIRATION RATE: 18 BRPM | HEART RATE: 94 BPM

## 2021-06-25 DIAGNOSIS — C18.7 CANCER OF SIGMOID COLON (HCC): Primary | ICD-10-CM

## 2021-06-25 DIAGNOSIS — Z71.3 NUTRITIONAL COUNSELING: Primary | ICD-10-CM

## 2021-06-25 DIAGNOSIS — C18.9 COLON CANCER METASTASIZED TO INTRA-ABDOMINAL LYMPH NODE (HCC): ICD-10-CM

## 2021-06-25 DIAGNOSIS — C77.2 COLON CANCER METASTASIZED TO INTRA-ABDOMINAL LYMPH NODE (HCC): ICD-10-CM

## 2021-06-25 PROCEDURE — 96415 CHEMO IV INFUSION ADDL HR: CPT

## 2021-06-25 PROCEDURE — 96413 CHEMO IV INFUSION 1 HR: CPT

## 2021-06-25 PROCEDURE — 96367 TX/PROPH/DG ADDL SEQ IV INF: CPT

## 2021-06-25 RX ORDER — DEXTROSE MONOHYDRATE 50 MG/ML
20 INJECTION, SOLUTION INTRAVENOUS ONCE
Status: COMPLETED | OUTPATIENT
Start: 2021-06-25 | End: 2021-06-25

## 2021-06-25 RX ORDER — SODIUM CHLORIDE 9 MG/ML
20 INJECTION, SOLUTION INTRAVENOUS ONCE AS NEEDED
Status: DISCONTINUED | OUTPATIENT
Start: 2021-06-25 | End: 2021-06-28 | Stop reason: HOSPADM

## 2021-06-25 RX ADMIN — SODIUM CHLORIDE 20 ML/HR: 0.9 INJECTION, SOLUTION INTRAVENOUS at 10:09

## 2021-06-25 RX ADMIN — DEXAMETHASONE SODIUM PHOSPHATE: 10 INJECTION, SOLUTION INTRAMUSCULAR; INTRAVENOUS at 10:08

## 2021-06-25 RX ADMIN — OXALIPLATIN 275.6 MG: 5 INJECTION, SOLUTION INTRAVENOUS at 10:51

## 2021-06-25 RX ADMIN — DEXTROSE 20 ML/HR: 5 SOLUTION INTRAVENOUS at 10:43

## 2021-06-25 NOTE — PATIENT INSTRUCTIONS
Nutrition Rx & Recommendations:  · Diet: High Calorie, High Protein (for high calorie foods see pages 52-53, and for high protein foods see pages 49-51 in your Eating Hints book)  · Keep a daily food journal (pen/paper, naif such as [employer])  · Small, frequent meals/snacks may be easier to tolerate than 3 large daily meals  Aim for 5-6 small meals per day (every 2-3 hours)  · Include protein at all meals/snacks  · Include a variety of foods (as tolerated/allowed by diet)  · Incorporate physical activity as able/allowed  · Stay hydrated by sipping fluids of choice/tolerance throughout the day  · Liquid nutrition may be better tolerated than solids at times  · Alter food choices and eating patterns to accommodate changing needs  · Refer to Eating Hints book for other meal/snack ideas and symptom management  · For appetite loss: try powdered or liquid nutrition supplements; eating by the clock every 2-3 hours; set a timer to remind yourself to eat, keep snacks nearby; add extra protein & calories to your diet; drink liquids in between meals, choose liquids that add calories; eat a bedtime snack; eat soft, cool or frozen foods; eat a larger meal when you feel well & rested; only sip small amounts of liquids during meals (see pages 10-12 in your Eating Hints book)    · For weight loss: monitor your weight at home at least 2x/week, record your weight, start by adding 250-500 extra calories per day, eat 5-6 small scheduled meals every 2-3 hrs, choose foods that are high in protein and calories (see pages 49-53 in your Eating Hints book), drink liquids with calories for example: milkshakes/smoothies/juice/soup/whole milk/chocolate milk, cook with protein fortified milk (see recipe on page 36 in your Eating Hints book), consider ready-to-drink oral nutrition supplements such as Ensure Plus, Ensure Enlive, Boost Plus, or Boost Very High Calorie, avoid "diet" and "light" foods when possible, avoid drinking too much with meals, contact your dietitian with any continued weight loss over the course of 1 week  For more info see pages 35-37 in your Eating Hints book  · For nausea/vomiting: try plain/bland foods; try lemon/lime flavors; eat 5-6 small meals/day (except when vomiting); do not skip meals/snacks; choose appealing foods; sip only small amounts of liquids during meals; stay hydrated in between meals; eat foods/drinks that are room temperature; eat dry toast/crackers (see pages  21-22 and 31-32 in your Eating Hints book)  · Weigh yourself regularly  If you notice weight loss, make an effort to increase your daily food/calorie intake  If you continue to notice loss after these efforts, reach out to your dietitian to establish a plan to stabilize weight  · Caution with cold temperatures during treatment with oxaliplatin  · Consider stopping all high dose antioxidant supplements (vitamin A, C, E, selenium, etc )  Refer to [facility] "About Herbs" website for more information on the safety of supplements      Follow Up Plan: 7/22/21 10am phone follow up   Recommend Referral to Other Providers: none at this time

## 2021-06-25 NOTE — PROGRESS NOTES
Pt here for chemotherapy, oxaliplatin  States he was a little nauseous a few days after his last treatment and the cold sensitivity went away this past week, other then that he is feeling pretty good  Did mention that to the left of his port near his armpit is tender  Dr jacob aware of that  Left port accessed with positive blood return noted throughout treatment  Tolerated infusion without incident  Port flushed and de-accessed  AVS declined  Walked out in stable condition

## 2021-06-25 NOTE — PROGRESS NOTES
Outpatient Oncology Nutrition Consultation   Type of Consult: Follow Up  Care Location: [facility]    Reason for referral: Milana Darby RN on 6/8/21 (reason for referral: pts mother requesting nutrition referral )  Nutrition Assessment:   Oncology Diagnosis & Treatments: Diagnosed with cancer of the sigmoid colon 4/13/21  S/p partial colectomy 5/5/21  Started chemotherapy (oxaliplatin) 6/4/21  Oncology History   Cancer of sigmoid colon ([facility] )   4/13/2021 Initial Diagnosis    Cancer of sigmoid colon ([facility] [address] )    RESULTS OF IMMUNOHISTOCHEMICAL ANALYSIS FOR MISMATCH REPAIR PROTEIN LOSS  INTERPRETATION: NO LOSS OF NUCLEAR EXPRESSION OF MMR PROTEINS: LOW PROBABILITY OF MSI-H   RESULTS:  AntibodyClone Description Results  MLH1 M1 Mismatch repair protein Intact nuclear expression  MSH2 [id number] Mismatch repair protein Intact nuclear expression  MSH6 40 Mismatch repair protein Intact nuclear expression  PMS2 [id number] Mismatch repair protein Intact nuclear expression     5/5/2021 Surgery    Descending colon, partial colectomy:  - Adenocarcinoma (1 9 cm), moderately differentiated, arising in adenocarcinoma with high grade dysplasia    - Tumor invades the submucosa (through the muscularis mucosa in to deeper one third of submucosa, pT1, Sm3)   - All margins are negative for tumor   - One  of twenty eight lymph nodes is positive for tumor (1/28)      6/4/2021 -  Chemotherapy    oxaliplatin (ELOXATIN) chemo infusion, 130 mg/m2 = 275 6 mg, Intravenous, Once, 2 of 8 cycles  Administration: 275 6 mg (6/4/2021)     Colon cancer metastasized to intra-abdominal lymph node ([facility] )   5/20/2021 Initial Diagnosis    Colon cancer metastasized to intra-abdominal lymph node (HCC)     6/4/2021 -  Chemotherapy    oxaliplatin (ELOXATIN) chemo infusion, 130 mg/m2 = 275 6 mg, Intravenous, Once, 2 of 8 cycles  Administration: 275 6 mg (6/4/2021)       Past Medical & Surgical Hx:   Patient Active Problem List   Diagnosis    Diabetes mellitus without complication ([address] )    Hereditary hemochromatosis ([address] )    Testosterone deficiency    Thyroid nodule    Fatigue    Cancer of sigmoid colon (HCC)    Colon cancer metastasized to intra-abdominal lymph node (HCC)    Liver nodule    Lung nodule    Polycythemia     Past Medical History:   Diagnosis Date    Cancer ([facility] )     colon cancer    Diabetes mellitus ([facility] [address] )     Hemochromatosis     Testosterone deficiency      Past Surgical History:   Procedure Laterality Date    COLONOSCOPY      HEMICOLOECTOMY W/ ANASTOMOSIS N/A 5/5/2021    Procedure: ROBOTIC SIGMOID COLECTOMY, MOBILIZATION OF SPLENIC FLEXURE;  Surgeon: Ray Sepulveda MD;  Location: [facility];  Service: Surgical Oncology    TUNNELED VENOUS PORT PLACEMENT Left 6/1/2021    Procedure: INSERTION VENOUS PORT (PORT-A-CATH) Ultrasound guided Left Internal Jugular Vein Access;  Surgeon: Gilford Elms, MD;  Location: [facility];  Service: Surgical Oncology    WISDOM TOOTH EXTRACTION         Review of Medications:   Vitamins, Supplements and Herbals: Med List Reviewed & pt is only taking: turmeric, vitamin B complex, Vitamin D  and Recommended avoiding high dose antioxidant supplements during tx      Current Outpatient Medications:     acetaminophen (TYLENOL) 325 mg tablet, Take 3 tablets (975 mg total) by mouth every 6 (six) hours as needed for mild pain or moderate pain, Disp: 60 tablet, Rfl: 0    capecitabine (XELODA) 500 MG tablet, Take 3 tablets (1,500 mg total) by mouth 2 (two) times a day For 2 weeks on then 1 week off, Disp: 84 tablet, Rfl: 3    Dulaglutide 1 5 MG/0 5ML SOPN, Inject 0 5 mL (1 5 mg total) under the skin once a week (Patient not taking: Reported on 6/24/2021), Disp: 6 mL, Rfl: 1    Empagliflozin (Jardiance) 25 MG TABS, Take 1 tablet (25 mg total) by mouth daily, Disp: 90 tablet, Rfl: 0    gabapentin (NEURONTIN) 100 mg capsule, Take 2 capsules (200 mg total) by mouth 2 (two) times a day for 5 days, Disp: 120 capsule, Rfl: 0    lidocaine-prilocaine (EMLA) cream, Apply topically as needed for mild pain On day of chemotherapy treatment  , Disp: 30 g, Rfl: 0    oxyCODONE (ROXICODONE) 5 mg immediate release tablet, Take 1 tablet by mouth for moderate pain, OR 2 tablets by mouth for severe pain, as needed up to every 6 hours  , Disp: 60 tablet, Rfl: 0    senna (SENOKOT) 8 6 MG tablet, Take 1 tablet (8 6 mg total) by mouth daily, Disp: 30 tablet, Rfl: 1  No current facility-administered medications for this visit      Facility-Administered Medications Ordered in Other Visits:     dextrose 5 % infusion, 20 mL/hr, Intravenous, Once, Morteza Morales MD PhD    ondansetron [facility] [address] [facility]) 16 mg, dexamethasone (DECADRON) 10 mg in sodium chloride 0 9 % 50 mL IVPB, , Intravenous, Once, Morteza Morales MD PhD    oxaliplatin (ELOXATIN) 275 6 mg in dextrose 5 % 500 mL chemo infusion, 130 mg/m2 (Treatment Plan Recorded), Intravenous, Once, Morteza Morales MD PhD    sodium chloride 0 9 % infusion, 20 mL/hr, Intravenous, Once PRN, Morteza Morales MD PhD    Most Recent Lab Results:   Lab Results   Component Value Date    WBC 7 03 06/22/2021    NEUTROABS 3 63 06/22/2021    IRON 45 (L) 06/22/2021    TIBC 465 (H) 06/22/2021    FERRITIN 33 06/22/2021    CHOLESTEROL 176 03/09/2021    TRIG 147 03/09/2021    HDL 42 03/09/2021    LDLCALC 105 (H) 03/09/2021    ALT 31 06/22/2021    AST 28 06/22/2021    ALB 4 2 06/22/2021    SODIUM 140 06/22/2021    SODIUM 140 06/01/2021    K 4 1 06/22/2021    K 4 3 06/01/2021     06/22/2021    BUN 15 06/22/2021    BUN 14 06/01/2021    CREATININE 1 07 06/22/2021    CREATININE 1 06 06/01/2021    EGFR 86 06/22/2021    PHOS 4 1 05/06/2021    POCGLU 92 06/01/2021    GLUF 126 (H) 06/22/2021    GLUF 85 04/22/2021    GLUC 117 06/01/2021    HGBA1C 5 8 (H) 03/09/2021    CALCIUM 9 3 06/22/2021    MG 2 0 05/06/2021       Anthropometric Measurements:   Height: 70"  Ht Readings from Last 1 Encounters:   06/25/21 5' 10" (1 778 m)     Wt Readings from Last 20 Encounters:   06/25/21 91 4 kg (201 lb 8 oz)   06/24/21 91 6 kg (202 lb)   06/16/21 92 8 kg (204 lb 9 4 oz)   06/04/21 93 6 kg (206 lb 5 6 oz)   06/01/21 93 1 kg (205 lb 4 oz)   05/20/21 94 1 kg (207 lb 8 oz)   05/20/21 93 4 kg (206 lb)   05/12/21 94 3 kg (208 lb)   05/05/21 94 3 kg (208 lb)   04/22/21 94 7 kg (208 lb 12 8 oz)   04/13/21 91 8 kg (202 lb 6 1 oz)   03/31/21 93 2 kg (205 lb 8 oz)   03/22/21 93 4 kg (206 lb)   03/09/21 94 3 kg (207 lb 12 8 oz)   02/08/21 94 8 kg (209 lb)   01/20/21 92 1 kg (203 lb)   12/16/20 91 8 kg (202 lb 6 4 oz)   12/15/20 92 2 kg (203 lb 3 2 oz)   11/12/20 91 2 kg (201 lb)   11/03/20 92 1 kg (203 lb)       Weight History:    Usual Weight: 190-205#   Home Scale: none    Oncology Nutrition-Anthropometrics      Nutrition from 6/25/2021 in [address] [place] [address] Dietitian [place]   Patient age (years):  37 years   Patient (male) height (in):  79 in   Current weight (lbs):  201 5 lbs   Current weight to be used for anthropometric calculations (kg)  91 6 kg   BMI:  28 9   IBW male  166 lb   IBW (kg) male  75 5 kg   IBW % (male)  121 4 %   Adjusted BW (male):  174 9 lbs   Adjusted BW in kg (male):  79 5 kg   % weight change after 1 week:  -1 5 %   Weight change after 1 week (lbs)  -3 1 lbs   % weight change after 1 month:  -2 2 %   Weight change after 1 month (lbs)  -4 5 lbs   % weight change after 3 months:  -2 2 %   Weight change after 3 months (lbs)  -4 5 lbs          Nutrition-Focused Physical Findings: none observed    Food/Nutrition-Related History & Client/Social History:    Current Nutrition Impact Symptoms:  [x] Nausea -in the morning after previous chemo  [x] Reduced Appetite -after previous chemo  [] Acid Reflux    [x] Vomiting -after previous chemo  [x] Unintended Wt Loss  [] Malabsorption    [] Diarrhea  [] Unintended Wt Gain  [] Dumping Syndrome    [] Constipation  [] Thick Mucous/Secretions  [] Abdominal Pain    [] Dysgeusia (Altered Taste)  [] Xerostomia (Dry Mouth)  [] Gas    [] Dysosmia (Altered Smell)  [] Thrush  [] Difficulty Chewing    [] Oral Mucositis (Sore Mouth)  [] Fatigue  [] Hyperglycemia    [] Odynophagia  [] Esophagitis  [] Other:    [] Dysphagia  [] Early Satiety  [] No Problems Eating      Food Allergies & Intolerances: no    Current Diet: Regular Diet, No Restrictions  Current Nutrition Intake: Unchanged from usual  Appetite: Good  Nutrition Route: PO  Oral Care: brushes BID  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, always on the go  24 Hr Diet Recall: states his girlfriend is a very healthy eater and has been encouraging him to eat better  Breakfast: Egg and cheese sandwich OR cereal with milk OR smoothie  Lunch: PB&J or turkey sandwich   Dinner: short ribs, mashed potatoes, spinach  Snack: ice cream     Beverages: Smart water (1L x1-2), iced tea (8oz x1-2), coffee (1czt2-6)  Supplements:    None      Oncology Nutrition-Estimated Needs      Nutrition from 2021 in [address] [place] [address]   Weight type used  Actual weight   Weight in kilograms (kg) used for estimated needs  91 6 kg   Energy needs formula:   30-35 kcal/kg   Energy needs based on 30 kcal/k kcal   Energy needs based on 35 kcal/k kcal   Protein needs formula:  1 2-1 5 g/kg   Protein needs based on 1 2 g/k g   Protein needs based on 1 5 g/kg  137 g   Fluid needs formula:  30-35 mL/kg   Fluid needs based on 30 mL/kg  2754 mL   Fluid needs in ounces  93 oz   Fluid needs based on 35 mL/kg  3213 mL   Fluid needs in ounces  109 oz           Discussion & Intervention:   Martha León was evaluated today for an RD follow up regarding nutrition impact sx management  Martha León is currently undergoing tx for colon cancer  Today Martha León reports that he is doing well, his appetite is good at this time  He reports nausea, some vomiting, and decreased appetite post chemo infusions   He has been trying to eat well, he states that his girlfriend and his mom are very helpful with preparing meals and making sure he is eating healthy  He is very active at work  Reviewed 24 hour recall, which revealed an adequate po intake, and discussed ways to increase kcal, protein, and fluid intakes and optimize nutrient intake  Also reviewed the importance of wt management throughout the tx process and the role of a high kcal/ high protein diet in managing wt and overall health  Based on today's assessment, discussion included: MNT for: nausea, weight management, hydration, vitamin/supplement intake during tx, avoiding cold temperatures with oxaliplatin, how to modify foods for anticipated nutrition impact symptoms pt may experience during CA tx, a high kcal/protein diet & food choices to include at all meals & snacks (Examples of high kcal foods: cheese, full-fat dairy products, nut butter, plant-based fats, coconut oil/milk, avocado, butter, cream soup, etc  Examples of high protein foods: eggs, chicken, fish, beans/legumes, nuts/nut butters, bone broth, etc ) , fortifying foods for added kcal and protein (examples include: adding cheese to foods such as eggs, mashed potatoes, casseroles, etc ; Making oatmeal with whole milk rather than water; Making fortified mashed potatoes with cream, butter, dry milk powder, plain [place] yogurt, and cheese ), adequate hydration & fluid choices, eating when feeling most hungry and avoiding high dose antioxidants during cancer treatment      Moving forward, Melissa Mcallister will make efforts to consume adequate calories, protein, and fluid  Materials Provided: not applicable  All questions and concerns addressed during todays visit  Melissa Ary has RD contact information  Nutrition Diagnosis:    Increased Nutrient Needs (kcal & pro) related to increased demand for nutrients and disease state as evidenced by cancer dx and pt undergoing tx for cancer    Monitoring & Evaluation:   Goals:  · weight maintenance/stabilization  · adequate nutrition impact symptom management  · pt to meet >/=75% estimated nutrition needs daily    · Progress Towards Goals: Progressing    Nutrition Rx & Recommendations:  · Diet: High Calorie, High Protein (for high calorie foods see pages 52-53, and for high protein foods see pages 49-51 in your Eating Hints book)  · Keep a daily food journal (pen/paper, naif such as [employer])  · Small, frequent meals/snacks may be easier to tolerate than 3 large daily meals  Aim for 5-6 small meals per day (every 2-3 hours)  · Include protein at all meals/snacks  · Include a variety of foods (as tolerated/allowed by diet)  · Incorporate physical activity as able/allowed  · Stay hydrated by sipping fluids of choice/tolerance throughout the day  · Liquid nutrition may be better tolerated than solids at times  · Alter food choices and eating patterns to accommodate changing needs  · Refer to Eating Hints book for other meal/snack ideas and symptom management  · For appetite loss: try powdered or liquid nutrition supplements; eating by the clock every 2-3 hours; set a timer to remind yourself to eat, keep snacks nearby; add extra protein & calories to your diet; drink liquids in between meals, choose liquids that add calories; eat a bedtime snack; eat soft, cool or frozen foods; eat a larger meal when you feel well & rested; only sip small amounts of liquids during meals (see pages 10-12 in your Eating Hints book)    · For weight loss: monitor your weight at home at least 2x/week, record your weight, start by adding 250-500 extra calories per day, eat 5-6 small scheduled meals every 2-3 hrs, choose foods that are high in protein and calories (see pages 49-53 in your Eating Hints book), drink liquids with calories for example: milkshakes/smoothies/juice/soup/whole milk/chocolate milk, cook with protein fortified milk (see recipe on page 36 in your Eating Hints book), consider ready-to-drink oral nutrition supplements such as Ensure Plus, Ensure Enlive, Boost Plus, or Boost Very High Calorie, avoid "diet" and "light" foods when possible, avoid drinking too much with meals, contact your dietitian with any continued weight loss over the course of 1 week  For more info see pages 35-37 in your Eating Hints book  · For nausea/vomiting: try plain/bland foods; try lemon/lime flavors; eat 5-6 small meals/day (except when vomiting); do not skip meals/snacks; choose appealing foods; sip only small amounts of liquids during meals; stay hydrated in between meals; eat foods/drinks that are room temperature; eat dry toast/crackers (see pages  21-22 and 31-32 in your Eating Hints book)  · Weigh yourself regularly  If you notice weight loss, make an effort to increase your daily food/calorie intake  If you continue to notice loss after these efforts, reach out to your dietitian to establish a plan to stabilize weight  · Caution with cold temperatures during treatment with oxaliplatin  · Consider stopping all high dose antioxidant supplements (vitamin A, C, E, selenium, etc )  Refer to [facility] "About Herbs" website for more information on the safety of supplements      Follow Up Plan: 7/22/21 10am phone follow up   Recommend Referral to Other Providers: none at this time

## 2021-06-25 NOTE — PROGRESS NOTES
MSW met with pt in the infusion center today, he is here with his girlfriend for his second treatment  He tells me that he overall did well after his first treatment, he did have a few days of feeling ill but says it only lasted a few days and then he could tell he was getting better  He works full time in the  area in construction management, and has been able to keep working during his treatment time  He is very complimentary of the staff in the infusion center and talked about how they have made his time here as comfortable and cheerful as possible  He complains of shoulder pain following his port placement, but is scheduled to see ortho next week to assess  He denies any needs at this time but is agreeable to reaching out should his needs change, and is also agreeable to me checking in periodically to see how he is doing  Chart review shows that pt is already involved with the palliative care office, as well as a personal h/o substance abuse issues  MSW will support pt throughout his cancer journey and will encourage him to actively work on and maintain his sobriety  No other needs at this time

## 2021-06-28 ENCOUNTER — TELEPHONE (OUTPATIENT)
Dept: HEMATOLOGY ONCOLOGY | Facility: CLINIC | Age: 41
End: 2021-06-28

## 2021-06-28 DIAGNOSIS — G89.29 CHRONIC LEFT SHOULDER PAIN: Primary | ICD-10-CM

## 2021-06-28 DIAGNOSIS — M25.512 CHRONIC LEFT SHOULDER PAIN: Primary | ICD-10-CM

## 2021-06-28 DIAGNOSIS — C18.7 CANCER OF SIGMOID COLON (HCC): ICD-10-CM

## 2021-06-28 RX ORDER — ONDANSETRON 4 MG/1
4 TABLET, ORALLY DISINTEGRATING ORAL EVERY 6 HOURS PRN
Qty: 20 TABLET | Refills: 0 | Status: SHIPPED | OUTPATIENT
Start: 2021-06-28 | End: 2021-07-22 | Stop reason: SDUPTHER

## 2021-06-28 RX ORDER — OXYCODONE HYDROCHLORIDE 5 MG/1
TABLET ORAL
Qty: 16 TABLET | Refills: 0 | Status: SHIPPED | OUTPATIENT
Start: 2021-06-28 | End: 2021-06-30 | Stop reason: ALTCHOICE

## 2021-06-28 NOTE — TELEPHONE ENCOUNTER
Call placed to pt  Pt reports the pain in cramping of his legs  Rates 6-7/10 when he initiates walking  But after moving around for a time the severity decreases  Pt has been taking gabapentin 100 mg capsules at 2 caps 2 X day  Pt also taking up to 5 tablets of Oxycodone per day  ( ordered 1 to 2 tabs Q 6 hours)   Will need refill  Refill request  sent by other office staff     Pt reports he plans to go into work for a short time today if he feels ok  Pt has scheduled appointment 06/30 with Zay Wooten        Thank you

## 2021-06-28 NOTE — TELEPHONE ENCOUNTER
Primary palliative medicine provider:  Joseph Mullen    Medication requested: Zofran    If for pain, how has the patient been taking their pain medicine?      Last appointment:  6 16    Next scheduled appointment:  6 30    PDMP review:  NA    Not presently on medication list

## 2021-06-28 NOTE — TELEPHONE ENCOUNTER
Reviewed information with Dr Rudi Camacho per him patient's next Oxaliplatin on 7/16 is to be canceled but he can continue with oral Capecitabine  Pt will be seen in office on 07/16/21, we will review patients symptoms and how to proceed with treatment  Called patient and reviewed this information he verbalized understanding and was agreeable to the plan  Pt is aware that this is a common side effect from Oxaliplatin, which could be permanent  Advised the patient to monitor his symptoms and give us a call back at any time if needed

## 2021-06-28 NOTE — TELEPHONE ENCOUNTER
Due to prior concerns raised regarding patient's history of drug abuse, will refill 16 tablets of oxycodone only to bridge the time until his next appointment  Will also order Zofran

## 2021-06-28 NOTE — TELEPHONE ENCOUNTER
Primary palliative medicine provider:     Medication requested: oxycodone 5 mg  , zofran 8 mg     If for pain, how has the patient been taking their pain medicine? Per pt patient is taking 5 to 6 tablets a day     Patient is requesting Zofran     Last appointment: 6/30      Next scheduled appointment: 6/16    PDMP review:    06/16/2021 1 06/16/2021   OXYCODONE HCL 5 MG TABLET  60 0 8 ST BAT   [id number]  [facility] ([id number][facility] [phone] [facility] Private Pay [place]  05/28/2021 1 05/28/2021   OXYCODONE HCL 5 MG TABLET  20 0 10 JA NOT   [id number]  [facility][id number][facility] 0 15 0 [facility] [place]  05/20/2021 1 05/20/2021   OXYCODONE HCL 5 MG TABLET  20 0 10 JA NOT   [id number]  [facility][id number] 0 15 0 [facility] [place]

## 2021-06-28 NOTE — TELEPHONE ENCOUNTER
Patients mother calling to report that patients neuropathy is very severe with his most recent cycle of treatment - cycle #2  Patient had neuropathy with cycle #1 but was not nearly as bad  Patient is having constant neuropathy in hands and feet  He is having difficulty walking due to the pain    He also has leg cramping   Mother Kimber Garza is concerned that neuropathy had gotten this bad so early into treatment   She wanted to update Dr Roman Barger and is looking for further recommendation   Patient is taking Gabapentin prescribed by palliative care  I will forward to RN with Dr Roman Barger and will also include palliative RN's as well    Mother requesting a call back directly to the patient - [phone]

## 2021-06-29 ENCOUNTER — OFFICE VISIT (OUTPATIENT)
Dept: OBGYN CLINIC | Facility: CLINIC | Age: 41
End: 2021-06-29
Payer: COMMERCIAL

## 2021-06-29 ENCOUNTER — APPOINTMENT (OUTPATIENT)
Dept: RADIOLOGY | Facility: CLINIC | Age: 41
End: 2021-06-29
Payer: COMMERCIAL

## 2021-06-29 VITALS
SYSTOLIC BLOOD PRESSURE: 134 MMHG | WEIGHT: 199.2 LBS | HEIGHT: 70 IN | HEART RATE: 81 BPM | BODY MASS INDEX: 28.52 KG/M2 | DIASTOLIC BLOOD PRESSURE: 89 MMHG

## 2021-06-29 DIAGNOSIS — M25.512 ACUTE PAIN OF LEFT SHOULDER: ICD-10-CM

## 2021-06-29 DIAGNOSIS — G89.29 CHRONIC LEFT SHOULDER PAIN: ICD-10-CM

## 2021-06-29 DIAGNOSIS — M25.512 CHRONIC LEFT SHOULDER PAIN: ICD-10-CM

## 2021-06-29 PROCEDURE — 99203 OFFICE O/P NEW LOW 30 MIN: CPT | Performed by: ORTHOPAEDIC SURGERY

## 2021-06-29 PROCEDURE — 73030 X-RAY EXAM OF SHOULDER: CPT

## 2021-06-29 NOTE — PROGRESS NOTES
Orthopaedics Office Visit - New Patient Visit    ASSESSMENT/PLAN:    Assessment:   Left shoulder pain likely secondary to port     Plan:   I discussed with Jose M Mckeon today that his pain is likely secondary to port placement  Based on clinical exam, I can not localize his pain to the shoulder joint itself  Patient advised this may be scar tissue versus infection  There is no signs of infection on exam  Patient advised to follow up with surgical oncology Dr Ana Christy  To Do Next Visit:  Follow up as needed     _____________________________________________________  CHIEF COMPLAINT:  Chief Complaint   Patient presents with    Left Shoulder - Pain         SUBJECTIVE:  Alys Prader is a 37 y o  male who presents to the office for evaluation of left shoulder pain  Patient states he has had left shoulder pain for years however, one month ago he had a port placed for chemotherapy which increased his pain  Patient notes pain to the anterior aspect of his shoulder  He states this is an intermittent sharp pain  He notes increased pain with reaching overhead and sleeping at night  Patient states he is unable to take Advil and Ibuprofen due to his chemotherapy  He does take Oxycodone which he states does provide him with relief  He has tried Tylenol OTC without any relief  Patient is on chemotherapy for colon cancer       PAST MEDICAL HISTORY:  Past Medical History:   Diagnosis Date    Cancer [facility])     colon cancer    Diabetes mellitus ([facility] [address] )     Hemochromatosis     Testosterone deficiency        PAST SURGICAL HISTORY:  Past Surgical History:   Procedure Laterality Date    COLONOSCOPY      HEMICOLOECTOMY W/ ANASTOMOSIS N/A 5/5/2021    Procedure: ROBOTIC SIGMOID COLECTOMY, MOBILIZATION OF SPLENIC FLEXURE;  Surgeon: Odell Garcia MD;  [facility];  Service: Surgical Oncology    TUNNELED VENOUS PORT PLACEMENT Left 6/1/2021    Procedure: INSERTION VENOUS PORT (PORT-A-CATH) Ultrasound guided Left Internal Jugular Vein Access;  Surgeon: Hunter Willis MD;  Location: [facility];  Service: Surgical Oncology    WISDOM TOOTH EXTRACTION         FAMILY HISTORY:  Family History   Problem Relation Age of Onset    Colon polyps Mother     No Known Problems Father     Colon polyps Maternal Grandmother     Prostate cancer Maternal Grandfather     Lung cancer Paternal Grandmother     Diabetes Paternal Grandmother     Prostate cancer Paternal Grandfather     Heart disease Paternal Grandfather     Kidney failure Paternal Grandfather     Diabetes Family     Hypertension Family        SOCIAL HISTORY:  Social History     Tobacco Use    Smoking status: Former Smoker     Packs/day: 0 25     Years: 10 00     Pack years: 2 50     Types: Cigarettes     Quit date: 5/28/2011     Years since quitting: 10 0    Smokeless tobacco: Never Used   Vaping Use    Vaping Use: Never used   Substance Use Topics    Alcohol use: Not Currently    Drug use: Not Currently       MEDICATIONS:    Current Outpatient Medications:     acetaminophen (TYLENOL) 325 mg tablet, Take 3 tablets (975 mg total) by mouth every 6 (six) hours as needed for mild pain or moderate pain, Disp: 60 tablet, Rfl: 0    capecitabine (XELODA) 500 MG tablet, Take 3 tablets (1,500 mg total) by mouth 2 (two) times a day For 2 weeks on then 1 week off, Disp: 84 tablet, Rfl: 3    Empagliflozin (Jardiance) 25 MG TABS, Take 1 tablet (25 mg total) by mouth daily, Disp: 90 tablet, Rfl: 0    lidocaine-prilocaine (EMLA) cream, Apply topically as needed for mild pain On day of chemotherapy treatment  , Disp: 30 g, Rfl: 0    ondansetron (ZOFRAN-ODT) 4 mg disintegrating tablet, Take 1 tablet (4 mg total) by mouth every 6 (six) hours as needed for nausea or vomiting, Disp: 20 tablet, Rfl: 0    oxyCODONE (ROXICODONE) 5 mg immediate release tablet, Take 1 tablet by mouth for moderate pain, OR 2 tablets by mouth for severe pain, as needed up to every 6 hours  , Disp: 16 tablet, Rfl: 0   senna (SENOKOT) 8 6 MG tablet, Take 1 tablet (8 6 mg total) by mouth daily, Disp: 30 tablet, Rfl: 1    Dulaglutide 1 5 MG/0 5ML SOPN, Inject 0 5 mL (1 5 mg total) under the skin once a week (Patient not taking: Reported on 6/24/2021), Disp: 6 mL, Rfl: 1    gabapentin (NEURONTIN) 100 mg capsule, Take 2 capsules (200 mg total) by mouth 2 (two) times a day for 5 days, Disp: 120 capsule, Rfl: 0    ALLERGIES:  No Known Allergies    REVIEW OF SYSTEMS:  MSK: left shoulder pain   Neuro: none   Pertinent items are otherwise noted in HPI  A comprehensive review of systems was otherwise negative  LABS:  HgA1c:   Lab Results   Component Value Date    HGBA1C 5 8 (H) 03/09/2021     BMP:   Lab Results   Component Value Date    CALCIUM 9 3 06/22/2021    K 4 1 06/22/2021    CO2 28 06/22/2021     06/22/2021    BUN 15 06/22/2021    CREATININE 1 07 06/22/2021     CBC: No components found for: CBC    _____________________________________________________  PHYSICAL EXAMINATION:  Vital signs: /89   Pulse 81   Ht 5' 10" (1 778 m)   Wt 90 4 kg (199 lb 3 2 oz)   BMI 28 58 kg/m²   General: No acute distress, awake and alert  Psychiatric: Mood and affect appear appropriate  HEENT: Trachea Midline, No torticollis, no apparent facial trauma  Cardiovascular: No audible murmurs; Extremities appear perfused  Pulmonary: No audible wheezing or stridor  Skin: No open lesions; see further details (if any) below    MUSCULOSKELETAL EXAMINATION:  Extremities:  Left shoulder  No erythema or signs of infection  Sensation intact  Full active and passive range of motion      _____________________________________________________  STUDIES REVIEWED:  I personally reviewed the images and interpretation is as follows:X-ray left shoulder performed in the office today demonstrates no osseous abnormalities         PROCEDURES PERFORMED:  None performed today     Scribe Attestation    I,:  DOMINIK Tirado am acting as a scribe while in the presence of the attending physician :       I,:  Demian Anaya MD personally performed the services described in this documentation    as scribed in my presence :

## 2021-06-30 ENCOUNTER — TELEPHONE (OUTPATIENT)
Dept: PALLIATIVE MEDICINE | Facility: CLINIC | Age: 41
End: 2021-06-30

## 2021-06-30 ENCOUNTER — OFFICE VISIT (OUTPATIENT)
Dept: PALLIATIVE MEDICINE | Facility: CLINIC | Age: 41
End: 2021-06-30
Payer: COMMERCIAL

## 2021-06-30 ENCOUNTER — SOCIAL WORK (OUTPATIENT)
Dept: PALLIATIVE MEDICINE | Facility: CLINIC | Age: 41
End: 2021-06-30
Payer: COMMERCIAL

## 2021-06-30 VITALS
HEART RATE: 96 BPM | OXYGEN SATURATION: 98 % | SYSTOLIC BLOOD PRESSURE: 121 MMHG | WEIGHT: 198.19 LBS | DIASTOLIC BLOOD PRESSURE: 62 MMHG | BODY MASS INDEX: 28.44 KG/M2 | TEMPERATURE: 97.6 F | RESPIRATION RATE: 16 BRPM

## 2021-06-30 DIAGNOSIS — M25.512 ACUTE PAIN OF LEFT SHOULDER: ICD-10-CM

## 2021-06-30 DIAGNOSIS — T45.1X5A CHEMOTHERAPY-INDUCED NEUROPATHY (HCC): ICD-10-CM

## 2021-06-30 DIAGNOSIS — C18.9 COLON CANCER METASTASIZED TO INTRA-ABDOMINAL LYMPH NODE (HCC): ICD-10-CM

## 2021-06-30 DIAGNOSIS — C18.7 CANCER OF SIGMOID COLON (HCC): ICD-10-CM

## 2021-06-30 DIAGNOSIS — G62.0 CHEMOTHERAPY-INDUCED NEUROPATHY (HCC): ICD-10-CM

## 2021-06-30 DIAGNOSIS — C77.2 COLON CANCER METASTASIZED TO INTRA-ABDOMINAL LYMPH NODE (HCC): ICD-10-CM

## 2021-06-30 DIAGNOSIS — G89.3 CANCER RELATED PAIN: Primary | ICD-10-CM

## 2021-06-30 DIAGNOSIS — Z71.89 COUNSELING AND COORDINATION OF CARE: Primary | ICD-10-CM

## 2021-06-30 DIAGNOSIS — F19.11 HISTORY OF DRUG ABUSE (HCC): ICD-10-CM

## 2021-06-30 PROCEDURE — 99214 OFFICE O/P EST MOD 30 MIN: CPT | Performed by: NURSE PRACTITIONER

## 2021-06-30 PROCEDURE — 99024 POSTOP FOLLOW-UP VISIT: CPT

## 2021-06-30 RX ORDER — MORPHINE SULFATE 15 MG/1
15 TABLET ORAL 3 TIMES DAILY PRN
Qty: 42 TABLET | Refills: 0 | Status: SHIPPED | OUTPATIENT
Start: 2021-06-30 | End: 2021-07-09

## 2021-06-30 RX ORDER — GABAPENTIN 100 MG/1
300 CAPSULE ORAL 2 TIMES DAILY
Qty: 120 CAPSULE | Refills: 0
Start: 2021-06-30 | End: 2021-07-14 | Stop reason: SDUPTHER

## 2021-06-30 NOTE — TELEPHONE ENCOUNTER
This nurse received a PA approval letter for the Morphine  Called the [facility] pharmacy who was able to run the script  It is actually cheaper with a discount card so he has that for the patient   Patient will  today around 4 pm

## 2021-06-30 NOTE — TELEPHONE ENCOUNTER
Prior Authorization needed for Morphine Sulfate  Submitted to [employer] through covermymeds, marked urgent  Awaiting determination

## 2021-06-30 NOTE — PROGRESS NOTES
Outpatient Follow-Up - Palliative and Supportive Care   Tenzin Fill 37 y o  male [id number]    Assessment & Plan  Problem List Items Addressed This Visit        Digestive    Cancer of sigmoid colon [facility])    Relevant Medications    gabapentin (NEURONTIN) 100 mg capsule    Colon cancer metastasized to intra-abdominal lymph node (HCC)       Nervous and Auditory    Chemotherapy-induced neuropathy (HCC)       Other    Acute pain of left shoulder    History of drug abuse ([address] )      Other Visit Diagnoses     Cancer related pain    -  Primary    Relevant Medications    morphine (MSIR) 15 mg tablet          - Increase gabapentin to 300mg PO TID   -Change to 300mg capsules on next refill, has many 100mg caps left  - Stop Oxycodone  - Start Morphine IR 15mg TID PRN  - Patient will consider Methadone, has a history of using Suboxone for drug addiction and did not like it  - Follow up in 2 weeks with [facility]  - UDS collected in office          Medications adjusted this encounter:  Requested Prescriptions     Signed Prescriptions Disp Refills    gabapentin (NEURONTIN) 100 mg capsule 120 capsule 0     Sig: Take 3 capsules (300 mg total) by mouth 2 (two) times a day for 5 days    morphine (MSIR) 15 mg tablet 42 tablet 0     Sig: Take 1 tablet (15 mg total) by mouth 3 (three) times a day as needed for moderate pain or severe painMax Daily Amount: 45 mg     No orders of the defined types were placed in this encounter  Medications Discontinued During This Encounter   Medication Reason    oxyCODONE (ROXICODONE) 5 mg immediate release tablet Therapy completed    gabapentin (NEURONTIN) 100 mg capsule          Tenzin Fill was seen today for symptoms and planning cares related to above illnesses  I have reviewed the patient's controlled substance dispensing history in the Prescription Drug Monitoring Program in compliance with the [facility] regulations before prescribing any controlled substances      They are invited to continue to follow with us  If there are questions or concerns, please contact us through our clinic/answering service 24 hours a day, seven days a week  SHABANA Gordon  [facility] Palliative and Supportive Care        Visit Information    Accompanied By: No one    Source of History: Self, Medical record    History Limitations: None      History of Present Illness      Suni Ramos is a 37 y o  male who presents in follow up of symptoms related to cancer of the sigmoid colon  He has been treated with surgical resection and is now on chemotherapy with Dr Lanette Gosselin office  Pertinent issues include: symptom management, pain, neoplasm related, nausea, assessment of goals of care, disease process education and discussion of prognosis      Barbara Rodriguez returns to the office today at our request to discuss concerns that have been brought up regarding his history of drug abuse  He reports that no particular medication started his addiction and he would use "whatever" drugs he could get  He does not feel that Oxycodone was necessarily the start of his addiction, but he was taking as much as 80mg at a time in the past   He has been clean and sober for 3 years  He has been through rehab and has a sponsor in this area  His drug addiction started and ended in [place] when he lived there  He reports that when he was prescribed opiates after his abdominal surgery, his mom took them away from him  He has some resentment of the pain he was in but does feel that his mom genuinely cares for him and does it out of love  He is living with his parents and is grateful for their support  He has a family history of addiction and wonders if his mom has some guilt about his own past     We discussed his pain control in the setting of a history of drug abuse  He has both acute left shoulder pain near his port site and now peripheral neuropathy which is severe, likely chemotherapy induced    The pain is constant and limits his ability to work as a   He has mild abdominal discomfort but does not describe it as pain, especially given the severity of the other pains he has  Sleeping on his shoulder makes the shoulder pain worse  Nothing changes the severity of the neuropathy  He has not been taking Ibuprofen since his last visit, in accordance with our He is agreeable to taking increased doses of gabapentin to help with his neuropathic pain  He is uncomfortable with the idea of Methadone as he has used suboxone in the past for his addictions and did not like it  He will consider it for our next visit  Due to the more euphoric nature of oxycodone, we will rotate his opiates to morphine  We will see him in the office every 2 weeks until such a time that we are sure he is not abusing his medication  He is averaging 30mg of Oxycodone per day, about 45 OMEs  As he does not find this is getting his pain below a level 6/10 at any time, will rotate to a maximum of 45mg per day of morphine without dose reduction  Completed in-office UDS today and will review results when available  Violet Mota is encouraged to call the office if morphine is not controlling his pain and he is thoroughly aware that he must call before increasing his dose  He is agreeable to keeping in close contact with [facility]  He is also aware that if he desires, his mom may accompany him on his visits  Violet Mota has been having comfortable bowel movements  He has some nausea for which he finds MMJ is useful  His MMJ card is from [place] but is not certified by our practice  Suggested that he speak to the dispensary pharmacist regarding creams for his neuropathy as this may also be useful  [facility] will continue to follow closely with Violet Mota for supportive cares and cautious but effective symptom management  Past medical, surgical, social, and family histories are reviewed and pertinent updates are made      Review of Systems   Constitutional: Negative for decreased appetite, fever, weight gain and weight loss  HENT: Negative for sore throat  Eyes: Negative for visual disturbance  Cardiovascular: Negative for chest pain  Respiratory: Negative for cough and sputum production  Skin: Negative for rash  Musculoskeletal: Positive for muscle cramps and myalgias  Negative for back pain  Gastrointestinal: Positive for nausea  Negative for change in bowel habit, dysphagia and vomiting  Genitourinary: Negative for pelvic pain  Neurological: Positive for sensory change  Negative for loss of balance  Peripheral neuropathy with numbness, tingling   Psychiatric/Behavioral: Negative for substance abuse, suicidal ideas and thoughts of violence  The patient is not nervous/anxious  Vital Signs    /62 (BP Location: Left arm, Patient Position: Sitting, Cuff Size: Standard)   Pulse 96   Temp 97 6 °F (36 4 °C) (Temporal)   Resp 16   Wt 89 9 kg (198 lb 3 1 oz)   SpO2 98%   BMI 28 44 kg/m²     Physical Exam and Objective Data  Physical Exam  Constitutional:       Appearance: Normal appearance  HENT:      Head: Normocephalic  Right Ear: External ear normal       Left Ear: External ear normal       Nose: Nose normal       Mouth/Throat:      Mouth: Mucous membranes are moist       Pharynx: Oropharynx is clear  Eyes:      Conjunctiva/sclera: Conjunctivae normal       Pupils: Pupils are equal, round, and reactive to light  Cardiovascular:      Rate and Rhythm: Regular rhythm  Tachycardia present  Pulses: Normal pulses  Pulmonary:      Effort: Pulmonary effort is normal  No respiratory distress  Breath sounds: No stridor  No wheezing  Abdominal:      General: There is no distension  Palpations: Abdomen is soft  Musculoskeletal:         General: Normal range of motion  Cervical back: Normal range of motion and neck supple  Skin:     General: Skin is warm and dry        Capillary Refill: Capillary refill takes less than 2 seconds  Neurological:      General: No focal deficit present  Mental Status: He is alert and oriented to person, place, and time  Mental status is at baseline  Psychiatric:         Mood and Affect: Mood normal          Behavior: Behavior normal          Thought Content: Thought content normal          Judgment: Judgment normal            Radiology and Laboratory:  I personally reviewed and interpreted the following results: BUN/Creat for reasonability of prescribing morphine    30 minutes was spent face to face with Suni Ramos with greater than 50% of the time spent in counseling or coordination of care including discussions of diagnostic results, impression, and recommendations, risks and benefits of treatment, instructions for disease self management, treatment instructions, follow up requirements, risk factors and risk reduction of disease, patient and family counseling/involvement in care and compliance with treatment regimen   Additional time was also spent in discussing coronavirus vaccine indications, availability, and logistical challenges  All of the patient's or agent's questions were answered during this discussion      This note was not shared with the patient due to privacy exception: note includes other individuals

## 2021-06-30 NOTE — PROGRESS NOTES
Palliative Outpatient Assessment of Need    MSW completed an assessment of need which was completed with patient in the office  Family dynamics: Supportive  Relationship status: , but in relationship  Duration of relationship:   Name of significant other:  Children and Ages: 2 children who live in [address] [place] [address]:  Other important family information:   Living situation: Pt resides with his parents  He also stays with his girlfriend    Patient's primary caregiver: Self Any limitations of caregiver:None  Environmental concerns or barriers:    history: None  Employment history/source of income: Works as a    Disability:  N/A  Spirituality/ Orthodox:    Patient's strengths, social supports, and resources: Pt has strong family support  He is also well supported by his girlfriend  Cultural information:   Mental Health current or previous: None  Substance use or history: Pt has a history of drug and alcohol abuse  Pt has been in recovery for 3 years  He was in rehab when he lived in [place]  He moved back to this area in October of 2020  He does occasionally attend meetings for support  Aguila Moulton, had a discussion regarding pts mother not being supportive of opioids being prescribed  Lauren Melchor and I offered for pts mother to attend an appointment with him and he states that he will consider it, that way she can be more understanding of the cancer and the symptoms he has been experiencing  Pt recognizes that his mother struggles with him maintaining his sobriety and having to take opioids for the cancer related pain     Sleep: Sleeping ok  Exercise: As tolerated  Diet/nutrition: Appetite is good   Durable Medical Equipment needs: None at this time  Transportation: Self  Financial concerns: None at this time  Advanced Directive: None  Other medical or social work providers involved: Samina Estrada, Cancer Counselor  Patient/caregiver current level of coping: Pt appears to be coping appropriately  He appeared to be very hyper, this may be his baseline  He states this all happened so fast and he is still dealing with that aspect  I encouraged him to call me if he needs any extra emotional support  Patient/family concerns and areas of need: Pain Control  Patient's Interests: Skiing    *All questions may not be answered due to constraints    Follow-up discussions may need to occur

## 2021-07-06 ENCOUNTER — TELEPHONE (OUTPATIENT)
Dept: PALLIATIVE MEDICINE | Facility: CLINIC | Age: 41
End: 2021-07-06

## 2021-07-06 ENCOUNTER — TELEPHONE (OUTPATIENT)
Dept: HEMATOLOGY ONCOLOGY | Facility: CLINIC | Age: 41
End: 2021-07-06

## 2021-07-06 DIAGNOSIS — R52 PAIN: Primary | ICD-10-CM

## 2021-07-06 NOTE — TELEPHONE ENCOUNTER
Patient called to report he has been having pain in left shoulder area with activity   Patient states he has been to Orthopedic doctor and they feel it is from the port placed  Patient states since port was placed it is very tender surrounding port area    Please call patient back at [phone]

## 2021-07-07 ENCOUNTER — TELEPHONE (OUTPATIENT)
Dept: ENDOCRINOLOGY | Facility: CLINIC | Age: 41
End: 2021-07-07

## 2021-07-07 ENCOUNTER — TELEPHONE (OUTPATIENT)
Dept: SURGICAL ONCOLOGY | Facility: CLINIC | Age: 41
End: 2021-07-07

## 2021-07-07 NOTE — TELEPHONE ENCOUNTER
Patient called back agreed to come in to office to discuss port with Dr Jon Mcmanus on Monday 7/12/2021 at 4pm

## 2021-07-07 NOTE — TELEPHONE ENCOUNTER
Received a fax from [address] [place] [address] Meds for initiation of Trulicity PA  PA was initiated today    Key - [id number]     Approval received 7/7/2021-7/7/2022

## 2021-07-07 NOTE — TELEPHONE ENCOUNTER
Called patient back regarding pain from port site  Left message with my direct extension to give me a call back to discuss  Awaiting return phone call

## 2021-07-08 NOTE — TELEPHONE ENCOUNTER
Spoke with pt today  He explained he hurt himself a bit trying to  his girlfriend and found he needed to take an extra tab of Morphine to attempt to ease his pain  He is hoping to receive communication back with some advice

## 2021-07-09 RX ORDER — MORPHINE SULFATE 15 MG/1
TABLET ORAL
Qty: 42 TABLET | Refills: 0 | Status: SHIPPED | OUTPATIENT
Start: 2021-07-09 | End: 2021-07-14 | Stop reason: SDUPTHER

## 2021-07-09 NOTE — TELEPHONE ENCOUNTER
Pt called at 237 pm  Reporting pharmacy not filling script citing to soon  This nurse called pharmacy  Reviewed new instructions with increased frequency and dose  Pt is due to refill by today  Pharmacy able to fill as ordered  Pt notified

## 2021-07-09 NOTE — TELEPHONE ENCOUNTER
Patient called office regarding the Morphine  He states he has 3 1/2 tablets left  He stated he does not get much relief from the Morphine  Patient states he was taking 1 1/2 -2 tablets in the morning 1 in the afternoon and 1 at night  Please advise    Pharmacy [facility] [address]  Next scheduled appointment 7/14/21

## 2021-07-09 NOTE — TELEPHONE ENCOUNTER
PDMP reviewed  Was provided:    6/30 Morphine 15mg  #60  6/28 Oxycodone 5mg #16  6/16 Oxycodone 5mg #43    Spoke to patient on the phone  He reports that 1 tablet of morphine is helpful at times but sometimes he needs 1 and half in order to relieve pain in his feet or shoulder  At last visit, methadone was discussed and patient is not interested in this option  We discussed the fact that there are other options for treating neuropathic pain in his feet which can be discussed at his visit on 07/14  Eplained that we wont change morphine today since he does find some reflief with it and he has an appointment next week to discuss a good pain regimen  He is understanding and very agreeable to this plan to get him through to his appointment with Dr Jamie Baron next week  Will provide patient with a one week refill of morphine     Xray mentioned in note below remains pending

## 2021-07-12 ENCOUNTER — OFFICE VISIT (OUTPATIENT)
Dept: SURGICAL ONCOLOGY | Facility: CLINIC | Age: 41
End: 2021-07-12
Payer: COMMERCIAL

## 2021-07-12 VITALS
BODY MASS INDEX: 28.49 KG/M2 | HEART RATE: 94 BPM | HEIGHT: 70 IN | TEMPERATURE: 96.4 F | WEIGHT: 199 LBS | SYSTOLIC BLOOD PRESSURE: 124 MMHG | DIASTOLIC BLOOD PRESSURE: 88 MMHG | OXYGEN SATURATION: 95 %

## 2021-07-12 DIAGNOSIS — M25.512 ACUTE PAIN OF LEFT SHOULDER: ICD-10-CM

## 2021-07-12 DIAGNOSIS — C18.7 CANCER OF SIGMOID COLON (HCC): Primary | ICD-10-CM

## 2021-07-12 PROCEDURE — 3079F DIAST BP 80-89 MM HG: CPT | Performed by: SURGERY

## 2021-07-12 PROCEDURE — 99213 OFFICE O/P EST LOW 20 MIN: CPT | Performed by: SURGERY

## 2021-07-12 PROCEDURE — 3074F SYST BP LT 130 MM HG: CPT | Performed by: SURGERY

## 2021-07-12 NOTE — PROGRESS NOTES
Surgical Oncology Follow Up  [facility] [address] [facility] [address] [place] [id number]  Lyly Naqvi [facility]  1980  [id number]      Chief Complaint   Patient presents with    Follow-up     pain in port sight         Assessment & Plan:   History of colon cancer is on adjuvant chemotherapy he is here to check his port site  Port site is well healed no evidence of surgical site infection nontender the port or catheter  He has point tenderness at the medial aspect of the deltoid muscle  This is clinically and most  most unlikely related  to the port  I encouraged him to take NSAID as well as physical therapy  However he declined  physical therapy  He states his pain is around 2 to 3 out of 10 scale  I offered him if needed we can always remove the port  He he declined removal of the port as it has been working fine  He has received several treatments and 6 more cycles to go  Cancer History:     Oncology History   Cancer [facility] sigmoid colon ([facility] )   4/13/2021 Initial Diagnosis    [facility]    RESULTS OF IMMUNOHISTOCHEMICAL ANALYSIS FOR MISMATCH REPAIR PROTEIN LOSS  INTERPRETATION: NO LOSS OF NUCLEAR EXPRESSION OF MMR PROTEINS: LOW PROBABILITY OF MSI-H   RESULTS:  AntibodyClone Description Results  MLH1 M1 Mismatch repair protein Intact nuclear expression  MSH2 [id number] Mismatch repair protein Intact nuclear expression  MSH6 40 Mismatch repair protein Intact nuclear expression  PMS2 [id number] Mismatch repair protein Intact nuclear expression     5/5/2021 Surgery    Descending colon, partial colectomy:  - Adenocarcinoma (1 9 cm), moderately differentiated, arising in adenocarcinoma with high grade dysplasia    - Tumor invades the submucosa (through the muscularis mucosa in to deeper one third of submucosa, pT1, Sm3)   - All margins are negative for tumor   - One  of twenty eight lymph nodes is positive for tumor (1/28)      6/4/2021 -  Chemotherapy    oxaliplatin (ELOXATIN) chemo infusion, 130 mg/m2 = 275 6 mg, Intravenous, Once, 2 of 8 cycles  Administration: 275 6 mg (6/4/2021)     Colon cancer metastasized to intra-abdominal lymph node ([facility] )   5/20/2021 Initial Diagnosis    Colon cancer metastasized to intra-abdominal lymph node (HCC)     6/4/2021 -  Chemotherapy    oxaliplatin (ELOXATIN) chemo infusion, 130 mg/m2 = 275 6 mg, Intravenous, Once, 2 of 8 cycles  Administration: 275 6 mg (6/4/2021)           Interval History:   The 52-year-old gentleman with history of colon cancer status post colon resection and on adjuvant chemotherapy  He has a left-sided chest wall port which is functioning well and being used without any difficulty  Approximately from the port site and the lid shoulder medial aspect of deltoid muscle point tenderness  This prompted him to be evaluated by myself since I placed his port  Port as well as catheter up free of any pain or discomfort  He has been seen by orthopedic surgeon as well  He has no fever no chills   Review of Systems:   Review of Systems   Constitutional: Negative  HENT: Negative  Eyes: Negative  Respiratory: Negative  Cardiovascular: Negative  Gastrointestinal: Negative  Endocrine: Negative  Genitourinary: Negative  Musculoskeletal: Negative  Skin: Negative  Allergic/Immunologic: Negative  Neurological: Negative  Hematological: Negative  Psychiatric/Behavioral: Negative          Past Medical History     Patient Active Problem List   Diagnosis    Diabetes mellitus without complication ([facility] )    [facility]    Testosterone deficiency    Thyroid nodule    Fatigue    Cancer [facility]moid [facility]    Colon cancer metastasized to intra-abdominal lymph node (HCC)    Liver nodule    Lung nodule    Polycythemia    Acute pain of left shoulder    History of drug abuse ([facility] )    Chemotherapy-induced neuropathy ([facility] )     Past Medical History:   Diagnosis Date    Cancer ([facility] )     colon cancer    [facility]     Hemochromatosis     Testosterone deficiency      Past Surgical History:   Procedure Laterality Date    COLONOSCOPY      HEMICOLOECTOMY W/ ANASTOMOSIS N/A 5/5/2021    Procedure: ROBOTIC SIGMOID COLECTOMY, MOBILIZATION OF SPLENIC FLEXURE;  Surgeon: Chio Fernández MD;  Location:  MAIN OR;  Service: Surgical Oncology    TUNNELED VENOUS PORT PLACEMENT Left 6/1/2021    Procedure: INSERTION VENOUS PORT (PORT-A-CATH) Ultrasound guided Left Internal Jugular Vein Access;  Surgeon: Valerie Parker MD;  Location: [facility] MAIN OR;  Service: Surgical Oncology    WISDOM TOOTH EXTRACTION       Family History   Problem Relation Age of Onset    Colon polyps Mother     No Known Problems Father     Colon polyps Maternal Grandmother     Prostate cancer Maternal Grandfather     Lung cancer Paternal Grandmother     Diabetes Paternal Grandmother     Prostate cancer Paternal Grandfather     Heart disease Paternal Grandfather     Kidney failure Paternal Grandfather     Diabetes Family     Hypertension Family      Social History     Socioeconomic History    Marital status:      Spouse name: Not on file    Number of children: Not on file    Years of education: Not on file    Highest education level: Not on file   Occupational History    Not on file   Tobacco Use    Smoking status: Former Smoker     Packs/day: 0 25     Years: 10 00     Pack years: 2 50     Types: Cigarettes     Quit date: 5/28/2011     Years since quitting: 10 1    Smokeless tobacco: Never Used   Vaping Use    Vaping Use: Never used   Substance and Sexual Activity    Alcohol use: Not Currently    Drug use: Not Currently    Sexual activity: Yes     Partners: Female     Birth control/protection: Condom Male   Other Topics Concern    Not on file   Social History Narrative    Not on file     Social Determinants of Health     Financial Resource Strain:     Difficulty of Paying Living Expenses:    Food Insecurity:     Worried About Running Out of Food in the Last Year:     [address] in the Last Year:    Transportation Needs:     Lack of Transportation (Medical):  Lack of Transportation (Non-Medical):    Physical Activity: Sufficiently Active    Days of Exercise per Week: 5 days    Minutes of Exercise per Session: 60 min   Stress: No Stress Concern Present    Feeling of Stress :  Only a little   Social Connections:     Frequency of Communication with Friends and Family:     Frequency of Social Gatherings with Friends and Family:     Attends Rastafari Services:     Active Member of Clubs or Organizations:     Attends Club or Organization Meetings:     Marital Status:    Intimate Partner Violence:     Fear of Current or Ex-Partner:     Emotionally Abused:     Physically Abused:     Sexually Abused:        Current Outpatient Medications:     capecitabine (XELODA) 500 MG tablet, Take 3 tablets (1,500 mg total) by mouth 2 (two) times a day For 2 weeks on then 1 week off, Disp: 84 tablet, Rfl: 3    Dulaglutide 1 5 MG/0 5ML SOPN, Inject 0 5 mL (1 5 mg total) under the skin once a week, Disp: 6 mL, Rfl: 1    Empagliflozin (Jardiance) 25 MG TABS, Take 1 tablet (25 mg total) by mouth daily, Disp: 90 tablet, Rfl: 0    morphine (MSIR) 15 mg tablet, Taker one tablet (15mg) PO for moderate pain Q6H PRN or one and half tablets (22 5mg) PO Q6H PRN for severe pain, Disp: 42 tablet, Rfl: 0    ondansetron (ZOFRAN-ODT) 4 mg disintegrating tablet, Take 1 tablet (4 mg total) by mouth every 6 (six) hours as needed for nausea or vomiting, Disp: 20 tablet, Rfl: 0    acetaminophen (TYLENOL) 325 mg tablet, Take 3 tablets (975 mg total) by mouth every 6 (six) hours as needed for mild pain or moderate pain (Patient not taking: Reported on 7/12/2021), Disp: 60 tablet, Rfl: 0    gabapentin (NEURONTIN) 100 mg capsule, Take 3 capsules (300 mg total) by mouth 2 (two) times a day for 5 days (Patient not taking: Reported on 7/12/2021), Disp: 120 capsule, Rfl: 0    lidocaine-prilocaine (EMLA) cream, Apply topically as needed for mild pain On day of chemotherapy treatment  (Patient not taking: Reported on 6/30/2021), Disp: 30 g, Rfl: 0    senna (SENOKOT) 8 6 MG tablet, Take 1 tablet (8 6 mg total) by mouth daily (Patient not taking: Reported on 7/12/2021), Disp: 30 tablet, Rfl: 1  No Known Allergies    Physical Exam:     Vitals:    07/12/21 1606   BP: 124/88   Pulse: 94   Temp: (!) 96 4 °F (35 8 °C)   SpO2: 95%     Physical Exam  Constitutional:       Appearance: He is normal weight  HENT:      Head: Normocephalic and atraumatic  Mouth/Throat:      Mouth: Mucous membranes are moist       Pharynx: Oropharynx is clear  Cardiovascular:      Rate and Rhythm: Normal rate  Pulses: Normal pulses  Pulmonary:      Effort: Pulmonary effort is normal       Breath sounds: Normal breath sounds  Abdominal:      General: Abdomen is flat  Bowel sounds are normal       Palpations: Abdomen is soft  Musculoskeletal:         General: Normal range of motion  Cervical back: Normal range of motion and neck supple  Skin:     General: Skin is warm  Neurological:      Mental Status: He is alert  Results & Discussion:   He was told to call with any questions concerns ago  Otherwise we will follow him as needed basis  Patient had several questions and I answered to his satisfaction seems to understand and agree to follow as needed  All patient questions were answered  Advance Care Planning/Advance Directives: Nidhi Moore MD discussed the disease status with Jessica Morton  today 07/12/21  treatment plans and follow-up with the patient

## 2021-07-14 ENCOUNTER — APPOINTMENT (OUTPATIENT)
Dept: LAB | Facility: HOSPITAL | Age: 41
End: 2021-07-14
Attending: INTERNAL MEDICINE
Payer: COMMERCIAL

## 2021-07-14 ENCOUNTER — OFFICE VISIT (OUTPATIENT)
Dept: PALLIATIVE MEDICINE | Facility: CLINIC | Age: 41
End: 2021-07-14
Payer: COMMERCIAL

## 2021-07-14 VITALS
OXYGEN SATURATION: 99 % | TEMPERATURE: 99 F | WEIGHT: 198.63 LBS | BODY MASS INDEX: 28.5 KG/M2 | HEART RATE: 100 BPM | DIASTOLIC BLOOD PRESSURE: 70 MMHG | SYSTOLIC BLOOD PRESSURE: 110 MMHG

## 2021-07-14 DIAGNOSIS — C18.7 CANCER OF SIGMOID COLON (HCC): ICD-10-CM

## 2021-07-14 DIAGNOSIS — G89.3 CANCER RELATED PAIN: Primary | ICD-10-CM

## 2021-07-14 DIAGNOSIS — G62.0 CHEMOTHERAPY-INDUCED NEUROPATHY (HCC): ICD-10-CM

## 2021-07-14 DIAGNOSIS — E83.110 HEREDITARY HEMOCHROMATOSIS (HCC): Chronic | ICD-10-CM

## 2021-07-14 DIAGNOSIS — C18.9 COLON CANCER METASTASIZED TO INTRA-ABDOMINAL LYMPH NODE (HCC): ICD-10-CM

## 2021-07-14 DIAGNOSIS — T45.1X5A CHEMOTHERAPY-INDUCED NEUROPATHY (HCC): ICD-10-CM

## 2021-07-14 DIAGNOSIS — R52 PAIN: ICD-10-CM

## 2021-07-14 DIAGNOSIS — C77.2 COLON CANCER METASTASIZED TO INTRA-ABDOMINAL LYMPH NODE (HCC): ICD-10-CM

## 2021-07-14 DIAGNOSIS — Z51.5 PALLIATIVE CARE PATIENT: ICD-10-CM

## 2021-07-14 LAB
ALBUMIN SERPL BCP-MCNC: 4.1 G/DL (ref 3.5–5)
ALP SERPL-CCNC: 86 U/L (ref 46–116)
ALT SERPL W P-5'-P-CCNC: 59 U/L (ref 12–78)
ANION GAP SERPL CALCULATED.3IONS-SCNC: 3 MMOL/L (ref 4–13)
AST SERPL W P-5'-P-CCNC: 41 U/L (ref 5–45)
BASOPHILS # BLD AUTO: 0.02 THOUSANDS/ΜL (ref 0–0.1)
BASOPHILS NFR BLD AUTO: 0 % (ref 0–1)
BILIRUB SERPL-MCNC: 1.29 MG/DL (ref 0.2–1)
BUN SERPL-MCNC: 14 MG/DL (ref 5–25)
CALCIUM SERPL-MCNC: 9.8 MG/DL (ref 8.3–10.1)
CHLORIDE SERPL-SCNC: 105 MMOL/L (ref 100–108)
CO2 SERPL-SCNC: 29 MMOL/L (ref 21–32)
CREAT SERPL-MCNC: 1.04 MG/DL (ref 0.6–1.3)
EOSINOPHIL # BLD AUTO: 0.2 THOUSAND/ΜL (ref 0–0.61)
EOSINOPHIL NFR BLD AUTO: 4 % (ref 0–6)
ERYTHROCYTE [DISTWIDTH] IN BLOOD BY AUTOMATED COUNT: 17.7 % (ref 11.6–15.1)
GFR SERPL CREATININE-BSD FRML MDRD: 89 ML/MIN/1.73SQ M
GLUCOSE SERPL-MCNC: 101 MG/DL (ref 65–140)
HCT VFR BLD AUTO: 43.2 % (ref 36.5–49.3)
HGB BLD-MCNC: 14.2 G/DL (ref 12–17)
IMM GRANULOCYTES # BLD AUTO: 0.01 THOUSAND/UL (ref 0–0.2)
IMM GRANULOCYTES NFR BLD AUTO: 0 % (ref 0–2)
LYMPHOCYTES # BLD AUTO: 1.92 THOUSANDS/ΜL (ref 0.6–4.47)
LYMPHOCYTES NFR BLD AUTO: 37 % (ref 14–44)
MCH RBC QN AUTO: 28.9 PG (ref 26.8–34.3)
MCHC RBC AUTO-ENTMCNC: 32.9 G/DL (ref 31.4–37.4)
MCV RBC AUTO: 88 FL (ref 82–98)
MONOCYTES # BLD AUTO: 0.61 THOUSAND/ΜL (ref 0.17–1.22)
MONOCYTES NFR BLD AUTO: 12 % (ref 4–12)
NEUTROPHILS # BLD AUTO: 2.37 THOUSANDS/ΜL (ref 1.85–7.62)
NEUTS SEG NFR BLD AUTO: 47 % (ref 43–75)
NRBC BLD AUTO-RTO: 0 /100 WBCS
PLATELET # BLD AUTO: 188 THOUSANDS/UL (ref 149–390)
PMV BLD AUTO: 11 FL (ref 8.9–12.7)
POTASSIUM SERPL-SCNC: 4.5 MMOL/L (ref 3.5–5.3)
PROT SERPL-MCNC: 7.7 G/DL (ref 6.4–8.2)
RBC # BLD AUTO: 4.92 MILLION/UL (ref 3.88–5.62)
SODIUM SERPL-SCNC: 137 MMOL/L (ref 136–145)
WBC # BLD AUTO: 5.13 THOUSAND/UL (ref 4.31–10.16)

## 2021-07-14 PROCEDURE — 1036F TOBACCO NON-USER: CPT | Performed by: INTERNAL MEDICINE

## 2021-07-14 PROCEDURE — 80053 COMPREHEN METABOLIC PANEL: CPT

## 2021-07-14 PROCEDURE — 85025 COMPLETE CBC W/AUTO DIFF WBC: CPT

## 2021-07-14 PROCEDURE — 36415 COLL VENOUS BLD VENIPUNCTURE: CPT

## 2021-07-14 PROCEDURE — 99213 OFFICE O/P EST LOW 20 MIN: CPT | Performed by: INTERNAL MEDICINE

## 2021-07-14 RX ORDER — MORPHINE SULFATE 15 MG/1
TABLET ORAL
Qty: 84 TABLET | Refills: 0 | Status: SHIPPED | OUTPATIENT
Start: 2021-07-14 | End: 2021-07-28 | Stop reason: SDUPTHER

## 2021-07-14 RX ORDER — GABAPENTIN 300 MG/1
300 CAPSULE ORAL 3 TIMES DAILY
Qty: 90 CAPSULE | Refills: 0 | Status: SHIPPED | OUTPATIENT
Start: 2021-07-14 | End: 2021-09-20

## 2021-07-14 NOTE — PROGRESS NOTES
Palliative and Supportive Care   Paolo Stahl 37 y o  male [id number]    Assessment/Plan:  1  Cancer related pain    2  Cancer of sigmoid colon ([facility] [facility] )    3  [facility]    4  Hereditary hemochromatosis ([facility] )    5  Palliative care patient    6  Pain      Requested Prescriptions     Signed Prescriptions Disp Refills    gabapentin (NEURONTIN) 300 mg capsule 90 capsule 0     Sig: Take 1 capsule (300 mg total) by mouth 3 (three) times a day    morphine (MSIR) 15 mg tablet 84 tablet 0     Sig: Taker one tablet (15mg) PO for moderate pain Q6H PRN or one and half tablets (22 5mg) PO Q6H PRN for severe pain     Medications Discontinued During This Encounter   Medication Reason    gabapentin (NEURONTIN) 100 mg capsule Reorder    morphine (MSIR) 15 mg tablet Reorder       PLAN:  - no changes  - 2 week supply of MSIR sent to pharmacy  - RTC in 2 weeks    ACP: did not discuss    Representatives have queried the patient's controlled substance dispensing history in the Prescription Drug Monitoring Program in compliance with regulations before I have prescribed any controlled substances  The prescription history is consistent with prescribed therapy and our practice policies  No follow-ups on file  Subjective: In attendance at this visit: Paolo Stahl  Chief Complaint  Follow up visit for:  symptom management, pain, neoplasm related, disease process education and discussion of prognosis  HPI     Paolo Stahl is a 37 y o  male with stage III T1N1 adenocarcinoma of the colon s/p resection on adjuvant chemotherapy  Further history includes hemochromatosis and history of substance abuse  Patient feels that he is doing well with the current regimen  Still having shoulder pain that limits him a little at work ()  He is considering going back to the gym to see if he can regain strength  On his off chemotherapy weeks, he notes improvement of neuropathy   He is meeting with Dr Zuri Steve at end of week to discuss continuing same regimen or trying a different regimen  He remains well supported by his parents and girlfriend  Time spent discussing "new normal" and processing his diagnosis  The following portions of the medical history were reviewed: past medical history, problem list, medication list, and social history  Current Outpatient Medications:     acetaminophen (TYLENOL) 325 mg tablet, Take 3 tablets (975 mg total) by mouth every 6 (six) hours as needed for mild pain or moderate pain (Patient not taking: Reported on 7/12/2021), Disp: 60 tablet, Rfl: 0    capecitabine (XELODA) 500 MG tablet, Take 3 tablets (1,500 mg total) by mouth 2 (two) times a day For 2 weeks on then 1 week off, Disp: 84 tablet, Rfl: 3    Dulaglutide 1 5 MG/0 5ML SOPN, Inject 0 5 mL (1 5 mg total) under the skin once a week, Disp: 6 mL, Rfl: 1    Empagliflozin (Jardiance) 25 MG TABS, Take 1 tablet (25 mg total) by mouth daily, Disp: 90 tablet, Rfl: 0    gabapentin (NEURONTIN) 300 mg capsule, Take 1 capsule (300 mg total) by mouth 3 (three) times a day, Disp: 90 capsule, Rfl: 0    lidocaine-prilocaine (EMLA) cream, Apply topically as needed for mild pain On day of chemotherapy treatment   (Patient not taking: Reported on 6/30/2021), Disp: 30 g, Rfl: 0    morphine (MSIR) 15 mg tablet, Taker one tablet (15mg) PO for moderate pain Q6H PRN or one and half tablets (22 5mg) PO Q6H PRN for severe pain, Disp: 84 tablet, Rfl: 0    ondansetron (ZOFRAN-ODT) 4 mg disintegrating tablet, Take 1 tablet (4 mg total) by mouth every 6 (six) hours as needed for nausea or vomiting, Disp: 20 tablet, Rfl: 0    senna (SENOKOT) 8 6 MG tablet, Take 1 tablet (8 6 mg total) by mouth daily (Patient not taking: Reported on 7/12/2021), Disp: 30 tablet, Rfl: 1  Review of Systems    All other systems negative    Objective:  Vital Signs  /70 (BP Location: Left arm, Cuff Size: Standard)   Pulse 100   Temp 99 °F (37 2 °C) (Temporal)   Wt 90 1 kg (198 lb 10 2 oz)   SpO2 99%   BMI 28 50 kg/m²    Physical Exam    Constitutional: Appears chronically ill  In no acute physical or emotional distress  Head: Normocephalic and atraumatic  Eyes: EOM are normal  No ocular discharge  No scleral icterus  Neck: No visible adenopathy or masses  Cardiovascular: Regular rate and rhythm, palpable distal pulses   Respiratory: Effort normal  No stridor  No respiratory distress  Gastrointestinal: No abdominal distension  Abdomen is soft  Musculoskeletal: No edema  Neurological: Alert, oriented and appropriately conversant  Skin: No diaphoresis, dry and warm to touch, no rashes seen on exposed areas of skin  Psychiatric: Displays a normal mood and affect   Behavior, judgement and thought content appear normal

## 2021-07-16 ENCOUNTER — TELEPHONE (OUTPATIENT)
Dept: PALLIATIVE MEDICINE | Facility: CLINIC | Age: 41
End: 2021-07-16

## 2021-07-16 ENCOUNTER — TELEPHONE (OUTPATIENT)
Dept: HEMATOLOGY ONCOLOGY | Facility: CLINIC | Age: 41
End: 2021-07-16

## 2021-07-16 ENCOUNTER — OFFICE VISIT (OUTPATIENT)
Dept: HEMATOLOGY ONCOLOGY | Facility: CLINIC | Age: 41
End: 2021-07-16
Payer: COMMERCIAL

## 2021-07-16 VITALS
BODY MASS INDEX: 28.2 KG/M2 | DIASTOLIC BLOOD PRESSURE: 76 MMHG | HEIGHT: 70 IN | RESPIRATION RATE: 18 BRPM | TEMPERATURE: 96 F | WEIGHT: 197 LBS | HEART RATE: 88 BPM | OXYGEN SATURATION: 99 % | SYSTOLIC BLOOD PRESSURE: 102 MMHG

## 2021-07-16 DIAGNOSIS — C18.7 CANCER OF SIGMOID COLON (HCC): Primary | ICD-10-CM

## 2021-07-16 DIAGNOSIS — G62.9 NEUROPATHY: ICD-10-CM

## 2021-07-16 DIAGNOSIS — M25.512 ACUTE PAIN OF LEFT SHOULDER: ICD-10-CM

## 2021-07-16 PROCEDURE — 99214 OFFICE O/P EST MOD 30 MIN: CPT | Performed by: INTERNAL MEDICINE

## 2021-07-16 PROCEDURE — 3008F BODY MASS INDEX DOCD: CPT | Performed by: INTERNAL MEDICINE

## 2021-07-16 NOTE — TELEPHONE ENCOUNTER
Reached out to patient's health insurance [phone], I was informed that patient's health insurance only covers 90 tablets per month and will be able to fill script August 4th 2021  There was a PA done and approved on 6/30 there for it does not need one

## 2021-07-16 NOTE — TELEPHONE ENCOUNTER
Patient has 1 day worth of his Morphine left  Per note below, Insurance will not cover more than 90 tablets per month, patient has been using discount cards from pharmacy as his co-pay even with insurance, is high  Per PDMP the patient would be due today for a refill to his medication  I called the [facility] pharmacy  and spoke with the pharmacist, who will use a discount card to fill his script today  I spoke with patient who is aware he will be able to  his script later today         07/09/2021 1 07/09/2021   MORPHINE SULFATE IR 15 MG TAB  42 0 7 JE BLO   [id number]  PENNS ([id number]) 0 90 0 MME Comm Ins PA

## 2021-07-16 NOTE — TELEPHONE ENCOUNTER
Patient called stating that he wants to have a transfer of care to another provider within our practice in the [facility] or [facility] location  He stated he wanted to see who else Dr Constance Lainez recommends  Please review and work with Dr Constance Lainez to a transfer of care with this patient

## 2021-07-22 ENCOUNTER — TELEPHONE (OUTPATIENT)
Dept: NUTRITION | Facility: CLINIC | Age: 41
End: 2021-07-22

## 2021-07-22 DIAGNOSIS — C18.7 CANCER OF SIGMOID COLON (HCC): ICD-10-CM

## 2021-07-22 RX ORDER — ONDANSETRON 4 MG/1
4 TABLET, ORALLY DISINTEGRATING ORAL EVERY 6 HOURS PRN
Qty: 20 TABLET | Refills: 0 | Status: SHIPPED | OUTPATIENT
Start: 2021-07-22 | End: 2021-09-20

## 2021-07-22 NOTE — TELEPHONE ENCOUNTER
Dr Laurie Dockery spoke with pt  He would like to transfer care to Dr Kim Pablo  I will reach out to his team to schedule

## 2021-07-22 NOTE — TELEPHONE ENCOUNTER
Per Dr Mayo Gale, the patient should be seen in 3 weeks for a 40 minute visit  I scheduled the patient on 8/5 with Dr Mayo Gale at 2:40 PM  I left a voicemail for the patient with this information

## 2021-07-22 NOTE — TELEPHONE ENCOUNTER
Primary palliative medicine provider: [place]     Medication requested: Zofran  4 mg     If for pain, how has the patient been taking their pain medicine?  NA    Last appointment:  7 14    Next scheduled appointment:  7 28    PDMP review:  NA       [facility]

## 2021-07-28 ENCOUNTER — OFFICE VISIT (OUTPATIENT)
Dept: PALLIATIVE MEDICINE | Facility: CLINIC | Age: 41
End: 2021-07-28
Payer: COMMERCIAL

## 2021-07-28 VITALS
TEMPERATURE: 98.4 F | SYSTOLIC BLOOD PRESSURE: 118 MMHG | WEIGHT: 195.99 LBS | RESPIRATION RATE: 18 BRPM | HEIGHT: 70 IN | DIASTOLIC BLOOD PRESSURE: 80 MMHG | HEART RATE: 104 BPM | OXYGEN SATURATION: 97 % | BODY MASS INDEX: 28.06 KG/M2

## 2021-07-28 DIAGNOSIS — T45.1X5A CHEMOTHERAPY-INDUCED NEUROPATHY (HCC): ICD-10-CM

## 2021-07-28 DIAGNOSIS — C77.2 COLON CANCER METASTASIZED TO INTRA-ABDOMINAL LYMPH NODE (HCC): ICD-10-CM

## 2021-07-28 DIAGNOSIS — C18.9 COLON CANCER METASTASIZED TO INTRA-ABDOMINAL LYMPH NODE (HCC): ICD-10-CM

## 2021-07-28 DIAGNOSIS — G89.3 CANCER RELATED PAIN: Primary | ICD-10-CM

## 2021-07-28 DIAGNOSIS — G62.0 CHEMOTHERAPY-INDUCED NEUROPATHY (HCC): ICD-10-CM

## 2021-07-28 DIAGNOSIS — R52 PAIN: ICD-10-CM

## 2021-07-28 DIAGNOSIS — Z51.5 PALLIATIVE CARE PATIENT: ICD-10-CM

## 2021-07-28 PROCEDURE — 99213 OFFICE O/P EST LOW 20 MIN: CPT | Performed by: INTERNAL MEDICINE

## 2021-07-28 RX ORDER — MORPHINE SULFATE 15 MG/1
TABLET ORAL
Qty: 84 TABLET | Refills: 0 | Status: SHIPPED | OUTPATIENT
Start: 2021-07-28 | End: 2021-08-10 | Stop reason: SDUPTHER

## 2021-07-28 NOTE — PROGRESS NOTES
Palliative and Supportive Care   Salud Aguilar 37 y o  male [id number]    Assessment/Plan:  1  Cancer related pain    2  Palliative care patient    3  Chemotherapy-induced neuropathy ([address] )    4  Colon cancer metastasized to intra-abdominal lymph node (HCC)    5  Pain      Requested Prescriptions     Signed Prescriptions Disp Refills    morphine (MSIR) 15 mg tablet 84 tablet 0     Sig: Taker one tablet (15mg) PO for moderate pain Q6H PRN or one and half tablets (22 5mg) PO Q6H PRN for severe pain     Medications Discontinued During This Encounter   Medication Reason    morphine (MSIR) 15 mg tablet Reorder       PLAN:  - 2 week supply of MSIR sent  - RTC in 4 weeks    ACP: did not address    Representatives have queried the patient's controlled substance dispensing history in the Prescription Drug Monitoring Program in compliance with regulations before I have prescribed any controlled substances  The prescription history is consistent with prescribed therapy and our practice policies  No follow-ups on file  Subjective: In attendance at this visit: Salud Aguilar  Chief Complaint  Follow up visit for:  symptom management, pain, neoplasm related, nausea  HPI     Salud Aguilar is a 37 y o  male with stage III T1N1 adenocarcinoma of the colon s/p resection on adjuvant chemotherapy  Further history includes hemochromatosis and history of substance abuse  He recently met with Dr Silvio Costa and decision made to hold oxaliplatin due to worsening neuropathies  Patient is doing quite well  His shoulder pain has vastly improved since changes made to both pain regimen and chemotherapy regimen  He was able to go golfing and play in a softball tournament  He is going to the [facility] next weekend and counseled on sun protection given sensitivities from current medications  He plans to go for a 2nd opinion next week with Dr Roland Acevedo       The following portions of the medical history were reviewed: past medical history, problem list, medication list, and social history  Current Outpatient Medications:     capecitabine (XELODA) 500 MG tablet, Take 3 tablets (1,500 mg total) by mouth 2 (two) times a day For 2 weeks on then 1 week off, Disp: 84 tablet, Rfl: 3    Dulaglutide 1 5 MG/0 5ML SOPN, Inject 0 5 mL (1 5 mg total) under the skin once a week, Disp: 6 mL, Rfl: 1    Empagliflozin (Jardiance) 25 MG TABS, Take 1 tablet (25 mg total) by mouth daily, Disp: 90 tablet, Rfl: 0    gabapentin (NEURONTIN) 300 mg capsule, Take 1 capsule (300 mg total) by mouth 3 (three) times a day, Disp: 90 capsule, Rfl: 0    morphine (MSIR) 15 mg tablet, Taker one tablet (15mg) PO for moderate pain Q6H PRN or one and half tablets (22 5mg) PO Q6H PRN for severe pain, Disp: 84 tablet, Rfl: 0    ondansetron (ZOFRAN-ODT) 4 mg disintegrating tablet, Take 1 tablet (4 mg total) by mouth every 6 (six) hours as needed for nausea or vomiting, Disp: 20 tablet, Rfl: 0    senna (SENOKOT) 8 6 MG tablet, Take 1 tablet (8 6 mg total) by mouth daily, Disp: 30 tablet, Rfl: 1    acetaminophen (TYLENOL) 325 mg tablet, Take 3 tablets (975 mg total) by mouth every 6 (six) hours as needed for mild pain or moderate pain (Patient not taking: Reported on 7/12/2021), Disp: 60 tablet, Rfl: 0    lidocaine-prilocaine (EMLA) cream, Apply topically as needed for mild pain On day of chemotherapy treatment  (Patient not taking: Reported on 6/30/2021), Disp: 30 g, Rfl: 0  Review of Systems   Gastrointestinal: Positive for abdominal pain  Skin: Positive for wound  Hematological: Bruises/bleeds easily  Psychiatric/Behavioral: The patient is not nervous/anxious  All other systems reviewed and are negative        All other systems negative    Objective:  Vital Signs  /80 (BP Location: Left arm, Patient Position: Sitting, Cuff Size: Standard)   Pulse 104   Temp 98 4 °F (36 9 °C) (Temporal)   Resp 18   Ht 5' 10" (1 778 m)   Wt 88 9 kg (195 lb 15 8 oz)   SpO2 97% BMI 28 12 kg/m²    Physical Exam    Constitutional: Appears chronically ill   In no acute physical or emotional distress  Head: Normocephalic and atraumatic  Eyes: EOM are normal  No ocular discharge  No scleral icterus  Neck: No visible adenopathy or masses  Cardiovascular: Regular rate and rhythm, palpable distal pulses   Respiratory: Effort normal  No stridor  No respiratory distress  Gastrointestinal: No abdominal distension  Abdomen is soft  Musculoskeletal: No edema  Neurological: Alert, oriented and appropriately conversant  Skin: No diaphoresis, dry and warm to touch, yes rashes seen on exposed areas of skin  Psychiatric: Displays a normal mood and affect   Behavior, judgement and thought content appear normal

## 2021-08-04 ENCOUNTER — TELEPHONE (OUTPATIENT)
Dept: INFUSION CENTER | Facility: CLINIC | Age: 41
End: 2021-08-04

## 2021-08-04 ENCOUNTER — APPOINTMENT (OUTPATIENT)
Dept: LAB | Facility: HOSPITAL | Age: 41
End: 2021-08-04
Attending: INTERNAL MEDICINE
Payer: COMMERCIAL

## 2021-08-04 DIAGNOSIS — C18.9 COLON CANCER METASTASIZED TO INTRA-ABDOMINAL LYMPH NODE (HCC): ICD-10-CM

## 2021-08-04 DIAGNOSIS — C18.7 CANCER OF SIGMOID COLON (HCC): ICD-10-CM

## 2021-08-04 DIAGNOSIS — C77.2 COLON CANCER METASTASIZED TO INTRA-ABDOMINAL LYMPH NODE (HCC): ICD-10-CM

## 2021-08-04 LAB
ALBUMIN SERPL BCP-MCNC: 4.2 G/DL (ref 3.5–5)
ALP SERPL-CCNC: 82 U/L (ref 46–116)
ALT SERPL W P-5'-P-CCNC: 37 U/L (ref 12–78)
ANION GAP SERPL CALCULATED.3IONS-SCNC: 9 MMOL/L (ref 4–13)
AST SERPL W P-5'-P-CCNC: 27 U/L (ref 5–45)
BASOPHILS # BLD AUTO: 0.03 THOUSANDS/ΜL (ref 0–0.1)
BASOPHILS NFR BLD AUTO: 1 % (ref 0–1)
BILIRUB SERPL-MCNC: 1.48 MG/DL (ref 0.2–1)
BUN SERPL-MCNC: 12 MG/DL (ref 5–25)
CALCIUM SERPL-MCNC: 9.3 MG/DL (ref 8.3–10.1)
CHLORIDE SERPL-SCNC: 102 MMOL/L (ref 100–108)
CO2 SERPL-SCNC: 28 MMOL/L (ref 21–32)
CREAT SERPL-MCNC: 1.01 MG/DL (ref 0.6–1.3)
EOSINOPHIL # BLD AUTO: 0.13 THOUSAND/ΜL (ref 0–0.61)
EOSINOPHIL NFR BLD AUTO: 3 % (ref 0–6)
ERYTHROCYTE [DISTWIDTH] IN BLOOD BY AUTOMATED COUNT: 18.7 % (ref 11.6–15.1)
GFR SERPL CREATININE-BSD FRML MDRD: 93 ML/MIN/1.73SQ M
GLUCOSE SERPL-MCNC: 155 MG/DL (ref 65–140)
HCT VFR BLD AUTO: 42.9 % (ref 36.5–49.3)
HGB BLD-MCNC: 14.4 G/DL (ref 12–17)
IMM GRANULOCYTES # BLD AUTO: 0.01 THOUSAND/UL (ref 0–0.2)
IMM GRANULOCYTES NFR BLD AUTO: 0 % (ref 0–2)
LYMPHOCYTES # BLD AUTO: 1.83 THOUSANDS/ΜL (ref 0.6–4.47)
LYMPHOCYTES NFR BLD AUTO: 39 % (ref 14–44)
MCH RBC QN AUTO: 29.4 PG (ref 26.8–34.3)
MCHC RBC AUTO-ENTMCNC: 33.6 G/DL (ref 31.4–37.4)
MCV RBC AUTO: 88 FL (ref 82–98)
MONOCYTES # BLD AUTO: 0.35 THOUSAND/ΜL (ref 0.17–1.22)
MONOCYTES NFR BLD AUTO: 8 % (ref 4–12)
NEUTROPHILS # BLD AUTO: 2.33 THOUSANDS/ΜL (ref 1.85–7.62)
NEUTS SEG NFR BLD AUTO: 49 % (ref 43–75)
NRBC BLD AUTO-RTO: 0 /100 WBCS
PLATELET # BLD AUTO: 221 THOUSANDS/UL (ref 149–390)
PMV BLD AUTO: 9.9 FL (ref 8.9–12.7)
POTASSIUM SERPL-SCNC: 4.8 MMOL/L (ref 3.5–5.3)
PROT SERPL-MCNC: 7.4 G/DL (ref 6.4–8.2)
RBC # BLD AUTO: 4.89 MILLION/UL (ref 3.88–5.62)
SODIUM SERPL-SCNC: 139 MMOL/L (ref 136–145)
WBC # BLD AUTO: 4.68 THOUSAND/UL (ref 4.31–10.16)

## 2021-08-04 PROCEDURE — 36415 COLL VENOUS BLD VENIPUNCTURE: CPT

## 2021-08-04 PROCEDURE — 80053 COMPREHEN METABOLIC PANEL: CPT

## 2021-08-04 PROCEDURE — 85025 COMPLETE CBC W/AUTO DIFF WBC: CPT

## 2021-08-04 NOTE — TELEPHONE ENCOUNTER
Spoke with patient  Tx for 8/6 has been rescheduled to 8/11  Schedule for tx has also been adjusted to accommodate this change  Pt will  his new schedule at his appt with Dr Evans Age tomorrow

## 2021-08-04 NOTE — TELEPHONE ENCOUNTER
This should be re scheduled, can you please contact the patient to re schedule treatment planned for this Friday 8/6/21  Please adjust upcoming apt's to reflect new treatment date

## 2021-08-04 NOTE — TELEPHONE ENCOUNTER
Pt called infusion center to cx tx for Friday 8/6 because he has to go out of town for an emergency  Please let us know if this cycle will be deferred, or rescheduled  Thanks!

## 2021-08-05 ENCOUNTER — OFFICE VISIT (OUTPATIENT)
Dept: HEMATOLOGY ONCOLOGY | Facility: CLINIC | Age: 41
End: 2021-08-05
Payer: COMMERCIAL

## 2021-08-05 VITALS
HEIGHT: 70 IN | RESPIRATION RATE: 16 BRPM | SYSTOLIC BLOOD PRESSURE: 122 MMHG | HEART RATE: 113 BPM | DIASTOLIC BLOOD PRESSURE: 82 MMHG | TEMPERATURE: 97.4 F | WEIGHT: 193 LBS | BODY MASS INDEX: 27.63 KG/M2 | OXYGEN SATURATION: 97 %

## 2021-08-05 DIAGNOSIS — E83.110 HEREDITARY HEMOCHROMATOSIS (HCC): ICD-10-CM

## 2021-08-05 DIAGNOSIS — T45.1X5A CHEMOTHERAPY-INDUCED NAUSEA: ICD-10-CM

## 2021-08-05 DIAGNOSIS — R11.0 CHEMOTHERAPY-INDUCED NAUSEA: ICD-10-CM

## 2021-08-05 DIAGNOSIS — C18.7 CANCER OF SIGMOID COLON (HCC): Primary | ICD-10-CM

## 2021-08-05 PROCEDURE — 3079F DIAST BP 80-89 MM HG: CPT | Performed by: INTERNAL MEDICINE

## 2021-08-05 PROCEDURE — 99214 OFFICE O/P EST MOD 30 MIN: CPT | Performed by: INTERNAL MEDICINE

## 2021-08-05 PROCEDURE — 3008F BODY MASS INDEX DOCD: CPT | Performed by: INTERNAL MEDICINE

## 2021-08-05 PROCEDURE — 3074F SYST BP LT 130 MM HG: CPT | Performed by: INTERNAL MEDICINE

## 2021-08-05 RX ORDER — SODIUM FLUORIDE 6 MG/ML
PASTE, DENTIFRICE DENTAL
COMMUNITY
Start: 2021-07-21 | End: 2022-07-13 | Stop reason: ALTCHOICE

## 2021-08-05 NOTE — PROGRESS NOTES
Hematology/Oncology Outpatient Follow- up Note  Elvira Clark 37 y o  male MRN: @ Encounter: [id number]        Date:  8/5/2021    Presenting Complaint/Diagnosis : PT1N1a    HPI:      The patient is a pleasant 50-year-old male who presented with GI bleeding and was found to have an adenocarcinoma of the descending colon status post hemicolectomy  Tumor was a grade 2 adenocarcinoma with 1/28 lymph nodes positive  It was a T1 lesion  The patient was started on adjuvant capecitabine and oxaliplatin  He has had 2 cycles so far  Cycle 3  Was canceled for his vacation per his wishes apparently  Previous Hematologic/ Oncologic History:    Oncology History   Cancer of sigmoid colon ([facility] [address] )   4/13/2021 Initial Diagnosis    Cancer of sigmoid colon ([facility] [address] )    RESULTS OF IMMUNOHISTOCHEMICAL ANALYSIS FOR MISMATCH REPAIR PROTEIN LOSS  INTERPRETATION: NO LOSS OF NUCLEAR EXPRESSION OF MMR PROTEINS: LOW PROBABILITY OF MSI-H   RESULTS:  AntibodyClone Description Results  MLH1 M1 Mismatch repair protein Intact nuclear expression  MSH2 [id number] Mismatch repair protein Intact nuclear expression  MSH6 40 Mismatch repair protein Intact nuclear expression  PMS2 [id number] Mismatch repair protein Intact nuclear expression     5/5/2021 Surgery    Descending colon, partial colectomy:  - Adenocarcinoma (1 9 cm), moderately differentiated, arising in adenocarcinoma with high grade dysplasia    - Tumor invades the submucosa (through the muscularis mucosa in to deeper one third of submucosa, pT1, Sm3)   - All margins are negative for tumor   - One  of twenty eight lymph nodes is positive for tumor (1/28)      6/4/2021 -  Chemotherapy    oxaliplatin (ELOXATIN) chemo infusion, 130 mg/m2 = 275 6 mg, Intravenous, Once, 2 of 7 cycles  Administration: 275 6 mg (6/4/2021), 275 6 mg (6/25/2021)     Colon cancer metastasized to intra-abdominal lymph node ([facility] [address] )   5/20/2021 Initial Diagnosis    Colon cancer metastasized to intra-abdominal lymph node ([facility] [address] )     6/4/2021 -  Chemotherapy    oxaliplatin (ELOXATIN) chemo infusion, 130 mg/m2 = 275 6 mg, Intravenous, Once, 2 of 7 cycles  Administration: 275 6 mg (6/4/2021), 275 6 mg (6/25/2021)         Current Hematologic/ Oncologic Treatment:    XELOX   Every 3 weeks  The patient has had 2 cycles so far  He needs 2 more cycles to complete 3 months of adjuvant treatment since he had a stage IIIA malignancy with only 1 lymph node  He may need to resume phlebotomy down the road for his hemochromatosis  Interval History:    The patient returns for follow-up visit  He was seeing my partner Dr Johanny Rodriguez:  Judsona Liner of sigmoid colon [facility])  Staging form: Colon and Rectum, AJCC 8th Edition  - Pathologic stage from 5/5/2021: Stage IIIA (pT1, pN1a, cM0) - Unsigned  Stage prefix: Initial diagnosis  Total positive nodes: 1  Total nodes examined: 28  Histologic grading system: 4 grade system  Histologic grade (G): G2  Carcinoembryonic antigen (CEA) (ng/mL): 0 5  Perineural invasion (PNI): Absent      Molecular Testing:             Test Results:    Imaging: No results found      Labs:   Lab Results   Component Value Date    WBC 4 68 08/04/2021    HGB 14 4 08/04/2021    HCT 42 9 08/04/2021    MCV 88 08/04/2021     08/04/2021     Lab Results   Component Value Date    K 4 8 08/04/2021     08/04/2021    CO2 28 08/04/2021    BUN 12 08/04/2021    CREATININE 1 01 08/04/2021    GLUF 126 (H) 06/22/2021    CALCIUM 9 3 08/04/2021    AST 27 08/04/2021    ALT 37 08/04/2021    ALKPHOS 82 08/04/2021    EGFR 93 08/04/2021         No results found for: SPEP, UPEP    Lab Results   Component Value Date    PSA 0 9 11/24/2020       Lab Results   Component Value Date    CEA <0 5 04/13/2021       No results found for:     No results found for: AFP    Lab Results   Component Value Date    IRON 45 (L) 06/22/2021    TIBC 465 (H) 06/22/2021    FERRITIN 33 06/22/2021       No results found for: [id number]      ROS: As stated in the history of present illness otherwise his 14 point review of systems today was negative        Active Problems:   Patient Active Problem List   Diagnosis    Diabetes mellitus without complication ([place] [address] )    Hereditary hemochromatosis ([place] [address] )    Testosterone deficiency    Thyroid nodule    Fatigue    [facility]    Colon cancer metastasized to intra-abdominal lymph node (HCC)    Liver nodule    Lung nodule    Polycythemia    Acute pain of left shoulder    History of drug abuse ([place] [address] )    Chemotherapy-induced neuropathy ([place] [address] )    Palliative care patient    Cancer related pain       Past Medical History:   Past Medical History:   Diagnosis Date    Cancer ([place] [address] )     colon cancer    Diabetes mellitus ([place] [address] )     Hemochromatosis     Testosterone deficiency        Surgical History:   Past Surgical History:   Procedure Laterality Date    COLONOSCOPY      HEMICOLOECTOMY W/ ANASTOMOSIS N/A 5/5/2021    Procedure: ROBOTIC SIGMOID COLECTOMY, MOBILIZATION OF SPLENIC FLEXURE;  Surgeon: Delmar Jones MD;  Location:  MAIN OR;  Service: Surgical Oncology    TUNNELED VENOUS PORT PLACEMENT Left 6/1/2021    Procedure: INSERTION VENOUS PORT (PORT-A-CATH) Ultrasound guided Left Internal Jugular Vein Access;  Surgeon: Aide Godinez MD;  Location: [facility] MAIN [facility];  Service: Surgical Oncology    WISDOM TOOTH EXTRACTION         Family History:    Family History   Problem Relation Age of Onset    Colon polyps Mother     No Known Problems Father     Colon polyps Maternal Grandmother     Prostate cancer Maternal Grandfather     Lung cancer Paternal Grandmother     Diabetes Paternal Grandmother     Prostate cancer Paternal Grandfather     Heart disease Paternal Grandfather     Kidney failure Paternal Grandfather     Diabetes Family     Hypertension Family        Cancer-related family history includes Lung cancer in his paternal grandmother; Prostate cancer in his maternal grandfather and paternal grandfather  Social History:   Social History     Socioeconomic History    Marital status:      Spouse name: Not on file    Number of children: Not on file    Years of education: Not on file    Highest education level: Not on file   Occupational History    Not on file   Tobacco Use    Smoking status: Former Smoker     Packs/day: 0 25     Years: 10 00     Pack years: 2 50     Types: Cigarettes     Quit date: 5/28/2011     Years since quitting: 10 1    Smokeless tobacco: Never Used   Vaping Use    Vaping Use: Never used   Substance and Sexual Activity    Alcohol use: Not Currently    Drug use: Not Currently    Sexual activity: Yes     Partners: Female     Birth control/protection: Condom Male   Other Topics Concern    Not on file   Social History Narrative    Not on file     Social Determinants of Health     Financial Resource Strain:     Difficulty of Paying Living Expenses:    Food Insecurity:     Worried About Running Out of Food in the Last Year:     [address] in the Last Year:    Transportation Needs:     Lack of Transportation (Medical):  Lack of Transportation (Non-Medical):    Physical Activity: Sufficiently Active    Days of Exercise per Week: 5 days    Minutes of Exercise per Session: 60 min   Stress: No Stress Concern Present    Feeling of Stress :  Only a little   Social Connections:     Frequency of Communication with Friends and Family:     Frequency of Social Gatherings with Friends and Family:     Attends Hoahaoism Services:     Active Member of Clubs or Organizations:     Attends Club or Organization Meetings:     Marital Status:    Intimate Partner Violence:     Fear of Current or Ex-Partner:     Emotionally Abused:     Physically Abused:     Sexually Abused:        Current Medications:   Current Outpatient Medications   Medication Sig Dispense Refill    acetaminophen (TYLENOL) 325 mg tablet Take 3 tablets (975 mg total) by mouth every 6 (six) hours as needed for mild pain or moderate pain 60 tablet 0    capecitabine (XELODA) 500 MG tablet Take 3 tablets (1,500 mg total) by mouth 2 (two) times a day For 2 weeks on then 1 week off 84 tablet 3    Dulaglutide 1 5 MG/0 5ML SOPN Inject 0 5 mL (1 5 mg total) under the skin once a week 6 mL 1    Empagliflozin (Jardiance) 25 MG TABS Take 1 tablet (25 mg total) by mouth daily 90 tablet 0    gabapentin (NEURONTIN) 300 mg capsule Take 1 capsule (300 mg total) by mouth 3 (three) times a day 90 capsule 0    lidocaine-prilocaine (EMLA) cream Apply topically as needed for mild pain On day of chemotherapy treatment  30 g 0    morphine (MSIR) 15 mg tablet Taker one tablet (15mg) PO for moderate pain Q6H PRN or one and half tablets (22 5mg) PO Q6H PRN for severe pain 84 tablet 0    ondansetron (ZOFRAN-ODT) 4 mg disintegrating tablet Take 1 tablet (4 mg total) by mouth every 6 (six) hours as needed for nausea or vomiting 20 tablet 0    senna (SENOKOT) 8 6 MG tablet Take 1 tablet (8 6 mg total) by mouth daily 30 tablet 1    Sodium Fluoride 5000 PPM 1 1 % PSTE BRUSH 2 TIMES A DAY FOR 2 3 MINUTES AND SPIT  NO RINSING FOR 30 MIN       No current facility-administered medications for this visit  Allergies: No Known Allergies    Physical Exam:    Body surface area is 2 06 meters squared  Wt Readings from Last 3 Encounters:   08/05/21 87 5 kg (193 lb)   07/28/21 88 9 kg (195 lb 15 8 oz)   07/16/21 89 4 kg (197 lb)        Temp Readings from Last 3 Encounters:   08/05/21 (!) 97 4 °F (36 3 °C) (Temporal)   07/28/21 98 4 °F (36 9 °C) (Temporal)   07/16/21 (!) 96 °F (35 6 °C)        BP Readings from Last 3 Encounters:   08/05/21 122/82   07/28/21 118/80   07/16/21 102/76         Pulse Readings from Last 3 Encounters:   08/05/21 (!) 113   07/28/21 104   07/16/21 88         Physical Exam     Constitutional   General appearance: No acute distress, well appearing and well nourished      Eyes Conjunctiva and lids: No swelling, erythema or discharge  Pupils and irises: Equal, round and reactive to light  Ears, Nose, Mouth, and Throat   External inspection of ears and nose: Normal     Nasal mucosa, septum, and turbinates: Normal without edema or erythema  Oropharynx: Normal with no erythema, edema, exudate or lesions  Pulmonary   Respiratory effort: No increased work of breathing or signs of respiratory distress  Auscultation of lungs: Clear to auscultation  Cardiovascular   Palpation of heart: Normal PMI, no thrills  Auscultation of heart: Normal rate and rhythm, normal S1 and S2, without murmurs  Examination of extremities for edema and/or varicosities: Normal     Carotid pulses: Normal     Abdomen   Abdomen: Non-tender, no masses  Liver and spleen: No hepatomegaly or splenomegaly  Lymphatic   Palpation of lymph nodes in neck: No lymphadenopathy  Musculoskeletal   Gait and station: Normal     Digits and nails: Normal without clubbing or cyanosis  Inspection/palpation of joints, bones, and muscles: Normal     Skin   Skin and subcutaneous tissue: Normal without rashes or lesions  Neurologic   Cranial nerves: Cranial nerves 2-12 intact  Sensation: No sensory loss  Psychiatric   Orientation to person, place, and time: Normal     Mood and affect: Normal         Assessment / Plan:       The patient is a pleasant 45-year-old male who was referred to see us because he wishes to transfer care from his previous oncologist for stage IIIA I e  T1 N1 descending colon adenocarcinoma with 1/28 lymph nodes positive  Histology was low risk  Based on the most current data the patient needs 3 months of adjuvant treatment I e  4 cycles of capecitabine with oxaliplatin  This would be 2 more cycles  The patient does not have any neuropathy  He did have cold sensitivity    I would like to continue his oxaliplatin based on the fact that he is complaining of no neuropathy today and states he mostly has cold sensitivity related to his treatment  He does complain of sore feet for the 1st week or 2 weeks of treatment which I suspect is related to his keep site a bean as he does not describe it as numbness  His  today corroborates this  He states sometimes his fingers did get very sensitive to cold and they were sore but today he has no numbness or tingling in his fingers  It would be unlikely that otherwise healthy young man would get neuropathy only after 2 doses of oxaliplatin so I think it is reasonable to continue the oxaliplatin  We will complete a total of 4 doses and then the patient we put on observation  He obviously will need monitoring for his ferritin because of his history of being a carrier for hemochromatosis as he was on phlebotomy in the past for what he describes as an elevated ferritin  We will repeat his imaging 3 months after he finishes chemotherapy to re-evaluate the lung nodules that he had which were quite small and probably benign but we will re-evaluate these at 3 months once he finishes up his chemotherapy  Goals and Barriers:  Current Goal:  Prolong Survival from   Colon cancer  Barriers: None  Patient's Capacity to Self Care:  Patient  able to self care  Portions of the record may have been created with voice recognition software   Occasional wrong word or "sound a like" substitutions may have occurred due to the inherent limitations of voice recognition software   Read the chart carefully and recognize, using context, where substitutions have occurred

## 2021-08-06 ENCOUNTER — HOSPITAL ENCOUNTER (OUTPATIENT)
Dept: INFUSION CENTER | Facility: CLINIC | Age: 41
Discharge: HOME | End: 2021-08-06

## 2021-08-10 DIAGNOSIS — R52 PAIN: ICD-10-CM

## 2021-08-10 NOTE — TELEPHONE ENCOUNTER
Primary palliative medicine provider: Yvonne    Medication requested: Morphine IR     If for pain, how has the patient been taking their pain medicine?      Last appointment: 7 28    Next scheduled appointment: 8 18    PDMP review:    Filled 7 28   # 84/14

## 2021-08-10 NOTE — PROGRESS NOTES
Bailey Rodriges, RN made aware of pts evaluated bilirubin, 1 48, she reported per dave Rodriguez to treat pt on 8/11/21

## 2021-08-11 ENCOUNTER — HOSPITAL ENCOUNTER (OUTPATIENT)
Dept: INFUSION CENTER | Facility: CLINIC | Age: 41
Discharge: HOME | End: 2021-08-11
Payer: COMMERCIAL

## 2021-08-11 ENCOUNTER — DOCUMENTATION (OUTPATIENT)
Dept: NUTRITION | Facility: CLINIC | Age: 41
End: 2021-08-11

## 2021-08-11 VITALS
TEMPERATURE: 97.5 F | DIASTOLIC BLOOD PRESSURE: 80 MMHG | HEIGHT: 70 IN | BODY MASS INDEX: 28.03 KG/M2 | RESPIRATION RATE: 18 BRPM | WEIGHT: 195.77 LBS | SYSTOLIC BLOOD PRESSURE: 131 MMHG | HEART RATE: 83 BPM

## 2021-08-11 DIAGNOSIS — C18.7 CANCER OF SIGMOID COLON (HCC): Primary | ICD-10-CM

## 2021-08-11 DIAGNOSIS — R52 PAIN: ICD-10-CM

## 2021-08-11 DIAGNOSIS — C18.9 COLON CANCER METASTASIZED TO INTRA-ABDOMINAL LYMPH NODE (HCC): ICD-10-CM

## 2021-08-11 DIAGNOSIS — C77.2 COLON CANCER METASTASIZED TO INTRA-ABDOMINAL LYMPH NODE (HCC): ICD-10-CM

## 2021-08-11 PROCEDURE — 96415 CHEMO IV INFUSION ADDL HR: CPT

## 2021-08-11 PROCEDURE — 96367 TX/PROPH/DG ADDL SEQ IV INF: CPT

## 2021-08-11 PROCEDURE — 96413 CHEMO IV INFUSION 1 HR: CPT

## 2021-08-11 RX ORDER — SODIUM CHLORIDE 9 MG/ML
20 INJECTION, SOLUTION INTRAVENOUS ONCE AS NEEDED
Status: DISCONTINUED | OUTPATIENT
Start: 2021-08-11 | End: 2021-08-14 | Stop reason: HOSPADM

## 2021-08-11 RX ORDER — MORPHINE SULFATE 15 MG/1
TABLET ORAL
Qty: 84 TABLET | Refills: 0 | Status: SHIPPED | OUTPATIENT
Start: 2021-08-11 | End: 2021-08-18 | Stop reason: SDUPTHER

## 2021-08-11 RX ORDER — MORPHINE SULFATE 15 MG/1
TABLET ORAL
Qty: 84 TABLET | Refills: 0 | Status: CANCELLED | OUTPATIENT
Start: 2021-08-11

## 2021-08-11 RX ORDER — DEXTROSE MONOHYDRATE 50 MG/ML
20 INJECTION, SOLUTION INTRAVENOUS ONCE
Status: COMPLETED | OUTPATIENT
Start: 2021-08-11 | End: 2021-08-11

## 2021-08-11 RX ADMIN — DEXTROSE 20 ML/HR: 5 SOLUTION INTRAVENOUS at 12:30

## 2021-08-11 RX ADMIN — DEXAMETHASONE SODIUM PHOSPHATE: 10 INJECTION, SOLUTION INTRAMUSCULAR; INTRAVENOUS at 12:58

## 2021-08-11 RX ADMIN — OXALIPLATIN 275.6 MG: 5 INJECTION, SOLUTION INTRAVENOUS at 13:31

## 2021-08-11 NOTE — PROGRESS NOTES
Pt presented for chemotherapy, offering no complaints  Port was accessed with good blood return noted throughout treatment  Pt tolerated entire treatment without incident  Port was flushed and de-accessed, pt was given AVS and discharged in stable condition

## 2021-08-11 NOTE — PROGRESS NOTES
Spoke to Mansoor Flores today in infusion to touch base in regards to his nutrition  He states that he is doing okay at this time and eating well  He does not have any nutrition related questions/concerns at this time  Encouraged him to reach out to this RD if he does have questions/concerns moving forward

## 2021-08-11 NOTE — PLAN OF CARE
Problem: Potential for Falls  Goal: Patient will remain free of falls  Description: INTERVENTIONS:  - Educate patient/family on patient safety including physical limitations  - Instruct patient to call for assistance with activity   - Consult OT/PT to assist with strengthening/mobility   - Keep Call bell within reach  - Keep bed low and locked with side rails adjusted as appropriate  - Keep care items and personal belongings within reach  - Initiate and maintain comfort rounds  - Make Fall Risk Sign visible to staff  - Apply yellow socks and bracelet for high fall risk patients  - Consider moving patient to room near nurses station  8/11/2021 1541 by Anil Kirby RN  Outcome: Progressing  8/11/2021 1345 by Anil Kirby, RN  Outcome: Progressing

## 2021-08-15 ENCOUNTER — TELEPHONE (OUTPATIENT)
Dept: OTHER | Facility: OTHER | Age: 41
End: 2021-08-15

## 2021-08-15 ENCOUNTER — APPOINTMENT (EMERGENCY)
Dept: RADIOLOGY | Facility: HOSPITAL | Age: 41
End: 2021-08-15
Payer: COMMERCIAL

## 2021-08-15 ENCOUNTER — HOSPITAL ENCOUNTER (EMERGENCY)
Facility: HOSPITAL | Age: 41
Discharge: HOME | End: 2021-08-15
Attending: EMERGENCY MEDICINE | Admitting: EMERGENCY MEDICINE
Payer: COMMERCIAL

## 2021-08-15 VITALS
RESPIRATION RATE: 18 BRPM | TEMPERATURE: 99.3 F | DIASTOLIC BLOOD PRESSURE: 73 MMHG | BODY MASS INDEX: 27.98 KG/M2 | OXYGEN SATURATION: 98 % | WEIGHT: 195 LBS | SYSTOLIC BLOOD PRESSURE: 118 MMHG | HEART RATE: 98 BPM

## 2021-08-15 DIAGNOSIS — R50.9 FEVER: Primary | ICD-10-CM

## 2021-08-15 DIAGNOSIS — G43.909 MIGRAINE: ICD-10-CM

## 2021-08-15 DIAGNOSIS — J32.9 SINUSITIS: ICD-10-CM

## 2021-08-15 LAB
ALBUMIN SERPL BCP-MCNC: 3.6 G/DL (ref 3.5–5)
ALP SERPL-CCNC: 88 U/L (ref 46–116)
ALT SERPL W P-5'-P-CCNC: 35 U/L (ref 12–78)
ANION GAP SERPL CALCULATED.3IONS-SCNC: 11 MMOL/L (ref 4–13)
AST SERPL W P-5'-P-CCNC: 27 U/L (ref 5–45)
BACTERIA UR QL AUTO: NORMAL /HPF
BASOPHILS # BLD AUTO: 0.02 THOUSANDS/ΜL (ref 0–0.1)
BASOPHILS NFR BLD AUTO: 0 % (ref 0–1)
BILIRUB SERPL-MCNC: 1.4 MG/DL (ref 0.2–1)
BILIRUB UR QL STRIP: NEGATIVE
BUN SERPL-MCNC: 17 MG/DL (ref 5–25)
CALCIUM SERPL-MCNC: 8.6 MG/DL (ref 8.3–10.1)
CHLORIDE SERPL-SCNC: 104 MMOL/L (ref 100–108)
CLARITY UR: CLEAR
CO2 SERPL-SCNC: 24 MMOL/L (ref 21–32)
COLOR UR: YELLOW
CREAT SERPL-MCNC: 1.07 MG/DL (ref 0.6–1.3)
EOSINOPHIL # BLD AUTO: 0.04 THOUSAND/ΜL (ref 0–0.61)
EOSINOPHIL NFR BLD AUTO: 1 % (ref 0–6)
ERYTHROCYTE [DISTWIDTH] IN BLOOD BY AUTOMATED COUNT: 17.5 % (ref 11.6–15.1)
GFR SERPL CREATININE-BSD FRML MDRD: 86 ML/MIN/1.73SQ M
GLUCOSE SERPL-MCNC: 146 MG/DL (ref 65–140)
GLUCOSE UR STRIP-MCNC: ABNORMAL MG/DL
HCT VFR BLD AUTO: 40 % (ref 36.5–49.3)
HGB BLD-MCNC: 13.7 G/DL (ref 12–17)
HGB UR QL STRIP.AUTO: NEGATIVE
IMM GRANULOCYTES # BLD AUTO: 0.01 THOUSAND/UL (ref 0–0.2)
IMM GRANULOCYTES NFR BLD AUTO: 0 % (ref 0–2)
KETONES UR STRIP-MCNC: NEGATIVE MG/DL
LACTATE SERPL-SCNC: 0.8 MMOL/L (ref 0.5–2)
LEUKOCYTE ESTERASE UR QL STRIP: ABNORMAL
LIPASE SERPL-CCNC: 99 U/L (ref 73–393)
LYMPHOCYTES # BLD AUTO: 0.96 THOUSANDS/ΜL (ref 0.6–4.47)
LYMPHOCYTES NFR BLD AUTO: 20 % (ref 14–44)
MCH RBC QN AUTO: 30.1 PG (ref 26.8–34.3)
MCHC RBC AUTO-ENTMCNC: 34.3 G/DL (ref 31.4–37.4)
MCV RBC AUTO: 88 FL (ref 82–98)
MONOCYTES # BLD AUTO: 0.49 THOUSAND/ΜL (ref 0.17–1.22)
MONOCYTES NFR BLD AUTO: 10 % (ref 4–12)
NEUTROPHILS # BLD AUTO: 3.36 THOUSANDS/ΜL (ref 1.85–7.62)
NEUTS SEG NFR BLD AUTO: 69 % (ref 43–75)
NITRITE UR QL STRIP: NEGATIVE
NON-SQ EPI CELLS URNS QL MICRO: NORMAL /HPF
NRBC BLD AUTO-RTO: 0 /100 WBCS
PH UR STRIP.AUTO: 5 [PH]
PLATELET # BLD AUTO: 159 THOUSANDS/UL (ref 149–390)
PMV BLD AUTO: 10.8 FL (ref 8.9–12.7)
POTASSIUM SERPL-SCNC: 3.6 MMOL/L (ref 3.5–5.3)
PROT SERPL-MCNC: 6.9 G/DL (ref 6.4–8.2)
PROT UR STRIP-MCNC: NEGATIVE MG/DL
RBC # BLD AUTO: 4.55 MILLION/UL (ref 3.88–5.62)
RBC #/AREA URNS AUTO: NORMAL /HPF
S PYO DNA THROAT QL NAA+PROBE: NORMAL
SARS-COV-2 RNA RESP QL NAA+PROBE: NEGATIVE
SODIUM SERPL-SCNC: 139 MMOL/L (ref 136–145)
SP GR UR STRIP.AUTO: 1.02 (ref 1–1.03)
TROPONIN I SERPL-MCNC: <0.02 NG/ML
UROBILINOGEN UR QL STRIP.AUTO: 0.2 E.U./DL
WBC # BLD AUTO: 4.88 THOUSAND/UL (ref 4.31–10.16)
WBC #/AREA URNS AUTO: NORMAL /HPF

## 2021-08-15 PROCEDURE — 99284 EMERGENCY DEPT VISIT MOD MDM: CPT

## 2021-08-15 PROCEDURE — 83605 ASSAY OF LACTIC ACID: CPT | Performed by: EMERGENCY MEDICINE

## 2021-08-15 PROCEDURE — 85025 COMPLETE CBC W/AUTO DIFF WBC: CPT | Performed by: EMERGENCY MEDICINE

## 2021-08-15 PROCEDURE — 96375 TX/PRO/DX INJ NEW DRUG ADDON: CPT

## 2021-08-15 PROCEDURE — 81001 URINALYSIS AUTO W/SCOPE: CPT | Performed by: EMERGENCY MEDICINE

## 2021-08-15 PROCEDURE — 99284 EMERGENCY DEPT VISIT MOD MDM: CPT | Performed by: EMERGENCY MEDICINE

## 2021-08-15 PROCEDURE — 80053 COMPREHEN METABOLIC PANEL: CPT | Performed by: EMERGENCY MEDICINE

## 2021-08-15 PROCEDURE — U0005 INFEC AGEN DETEC AMPLI PROBE: HCPCS | Performed by: EMERGENCY MEDICINE

## 2021-08-15 PROCEDURE — 96361 HYDRATE IV INFUSION ADD-ON: CPT

## 2021-08-15 PROCEDURE — 36415 COLL VENOUS BLD VENIPUNCTURE: CPT | Performed by: EMERGENCY MEDICINE

## 2021-08-15 PROCEDURE — 87651 STREP A DNA AMP PROBE: CPT | Performed by: EMERGENCY MEDICINE

## 2021-08-15 PROCEDURE — 96374 THER/PROPH/DIAG INJ IV PUSH: CPT

## 2021-08-15 PROCEDURE — U0003 INFECTIOUS AGENT DETECTION BY NUCLEIC ACID (DNA OR RNA); SEVERE ACUTE RESPIRATORY SYNDROME CORONAVIRUS 2 (SARS-COV-2) (CORONAVIRUS DISEASE [COVID-19]), AMPLIFIED PROBE TECHNIQUE, MAKING USE OF HIGH THROUGHPUT TECHNOLOGIES AS DESCRIBED BY CMS-2020-01-R: HCPCS | Performed by: EMERGENCY MEDICINE

## 2021-08-15 PROCEDURE — 83690 ASSAY OF LIPASE: CPT | Performed by: EMERGENCY MEDICINE

## 2021-08-15 PROCEDURE — 87040 BLOOD CULTURE FOR BACTERIA: CPT | Performed by: EMERGENCY MEDICINE

## 2021-08-15 PROCEDURE — 71046 X-RAY EXAM CHEST 2 VIEWS: CPT

## 2021-08-15 PROCEDURE — 84484 ASSAY OF TROPONIN QUANT: CPT | Performed by: EMERGENCY MEDICINE

## 2021-08-15 RX ORDER — KETOROLAC TROMETHAMINE 30 MG/ML
15 INJECTION, SOLUTION INTRAMUSCULAR; INTRAVENOUS ONCE
Status: COMPLETED | OUTPATIENT
Start: 2021-08-15 | End: 2021-08-15

## 2021-08-15 RX ORDER — METOCLOPRAMIDE HYDROCHLORIDE 5 MG/ML
10 INJECTION INTRAMUSCULAR; INTRAVENOUS ONCE
Status: COMPLETED | OUTPATIENT
Start: 2021-08-15 | End: 2021-08-15

## 2021-08-15 RX ORDER — NAPROXEN 500 MG/1
500 TABLET ORAL 2 TIMES DAILY WITH MEALS
Qty: 30 TABLET | Refills: 0 | Status: SHIPPED | OUTPATIENT
Start: 2021-08-15 | End: 2021-08-31

## 2021-08-15 RX ORDER — DIPHENHYDRAMINE HYDROCHLORIDE 50 MG/ML
25 INJECTION INTRAMUSCULAR; INTRAVENOUS ONCE
Status: COMPLETED | OUTPATIENT
Start: 2021-08-15 | End: 2021-08-15

## 2021-08-15 RX ORDER — DOXYCYCLINE HYCLATE 100 MG/1
100 CAPSULE ORAL 2 TIMES DAILY
Qty: 20 CAPSULE | Refills: 0 | Status: SHIPPED | OUTPATIENT
Start: 2021-08-15 | End: 2021-08-25

## 2021-08-15 RX ORDER — BUTALBITAL, ACETAMINOPHEN AND CAFFEINE 50; 325; 40 MG/1; MG/1; MG/1
1 TABLET ORAL EVERY 4 HOURS PRN
Qty: 30 TABLET | Refills: 0 | Status: SHIPPED | OUTPATIENT
Start: 2021-08-15 | End: 2021-08-25

## 2021-08-15 RX ADMIN — DIPHENHYDRAMINE HYDROCHLORIDE 25 MG: 50 INJECTION, SOLUTION INTRAMUSCULAR; INTRAVENOUS at 03:42

## 2021-08-15 RX ADMIN — SODIUM CHLORIDE 1000 ML: 0.9 INJECTION, SOLUTION INTRAVENOUS at 03:43

## 2021-08-15 RX ADMIN — KETOROLAC TROMETHAMINE 15 MG: 30 INJECTION, SOLUTION INTRAMUSCULAR; INTRAVENOUS at 03:43

## 2021-08-15 RX ADMIN — METOCLOPRAMIDE HYDROCHLORIDE 10 MG: 5 INJECTION INTRAMUSCULAR; INTRAVENOUS at 03:43

## 2021-08-15 NOTE — ED PROVIDER NOTES
History  Chief Complaint   Patient presents with    Fever - 9 weeks to 74 years     pt c/o generalized body aches, fever, headache since this morning  Pt is recieving chemo at this time for colon cancer and last recieved his tx this past wednesday  His oncologist advised him to get checked out in the ED  Eric Torres is a pleasant 81-year-old male, past medical history of colon cancer currently on chemotherapy, who presents with less than 24 hours of fever, generalized body aches, and headache since this morning  Patient has no neck pain, no nuchal rigidity  He does have nasal congestion, nasal turbinates erythema and some right-sided sinus tenderness  Denies other symptoms of infection  He has no cough, no rash, no nausea or vomiting (other than expected post chemotherapy)  No diarrhea  No sore throat although he does have some erythema to pharynx  No known sick contacts  Prior to Admission Medications   Prescriptions Last Dose Informant Patient Reported? Taking? Dulaglutide 1 5 MG/0 5ML SOPN  Self No No   Sig: Inject 0 5 mL (1 5 mg total) under the skin once a week   Empagliflozin (Jardiance) 25 MG TABS  Self No No   Sig: Take 1 tablet (25 mg total) by mouth daily   Sodium Fluoride 5000 PPM 1 1 % PSTE   Yes No   Sig: BRUSH 2 TIMES A DAY FOR 2 3 MINUTES AND SPIT  NO RINSING FOR 30 MIN   acetaminophen (TYLENOL) 325 mg tablet  Self No No   Sig: Take 3 tablets (975 mg total) by mouth every 6 (six) hours as needed for mild pain or moderate pain   capecitabine (XELODA) 500 MG tablet  Self No No   Sig: Take 3 tablets (1,500 mg total) by mouth 2 (two) times a day For 2 weeks on then 1 week off   gabapentin (NEURONTIN) 300 mg capsule   No No   Sig: Take 1 capsule (300 mg total) by mouth 3 (three) times a day   lidocaine-prilocaine (EMLA) cream  Self No No   Sig: Apply topically as needed for mild pain On day of chemotherapy treatment     morphine (MSIR) 15 mg tablet   No No   Sig: Taker one tablet (15mg) PO for moderate pain Q6H PRN or one and half tablets (22 5mg) PO Q6H PRN for severe pain   ondansetron (ZOFRAN-ODT) 4 mg disintegrating tablet   No No   Sig: Take 1 tablet (4 mg total) by mouth every 6 (six) hours as needed for nausea or vomiting   senna (SENOKOT) 8 6 MG tablet  Self No No   Sig: Take 1 tablet (8 6 mg total) by mouth daily      Facility-Administered Medications: None       Past Medical History:   Diagnosis Date    Cancer ([facility] )     colon cancer    Diabetes mellitus ([facility] )     Hemochromatosis     Testosterone deficiency        Past Surgical History:   Procedure Laterality Date    COLONOSCOPY      HEMICOLOECTOMY W/ ANASTOMOSIS N/A 5/5/2021    Procedure: ROBOTIC SIGMOID COLECTOMY, MOBILIZATION OF SPLENIC FLEXURE;  Surgeon: Graciela Hsieh MD;  Location:  MAIN OR;  Service: Surgical Oncology    TUNNELED VENOUS PORT PLACEMENT Left 6/1/2021    Procedure: INSERTION VENOUS PORT (PORT-A-CATH) Ultrasound guided Left Internal Jugular Vein Access;  Surgeon: Brianna Corrales MD;  Location: [facility];  Service: Surgical Oncology    WISDOM TOOTH EXTRACTION         Family History   Problem Relation Age of Onset    Colon polyps Mother     No Known Problems Father     Colon polyps Maternal Grandmother     Prostate cancer Maternal Grandfather     Lung cancer Paternal Grandmother     Diabetes Paternal Grandmother     Prostate cancer Paternal Grandfather     Heart disease Paternal Grandfather     Kidney failure Paternal Grandfather     Diabetes Family     Hypertension Family      I have reviewed and agree with the history as documented      E-Cigarette/Vaping    E-Cigarette Use Never User      E-Cigarette/Vaping Substances    Nicotine No     THC No     CBD No     Flavoring No     Other No     Unknown No      Social History     Tobacco Use    Smoking status: Former Smoker     Packs/day: 0 25     Years: 10 00     Pack years: 2 50     Types: Cigarettes     Quit date: 5/28/2011     Years since quitting: 10 2    Smokeless tobacco: Never Used   Vaping Use    Vaping Use: Never used   Substance Use Topics    Alcohol use: Not Currently    Drug use: Not Currently       Review of Systems   Constitutional: Positive for fatigue and fever  Negative for activity change, appetite change, chills and diaphoresis  HENT: Positive for congestion, sinus pressure and sinus pain  Negative for ear pain and sore throat  Respiratory: Negative for apnea, cough, chest tightness, shortness of breath and wheezing  Cardiovascular: Negative for chest pain, palpitations and leg swelling  Gastrointestinal: Positive for nausea (Post chemotherapy)  Negative for abdominal pain, constipation, diarrhea and vomiting  Genitourinary: Negative for dysuria and flank pain  Musculoskeletal: Negative for back pain, neck pain and neck stiffness  Skin: Negative for color change, rash and wound  Allergic/Immunologic: Negative for immunocompromised state  Neurological: Positive for headaches  Negative for dizziness, syncope, weakness, light-headedness and numbness  Physical Exam  Physical Exam  Vitals reviewed  Constitutional:       General: He is not in acute distress  Appearance: He is well-developed  He is not ill-appearing, toxic-appearing or diaphoretic  HENT:      Head: Normocephalic and atraumatic  Right Ear: Tympanic membrane normal       Left Ear: Tympanic membrane normal       Nose: Congestion present  No rhinorrhea  Comments: Bilateral nasal turbinates erythema  Right sinus pressure  Mouth/Throat:      Mouth: Mucous membranes are moist       Pharynx: Posterior oropharyngeal erythema present  No oropharyngeal exudate  Eyes:      General: No scleral icterus  Right eye: No discharge  Left eye: No discharge  Conjunctiva/sclera: Conjunctivae normal       Pupils: Pupils are equal, round, and reactive to light  Neck:      Vascular: No JVD     Cardiovascular:      Rate and Rhythm: Normal rate and regular rhythm  Heart sounds: Normal heart sounds  No murmur heard  No friction rub  No gallop  Pulmonary:      Effort: Pulmonary effort is normal  No respiratory distress  Breath sounds: Normal breath sounds  No wheezing or rales  Chest:      Chest wall: No tenderness  Abdominal:      General: Bowel sounds are normal  There is no distension  Palpations: Abdomen is soft  Tenderness: There is no abdominal tenderness  There is no right CVA tenderness, left CVA tenderness, guarding or rebound  Musculoskeletal:         General: No tenderness or deformity  Normal range of motion  Cervical back: Normal range of motion and neck supple  No rigidity  Right lower leg: No edema  Left lower leg: No edema  Lymphadenopathy:      Cervical: Cervical adenopathy present  Skin:     General: Skin is warm and dry  Coloration: Skin is not pale  Findings: No erythema or rash  Neurological:      Mental Status: He is alert and oriented to person, place, and time  Cranial Nerves: No cranial nerve deficit     Psychiatric:         Behavior: Behavior normal          Vital Signs  ED Triage Vitals   Temperature Pulse Respirations Blood Pressure SpO2   08/15/21 0203 08/15/21 0203 08/15/21 0203 08/15/21 0203 08/15/21 0203   99 3 °F (37 4 °C) (!) 107 20 125/75 97 %      Temp Source Heart Rate Source Patient Position - Orthostatic VS BP Location FiO2 (%)   08/15/21 0203 08/15/21 0203 08/15/21 0203 08/15/21 0203 --   Oral Monitor Sitting Left arm       Pain Score       08/15/21 0300       8           Vitals:    08/15/21 0203 08/15/21 0648   BP: 125/75 118/73   Pulse: (!) 107 98   Patient Position - Orthostatic VS: Sitting          Visual Acuity  Visual Acuity      Most Recent Value   L Pupil Size (mm)  3   R Pupil Size (mm)  3          ED Medications  Medications   sodium chloride 0 9 % bolus 1,000 mL (0 mL Intravenous Stopped 8/15/21 8355)   ketorolac (TORADOL) injection 15 mg (15 mg Intravenous Given 8/15/21 0343)   metoclopramide (REGLAN) injection 10 mg (10 mg Intravenous Given 8/15/21 0343)   diphenhydrAMINE (BENADRYL) injection 25 mg (25 mg Intravenous Given 8/15/21 0342)       Diagnostic Studies  Results Reviewed     Procedure Component Value Units Date/Time    Urine Microscopic [[id number]]  (Normal) Collected: 08/15/21 0602    Lab Status: Final result Specimen: Urine, Clean Catch Updated: 08/15/21 0633     RBC, UA 0-1 /hpf      WBC, UA 0-1 /hpf      Epithelial Cells Occasional /hpf      Bacteria, UA Occasional /hpf     UA w Reflex to Microscopic w Reflex to Culture [[id number]]  (Abnormal) Collected: 08/15/21 0602    Lab Status: Final result Specimen: Urine, Clean Catch Updated: 08/15/21 0618     Color, UA Yellow     Clarity, UA Clear     Specific Gravity, UA 1 025     pH, UA 5 0     Leukocytes, UA Elevated glucose may cause decreased leukocyte values  See urine microscopic for [facility] result/     Nitrite, UA Negative     Protein, UA Negative mg/dl      Glucose, UA >=1000 (1%) mg/dl      Ketones, UA Negative mg/dl      Urobilinogen, UA 0 2 E U /dl      Bilirubin, UA Negative     Blood, UA Negative    Novel Coronavirus (Covid-19),PCR [facility] [[id number]]  (Normal) Collected: 08/15/21 0335    Lab Status: Final result Specimen: Nares from Nose Updated: 08/15/21 0445     SARS-CoV-2 Negative    Narrative: The specimen collection materials, transport medium, and/or testing methodology utilized in the production of these test results have been proven to be reliable in a limited validation with an abbreviated program under the Emergency Utilization Authorization provided by the FDA  Testing reported as "Presumptive positive" will be confirmed with secondary testing to ensure result accuracy  Clinical caution and judgement should be used with the interpretation of these results with consideration of the clinical impression and other laboratory testing    Testing reported as "Positive" or "Negative" has been proven to be accurate according to standard laboratory validation requirements  All testing is performed with control materials showing appropriate reactivity at standard intervals  Strep A PCR [[id number]]  (Normal) Collected: 08/15/21 0335    Lab Status: Final result Specimen: Throat Updated: 08/15/21 0430     STREP A PCR None Detected    Troponin I [[id number]]  (Normal) Collected: 08/15/21 0334    Lab Status: Final result Specimen: Blood from Arm, Right Updated: 08/15/21 0411     Troponin I <0 02 ng/mL     Lactic acid, plasma [[id number]]  (Normal) Collected: 08/15/21 0334    Lab Status: Final result Specimen: Blood from Arm, Right Updated: 08/15/21 0411     LACTIC ACID 0 8 mmol/L     Narrative:      Result may be elevated if tourniquet was used during collection      Comprehensive metabolic panel [[id number]]  (Abnormal) Collected: 08/15/21 0334    Lab Status: Final result Specimen: Blood from Arm, Right Updated: 08/15/21 0409     Sodium 139 mmol/L      Potassium 3 6 mmol/L      Chloride 104 mmol/L      CO2 24 mmol/L      ANION GAP 11 mmol/L      BUN 17 mg/dL      Creatinine 1 07 mg/dL      Glucose 146 mg/dL      Calcium 8 6 mg/dL      AST 27 U/L      ALT 35 U/L      Alkaline Phosphatase 88 U/L      Total Protein 6 9 g/dL      Albumin 3 6 g/dL      Total Bilirubin 1 40 mg/dL      eGFR 86 ml/min/1 73sq m     Narrative:      [facility] guidelines for Chronic Kidney Disease (CKD):     Stage 1 with normal or high GFR (GFR > 90 mL/min/1 73 square meters)    Stage 2 Mild CKD (GFR = 60-89 mL/min/1 73 square meters)    Stage 3A Moderate CKD (GFR = 45-59 mL/min/1 73 square meters)    Stage 3B Moderate CKD (GFR = 30-44 mL/min/1 73 square meters)    Stage 4 Severe CKD (GFR = 15-29 mL/min/1 73 square meters)    Stage 5 End Stage CKD (GFR <15 mL/min/1 73 square meters)  Note: GFR calculation is accurate only with a steady state creatinine    Lipase [[id number]]  (Normal) Collected: 08/15/21 0334    Lab Status: Final result Specimen: Blood from Arm, Right Updated: 08/15/21 0409     Lipase 99 u/L     CBC and differential [[id number]]  (Abnormal) Collected: 08/15/21 0334    Lab Status: Final result Specimen: Blood from Arm, Right Updated: 08/15/21 0355     WBC 4 88 Thousand/uL      RBC 4 55 Million/uL      Hemoglobin 13 7 g/dL      Hematocrit 40 0 %      MCV 88 fL      MCH 30 1 pg      MCHC 34 3 g/dL      RDW 17 5 %      MPV 10 8 fL      Platelets 749 Thousands/uL      nRBC 0 /100 WBCs      Neutrophils Relative 69 %      Immat GRANS % 0 %      Lymphocytes Relative 20 %      Monocytes Relative 10 %      Eosinophils Relative 1 %      Basophils Relative 0 %      Neutrophils Absolute 3 36 Thousands/µL      Immature Grans Absolute 0 01 Thousand/uL      Lymphocytes Absolute 0 96 Thousands/µL      Monocytes Absolute 0 49 Thousand/µL      Eosinophils Absolute 0 04 Thousand/µL      Basophils Absolute 0 02 Thousands/µL     Blood culture #1 [[id number]] Collected: 08/15/21 0334    Lab Status: In process Specimen: Blood from Arm, Right Updated: 08/15/21 0350    Blood culture #2 [[id number]] Collected: 08/15/21 0334    Lab Status: In process Specimen: Blood from Arm, Left Updated: 08/15/21 0350                 XR chest 2 views   ED Interpretation by Bernadette Clayton DO (08/15 0995)   No acute cardiopulmonary abnormalities      Final Result by Abida Covington MD (08/15 3942)      No acute cardiopulmonary disease  Workstation performed: [id number]                    Procedures  Procedures         ED Course  ED Course as of Aug 15 0842   Sun Aug 15, 2021   0409 WBC: 4 88   0437 STREP A PCR: None Detected   0522 SARS-COV-2: Negative   0522 STREP A PCR: None Detected   0558 So far infectious workup is negative  Will evaluate urine and hopeful to discharge patient home  6596 Leukocytes, UA(!): Elevated glucose may cause decreased leukocyte values   See urine microscopic for [facility] result/   0626 Glucose, UA(!): >=1000 (1%)   0642 Headache resolved  Patient feels well at this time  Patient will be discharged home with medications for symptomatic treatment of headaches and advised return precaution if any worsening symptoms or signs of infection  Patient agreeable w plan                                SBIRT 20yo+      Most Recent Value   SBIRT (24 yo +)   In order to provide better care to our patients, we are screening all of our patients for alcohol and drug use  Would it be okay to ask you these screening questions? Yes Filed at: 08/15/2021 0255   Initial Alcohol Screen: US AUDIT-C    1  How often do you have a drink containing alcohol?  0 Filed at: 08/15/2021 0255   2  How many drinks containing alcohol do you have on a typical day you are drinking? 0 Filed at: 08/15/2021 0255   3a  Male UNDER 65: How often do you have five or more drinks on one occasion? 0 Filed at: 08/15/2021 0255   3b  FEMALE Any Age, or MALE 65+: How often do you have 4 or more drinks on one occassion? 0 Filed at: 08/15/2021 0255   Audit-C Score  0 Filed at: 08/15/2021 0255   SOHAN: How many times in the past year have you    Used an illegal drug or used a prescription medication for non-medical reasons? Never Filed at: 08/15/2021 0255                    MDM  Number of Diagnoses or Management Options  Fever  Migraine  Sinusitis  Diagnosis management comments: Frontal headache, symptoms of sinusitis, however concerned because this patient is a chemotherapy patient  A broad workup is performed given fever in immune compromised patient  Chest x-ray normal, no urinary tract infection, blood work is normal, no evidence of sepsis  Blood cultures are sent  Will treat patient for migraines if she feels much improved migraine cocktail, as well as treat him for sinusitis  Discussed risks and benefits treatment for sinusitis and patient is agreeable to treat at this time    Will treat with doxycycline  Discussed with patient and they understood the risks and benefits of discharge  Patient had opportunity to ask questions regarding care and discharge instructions and had no further questions  Advised follow up with PCP, advised returning if worsening and urged the importance of close follow-up because of our concern of febrile illness in immune compromise patient, and discussed disease specific return precautions  Patient understood discharge instructions  Amount and/or Complexity of Data Reviewed  Clinical lab tests: ordered and reviewed  Tests in the radiology section of CPT®: ordered and reviewed        Disposition  Final diagnoses:   Fever   Migraine   Sinusitis     Time reflects when diagnosis was documented in both MDM as applicable and the Disposition within this note     Time User Action Codes Description Comment    8/15/2021  6:44 AM Gregg Harper Add [R50 9] Fever     8/15/2021  6:44 AM Angeles Harper Add [G43 909] Migraine     8/15/2021  6:54 AM Johnna Roberts Add [J32 9] Sinusitis       ED Disposition     ED Disposition Condition Date/Time Comment    Discharge Stable Sun Aug 15, 2021  6:43 AM Marty Dill discharge to home/self care              Follow-up Information     Follow up With Specialties Details Why Contact Info Additional [id number] [facility],  Internal Medicine Schedule an appointment as soon as possible for a visit  For follow up to ensure improvement, and for further testing and treatment as needed [address] Emergency Department Emergency Medicine  If symptoms worsen: return of fever [address] [facility] Emergency Department, 9 [facility], [address] [place] [address], [facility]          Discharge Medication List as of 8/15/2021  6:48 AM      START taking these medications    Details   butalbital-acetaminophen-caffeine (FIORICET,ESGIC) -40 mg per tablet Take 1 tablet by mouth every 4 (four) hours as needed for headaches for up to 10 days, Starting Sun 8/15/2021, Until Wed 8/25/2021 at 2359, Normal      naproxen (NAPROSYN) 500 mg tablet Take 1 tablet (500 mg total) by mouth 2 (two) times a day with meals As needed for headaches, Starting Sun 8/15/2021, Normal         CONTINUE these medications which have NOT CHANGED    Details   acetaminophen (TYLENOL) 325 mg tablet Take 3 tablets (975 mg total) by mouth every 6 (six) hours as needed for mild pain or moderate pain, Starting Wed 6/16/2021, Normal      capecitabine (XELODA) 500 MG tablet Take 3 tablets (1,500 mg total) by mouth 2 (two) times a day For 2 weeks on then 1 week off, Starting Sun 5/23/2021, Normal      Dulaglutide 1 5 MG/0 5ML SOPN Inject 0 5 mL (1 5 mg total) under the skin once a week, Starting Tue 5/18/2021, Normal      Empagliflozin (Jardiance) 25 MG TABS Take 1 tablet (25 mg total) by mouth daily, Starting Tue 6/1/2021, Normal      gabapentin (NEURONTIN) 300 mg capsule Take 1 capsule (300 mg total) by mouth 3 (three) times a day, Starting Wed 7/14/2021, Until Fri 8/13/2021, Normal      lidocaine-prilocaine (EMLA) cream Apply topically as needed for mild pain On day of chemotherapy treatment  , Starting Fri 6/4/2021, Normal      morphine (MSIR) 15 mg tablet Taker one tablet (15mg) PO for moderate pain Q6H PRN or one and half tablets (22 5mg) PO Q6H PRN for severe pain, Normal      ondansetron (ZOFRAN-ODT) 4 mg disintegrating tablet Take 1 tablet (4 mg total) by mouth every 6 (six) hours as needed for nausea or vomiting, Starting Thu 7/22/2021, Normal      senna (SENOKOT) 8 6 MG tablet Take 1 tablet (8 6 mg total) by mouth daily, Starting Wed 6/16/2021, Normal      Sodium Fluoride 5000 PPM 1 1 % PSTE BRUSH 2 TIMES A DAY FOR 2 3 MINUTES AND SPIT   NO RINSING FOR 30 MIN, Historical Med           No discharge procedures on file      PDMP Review       Value Time User    PDMP Reviewed  Yes 7/9/2021  1:31 PM Ronny Mckeon, 10 [facility]          ED Provider  Electronically Signed by           Zoltan Martinez DO  08/15/21 0641

## 2021-08-15 NOTE — DISCHARGE INSTRUCTIONS
Return immediately the emergency department if you have any worsening symptoms, uncontrollable fever, if new symptoms develop, or any other concerning symptoms  use medications provided for symptom control

## 2021-08-18 ENCOUNTER — OFFICE VISIT (OUTPATIENT)
Dept: PALLIATIVE MEDICINE | Facility: CLINIC | Age: 41
End: 2021-08-18
Payer: COMMERCIAL

## 2021-08-18 VITALS
TEMPERATURE: 98.7 F | RESPIRATION RATE: 14 BRPM | BODY MASS INDEX: 27.3 KG/M2 | HEART RATE: 94 BPM | DIASTOLIC BLOOD PRESSURE: 80 MMHG | SYSTOLIC BLOOD PRESSURE: 110 MMHG | OXYGEN SATURATION: 99 % | WEIGHT: 190.26 LBS

## 2021-08-18 DIAGNOSIS — G89.3 CANCER RELATED PAIN: ICD-10-CM

## 2021-08-18 DIAGNOSIS — C18.9 COLON CANCER METASTASIZED TO INTRA-ABDOMINAL LYMPH NODE (HCC): ICD-10-CM

## 2021-08-18 DIAGNOSIS — C18.7 CANCER OF SIGMOID COLON (HCC): Primary | ICD-10-CM

## 2021-08-18 DIAGNOSIS — C77.2 COLON CANCER METASTASIZED TO INTRA-ABDOMINAL LYMPH NODE (HCC): ICD-10-CM

## 2021-08-18 DIAGNOSIS — Z51.5 PALLIATIVE CARE PATIENT: ICD-10-CM

## 2021-08-18 DIAGNOSIS — G62.0 CHEMOTHERAPY-INDUCED NEUROPATHY (HCC): ICD-10-CM

## 2021-08-18 DIAGNOSIS — T45.1X5A CHEMOTHERAPY-INDUCED NEUROPATHY (HCC): ICD-10-CM

## 2021-08-18 DIAGNOSIS — R52 PAIN: ICD-10-CM

## 2021-08-18 PROCEDURE — 99213 OFFICE O/P EST LOW 20 MIN: CPT | Performed by: INTERNAL MEDICINE

## 2021-08-18 RX ORDER — MORPHINE SULFATE 30 MG/1
30 TABLET ORAL EVERY 6 HOURS PRN
Qty: 120 TABLET | Refills: 0 | Status: SHIPPED | OUTPATIENT
Start: 2021-08-18 | End: 2021-08-23 | Stop reason: SDUPTHER

## 2021-08-18 NOTE — PROGRESS NOTES
Palliative and Supportive Care   Gorge Russ 37 y o  male [id number]    Assessment/Plan:  1  Cancer of sigmoid colon ([facility] [address] )    2  Colon cancer metastasized to intra-abdominal lymph node ([facility]    3  Chemotherapy-induced neuropathy ([facility] )    4  Palliative care patient    5  Cancer related pain    6  Pain      Requested Prescriptions     Signed Prescriptions Disp Refills    morphine (MSIR) 30 MG tablet 120 tablet 0     Sig: Take 1 tablet (30 mg total) by mouth every 6 (six) hours as needed for severe painMax Daily Amount: 120 mg     Medications Discontinued During This Encounter   Medication Reason    morphine (MSIR) 15 mg tablet Reorder       PLAN:  - increase MSIR to 30 mg q6H PRN  - continue other medications as is   - Plan to wean off opioids after chemotherapy completion this fall  - RTC in 1 month    ACP: did not address    Representatives have queried the patient's controlled substance dispensing history in the Prescription Drug Monitoring Program in compliance with regulations before I have prescribed any controlled substances  The prescription history is consistent with prescribed therapy and our practice policies  No follow-ups on file  Subjective: In attendance at this visit: Gorge Russ  Chief Complaint  Follow up visit for:  symptom management, pain, neoplasm related, depression or anxiety  HPI     Gorge Russ is a 37 y o  male with stage III T1N1 adenocarcinoma of the colon s/p resection on adjuvant chemotherapy  Further history includes hemochromatosis and history of substance abuse  Patient has remained sober  He recently had a 2nd opinion with Dr Kim Pablo and is very satisfied with his treatment plan  Patient continues to remain very active and notes that sometimes he "over does it" and this results in worsening pain  He went to the ED over the weekend due to fever of 102 4 at home, workup was negative and he was discharged   He continues to have shoulder pain but is able to play golf and continue on with his softball team      Discussed plan for opioid use after treatment completion  He wants to get his port removed ASAP after his chemotherapy is completed  No other issues  The following portions of the medical history were reviewed: past medical history, problem list, medication list, and social history  Current Outpatient Medications:     acetaminophen (TYLENOL) 325 mg tablet, Take 3 tablets (975 mg total) by mouth every 6 (six) hours as needed for mild pain or moderate pain, Disp: 60 tablet, Rfl: 0    butalbital-acetaminophen-caffeine (FIORICET,ESGIC) -40 mg per tablet, Take 1 tablet by mouth every 4 (four) hours as needed for headaches for up to 10 days, Disp: 30 tablet, Rfl: 0    capecitabine (XELODA) 500 MG tablet, Take 3 tablets (1,500 mg total) by mouth 2 (two) times a day For 2 weeks on then 1 week off, Disp: 84 tablet, Rfl: 3    doxycycline hyclate (VIBRAMYCIN) 100 mg capsule, Take 1 capsule (100 mg total) by mouth 2 (two) times a day for 10 days, Disp: 20 capsule, Rfl: 0    Dulaglutide 1 5 MG/0 5ML SOPN, Inject 0 5 mL (1 5 mg total) under the skin once a week, Disp: 6 mL, Rfl: 1    Empagliflozin (Jardiance) 25 MG TABS, Take 1 tablet (25 mg total) by mouth daily, Disp: 90 tablet, Rfl: 0    lidocaine-prilocaine (EMLA) cream, Apply topically as needed for mild pain On day of chemotherapy treatment  , Disp: 30 g, Rfl: 0    morphine (MSIR) 30 MG tablet, Take 1 tablet (30 mg total) by mouth every 6 (six) hours as needed for severe painMax Daily Amount: 120 mg, Disp: 120 tablet, Rfl: 0    naproxen (NAPROSYN) 500 mg tablet, Take 1 tablet (500 mg total) by mouth 2 (two) times a day with meals As needed for headaches, Disp: 30 tablet, Rfl: 0    ondansetron (ZOFRAN-ODT) 4 mg disintegrating tablet, Take 1 tablet (4 mg total) by mouth every 6 (six) hours as needed for nausea or vomiting, Disp: 20 tablet, Rfl: 0    senna (SENOKOT) 8 6 MG tablet, Take 1 tablet (8 6 mg total) by mouth daily, Disp: 30 tablet, Rfl: 1    Sodium Fluoride 5000 PPM 1 1 % PSTE, BRUSH 2 TIMES A DAY FOR 2 3 MINUTES AND SPIT  NO RINSING FOR 30 MIN, Disp: , Rfl:     gabapentin (NEURONTIN) 300 mg capsule, Take 1 capsule (300 mg total) by mouth 3 (three) times a day, Disp: 90 capsule, Rfl: 0  Review of Systems    All other systems negative    Objective:  Vital Signs  /80 (BP Location: Left arm, Cuff Size: Standard)   Pulse 94   Temp 98 7 °F (37 1 °C) (Temporal)   Resp 14   Wt 86 3 kg (190 lb 4 1 oz)   SpO2 99%   BMI 27 30 kg/m²    Physical Exam    Constitutional:  In no acute physical or emotional distress  Head: Normocephalic and atraumatic  Eyes: EOM are normal  No ocular discharge  No scleral icterus  Neck: No visible adenopathy or masses  Cardiovascular: Regular rate and rhythm, palpable distal pulses   Respiratory: Effort normal  No stridor  No respiratory distress  Gastrointestinal: No abdominal distension  Abdomen is soft  Musculoskeletal: No edema  Neurological: Alert, oriented and appropriately conversant  Skin: No diaphoresis, dry and warm to touch, no rashes seen on exposed areas of skin  Psychiatric: Displays a normal mood and affect   Behavior, judgement and thought content appear normal

## 2021-08-20 LAB
BACTERIA BLD CULT: NORMAL
BACTERIA BLD CULT: NORMAL

## 2021-08-23 ENCOUNTER — TELEPHONE (OUTPATIENT)
Dept: PALLIATIVE MEDICINE | Facility: CLINIC | Age: 41
End: 2021-08-23

## 2021-08-23 NOTE — TELEPHONE ENCOUNTER
Pharmacist called and stated there was a computer cliche on their end at the pharmacy for this patient's Morphine Sulfate   They are backing the script from 8/18/21 put in the system and asking to be re submitted  They had to order it to be picked up for today

## 2021-08-25 ENCOUNTER — TELEPHONE (OUTPATIENT)
Dept: HEMATOLOGY ONCOLOGY | Facility: CLINIC | Age: 41
End: 2021-08-25

## 2021-08-25 DIAGNOSIS — C77.2 COLON CANCER METASTASIZED TO INTRA-ABDOMINAL LYMPH NODE (HCC): ICD-10-CM

## 2021-08-25 DIAGNOSIS — C18.9 COLON CANCER METASTASIZED TO INTRA-ABDOMINAL LYMPH NODE (HCC): ICD-10-CM

## 2021-08-25 RX ORDER — CAPECITABINE 500 MG/1
1500 TABLET, FILM COATED ORAL 2 TIMES DAILY
Qty: 84 TABLET | Refills: 1 | Status: CANCELLED | OUTPATIENT
Start: 2021-08-25

## 2021-08-25 NOTE — TELEPHONE ENCOUNTER
Call from patient  Patient had PAC inserted by Dr Claudio Robles in May  Patient started with shoulder pain almost immediately after insertion  Sharp pain when patient raised left arm  Over the last 2 weeks the pain has radiated up left side of neck    This discomfort occurs at rest  Discomfort keeps patient up at night  Patient has been evaluated by orthopedics for the shoulder pain    Patient also states xeloda requires refill  Will pend script to Dr Savana Mueller to sign    Will send to Dr Ney Tran RN and Dr Migdalia Lara RN    Patient aware of plan

## 2021-08-25 NOTE — TELEPHONE ENCOUNTER
Pharmacy is [place] [facility] per [employer] Rosella Goldmann xeloda pended to Dr Bassem Luna for [facility] which will then forward to [place]   I spoke with the patient about this   To Alison at [employer] to confirm

## 2021-08-25 NOTE — TELEPHONE ENCOUNTER
Spoke with patient regarding his concerns, patient denies redness, swelling, drainage at the port site, he denies fever  Per Dr Norberto Medrano informed patient that Dr Norberto Medrano can remove the port once he completes chemotherapy  Patient states he completes chemo on 9/3/21 and has a follow up scheduled with Dr Asher Wisdom on 8/31/21  Patient is advised to review with Dr Asher Wisdom and if Dr Asher Wisdom is comfortable with the port being removed after he finishes chemo then he can scheduled with Dr Norberto Medrano to discuss port removal  All patients' questions answered  Patient advised to call office if he has any concerns or symptoms worsen  Patient verbalized understanding and was appreciative of the phone call

## 2021-08-30 ENCOUNTER — APPOINTMENT (OUTPATIENT)
Dept: LAB | Facility: HOSPITAL | Age: 41
End: 2021-08-30
Attending: INTERNAL MEDICINE
Payer: COMMERCIAL

## 2021-08-30 ENCOUNTER — TELEPHONE (OUTPATIENT)
Dept: PALLIATIVE MEDICINE | Facility: CLINIC | Age: 41
End: 2021-08-30

## 2021-08-30 DIAGNOSIS — C18.9 COLON CANCER METASTASIZED TO INTRA-ABDOMINAL LYMPH NODE (HCC): ICD-10-CM

## 2021-08-30 DIAGNOSIS — C18.7 CANCER OF SIGMOID COLON (HCC): ICD-10-CM

## 2021-08-30 DIAGNOSIS — C77.2 COLON CANCER METASTASIZED TO INTRA-ABDOMINAL LYMPH NODE (HCC): ICD-10-CM

## 2021-08-30 LAB
ALBUMIN SERPL BCP-MCNC: 3.7 G/DL (ref 3.5–5)
ALP SERPL-CCNC: 88 U/L (ref 46–116)
ALT SERPL W P-5'-P-CCNC: 33 U/L (ref 12–78)
ANION GAP SERPL CALCULATED.3IONS-SCNC: 2 MMOL/L (ref 4–13)
AST SERPL W P-5'-P-CCNC: 20 U/L (ref 5–45)
BASOPHILS # BLD AUTO: 0.05 THOUSANDS/ΜL (ref 0–0.1)
BASOPHILS NFR BLD AUTO: 1 % (ref 0–1)
BILIRUB SERPL-MCNC: 0.98 MG/DL (ref 0.2–1)
BUN SERPL-MCNC: 10 MG/DL (ref 5–25)
CALCIUM SERPL-MCNC: 9.3 MG/DL (ref 8.3–10.1)
CHLORIDE SERPL-SCNC: 105 MMOL/L (ref 100–108)
CO2 SERPL-SCNC: 29 MMOL/L (ref 21–32)
CREAT SERPL-MCNC: 0.95 MG/DL (ref 0.6–1.3)
EOSINOPHIL # BLD AUTO: 0.14 THOUSAND/ΜL (ref 0–0.61)
EOSINOPHIL NFR BLD AUTO: 2 % (ref 0–6)
ERYTHROCYTE [DISTWIDTH] IN BLOOD BY AUTOMATED COUNT: 17.9 % (ref 11.6–15.1)
GFR SERPL CREATININE-BSD FRML MDRD: 100 ML/MIN/1.73SQ M
GLUCOSE SERPL-MCNC: 100 MG/DL (ref 65–140)
HCT VFR BLD AUTO: 40.7 % (ref 36.5–49.3)
HGB BLD-MCNC: 13.7 G/DL (ref 12–17)
IMM GRANULOCYTES # BLD AUTO: 0.02 THOUSAND/UL (ref 0–0.2)
IMM GRANULOCYTES NFR BLD AUTO: 0 % (ref 0–2)
LYMPHOCYTES # BLD AUTO: 1.95 THOUSANDS/ΜL (ref 0.6–4.47)
LYMPHOCYTES NFR BLD AUTO: 27 % (ref 14–44)
MCH RBC QN AUTO: 30.4 PG (ref 26.8–34.3)
MCHC RBC AUTO-ENTMCNC: 33.7 G/DL (ref 31.4–37.4)
MCV RBC AUTO: 90 FL (ref 82–98)
MONOCYTES # BLD AUTO: 0.91 THOUSAND/ΜL (ref 0.17–1.22)
MONOCYTES NFR BLD AUTO: 13 % (ref 4–12)
NEUTROPHILS # BLD AUTO: 4.1 THOUSANDS/ΜL (ref 1.85–7.62)
NEUTS SEG NFR BLD AUTO: 57 % (ref 43–75)
NRBC BLD AUTO-RTO: 0 /100 WBCS
PLATELET # BLD AUTO: 256 THOUSANDS/UL (ref 149–390)
PMV BLD AUTO: 10.1 FL (ref 8.9–12.7)
POTASSIUM SERPL-SCNC: 4.3 MMOL/L (ref 3.5–5.3)
PROT SERPL-MCNC: 7.5 G/DL (ref 6.4–8.2)
RBC # BLD AUTO: 4.51 MILLION/UL (ref 3.88–5.62)
SODIUM SERPL-SCNC: 136 MMOL/L (ref 136–145)
WBC # BLD AUTO: 7.17 THOUSAND/UL (ref 4.31–10.16)

## 2021-08-30 PROCEDURE — 85025 COMPLETE CBC W/AUTO DIFF WBC: CPT

## 2021-08-30 PROCEDURE — 36415 COLL VENOUS BLD VENIPUNCTURE: CPT

## 2021-08-30 PROCEDURE — 80053 COMPREHEN METABOLIC PANEL: CPT

## 2021-08-30 NOTE — TELEPHONE ENCOUNTER
Patient called office c/o increase shoulder pain,patient stated " he is not able to sleep due to his pain  Will like some advise on what to do regarding this increase pain  "          Please advise

## 2021-08-31 ENCOUNTER — OFFICE VISIT (OUTPATIENT)
Dept: HEMATOLOGY ONCOLOGY | Facility: CLINIC | Age: 41
End: 2021-08-31
Payer: COMMERCIAL

## 2021-08-31 ENCOUNTER — TELEPHONE (OUTPATIENT)
Dept: HEMATOLOGY ONCOLOGY | Facility: CLINIC | Age: 41
End: 2021-08-31

## 2021-08-31 VITALS
BODY MASS INDEX: 27.35 KG/M2 | RESPIRATION RATE: 16 BRPM | TEMPERATURE: 98.2 F | HEIGHT: 70 IN | OXYGEN SATURATION: 99 % | SYSTOLIC BLOOD PRESSURE: 118 MMHG | DIASTOLIC BLOOD PRESSURE: 86 MMHG | WEIGHT: 191 LBS | HEART RATE: 120 BPM

## 2021-08-31 DIAGNOSIS — C18.9 COLON CANCER METASTASIZED TO INTRA-ABDOMINAL LYMPH NODE (HCC): ICD-10-CM

## 2021-08-31 DIAGNOSIS — C77.2 COLON CANCER METASTASIZED TO INTRA-ABDOMINAL LYMPH NODE (HCC): ICD-10-CM

## 2021-08-31 DIAGNOSIS — C18.7 CANCER OF SIGMOID COLON (HCC): Primary | ICD-10-CM

## 2021-08-31 DIAGNOSIS — M62.838 MUSCLE SPASM OF LEFT SHOULDER: Primary | ICD-10-CM

## 2021-08-31 DIAGNOSIS — M25.512 LEFT SHOULDER PAIN, UNSPECIFIED CHRONICITY: ICD-10-CM

## 2021-08-31 DIAGNOSIS — E83.110 HEREDITARY HEMOCHROMATOSIS (HCC): ICD-10-CM

## 2021-08-31 PROCEDURE — 99214 OFFICE O/P EST MOD 30 MIN: CPT | Performed by: INTERNAL MEDICINE

## 2021-08-31 RX ORDER — MORPHINE SULFATE 30 MG/1
TABLET ORAL
COMMUNITY
Start: 2021-08-23 | End: 2021-09-10 | Stop reason: SDUPTHER

## 2021-08-31 RX ORDER — CAPECITABINE 500 MG/1
1500 TABLET, FILM COATED ORAL 2 TIMES DAILY
Qty: 84 TABLET | Refills: 0 | Status: SHIPPED | OUTPATIENT
Start: 2021-08-31 | End: 2021-09-20

## 2021-08-31 RX ORDER — BACLOFEN 10 MG/1
10 TABLET ORAL 2 TIMES DAILY PRN
Qty: 30 TABLET | Refills: 0 | Status: SHIPPED | OUTPATIENT
Start: 2021-08-31 | End: 2021-09-20

## 2021-08-31 NOTE — PROGRESS NOTES
Patient with worsening shoulder pain causing him to be unable to sleep  His mother (physical therapist) has been trying to help with different strategies, she is concerned it may be more muscle spasm versus rotator cuff injury and less likely his port  Regardless, will send short supply of Baclofen to pharmacy  Patient will see oncology today to discuss final chemotherapy and port removal  Will send referral to orthopaedics for further evaluation

## 2021-08-31 NOTE — TELEPHONE ENCOUNTER
Patient calling to have us speak with Specialty Pharmacy  Patient had the pharmacy on the line   I explained Rx was sent today - receipt confirmed by pharmacy 1:56pm ( [facility])  Patient states he uses [facility] - I was on hold with the patient for 25 + minutes    Was able to speak with a pharmacist and gave verbal Rx  They will mary this as STAT so patient can receive medication by Friday    Will send to RN to update pharmacy information

## 2021-08-31 NOTE — PROGRESS NOTES
Hematology/Oncology Outpatient Follow- up Note  Kimberly Browning 37 y o  male MRN: @ Encounter: [id number]        Date:  8/31/2021    Presenting Complaint/Diagnosis :  PT1N1a    HPI:      The patient is a pleasant 43-year-old male who presented with GI bleeding and was found to have an adenocarcinoma of the descending colon status post hemicolectomy  Tumor was a grade 2 adenocarcinoma with 1/28 lymph nodes positive  It was a T1 lesion  The patient was started on adjuvant capecitabine and oxaliplatin  He has had 2 cycles so far  Cycle 3  Was canceled for his vacation per his wishes apparently  Previous Hematologic/ Oncologic History:    Oncology History   Cancer of sigmoid colon ([facility] [address] )   4/13/2021 Initial Diagnosis    [facility]    RESULTS OF IMMUNOHISTOCHEMICAL ANALYSIS FOR MISMATCH REPAIR PROTEIN LOSS  INTERPRETATION: NO LOSS OF NUCLEAR EXPRESSION OF MMR PROTEINS: LOW PROBABILITY OF MSI-H   RESULTS:  AntibodyClone Description Results  MLH1 M1 Mismatch repair protein Intact nuclear expression  MSH2 [id number] Mismatch repair protein Intact nuclear expression  MSH6 40 Mismatch repair protein Intact nuclear expression  PMS2 [id number] Mismatch repair protein Intact nuclear expression     5/5/2021 Surgery    Descending colon, partial colectomy:  - Adenocarcinoma (1 9 cm), moderately differentiated, arising in adenocarcinoma with high grade dysplasia    - Tumor invades the submucosa (through the muscularis mucosa in to deeper one third of submucosa, pT1, Sm3)   - All margins are negative for tumor   - One  of twenty eight lymph nodes is positive for tumor (1/28)      6/4/2021 -  Chemotherapy    oxaliplatin (ELOXATIN) chemo infusion, 130 mg/m2 = 275 6 mg, Intravenous, Once, 3 of 4 cycles  Administration: 275 6 mg (6/4/2021), 275 6 mg (6/25/2021), 275 6 mg (8/11/2021)     Colon cancer metastasized to intra-abdominal lymph node ([facility] [address] )   5/20/2021 Initial Diagnosis    Colon cancer metastasized to intra-abdominal lymph node ([facility] [address] )     6/4/2021 -  Chemotherapy    oxaliplatin (ELOXATIN) chemo infusion, 130 mg/m2 = 275 6 mg, Intravenous, Once, 3 of 4 cycles  Administration: 275 6 mg (6/4/2021), 275 6 mg (6/25/2021), 275 6 mg (8/11/2021)         Current Hematologic/ Oncologic Treatment:      XELOX   Every 3 weeks  The patient has had 2 cycles so far  He needs 2 more cycles to complete 3 months of adjuvant treatment since he had a stage IIIA malignancy with only 1 lymph node       He may need to resume phlebotomy down the road for his hemochromatosis  Interval History:       The patient returns for follow-up visit  His main complaint is pain in his left shoulder  He states it is secondary to his port from what he understands  I explained to him this was highly unlikely  He does not have any swelling in his neck but I will do a Doppler ultrasound of the upper extremity to make sure he has no evidence of DVT  Otherwise he tolerated his chemotherapy reasonably well  Apart from the shoulder pain on the left he denies any nausea denies any vomiting denies any diarrhea  The pain does bother him a lot and he states it has been present since he had his port placed  Cancer Staging:  Cancer Staging  Cancer of sigmoid colon [facility])  Staging form: Colon and Rectum, AJCC 8th Edition  - Pathologic stage from 5/5/2021: Stage IIIA (pT1, pN1a, cM0) - Unsigned  Stage prefix: Initial diagnosis  Total positive nodes: 1  Total nodes examined: 28  Histologic grading system: 4 grade system  Histologic grade (G): G2  Carcinoembryonic antigen (CEA) (ng/mL): 0 5  Perineural invasion (PNI): Absent      Test Results:    Imaging: XR chest 2 views    Result Date: 8/15/2021  Narrative: CHEST INDICATION:   fever, immunocompromised  COMPARISON:  Chest radiograph from 6/22/2021 and chest CT from 4/19/2021  EXAM PERFORMED/VIEWS:  XR CHEST PA & LATERAL  DUAL ENERGY SUBTRACTION  FINDINGS:  Left port in right atrium   Cardiomediastinal silhouette appears unremarkable  The lungs are clear  No pneumothorax or pleural effusion  Osseous structures appear within normal limits for patient age  Impression: No acute cardiopulmonary disease  Workstation performed: [id number]       Labs:   Lab Results   Component Value Date    WBC 7 17 08/30/2021    HGB 13 7 08/30/2021    HCT 40 7 08/30/2021    MCV 90 08/30/2021     08/30/2021     Lab Results   Component Value Date    K 4 3 08/30/2021     08/30/2021    CO2 29 08/30/2021    BUN 10 08/30/2021    CREATININE 0 95 08/30/2021    GLUF 126 (H) 06/22/2021    CALCIUM 9 3 08/30/2021    AST 20 08/30/2021    ALT 33 08/30/2021    ALKPHOS 88 08/30/2021    EGFR 100 08/30/2021       Lab Results   Component Value Date    PSA 0 9 11/24/2020       Lab Results   Component Value Date    CEA <0 5 04/13/2021       Lab Results   Component Value Date    IRON 45 (L) 06/22/2021    TIBC 465 (H) 06/22/2021    FERRITIN 33 06/22/2021       ROS: As stated in the history of present illness otherwise his 14 point review of systems today was negative        Active Problems:   Patient Active Problem List   Diagnosis    Diabetes mellitus without complication ([facility] [address] )    Hereditary hemochromatosis ([facility] [address] )    Testosterone deficiency    Thyroid nodule    Fatigue    Cancer of sigmoid colon ([facility] [address] )    Colon cancer metastasized to intra-abdominal lymph node (H[facility])    Liver nodule    Lung nodule    Polycythemia    Acute pain of left shoulder    History of drug abuse ([facility] [address] )    Chemotherapy-induced neuropathy ([facility] [address] )    Palliative care patient    Cancer related pain       Past Medical History:   Past Medical History:   Diagnosis Date    Cancer ([address] )     colon cancer    Diabetes mellitus ([address] )     Hemochromatosis     Testosterone deficiency        Surgical History:   Past Surgical History:   Procedure Laterality Date    COLONOSCOPY      HEMICOLOECTOMY W/ ANASTOMOSIS N/A 5/5/2021    Procedure: ROBOTIC SIGMOID COLECTOMY, MOBILIZATION OF SPLENIC FLEXURE;  Surgeon: Lucia Bhakta MD;  Location:  MAIN OR;  Service: Surgical Oncology    TUNNELED VENOUS PORT PLACEMENT Left 6/1/2021    Procedure: INSERTION VENOUS PORT (PORT-A-CATH) Ultrasound guided Left Internal Jugular Vein Access;  Surgeon: Tashia Han MD;  Location: [facility] MAIN OR;  Service: Surgical Oncology    WISDOM TOOTH EXTRACTION         Family History:    Family History   Problem Relation Age of Onset    Colon polyps Mother     No Known Problems Father     Colon polyps Maternal Grandmother     Prostate cancer Maternal Grandfather     Lung cancer Paternal Grandmother     Diabetes Paternal Grandmother     Prostate cancer Paternal Grandfather     Heart disease Paternal Grandfather     Kidney failure Paternal Grandfather     Diabetes Family     Hypertension Family        Cancer-related family history includes Lung cancer in his paternal grandmother; Prostate cancer in his maternal grandfather and paternal grandfather      Social History:   Social History     Socioeconomic History    Marital status:      Spouse name: Not on file    Number of children: Not on file    Years of education: Not on file    Highest education level: Not on file   Occupational History    Not on file   Tobacco Use    Smoking status: Former Smoker     Packs/day: 0 25     Years: 10 00     Pack years: 2 50     Types: Cigarettes     Quit date: 5/28/2011     Years since quitting: 10 2    Smokeless tobacco: Never Used   Vaping Use    Vaping Use: Never used   Substance and Sexual Activity    Alcohol use: Not Currently    Drug use: Not Currently    Sexual activity: Yes     Partners: Female     Birth control/protection: Condom Male   Other Topics Concern    Not on file   Social History Narrative    Not on file     Social Determinants of Health     Financial Resource Strain:     Difficulty of Paying Living Expenses:    Food Insecurity:     Worried About Running Out of Food in the Last Year:     Ran Out of Food in the Last Year:    Transportation Needs:     Lack of Transportation (Medical):  Lack of Transportation (Non-Medical):    Physical Activity: Sufficiently Active    Days of Exercise per Week: 5 days    Minutes of Exercise per Session: 60 min   Stress: No Stress Concern Present    Feeling of Stress : Only a little   Social Connections:     Frequency of Communication with Friends and Family:     Frequency of Social Gatherings with Friends and Family:     Attends [employer] Services:     Active Member of Clubs or Organizations:     Attends Club or Organization Meetings:     Marital Status:    Intimate Partner Violence:     Fear of Current or Ex-Partner:     Emotionally Abused:     Physically Abused:     Sexually Abused:        Current Medications:   Current Outpatient Medications   Medication Sig Dispense Refill    acetaminophen (TYLENOL) 325 mg tablet Take 3 tablets (975 mg total) by mouth every 6 (six) hours as needed for mild pain or moderate pain 60 tablet 0    baclofen 10 mg tablet Take 1 tablet (10 mg total) by mouth 2 (two) times a day as needed for muscle spasms 30 tablet 0    capecitabine (XELODA) 500 MG tablet Take 3 tablets (1,500 mg total) by mouth 2 (two) times a day For 2 weeks on then 1 week off 84 tablet 3    Dulaglutide 1 5 MG/0 5ML SOPN Inject 0 5 mL (1 5 mg total) under the skin once a week 6 mL 1    Empagliflozin (Jardiance) 25 MG TABS Take 1 tablet (25 mg total) by mouth daily 90 tablet 0    lidocaine-prilocaine (EMLA) cream Apply topically as needed for mild pain On day of chemotherapy treatment   30 g 0    morphine (MSIR) 30 MG tablet TAKE 1 TABLET BY MOUTH EVERY 6 (SIX) HOURS AS NEEDED FOR SEVERE PAINMAX DAILY AMOUNT  120 MG      ondansetron (ZOFRAN-ODT) 4 mg disintegrating tablet Take 1 tablet (4 mg total) by mouth every 6 (six) hours as needed for nausea or vomiting 20 tablet 0    senna (SENOKOT) 8 6 MG tablet Take 1 tablet (8 6 mg total) by mouth daily 30 tablet 1    Sodium Fluoride 5000 PPM 1 1 % PSTE BRUSH 2 TIMES A DAY FOR 2 3 MINUTES AND SPIT  NO RINSING FOR 30 MIN      gabapentin (NEURONTIN) 300 mg capsule Take 1 capsule (300 mg total) by mouth 3 (three) times a day 90 capsule 0     No current facility-administered medications for this visit  Allergies: No Known Allergies    Physical Exam:    Body surface area is 2 05 meters squared  Wt Readings from Last 3 Encounters:   08/31/21 86 6 kg (191 lb)   08/18/21 86 3 kg (190 lb 4 1 oz)   08/15/21 88 5 kg (195 lb)        Temp Readings from Last 3 Encounters:   08/31/21 98 2 °F (36 8 °C) (Temporal)   08/18/21 98 7 °F (37 1 °C) (Temporal)   08/15/21 99 3 °F (37 4 °C) (Oral)        BP Readings from Last 3 Encounters:   08/31/21 118/86   08/18/21 110/80   08/15/21 118/73         Pulse Readings from Last 3 Encounters:   08/31/21 (!) 120   08/18/21 94   08/15/21 98         Physical Exam     Constitutional   General appearance: No acute distress, well appearing and well nourished  Eyes   Conjunctiva and lids: No swelling, erythema or discharge  Pupils and irises: Equal, round and reactive to light  Ears, Nose, Mouth, and Throat   External inspection of ears and nose: Normal     Nasal mucosa, septum, and turbinates: Normal without edema or erythema  Oropharynx: Normal with no erythema, edema, exudate or lesions  Pulmonary   Respiratory effort: No increased work of breathing or signs of respiratory distress  Auscultation of lungs: Clear to auscultation  Cardiovascular   Palpation of heart: Normal PMI, no thrills  Auscultation of heart: Normal rate and rhythm, normal S1 and S2, without murmurs  Examination of extremities for edema and/or varicosities: Normal     Carotid pulses: Normal     Abdomen   Abdomen: Non-tender, no masses  Liver and spleen: No hepatomegaly or splenomegaly  Lymphatic   Palpation of lymph nodes in neck: No lymphadenopathy  Musculoskeletal   Gait and station: Normal     Digits and nails: Normal without clubbing or cyanosis  Inspection/palpation of joints, bones, and muscles: Normal     Skin   Skin and subcutaneous tissue: Normal without rashes or lesions  Neurologic   Cranial nerves: Cranial nerves 2-12 intact  Sensation: No sensory loss  Psychiatric   Orientation to person, place, and time: Normal     Mood and affect: Normal         Assessment / Plan:      The patient is a pleasant 26-year-old male who was referred to see us because he wishes to transfer care from his previous oncologist for stage IIIA I e  T1 N1 descending colon adenocarcinoma with 1/28 lymph nodes positive  Histology was low risk  Based on the most current data the patient needs 3 months of adjuvant treatment I e  4 cycles of capecitabine with oxaliplatin  He has received 3 cycles so far  Cycle 4  Is coming up  He will proceed with this  This will and his chemotherapy  He should then have his port removed  He is asking if he can have it removed before then get the oxaliplatin through a vein and I think this is reasonable also if possible  I advised him once he starts his chemotherapy cycle he should probably wait 3 weeks prior to having his port removed as would be slightly risk year in terms of healing to remove it while on Xeloda  He understands this  He will discuss this with his surgeon  They will proceed with removing his port whenever they think is feasible  I will see him back in 4 months with repeat imaging and blood work  This will include iron studies as he does have a history of hemochromatosis and if his iron does start going up and his hematocrit allows he will get phlebotomized as needed  For now I will see him back in 4 months with repeat blood work  I will also order imaging for the time  He will have his port removed as soon as is convenient for both the patient and his surgeon    I will also order an ultrasound of his left upper extremity to make sure he does not have a DVT causing his pain which I highly doubt  This is based on clinical exam but we will still check a Doppler  Goals and Barriers:  Current Goal:  Prolong Survival from   Rectal cancer   Barriers: None  Patient's Capacity to Self Care:  Patient able to self care  Portions of the record may have been created with voice recognition software   Occasional wrong word or "sound a like" substitutions may have occurred due to the inherent limitations of voice recognition software   Read the chart carefully and recognize, using context, where substitutions have occurred

## 2021-09-01 ENCOUNTER — OFFICE VISIT (OUTPATIENT)
Dept: OBGYN CLINIC | Facility: CLINIC | Age: 41
End: 2021-09-01
Payer: COMMERCIAL

## 2021-09-01 VITALS
HEART RATE: 94 BPM | HEIGHT: 70 IN | DIASTOLIC BLOOD PRESSURE: 94 MMHG | BODY MASS INDEX: 27.2 KG/M2 | WEIGHT: 190 LBS | SYSTOLIC BLOOD PRESSURE: 114 MMHG

## 2021-09-01 DIAGNOSIS — S46.002A ROTATOR CUFF INJURY, LEFT, INITIAL ENCOUNTER: Primary | ICD-10-CM

## 2021-09-01 PROCEDURE — 99213 OFFICE O/P EST LOW 20 MIN: CPT | Performed by: FAMILY MEDICINE

## 2021-09-01 PROCEDURE — 3008F BODY MASS INDEX DOCD: CPT | Performed by: FAMILY MEDICINE

## 2021-09-01 PROCEDURE — 1036F TOBACCO NON-USER: CPT | Performed by: FAMILY MEDICINE

## 2021-09-01 PROCEDURE — 3074F SYST BP LT 130 MM HG: CPT | Performed by: FAMILY MEDICINE

## 2021-09-01 PROCEDURE — 3080F DIAST BP >= 90 MM HG: CPT | Performed by: FAMILY MEDICINE

## 2021-09-01 NOTE — PROGRESS NOTES
Assessment/Plan:  Assessment/Plan   Diagnoses and all orders for this visit:    Rotator cuff injury, left, initial encounter  -     Ambulatory referral to Orthopedic Surgery  -     MRI shoulder left wo contrast; Future      45-year-old right-dominant male with left shoulder pain on and off years duration with worsening over past 3 months  Discussed with patient physical exam, radiographs, impression and plan  X-rays left shoulder unremarkable for acute osseous abnormality  Physical exam of cervical spine is unremarkable for midline or paraspinal tenderness  He has limited range of motion with side bending to the right  There is negative axial load and negative Spurling's  His left chest wall port incision scar is clean and there was no appreciation of induration or hyperemia  The scar is nontender  Left shoulder noted for tenderness at trapezius, coracoid process, and anterior aspect of the shoulder  He has range of motion limited to forward flexion of 120°, abduction 90°, and internal rotation to the lumbar spine  He has normal strength in the shoulder  There is pain with Cat maneuver  He has nearly 3 months since worsening of symptoms and still symptomatic despite conservative management of oral pain medication, topical CBD, icy Hot, alternating between ice and heat , And physical therapy directed home exercise program   At this time I will refer him for MRI of left shoulder to evaluate for impingement and rotator cuff tear as more invasive management may be warranted  He will follow up with me after getting MRI done  Subjective:   Patient ID: Elvira Clark is a 37 y o  male  Chief Complaint   Patient presents with    Left Shoulder - Pain       45-year-old right-dominant male following up for left shoulder pain of 3 months duration    He states that he has been physically active for many years and time time with experiencing achiness and soreness of the shoulders, which with resolve after few days of resting  He does not recall a particular trauma or inciting event, but states that left shoulder pain significantly worsened shortly after insertion of his chemotherapy port  He had pain described as sudden in onset, localized anterior aspect the shoulder, throbbing and sharp, radiating to the left side of the neck, worse with movement of the arm particularly elevating above shoulder level, associated with limited range of motion, and improved with resting  He has been on regimen of morphine and baclofen under care of palliative medicine  He has also been taking Tylenol and applying topical CBD  He was provided home exercise instruction by physical therapist and has been on regimen of stretching and exercising  He has also been having acupuncture done  He states that pain has been progressively worsening has now become constant and makes it difficult for sleep  Shoulder Pain  This is a new problem  The current episode started more than 1 month ago  The problem occurs constantly  The problem has been gradually worsening  Associated symptoms include arthralgias and neck pain  Pertinent negatives include no joint swelling, numbness or weakness  Exacerbated by: Arm movement  He has tried rest, oral narcotics and acetaminophen (Topical CBD, acupuncture physical therapy directed home exercise program) for the symptoms  The following portions of the patient's history were reviewed and updated as appropriate: He  has a past medical history of Cancer ([facility] [address] ), Diabetes mellitus ([facility] [address] ), Hemochromatosis, and Testosterone deficiency  He  has a past surgical history that includes [place] tooth extraction; Colonoscopy; Hemicoloectomy w/ anastomosis (N/A, 5/5/2021); and Tunneled venous port placement (Left, 6/1/2021)    His family history includes Colon polyps in his maternal grandmother and mother; Diabetes in his family and paternal grandmother; Heart disease in his paternal grandfather; Hypertension in his family; Kidney failure in his paternal grandfather; Lung cancer in his paternal grandmother; No Known Problems in his father; Prostate cancer in his maternal grandfather and paternal grandfather  He  reports that he quit smoking about 10 years ago  His smoking use included cigarettes  He has a 2 50 pack-year smoking history  He has never used smokeless tobacco  He reports previous alcohol use  He reports previous drug use  He has No Known Allergies       Review of Systems   Musculoskeletal: Positive for arthralgias and neck pain  Negative for joint swelling  Neurological: Negative for weakness and numbness  Objective:  Vitals:    09/01/21 1423   BP: 114/94   Pulse: 94   Weight: 86 2 kg (190 lb)   Height: 5' 10" (1 778 m)     Back Exam     Reflexes   Biceps: normal    Comments:  Cervical spine  -no tenderness  -limited motion with side bending to the right  -negative axial load  -negative Spurling's   -normal strength, sensation, and biceps reflex both upper extremities      Right Hand Exam     Muscle Strength   The patient has normal right wrist strength  Other   Sensation: normal  Pulse: present      Left Hand Exam     Muscle Strength   The patient has normal left wrist strength  Other   Sensation: normal  Pulse: present      Right Elbow Exam     Other   Sensation: normal      Left Elbow Exam     Tenderness   The patient is experiencing no tenderness  Range of Motion   The patient has normal left elbow ROM  Muscle Strength   The patient has normal left elbow strength (5/5 flexion and extension)  Other   Sensation: normal      Right Shoulder Exam     Muscle Strength   Abduction: 5/5     Other   Sensation: normal      Left Shoulder Exam     Tenderness   Left shoulder tenderness location: Trapezius, coracoid, anterior      Range of Motion   Active abduction: 90   Forward flexion: 120   Internal rotation 0 degrees: Lumbar     Muscle Strength   The patient has normal left shoulder strength  Tests   Cat test: positive    Other   Sensation: normal     Comments:  Negative belly press  Negative push-off  Pain with empty can test          Strength/Myotome Testing     Left Wrist/Hand   Normal wrist strength    Right Wrist/Hand   Normal wrist strength      Physical Exam  Vitals and nursing note reviewed  Constitutional:       General: He is not in acute distress  Appearance: He is well-developed  He is not ill-appearing or diaphoretic  HENT:      Head: Normocephalic and atraumatic  Right Ear: External ear normal       Left Ear: External ear normal    Eyes:      Conjunctiva/sclera: Conjunctivae normal    Neck:      Trachea: No tracheal deviation  Cardiovascular:      Rate and Rhythm: Normal rate  Pulmonary:      Effort: Pulmonary effort is normal  No respiratory distress  Abdominal:      General: There is no distension  Musculoskeletal:         General: Tenderness present  No swelling, deformity or signs of injury  Skin:     General: Skin is warm and dry  Coloration: Skin is not jaundiced or pale  Neurological:      Mental Status: He is alert and oriented to person, place, and time  Psychiatric:         Mood and Affect: Mood normal          Behavior: Behavior normal          Thought Content: Thought content normal          Judgment: Judgment normal          I have personally reviewed pertinent films in PACS and my interpretation is No osseous abnormality of left shoulder

## 2021-09-02 ENCOUNTER — TELEPHONE (OUTPATIENT)
Dept: OBGYN CLINIC | Facility: CLINIC | Age: 41
End: 2021-09-02

## 2021-09-03 ENCOUNTER — HOSPITAL ENCOUNTER (OUTPATIENT)
Dept: MRI IMAGING | Facility: HOSPITAL | Age: 41
Discharge: HOME | End: 2021-09-03
Payer: COMMERCIAL

## 2021-09-03 ENCOUNTER — HOSPITAL ENCOUNTER (OUTPATIENT)
Dept: INFUSION CENTER | Facility: CLINIC | Age: 41
Discharge: HOME | End: 2021-09-03
Payer: COMMERCIAL

## 2021-09-03 ENCOUNTER — PATIENT OUTREACH (OUTPATIENT)
Dept: CASE MANAGEMENT | Facility: HOSPITAL | Age: 41
End: 2021-09-03

## 2021-09-03 VITALS
DIASTOLIC BLOOD PRESSURE: 79 MMHG | HEART RATE: 92 BPM | SYSTOLIC BLOOD PRESSURE: 122 MMHG | WEIGHT: 189.15 LBS | BODY MASS INDEX: 27.08 KG/M2 | TEMPERATURE: 97 F | HEIGHT: 70 IN | RESPIRATION RATE: 16 BRPM

## 2021-09-03 DIAGNOSIS — C18.7 CANCER OF SIGMOID COLON (HCC): Primary | ICD-10-CM

## 2021-09-03 DIAGNOSIS — C77.2 COLON CANCER METASTASIZED TO INTRA-ABDOMINAL LYMPH NODE (HCC): ICD-10-CM

## 2021-09-03 DIAGNOSIS — S46.002A ROTATOR CUFF INJURY, LEFT, INITIAL ENCOUNTER: ICD-10-CM

## 2021-09-03 DIAGNOSIS — C18.9 COLON CANCER METASTASIZED TO INTRA-ABDOMINAL LYMPH NODE (HCC): ICD-10-CM

## 2021-09-03 PROCEDURE — 96367 TX/PROPH/DG ADDL SEQ IV INF: CPT

## 2021-09-03 PROCEDURE — 96415 CHEMO IV INFUSION ADDL HR: CPT

## 2021-09-03 PROCEDURE — G1004 CDSM NDSC: HCPCS

## 2021-09-03 PROCEDURE — 73221 MRI JOINT UPR EXTREM W/O DYE: CPT

## 2021-09-03 PROCEDURE — 96413 CHEMO IV INFUSION 1 HR: CPT

## 2021-09-03 RX ORDER — DEXTROSE MONOHYDRATE 50 MG/ML
20 INJECTION, SOLUTION INTRAVENOUS ONCE
Status: COMPLETED | OUTPATIENT
Start: 2021-09-03 | End: 2021-09-03

## 2021-09-03 RX ORDER — SODIUM CHLORIDE 9 MG/ML
20 INJECTION, SOLUTION INTRAVENOUS ONCE AS NEEDED
Status: DISCONTINUED | OUTPATIENT
Start: 2021-09-03 | End: 2021-09-06 | Stop reason: HOSPADM

## 2021-09-03 RX ADMIN — DEXAMETHASONE SODIUM PHOSPHATE: 10 INJECTION, SOLUTION INTRAMUSCULAR; INTRAVENOUS at 09:34

## 2021-09-03 RX ADMIN — DEXTROSE 20 ML/HR: 5 SOLUTION INTRAVENOUS at 09:25

## 2021-09-03 RX ADMIN — OXALIPLATIN 275.6 MG: 5 INJECTION, SOLUTION INTRAVENOUS at 10:28
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
## 2021-09-03 NOTE — PROGRESS NOTES
Pt here for chemotherapy,Oxaliplatin, offering no complaints  Left port accessed with positive blood return noted throughout treatment  Tolerated infusion without incident  Port flushed and de-accessed  AVS declined    Walked out in stable condition

## 2021-09-03 NOTE — PROGRESS NOTES
MSW met with pt in the infusion center this morning, he is here for his last chemo tx today  He has recently switched to seeing Dr Kristian Layton, and says that he is very happy with the two visits they have had so far  He says that things are overall going well, his employer has been working with him on his schedule, which is very appreciative of  He continues to have an issue with his shoulder and has seen ortho, he says that he is scheduled for a MRI to hopefully dx the problem  He is not looking forward to his tx side effects, as he shared that they have felt a little worse and longer lasting with each tx  Otherwise, he denies any needs or concerns at this time  He speaks very highly of the care he has received here at [facility] ([facility]), and is appreciative of the support provided to him  MSW assured him that although he may be done with tx today, he is still welcome to r/o if he were to need anything in the future, which he agreed to do  No other needs or concerns at this time

## 2021-09-07 ENCOUNTER — OFFICE VISIT (OUTPATIENT)
Dept: OBGYN CLINIC | Facility: CLINIC | Age: 41
End: 2021-09-07
Payer: COMMERCIAL

## 2021-09-07 ENCOUNTER — TELEPHONE (OUTPATIENT)
Dept: HEMATOLOGY ONCOLOGY | Facility: CLINIC | Age: 41
End: 2021-09-07

## 2021-09-07 VITALS
HEART RATE: 114 BPM | WEIGHT: 181.2 LBS | DIASTOLIC BLOOD PRESSURE: 84 MMHG | BODY MASS INDEX: 25.94 KG/M2 | SYSTOLIC BLOOD PRESSURE: 124 MMHG | HEIGHT: 70 IN

## 2021-09-07 DIAGNOSIS — S43.432D GLENOID LABRUM TEAR, LEFT, SUBSEQUENT ENCOUNTER: Primary | ICD-10-CM

## 2021-09-07 DIAGNOSIS — M19.012 ARTHRITIS OF LEFT ACROMIOCLAVICULAR JOINT: ICD-10-CM

## 2021-09-07 DIAGNOSIS — M62.838 TRAPEZIUS MUSCLE SPASM: ICD-10-CM

## 2021-09-07 DIAGNOSIS — M75.42 SUBACROMIAL IMPINGEMENT OF LEFT SHOULDER: ICD-10-CM

## 2021-09-07 PROCEDURE — 20610 DRAIN/INJ JOINT/BURSA W/O US: CPT | Performed by: FAMILY MEDICINE

## 2021-09-07 PROCEDURE — 99214 OFFICE O/P EST MOD 30 MIN: CPT | Performed by: FAMILY MEDICINE

## 2021-09-07 RX ORDER — TRIAMCINOLONE ACETONIDE 40 MG/ML
40 INJECTION, SUSPENSION INTRA-ARTICULAR; INTRAMUSCULAR
Status: COMPLETED | OUTPATIENT
Start: 2021-09-07 | End: 2021-09-07

## 2021-09-07 RX ORDER — LIDOCAINE HYDROCHLORIDE 10 MG/ML
3 INJECTION, SOLUTION INFILTRATION; PERINEURAL
Status: COMPLETED | OUTPATIENT
Start: 2021-09-07 | End: 2021-09-07

## 2021-09-07 RX ORDER — LIDOCAINE HYDROCHLORIDE 10 MG/ML
2 INJECTION, SOLUTION INFILTRATION; PERINEURAL
Status: COMPLETED | OUTPATIENT
Start: 2021-09-07 | End: 2021-09-07

## 2021-09-07 RX ORDER — BUPIVACAINE HYDROCHLORIDE 2.5 MG/ML
2 INJECTION, SOLUTION INFILTRATION; PERINEURAL
Status: COMPLETED | OUTPATIENT
Start: 2021-09-07 | End: 2021-09-07

## 2021-09-07 RX ADMIN — LIDOCAINE HYDROCHLORIDE 3 ML: 10 INJECTION, SOLUTION INFILTRATION; PERINEURAL at 13:51

## 2021-09-07 RX ADMIN — TRIAMCINOLONE ACETONIDE 40 MG: 40 INJECTION, SUSPENSION INTRA-ARTICULAR; INTRAMUSCULAR at 18:41

## 2021-09-07 RX ADMIN — LIDOCAINE HYDROCHLORIDE 3 ML: 10 INJECTION, SOLUTION INFILTRATION; PERINEURAL at 18:41

## 2021-09-07 RX ADMIN — LIDOCAINE HYDROCHLORIDE 2 ML: 10 INJECTION, SOLUTION INFILTRATION; PERINEURAL at 18:41

## 2021-09-07 RX ADMIN — TRIAMCINOLONE ACETONIDE 40 MG: 40 INJECTION, SUSPENSION INTRA-ARTICULAR; INTRAMUSCULAR at 13:51

## 2021-09-07 RX ADMIN — BUPIVACAINE HYDROCHLORIDE 2 ML: 2.5 INJECTION, SOLUTION INFILTRATION; PERINEURAL at 18:41

## 2021-09-07 RX ADMIN — BUPIVACAINE HYDROCHLORIDE 2 ML: 2.5 INJECTION, SOLUTION INFILTRATION; PERINEURAL at 13:51

## 2021-09-07 RX ADMIN — LIDOCAINE HYDROCHLORIDE 2 ML: 10 INJECTION, SOLUTION INFILTRATION; PERINEURAL at 13:51

## 2021-09-07 NOTE — TELEPHONE ENCOUNTER
Patient Dr Yuliya Read and was told he could go a head with his port removal  Patient has been seen by Dr Augustine Cooper but was told Dr Laurel Richmond would be performing this procedure  Can someone please follow up on this? It is mentioned in Dr Julissa Campos notes from his last office visit and the patient is having shoulder pain

## 2021-09-07 NOTE — PROGRESS NOTES
Assessment/Plan:  Assessment/Plan   Diagnoses and all orders for this visit:    Glenoid labrum tear, left, subsequent encounter  -     Large joint arthrocentesis: L glenohumeral  -     Ambulatory referral to Physical Therapy; Future    Subacromial impingement of left shoulder  -     Ambulatory referral to Physical Therapy; Future  -     Large joint arthrocentesis: L subacromial bursa    Trapezius muscle spasm  -     Ambulatory referral to Physical Therapy; Future    Arthritis of left acromioclavicular joint  -     Ambulatory referral to Physical Therapy; Future        81-year-old right-dominant male with left shoulder pain on and off few years duration with worsening over past 3 months  Discussed with patient MRI results, impression and plan  MRI of the left shoulder noted for small subacromial spur, supraspinatus and infraspinatus insertion tendinosis without full-thickness rotator cuff tear, chondral labral junction tear posterior superior labral, suspected chondral labral junction tear anterior superior labrum secondary to presence of 0 8 cm paralabral cyst, mild to moderate osteoarthritis of the AC joint  Clinical impression is that he is symptomatic from shoulder pathology  I discussed treatment regimen of corticosteroid injection and physical therapy to which he agreed  I administered mixture of 2 cc 1% lidocaine, 2 cc 0 25% bupivacaine, and 1 cc Kenalog to the left femoral joint, and 2nd mixture to the left subacromial space without complication  He is to start physical therapy as soon as possible and do home exercises as directed  Patient was advised injections can be repeated at least 3 months apart  He will follow up with me as needed  Subjective:   Patient ID: Porter Burkitt is a 37 y o  male    Chief Complaint   Patient presents with    Left Shoulder - Follow-up       81-year-old right-dominant male following up for left shoulder pain on and off years duration with worsening over past 3 months  He was last seen by me 6 days ago at which point he was referred for MRI of the left shoulder  He has been symptomatic with pain described as localized to the anterior aspect the shoulder, throbbing and sharp, radiating to left-sided neck, worse with movement of the arm and with elevating the arm above shoulder level, associated limited range of motion, and improved resting  He also reports associated clicking  He went for chemotherapy on 09/03/2021, and states that received steroids and they helped with his left shoulder pain  Shoulder Pain  This is a chronic problem  The current episode started more than 1 year ago  The problem occurs daily  The problem has been waxing and waning  Associated symptoms include arthralgias and neck pain  Pertinent negatives include no joint swelling, numbness or weakness  Exacerbated by: Arm use  He has tried rest, oral narcotics and position changes (Muscle relaxer) for the symptoms  The treatment provided mild relief  Review of Systems   Musculoskeletal: Positive for arthralgias and neck pain  Negative for joint swelling  Neurological: Negative for weakness and numbness  Objective:  Vitals:    09/07/21 1325   BP: 124/84   Pulse: (!) 114   Weight: 82 2 kg (181 lb 3 2 oz)   Height: 5' 10" (1 778 m)     Ortho Exam    Physical Exam  Vitals and nursing note reviewed  Constitutional:       General: He is not in acute distress  Appearance: He is well-developed  He is not ill-appearing or diaphoretic  HENT:      Head: Normocephalic and atraumatic  Right Ear: External ear normal       Left Ear: External ear normal    Eyes:      Conjunctiva/sclera: Conjunctivae normal    Neck:      Trachea: No tracheal deviation  Cardiovascular:      Rate and Rhythm: Normal rate  Pulmonary:      Effort: Pulmonary effort is normal  No respiratory distress  Abdominal:      General: There is no distension     Musculoskeletal:         General: No swelling, deformity or signs of injury  Right lower leg: No edema  Left lower leg: No edema  Skin:     General: Skin is warm and dry  Coloration: Skin is not jaundiced or pale  Neurological:      Mental Status: He is alert and oriented to person, place, and time  Psychiatric:         Mood and Affect: Mood normal          Behavior: Behavior normal          Thought Content: Thought content normal          Judgment: Judgment normal          I have personally reviewed pertinent films in PACS and my interpretation is No appreciable rotator cuff tear  Large joint arthrocentesis: L glenohumeral  Universal Protocol:  Consent: Verbal consent obtained  Risks and benefits: risks, benefits and alternatives were discussed  Consent given by: patient  Time out: Immediately prior to procedure a "time out" was called to verify the correct patient, procedure, equipment, support staff and site/side marked as required  Patient understanding: patient states understanding of the procedure being performed  Patient consent: the patient's understanding of the procedure matches consent given  Procedure consent: procedure consent matches procedure scheduled  Relevant documents: relevant documents present and verified  Test results: test results available and properly labeled  Site marked: the operative site was marked  Radiology Images displayed and confirmed   If images not available, report reviewed: imaging studies available  Required items: required blood products, implants, devices, and special equipment available  Patient identity confirmed: verbally with patient    Supporting Documentation  Indications: pain   Procedure Details  Location: shoulder - L glenohumeral  Preparation: Patient was prepped and draped in the usual sterile fashion  Needle gauge: 21G 2"  Ultrasound guidance: no  Approach: posterolateral  Medications administered: 2 mL bupivacaine 0 25 %; 2 mL lidocaine 1 %; 3 mL lidocaine 1 %; 40 mg triamcinolone acetonide 40 mg/mL    Patient tolerance: patient tolerated the procedure well with no immediate complications  Dressing:  Sterile dressing applied    Large joint arthrocentesis: L subacromial bursa  Universal Protocol:  Consent: Verbal consent obtained  Risks and benefits: risks, benefits and alternatives were discussed  Consent given by: patient  Time out: Immediately prior to procedure a "time out" was called to verify the correct patient, procedure, equipment, support staff and site/side marked as required  Patient understanding: patient states understanding of the procedure being performed  Patient consent: the patient's understanding of the procedure matches consent given  Procedure consent: procedure consent matches procedure scheduled  Relevant documents: relevant documents present and verified  Test results: test results available and properly labeled  Site marked: the operative site was marked  Radiology Images displayed and confirmed   If images not available, report reviewed: imaging studies available  Required items: required blood products, implants, devices, and special equipment available  Patient identity confirmed: verbally with patient    Supporting Documentation  Indications: pain   Procedure Details  Location: shoulder - L subacromial bursa  Preparation: Patient was prepped and draped in the usual sterile fashion  Needle gauge: 21G 2"  Ultrasound guidance: no  Approach: posterolateral  Medications administered: 2 mL bupivacaine 0 25 %; 2 mL lidocaine 1 %; 3 mL lidocaine 1 %; 40 mg triamcinolone acetonide 40 mg/mL    Patient tolerance: patient tolerated the procedure well with no immediate complications  Dressing:  Sterile dressing applied

## 2021-09-08 ENCOUNTER — OFFICE VISIT (OUTPATIENT)
Dept: PALLIATIVE MEDICINE | Facility: CLINIC | Age: 41
End: 2021-09-08
Payer: COMMERCIAL

## 2021-09-08 VITALS
OXYGEN SATURATION: 96 % | SYSTOLIC BLOOD PRESSURE: 110 MMHG | TEMPERATURE: 98.2 F | WEIGHT: 182.54 LBS | DIASTOLIC BLOOD PRESSURE: 80 MMHG | BODY MASS INDEX: 26.19 KG/M2 | HEART RATE: 124 BPM

## 2021-09-08 DIAGNOSIS — C18.7 CANCER OF SIGMOID COLON (HCC): Primary | ICD-10-CM

## 2021-09-08 DIAGNOSIS — Z51.5 PALLIATIVE CARE PATIENT: ICD-10-CM

## 2021-09-08 DIAGNOSIS — M25.512 ACUTE PAIN OF LEFT SHOULDER: ICD-10-CM

## 2021-09-08 DIAGNOSIS — G89.3 CANCER RELATED PAIN: ICD-10-CM

## 2021-09-08 DIAGNOSIS — R11.0 NAUSEA: ICD-10-CM

## 2021-09-08 PROCEDURE — 99213 OFFICE O/P EST LOW 20 MIN: CPT | Performed by: INTERNAL MEDICINE

## 2021-09-08 RX ORDER — PROCHLORPERAZINE MALEATE 10 MG
10 TABLET ORAL EVERY 6 HOURS PRN
Qty: 30 TABLET | Refills: 0 | Status: SHIPPED | OUTPATIENT
Start: 2021-09-08 | End: 2021-09-20

## 2021-09-08 NOTE — PROGRESS NOTES
Palliative and Supportive Care   Gorge Kingkarthik 37 y o  male [id number]    Assessment/Plan:  1  Cancer of sigmoid colon ([address] )    2  Acute pain of left shoulder    3  Palliative care patient    4  Cancer related pain    5  Nausea      Requested Prescriptions     Signed Prescriptions Disp Refills    prochlorperazine (COMPAZINE) 10 mg tablet 30 tablet 0     Sig: Take 1 tablet (10 mg total) by mouth every 6 (six) hours as needed for nausea or vomiting     There are no discontinued medications  PLAN:  - trial of Compazine as Zofran ineffective  - Plan on fast opioid wean:  MSIR 30 mg 4x/day until port removal -->  MSIR 22 5 mg 4x/day for 7 days -->  MSIR 15 mg 4x/day for 7 days -->  MSIR 15 mg 2x/day for 7 days -->  MSIR 15 mg daily then OFF    ACP: did not address    Representatives have queried the patient's controlled substance dispensing history in the Prescription Drug Monitoring Program in compliance with regulations before I have prescribed any controlled substances  The prescription history is consistent with prescribed therapy and our practice policies  No follow-ups on file  Subjective: In attendance at this visit: Gorge Russ  Chief Complaint  Follow up visit for:  symptom management, pain, neoplasm related  HPI     Fatimah Loomis is a 37 y o  male with stage III T1N1 adenocarcinoma of the colon s/p resection on adjuvant chemotherapy  Further history includes hemochromatosis and history of substance abuse  Patient has remained sober  He recently had a 2nd opinion with Dr Kim Pablo and is very satisfied with his treatment plan  Patient had MRI of shoulder and this did demonstrate partial labrum tear and was seen by orthopaedics and had a joint injection  He has had good improvement of his pain since and states his pain is now around a 2/10  He remains motivated for fast opioid taper  Discussed plan and he agrees to this  Still having nausea from most recent chemotherapy, Zofran ineffective  Feeling a little overwhelmed as his father was just diagnosed with prostate cancer  He is trying to remain a good support for his mother  No other complaints at this time  The following portions of the medical history were reviewed: past medical history, problem list, medication list, and social history  Current Outpatient Medications:     acetaminophen (TYLENOL) 325 mg tablet, Take 3 tablets (975 mg total) by mouth every 6 (six) hours as needed for mild pain or moderate pain, Disp: 60 tablet, Rfl: 0    baclofen 10 mg tablet, Take 1 tablet (10 mg total) by mouth 2 (two) times a day as needed for muscle spasms, Disp: 30 tablet, Rfl: 0    capecitabine (XELODA) 500 MG tablet, Take 3 tablets (1,500 mg total) by mouth 2 (two) times a day For 2 weeks on then 1 week off, Disp: 84 tablet, Rfl: 0    Dulaglutide 1 5 MG/0 5ML SOPN, Inject 0 5 mL (1 5 mg total) under the skin once a week, Disp: 6 mL, Rfl: 1    Empagliflozin (Jardiance) 25 MG TABS, Take 1 tablet (25 mg total) by mouth daily, Disp: 90 tablet, Rfl: 0    gabapentin (NEURONTIN) 300 mg capsule, Take 1 capsule (300 mg total) by mouth 3 (three) times a day, Disp: 90 capsule, Rfl: 0    lidocaine-prilocaine (EMLA) cream, Apply topically as needed for mild pain On day of chemotherapy treatment  , Disp: 30 g, Rfl: 0    morphine (MSIR) 30 MG tablet, TAKE 1 TABLET BY MOUTH EVERY 6 (SIX) HOURS AS NEEDED FOR SEVERE PAINMAX DAILY AMOUNT  120 MG, Disp: , Rfl:     ondansetron (ZOFRAN-ODT) 4 mg disintegrating tablet, Take 1 tablet (4 mg total) by mouth every 6 (six) hours as needed for nausea or vomiting, Disp: 20 tablet, Rfl: 0    prochlorperazine (COMPAZINE) 10 mg tablet, Take 1 tablet (10 mg total) by mouth every 6 (six) hours as needed for nausea or vomiting, Disp: 30 tablet, Rfl: 0    senna (SENOKOT) 8 6 MG tablet, Take 1 tablet (8 6 mg total) by mouth daily, Disp: 30 tablet, Rfl: 1    Sodium Fluoride 5000 PPM 1 1 % PSTE, BRUSH 2 TIMES A DAY FOR 2 3 MINUTES AND SPIT  NO RINSING FOR 30 MIN, Disp: , Rfl:   No current facility-administered medications for this visit  Review of Systems    All other systems negative    Objective:  Vital Signs  /80 (BP Location: Left arm, Cuff Size: Standard)   Pulse (!) 124   Temp 98 2 °F (36 8 °C) (Temporal)   Wt 82 8 kg (182 lb 8 7 oz)   SpO2 96%   BMI 26 19 kg/m²    Physical Exam    Constitutional: Appears chronically ill  In no acute physical or emotional distress  Head: Normocephalic and atraumatic  Eyes: EOM are normal  No ocular discharge  No scleral icterus  Neck: No visible adenopathy or masses  Cardiovascular: Regular rate and rhythm, palpable distal pulses   Respiratory: Effort normal  No stridor  No respiratory distress  Gastrointestinal: No abdominal distension  Abdomen is soft  Musculoskeletal: No edema  Neurological: Alert, oriented and appropriately conversant  Skin: No diaphoresis, dry and warm to touch, no rashes seen on exposed areas of skin  Psychiatric: Displays a normal mood and affect   Behavior, judgement and thought content appear normal

## 2021-09-10 ENCOUNTER — OFFICE VISIT (OUTPATIENT)
Dept: ENDOCRINOLOGY | Facility: CLINIC | Age: 41
End: 2021-09-10
Payer: COMMERCIAL

## 2021-09-10 VITALS
HEART RATE: 91 BPM | OXYGEN SATURATION: 98 % | WEIGHT: 183 LBS | BODY MASS INDEX: 26.2 KG/M2 | SYSTOLIC BLOOD PRESSURE: 118 MMHG | DIASTOLIC BLOOD PRESSURE: 76 MMHG | HEIGHT: 70 IN

## 2021-09-10 DIAGNOSIS — E11.9 DIABETES MELLITUS WITHOUT COMPLICATION (HCC): Primary | ICD-10-CM

## 2021-09-10 DIAGNOSIS — Z51.5 PALLIATIVE CARE PATIENT: Primary | ICD-10-CM

## 2021-09-10 DIAGNOSIS — E04.1 THYROID NODULE: ICD-10-CM

## 2021-09-10 DIAGNOSIS — G89.3 CANCER RELATED PAIN: ICD-10-CM

## 2021-09-10 LAB — SL AMB POCT HEMOGLOBIN AIC: 6 (ref ?–6.5)

## 2021-09-10 PROCEDURE — 3044F HG A1C LEVEL LT 7.0%: CPT | Performed by: SURGERY

## 2021-09-10 PROCEDURE — 83036 HEMOGLOBIN GLYCOSYLATED A1C: CPT | Performed by: NURSE PRACTITIONER

## 2021-09-10 PROCEDURE — 99214 OFFICE O/P EST MOD 30 MIN: CPT | Performed by: NURSE PRACTITIONER

## 2021-09-10 NOTE — TELEPHONE ENCOUNTER
Primary palliative medicine provider: Dr Adam Leon    Medication requested:Morphine IR 30mg tablets    If for pain, how has the patient been taking their pain medicine? Patient mentioned he believes he is being taken down on Morphine dose       Last appointment:9/8/21    Next scheduled appointment:10/6/21    PDMP review:    08/23/2021 1 08/18/2021   MORPHINE SULFATE IR 30 MG TAB  120 0 30 ER DANIELA   [id number]  PENNS ([phone]) 0 120 0 MME

## 2021-09-10 NOTE — PROGRESS NOTES
Established Patient Progress Note      Chief Complaint   Patient presents with    Diabetes Type 2        History of Present Illness:   Cristina Pena is a 37 y o  male with   Hypogonadism, vitamin-D deficiency, thyroid nodules, and type 2 diabetes without long-term use of insulin since 20/10  Denies complications of diabetes  Denies recent severe hypoglycemic or severe hyperglycemic episodes  Denies any issues with his current regimen  Home glucose monitoring is performed sporadically  Since patient's last appointment on 03/09/2021, he was unfortunately diagnosed with cancer of the sigmoid colon  He has been following with Hematology-Oncology for stage III T1 N1 adenocarcinoma of the colon status post resection on adjuvant chemotherapy- capecitabine and oxaliplatin  He has also been dealing with chronic left shoulder pain  MRI of shoulder demonstrated partial labrum tear  Patient was seen by orthopedics and received a steroid injection  In addition to all of this, he is under increased stress at home given that his father was recently diagnosed with prostate cancer  Home blood glucose readings:   Patient is only checking a few times per week- per his report  Before breakfast: 140s-180s     Current regimen:   Trulicity 1 5 mg weekly  Jardiance 25 mg daily      Point of care hemoglobin A1c in the office today is 6 0%  Last Eye Exam: Overdue  Last Foot Exam: UTD; + neuropathy     for his neuropathy, he is taking 300 mg of gabapentin 3 times daily  He is not currently on an ACE-inhibitor or a statin    Patient Active Problem List   Diagnosis    [facility] mellitus [facility] complication [facility] [address] [facility]    Hereditary hemochromatosis [facility]    Testosterone deficiency    Thyroid nodule    Fatigue    Cancer of sigmoid colon [facility]    Colon cancer metastasized to intra-abdominal lymph node ([facility] [facility]    Liver nodule    Lung nodule    Polycythemia    Acute pain of left shoulder    History of drug abuse ([facility] [facility] )  [facility]    Palliative care patient    Cancer related pain      Past Medical History:   Diagnosis Date    [facility]     colon cancer    [facility] mellitus ([facility] )     Hemochromatosis     Testosterone deficiency       Past Surgical History:   Procedure Laterality Date    COLONOSCOPY      HEMICOLOECTOMY W/ ANASTOMOSIS N/A 5/5/2021    Procedure: ROBOTIC SIGMOID COLECTOMY, MOBILIZATION OF SPLENIC FLEXURE;  Surgeon: Shelly Garcia MD;  [facility];  Service: Surgical Oncology    TUNNELED VENOUS PORT PLACEMENT Left 6/1/2021    Procedure: INSERTION VENOUS PORT (PORT-A-CATH) Ultrasound guided Left Internal Jugular Vein Access;  Surgeon: Zeenat Hermosillo MD;  [facility];  Service: Surgical Oncology    WISDOM TOOTH EXTRACTION        Family History   Problem Relation Age of Onset    Colon polyps Mother     No Known Problems Father     Colon polyps Maternal Grandmother     Prostate cancer Maternal Grandfather     Lung cancer Paternal Grandmother     Diabetes Paternal Grandmother     Prostate cancer Paternal Grandfather     Heart disease Paternal Grandfather     Kidney failure Paternal Grandfather     Diabetes Family     Hypertension Family      Social History     Tobacco Use    Smoking status: Former Smoker     Packs/day: 0 25     Years: 10 00     Pack years: 2 50     Types: Cigarettes     Quit date: 5/28/2011     Years since quitting: 10 3    Smokeless tobacco: Never Used   Substance Use Topics    Alcohol use: Not Currently     No Known Allergies      Current Outpatient Medications:     acetaminophen (TYLENOL) 325 mg tablet, Take 3 tablets (975 mg total) by mouth every 6 (six) hours as needed for mild pain or moderate pain, Disp: 60 tablet, Rfl: 0    baclofen 10 mg tablet, Take 1 tablet (10 mg total) by mouth 2 (two) times a day as needed for muscle spasms, Disp: 30 tablet, Rfl: 0    capecitabine (XELODA) 500 MG tablet, Take 3 tablets (1,500 mg total) by mouth 2 (two) times a day For 2 weeks on then 1 week off, Disp: 84 tablet, Rfl: 0    Dulaglutide 1 5 MG/0 5ML SOPN, Inject 0 5 mL (1 5 mg total) under the skin once a week, Disp: 6 mL, Rfl: 1    Empagliflozin (Jardiance) 25 MG TABS, Take 1 tablet (25 mg total) by mouth daily, Disp: 90 tablet, Rfl: 0    lidocaine-prilocaine (EMLA) cream, Apply topically as needed for mild pain On day of chemotherapy treatment  , Disp: 30 g, Rfl: 0    ondansetron (ZOFRAN-ODT) 4 mg disintegrating tablet, Take 1 tablet (4 mg total) by mouth every 6 (six) hours as needed for nausea or vomiting, Disp: 20 tablet, Rfl: 0    prochlorperazine (COMPAZINE) 10 mg tablet, Take 1 tablet (10 mg total) by mouth every 6 (six) hours as needed for nausea or vomiting, Disp: 30 tablet, Rfl: 0    senna (SENOKOT) 8 6 MG tablet, Take 1 tablet (8 6 mg total) by mouth daily, Disp: 30 tablet, Rfl: 1    Sodium Fluoride 5000 PPM 1 1 % PSTE, BRUSH 2 TIMES A DAY FOR 2 3 MINUTES AND SPIT  NO RINSING FOR 30 MIN, Disp: , Rfl:     gabapentin (NEURONTIN) 300 mg capsule, Take 1 capsule (300 mg total) by mouth 3 (three) times a day, Disp: 90 capsule, Rfl: 0    morphine (MSIR) 15 mg tablet, Take 1 5 tablets (22 5 mg total) by mouth every 6 (six) hours as needed for severe pain for up to 14 daysMax Daily Amount: 90 mg, Disp: 84 tablet, Rfl: 0    Review of Systems   Constitutional: Positive for fatigue  Negative for activity change, appetite change and unexpected weight change  HENT: Negative for sore throat, trouble swallowing and voice change  Eyes: Negative for visual disturbance  Respiratory: Negative for cough, chest tightness and shortness of breath  Cardiovascular: Negative for chest pain, palpitations and leg swelling  Gastrointestinal: Positive for diarrhea and nausea  Negative for constipation and vomiting  Endocrine: Negative for cold intolerance, heat intolerance, polydipsia, polyphagia and polyuria     Genitourinary: Negative for frequency  Musculoskeletal: Positive for arthralgias  Negative for back pain, gait problem, joint swelling and myalgias  Skin: Negative for wound  Allergic/Immunologic: Negative for environmental allergies and food allergies  Neurological: Positive for numbness  Negative for dizziness, weakness, light-headedness and headaches  Hematological: Does not bruise/bleed easily  Psychiatric/Behavioral: Positive for dysphoric mood  Negative for decreased concentration and sleep disturbance  The patient is not nervous/anxious  Physical Exam:  Body mass index is 26 26 kg/m²  /76 (BP Location: Left arm, Cuff Size: Adult)   Pulse 91   Ht 5' 10" (1 778 m)   Wt 83 kg (183 lb)   SpO2 98%   BMI 26 26 kg/m²    Wt Readings from Last 3 Encounters:   09/10/21 83 kg (183 lb)   09/08/21 82 8 kg (182 lb 8 7 oz)   09/07/21 82 2 kg (181 lb 3 2 oz)       Physical Exam  Vitals reviewed  Constitutional:       General: He is not in acute distress  Appearance: He is well-developed and normal weight  He is not ill-appearing  HENT:      Head: Normocephalic and atraumatic  Comments: Mask in place  Eyes:      Pupils: Pupils are equal, round, and reactive to light  Neck:      Thyroid: No thyromegaly  Cardiovascular:      Rate and Rhythm: Normal rate and regular rhythm  Pulses: Normal pulses  Heart sounds: Normal heart sounds  Pulmonary:      Effort: Pulmonary effort is normal       Breath sounds: Normal breath sounds  Abdominal:      General: Bowel sounds are normal  There is no distension  Palpations: Abdomen is soft  Tenderness: There is no abdominal tenderness  Musculoskeletal:      Cervical back: Normal range of motion and neck supple  Right lower leg: No edema  Left lower leg: No edema  Lymphadenopathy:      Cervical: No cervical adenopathy  Skin:     General: Skin is warm and dry  Capillary Refill: Capillary refill takes less than 2 seconds  Neurological:      Mental Status: He is alert and oriented to person, place, and time  Gait: Gait normal    Psychiatric:         Mood and Affect: Mood normal          Behavior: Behavior normal            Labs:   Lab Results   Component Value Date    HGBA1C 6 0 09/10/2021    HGBA1C 5 8 (H) 03/09/2021     Lab Results   Component Value Date    CREATININE 0 95 08/30/2021    CREATININE 1 07 08/15/2021    CREATININE 1 01 08/04/2021    BUN 10 08/30/2021    K 4 3 08/30/2021     08/30/2021    CO2 29 08/30/2021     eGFR   Date Value Ref Range Status   08/30/2021 100 ml/min/1 73sq m Final     Lab Results   Component Value Date    HDL 42 03/09/2021    TRIG 147 03/09/2021     Lab Results   Component Value Date    ALT 33 08/30/2021    AST 20 08/30/2021    ALKPHOS 88 08/30/2021     Lab Results   Component Value Date    [id number] 1 316 11/24/2020     No results found for: FREET4, TSI    Impression & Plan:    Problem List Items Addressed This Visit        Endocrine    Diabetes mellitus without complication ([facility] [address] [facility] ) - Primary     Patient is well controlled on current regimen  Continue to test blood sugar daily  Patient knows to notify me with any significant changes  Continue to focus on healthy diet, exercise, and recovery  Check labs in 5 months prior to repeat appointment in 6 months  Lab Results   Component Value Date    HGBA1C 6 0 09/10/2021            Relevant Orders    HEMOGLOBIN A1C W/ EAG ESTIMATION Lab Collect    Comprehensive metabolic panel Lab Collect    Lipid panel Lab Collect Lab Collect    Microalbumin / creatinine urine ratio Lab Collect    Thyroid nodule       Reordered ultrasound of the thyroid  Patient should complete this prior to his next appointment in 6 months  Relevant Orders    US thyroid          Orders Placed This Encounter   Procedures    US thyroid     Standing Status:   Future     Standing Expiration Date:   9/13/2025     Scheduling Instructions:      No prep required   Please bring your insurance cards, a form of photo ID and a list of your medications with you  Arrive 15 minutes prior to your appointment time in order to register  To schedule this appointment, please contact Central Scheduling at [phone]   HEMOGLOBIN A1C W/ EAG ESTIMATION Lab Collect     Standing Status:   Future     Standing Expiration Date:   9/10/2022    Comprehensive metabolic panel Lab Collect     This is a patient instruction: Patient fasting for 8 hours or longer recommended  Standing Status:   Future     Standing Expiration Date:   9/10/2022    Lipid panel Lab Collect Lab Collect     This is a patient instruction: This test requires patient fasting for 10-12 hours or longer  Drinking of black coffee or black tea is acceptable  Standing Status:   Future     Standing Expiration Date:   9/10/2022    Microalbumin / creatinine urine ratio Lab Collect     Standing Status:   Future     Standing Expiration Date:   9/10/2022       There are no Patient Instructions on file for this visit  Discussed with the patient and all questioned fully answered  He will call me if any problems arise  Follow-up appointment in 6 months       Counseled patient on diagnostic results, prognosis, risk and benefit of treatment options, instruction for management, importance of treatment compliance, Risk  factor reduction and impressions    SHABANA Samson

## 2021-09-10 NOTE — ASSESSMENT & PLAN NOTE
Patient is well controlled on current regimen  Continue to test blood sugar daily  Patient knows to notify me with any significant changes  Continue to focus on healthy diet, exercise, and recovery  Check labs in 5 months prior to repeat appointment in 6 months    Lab Results   Component Value Date    HGBA1C 6 0 09/10/2021

## 2021-09-13 RX ORDER — MORPHINE SULFATE 15 MG/1
22.5 TABLET ORAL EVERY 6 HOURS PRN
Qty: 84 TABLET | Refills: 0 | Status: SHIPPED | OUTPATIENT
Start: 2021-09-13 | End: 2021-09-27 | Stop reason: SDUPTHER

## 2021-09-13 NOTE — TELEPHONE ENCOUNTER
Please tell patient, as discussed, will be initiating opioid wean  Will decrease from 30 mg of MSIR to 22 5 mg of MSIR for the next 14 days

## 2021-09-13 NOTE — ASSESSMENT & PLAN NOTE
Reordered ultrasound of the thyroid  Patient should complete this prior to his next appointment in 6 months

## 2021-09-15 ENCOUNTER — TELEPHONE (OUTPATIENT)
Dept: HEMATOLOGY ONCOLOGY | Facility: CLINIC | Age: 41
End: 2021-09-15

## 2021-09-15 ENCOUNTER — OFFICE VISIT (OUTPATIENT)
Dept: SURGICAL ONCOLOGY | Facility: CLINIC | Age: 41
End: 2021-09-15
Payer: COMMERCIAL

## 2021-09-15 VITALS
TEMPERATURE: 98.3 F | BODY MASS INDEX: 25.77 KG/M2 | WEIGHT: 180 LBS | HEIGHT: 70 IN | SYSTOLIC BLOOD PRESSURE: 128 MMHG | HEART RATE: 103 BPM | RESPIRATION RATE: 14 BRPM | DIASTOLIC BLOOD PRESSURE: 70 MMHG | OXYGEN SATURATION: 97 %

## 2021-09-15 DIAGNOSIS — C77.2 COLON CANCER METASTASIZED TO INTRA-ABDOMINAL LYMPH NODE (HCC): ICD-10-CM

## 2021-09-15 DIAGNOSIS — C18.7 CANCER OF SIGMOID COLON (HCC): Primary | ICD-10-CM

## 2021-09-15 DIAGNOSIS — C18.9 COLON CANCER METASTASIZED TO INTRA-ABDOMINAL LYMPH NODE (HCC): ICD-10-CM

## 2021-09-15 PROCEDURE — 3078F DIAST BP <80 MM HG: CPT | Performed by: SURGERY

## 2021-09-15 PROCEDURE — 99214 OFFICE O/P EST MOD 30 MIN: CPT | Performed by: SURGERY

## 2021-09-15 PROCEDURE — 1036F TOBACCO NON-USER: CPT | Performed by: SURGERY

## 2021-09-15 PROCEDURE — 3008F BODY MASS INDEX DOCD: CPT | Performed by: SURGERY

## 2021-09-15 PROCEDURE — 3074F SYST BP LT 130 MM HG: CPT | Performed by: SURGERY

## 2021-09-15 NOTE — PROGRESS NOTES
Surgical Oncology Follow Up  [facility] [address] [facility] [place] [id number] [phone]    Elvira Clark  1980  [id number]      Chief Complaint   Patient presents with    Follow-up     port removal         Assessment & Plan: This is a 64-year-old gentleman status post completion of chemotherapy and here for the removal of the port  Cancer History:     Oncology History   Cancer of sigmoid colon ([facility] [address] )   4/13/2021 Initial Diagnosis    Cancer of sigmoid colon ([facility] [address] )    RESULTS OF IMMUNOHISTOCHEMICAL ANALYSIS FOR MISMATCH REPAIR PROTEIN LOSS  INTERPRETATION: NO LOSS OF NUCLEAR EXPRESSION OF MMR PROTEINS: LOW PROBABILITY OF MSI-H   RESULTS:  AntibodyClone Description Results  MLH1 M1 Mismatch repair protein Intact nuclear expression  MSH2 [id number] Mismatch repair protein Intact nuclear expression  MSH6 40 Mismatch repair protein Intact nuclear expression  PMS2 [id number] Mismatch repair protein Intact nuclear expression     5/5/2021 Surgery    Descending colon, partial colectomy:  - Adenocarcinoma (1 9 cm), moderately differentiated, arising in adenocarcinoma with high grade dysplasia    - Tumor invades the submucosa (through the muscularis mucosa in to deeper one third of submucosa, pT1, Sm3)   - All margins are negative for tumor   - One  of twenty eight lymph nodes is positive for tumor (1/28)      6/4/2021 -  Chemotherapy    oxaliplatin (ELOXATIN) chemo infusion, 130 mg/m2 = 275 6 mg, Intravenous, Once, 4 of 4 cycles  Administration: 275 6 mg (6/4/2021), 275 6 mg (6/25/2021), 275 6 mg (8/11/2021), 275 6 mg (9/3/2021)     Colon cancer metastasized to intra-abdominal lymph node ([facility] [address] )   5/20/2021 Initial Diagnosis    Colon cancer metastasized to intra-abdominal lymph node ([address] )     6/4/2021 -  Chemotherapy    oxaliplatin (ELOXATIN) chemo infusion, 130 mg/m2 = 275 6 mg, Intravenous, Once, 4 of 4 cycles  Administration: 275 6 mg (6/4/2021), 275 6 mg (6/25/2021), 275 6 mg (8/11/2021), 275 6 mg (9/3/2021)           Interval History: To discuss port removal     Review of Systems:   Review of Systems   Constitutional: Negative for chills and fever  HENT: Negative for ear pain and sore throat  Eyes: Negative for pain and visual disturbance  Respiratory: Negative for cough and shortness of breath  Cardiovascular: Negative for chest pain and palpitations  Gastrointestinal: Negative for abdominal pain and vomiting  Genitourinary: Negative for dysuria and hematuria  Musculoskeletal: Negative for arthralgias and back pain  Skin: Negative for color change and rash  Neurological: Negative for seizures and syncope  All other systems reviewed and are negative        Past Medical History     Patient Active Problem List   Diagnosis    Diabetes mellitus without complication ([facility] [address] [facility]    Hereditary hemochromatosis ([facility] [address] [facility]    Testosterone deficiency    Thyroid nodule    Fatigue    Cancer of sigmoid colon ([facility] [address] [facility]    Colon cancer metastasized to intra-abdominal lymph node ([facility])    Liver nodule    Lung nodule    Polycythemia    Acute pain of left shoulder    History of drug abuse ([facility] [facility]    Chemotherapy-induced neuropathy ([facility] [address] [facility]    Palliative care patient    Cancer related pain     Past Medical History:   Diagnosis Date    Cancer ([facility] [address] [facility]     colon cancer    Diabetes mellitus ([facility] [address] [facility]     Hemochromatosis     Testosterone deficiency      Past Surgical History:   Procedure Laterality Date    COLONOSCOPY      HEMICOLOECTOMY W/ ANASTOMOSIS N/A 5/5/2021    Procedure: ROBOTIC SIGMOID COLECTOMY, MOBILIZATION OF SPLENIC FLEXURE;  Surgeon: John Arana MD;  [facility];  Service: Surgical Oncology    TUNNELED VENOUS PORT PLACEMENT Left 6/1/2021    Procedure: INSERTION VENOUS PORT (PORT-A-CATH) Ultrasound guided Left Internal Jugular Vein Access;  Surgeon: Nata Cárdenas MD;  [facility]; Service: Surgical Oncology    WISDOM TOOTH EXTRACTION       Family History   Problem Relation Age of Onset    Colon polyps Mother     No Known Problems Father     Colon polyps Maternal Grandmother     Prostate cancer Maternal Grandfather     Lung cancer Paternal Grandmother     Diabetes Paternal Grandmother     Prostate cancer Paternal Grandfather     Heart disease Paternal Grandfather     Kidney failure Paternal Grandfather     Diabetes Family     Hypertension Family      Social History     Socioeconomic History    Marital status:      Spouse name: Not on file    Number of children: Not on file    Years of education: Not on file    Highest education level: Not on file   Occupational History    Not on file   Tobacco Use    Smoking status: Former Smoker     Packs/day: 0 25     Years: 10 00     Pack years: 2 50     Types: Cigarettes     Quit date: 5/28/2011     Years since quitting: 10 3    Smokeless tobacco: Never Used   Vaping Use    Vaping Use: Never used   Substance and Sexual Activity    Alcohol use: Not Currently    Drug use: Not Currently    Sexual activity: Yes     Partners: Female     Birth control/protection: Condom Male   Other Topics Concern    Not on file   Social History Narrative    Not on file     Social Determinants of Health     Financial Resource Strain:     Difficulty of Paying Living Expenses:    Food Insecurity:     Worried About Running Out of Food in the Last Year:     Ran Out of Food in the Last Year:    Transportation Needs:     Lack of Transportation (Medical):  Lack of Transportation (Non-Medical):    Physical Activity: Sufficiently Active    Days of Exercise per Week: 5 days    Minutes of Exercise per Session: 60 min   Stress: No Stress Concern Present    Feeling of Stress :  Only a little   Social Connections:     Frequency of Communication with Friends and Family:     Frequency of Social Gatherings with Friends and Family:     Attends [employer] Services:     Active Member of Clubs or Organizations:     Attends Club or Organization Meetings:     Marital Status:    Intimate Partner Violence:     Fear of Current or Ex-Partner:     Emotionally Abused:     Physically Abused:     Sexually Abused:        Current Outpatient Medications:     acetaminophen (TYLENOL) 325 mg tablet, Take 3 tablets (975 mg total) by mouth every 6 (six) hours as needed for mild pain or moderate pain, Disp: 60 tablet, Rfl: 0    baclofen 10 mg tablet, Take 1 tablet (10 mg total) by mouth 2 (two) times a day as needed for muscle spasms, Disp: 30 tablet, Rfl: 0    capecitabine (XELODA) 500 MG tablet, Take 3 tablets (1,500 mg total) by mouth 2 (two) times a day For 2 weeks on then 1 week off, Disp: 84 tablet, Rfl: 0    Dulaglutide 1 5 MG/0 5ML SOPN, Inject 0 5 mL (1 5 mg total) under the skin once a week, Disp: 6 mL, Rfl: 1    Empagliflozin (Jardiance) 25 MG TABS, Take 1 tablet (25 mg total) by mouth daily, Disp: 90 tablet, Rfl: 0    gabapentin (NEURONTIN) 300 mg capsule, Take 1 capsule (300 mg total) by mouth 3 (three) times a day, Disp: 90 capsule, Rfl: 0    morphine (MSIR) 15 mg tablet, Take 1 5 tablets (22 5 mg total) by mouth every 6 (six) hours as needed for severe pain for up to 14 daysMax Daily Amount: 90 mg, Disp: 84 tablet, Rfl: 0    prochlorperazine (COMPAZINE) 10 mg tablet, Take 1 tablet (10 mg total) by mouth every 6 (six) hours as needed for nausea or vomiting, Disp: 30 tablet, Rfl: 0    senna (SENOKOT) 8 6 MG tablet, Take 1 tablet (8 6 mg total) by mouth daily, Disp: 30 tablet, Rfl: 1    Sodium Fluoride 5000 PPM 1 1 % PSTE, BRUSH 2 TIMES A DAY FOR 2 3 MINUTES AND SPIT  NO RINSING FOR 30 MIN, Disp: , Rfl:     lidocaine-prilocaine (EMLA) cream, Apply topically as needed for mild pain On day of chemotherapy treatment   (Patient not taking: Reported on 9/15/2021), Disp: 30 g, Rfl: 0    ondansetron (ZOFRAN-ODT) 4 mg disintegrating tablet, Take 1 tablet (4 mg total) by mouth every 6 (six) hours as needed for nausea or vomiting (Patient not taking: Reported on 9/15/2021), Disp: 20 tablet, Rfl: 0  No Known Allergies    Physical Exam:     Vitals:    09/15/21 0840   BP: 128/70   Pulse: 103   Resp: 14   Temp: 98 3 °F (36 8 °C)   SpO2: 97%     Physical Exam  Constitutional:       Appearance: Normal appearance  HENT:      Mouth/Throat:      Mouth: Mucous membranes are moist    Eyes:      Pupils: Pupils are equal, round, and reactive to light  Cardiovascular:      Rate and Rhythm: Normal rate and regular rhythm  Pulmonary:      Effort: Pulmonary effort is normal    Abdominal:      General: Abdomen is flat  Palpations: Abdomen is soft  Musculoskeletal:         General: Normal range of motion  Cervical back: Normal range of motion and neck supple  Skin:     General: Skin is warm  Neurological:      Mental Status: He is alert  Results & Discussion:   I did review with him the port removal in detail  Consent was obtained  Surgical consent was obtained after explaining benefits, alternative, procedure and possible complications with regards above mentioned procedure  All his questions regarding the visit  as well as the  surgery were answered to  the patient's satisfaction  All patient questions were answered  Advance Care Planning/Advance Directives: Bret Fuller MD discussed the disease status with Gorge Russ  today 09/15/21  treatment plans and follow-up with the patient

## 2021-09-15 NOTE — PATIENT INSTRUCTIONS
Pre-Surgery Instructions:   Medication Instructions    acetaminophen (TYLENOL) 325 mg tablet Take morning of surgery    baclofen 10 mg tablet Stop taking 1 days prior to surgery    capecitabine (XELODA) 500 MG tablet per anesthesia guidelines     Dulaglutide 1 5 MG/0 5ML SOPN per anesthesia guidelines     Empagliflozin (Jardiance) 25 MG TABS Stop taking 1 days prior to surgery    gabapentin (NEURONTIN) 300 mg capsule Stop taking 1 days prior to surgery    morphine (MSIR) 15 mg tablet Stop taking 1 days prior to surgery    prochlorperazine (COMPAZINE) 10 mg tablet Stop taking 1 days prior to surgery    senna (SENOKOT) 8 6 MG tablet Take morning of surgery    Sodium Fluoride 5000 PPM 1 1 % PSTE Take morning of surgery

## 2021-09-15 NOTE — H&P (VIEW-ONLY)
Surgical Oncology Follow Up  [facility] [address] [facility] [address] [address] [phone]    [facility]  1980  [id number]      Chief Complaint   Patient presents with    Follow-up     port removal         Assessment & Plan: This is a 43-year-old gentleman status post completion of chemotherapy and here for the removal of the port  Cancer History:     Oncology History   Cancer of sigmoid colon ([facility] [address] )   4/13/2021 Initial Diagnosis    Cancer of sigmoid colon ([facility] [address] )    RESULTS OF IMMUNOHISTOCHEMICAL ANALYSIS FOR MISMATCH REPAIR PROTEIN LOSS  INTERPRETATION: NO LOSS OF NUCLEAR EXPRESSION OF MMR PROTEINS: LOW PROBABILITY OF MSI-H   RESULTS:  AntibodyClone Description Results  MLH1 M1 Mismatch repair protein Intact nuclear expression  MSH2 [id number] Mismatch repair protein Intact nuclear expression  MSH6 40 Mismatch repair protein Intact nuclear expression  PMS2 [id number] Mismatch repair protein Intact nuclear expression     5/5/2021 Surgery    Descending colon, partial colectomy:  - Adenocarcinoma (1 9 cm), moderately differentiated, arising in adenocarcinoma with high grade dysplasia    - Tumor invades the submucosa (through the muscularis mucosa in to deeper one third of submucosa, pT1, Sm3)   - All margins are negative for tumor   - One  of twenty eight lymph nodes is positive for tumor (1/28)      6/4/2021 -  Chemotherapy    oxaliplatin (ELOXATIN) chemo infusion, 130 mg/m2 = 275 6 mg, Intravenous, Once, 4 of 4 cycles  Administration: 275 6 mg (6/4/2021), 275 6 mg (6/25/2021), 275 6 mg (8/11/2021), 275 6 mg (9/3/2021)     Colon cancer metastasized to intra-abdominal lymph node ([facility] [address] )   5/20/2021 Initial Diagnosis    Colon cancer metastasized to intra-abdominal lymph node ([facility] [address] )     6/4/2021 -  Chemotherapy    oxaliplatin (ELOXATIN) chemo infusion, 130 mg/m2 = 275 6 mg, Intravenous, Once, 4 of 4 cycles  Administration: 275 6 mg (6/4/2021), 275 6 mg (6/25/2021), 275 6 mg (8/11/2021), 275 6 mg (9/3/2021)           Interval History: To discuss port removal     Review of Systems:   Review of Systems   Constitutional: Negative for chills and fever  HENT: Negative for ear pain and sore throat  Eyes: Negative for pain and visual disturbance  Respiratory: Negative for cough and shortness of breath  Cardiovascular: Negative for chest pain and palpitations  Gastrointestinal: Negative for abdominal pain and vomiting  Genitourinary: Negative for dysuria and hematuria  Musculoskeletal: Negative for arthralgias and back pain  Skin: Negative for color change and rash  Neurological: Negative for seizures and syncope  All other systems reviewed and are negative        Past Medical History     Patient Active Problem List   Diagnosis    Diabetes mellitus without complication ([facility] [address] )    Hereditary hemochromatosis ([facility] [address] [facility]    Testosterone deficiency    Thyroid nodule    Fatigue    Cancer of sigmoid colon ([facility] [address] [facility]    Colon cancer metastasized to intra-abdominal lymph node (HCC)    Liver nodule    Lung nodule    Polycythemia    Acute pain of left shoulder    History of drug abuse ([facility] [address] [facility]    Chemotherapy-induced neuropathy ([facility] [address] )    Palliative care patient    Cancer related pain     Past Medical History:   Diagnosis Date    Cancer ([facility] [address] )     colon cancer    Diabetes mellitus ([facility] [address] )     Hemochromatosis     Testosterone deficiency      Past Surgical History:   Procedure Laterality Date    COLONOSCOPY      HEMICOLOECTOMY W/ ANASTOMOSIS N/A 5/5/2021    Procedure: ROBOTIC SIGMOID COLECTOMY, MOBILIZATION OF SPLENIC FLEXURE;  Surgeon: Mike Campbell MD;  [facility]: [facility];  Service: Surgical Oncology    TUNNELED VENOUS PORT PLACEMENT Left 6/1/2021    Procedure: INSERTION VENOUS PORT (PORT-A-CATH) Ultrasound guided Left Internal Jugular Vein Access;  Surgeon: Chuck Gao MD;  Location: [facility]; Service: Surgical Oncology    WISDOM TOOTH EXTRACTION       Family History   Problem Relation Age of Onset    Colon polyps Mother     No Known Problems Father     Colon polyps Maternal Grandmother     Prostate cancer Maternal Grandfather     Lung cancer Paternal Grandmother     Diabetes Paternal Grandmother     Prostate cancer Paternal Grandfather     Heart disease Paternal Grandfather     Kidney failure Paternal Grandfather     Diabetes Family     Hypertension Family      Social History     Socioeconomic History    Marital status:      Spouse name: Not on file    Number of children: Not on file    Years of education: Not on file    Highest education level: Not on file   Occupational History    Not on file   Tobacco Use    Smoking status: Former Smoker     Packs/day: 0 25     Years: 10 00     Pack years: 2 50     Types: Cigarettes     Quit date: 5/28/2011     Years since quitting: 10 3    Smokeless tobacco: Never Used   Vaping Use    Vaping Use: Never used   Substance and Sexual Activity    Alcohol use: Not Currently    Drug use: Not Currently    Sexual activity: Yes     Partners: Female     Birth control/protection: Condom Male   Other Topics Concern    Not on file   Social History Narrative    Not on file     Social Determinants of Health     Financial Resource Strain:     Difficulty of Paying Living Expenses:    Food Insecurity:     Worried About Running Out of Food in the Last Year:     Ran Out of Food in the Last Year:    Transportation Needs:     Lack of Transportation (Medical):  Lack of Transportation (Non-Medical):    Physical Activity: Sufficiently Active    Days of Exercise per Week: 5 days    Minutes of Exercise per Session: 60 min   Stress: No Stress Concern Present    Feeling of Stress :  Only a little   Social Connections:     Frequency of Communication with Friends and Family:     Frequency of Social Gatherings with Friends and Family:     Attends [employer] Services:     Active Member of Clubs or Organizations:     Attends Club or Organization Meetings:     Marital Status:    Intimate Partner Violence:     Fear of Current or Ex-Partner:     Emotionally Abused:     Physically Abused:     Sexually Abused:        Current Outpatient Medications:     acetaminophen (TYLENOL) 325 mg tablet, Take 3 tablets (975 mg total) by mouth every 6 (six) hours as needed for mild pain or moderate pain, Disp: 60 tablet, Rfl: 0    baclofen 10 mg tablet, Take 1 tablet (10 mg total) by mouth 2 (two) times a day as needed for muscle spasms, Disp: 30 tablet, Rfl: 0    capecitabine (XELODA) 500 MG tablet, Take 3 tablets (1,500 mg total) by mouth 2 (two) times a day For 2 weeks on then 1 week off, Disp: 84 tablet, Rfl: 0    Dulaglutide 1 5 MG/0 5ML SOPN, Inject 0 5 mL (1 5 mg total) under the skin once a week, Disp: 6 mL, Rfl: 1    Empagliflozin (Jardiance) 25 MG TABS, Take 1 tablet (25 mg total) by mouth daily, Disp: 90 tablet, Rfl: 0    gabapentin (NEURONTIN) 300 mg capsule, Take 1 capsule (300 mg total) by mouth 3 (three) times a day, Disp: 90 capsule, Rfl: 0    morphine (MSIR) 15 mg tablet, Take 1 5 tablets (22 5 mg total) by mouth every 6 (six) hours as needed for severe pain for up to 14 daysMax Daily Amount: 90 mg, Disp: 84 tablet, Rfl: 0    prochlorperazine (COMPAZINE) 10 mg tablet, Take 1 tablet (10 mg total) by mouth every 6 (six) hours as needed for nausea or vomiting, Disp: 30 tablet, Rfl: 0    senna (SENOKOT) 8 6 MG tablet, Take 1 tablet (8 6 mg total) by mouth daily, Disp: 30 tablet, Rfl: 1    Sodium Fluoride 5000 PPM 1 1 % PSTE, BRUSH 2 TIMES A DAY FOR 2 3 MINUTES AND SPIT  NO RINSING FOR 30 MIN, Disp: , Rfl:     lidocaine-prilocaine (EMLA) cream, Apply topically as needed for mild pain On day of chemotherapy treatment   (Patient not taking: Reported on 9/15/2021), Disp: 30 g, Rfl: 0    ondansetron (ZOFRAN-ODT) 4 mg disintegrating tablet, Take 1 tablet (4 mg total) by mouth every 6 (six) hours as needed for nausea or vomiting (Patient not taking: Reported on 9/15/2021), Disp: 20 tablet, Rfl: 0  No Known Allergies    Physical Exam:     Vitals:    09/15/21 0840   BP: 128/70   Pulse: 103   Resp: 14   Temp: 98 3 °F (36 8 °C)   SpO2: 97%     Physical Exam  Constitutional:       Appearance: Normal appearance  HENT:      Mouth/Throat:      Mouth: Mucous membranes are moist    Eyes:      Pupils: Pupils are equal, round, and reactive to light  Cardiovascular:      Rate and Rhythm: Normal rate and regular rhythm  Pulmonary:      Effort: Pulmonary effort is normal    Abdominal:      General: Abdomen is flat  Palpations: Abdomen is soft  Musculoskeletal:         General: Normal range of motion  Cervical back: Normal range of motion and neck supple  Skin:     General: Skin is warm  Neurological:      Mental Status: He is alert  Results & Discussion:   I did review with him the port removal in detail  Consent was obtained  Surgical consent was obtained after explaining benefits, alternative, procedure and possible complications with regards above mentioned procedure  All his questions regarding the visit  as well as the  surgery were answered to  the patient's satisfaction  All patient questions were answered  Advance Care Planning/Advance Directives: Sincere Haque MD discussed the disease status with Isela Pittman  today 09/15/21  treatment plans and follow-up with the patient

## 2021-09-15 NOTE — TELEPHONE ENCOUNTER
Patient would like to speak with Ivory  He states he is scheduled to have his port removed  He had a different doctor put the port in but he does not feel comfortable with him and would like to see if someone else can remove it   Please call [phone]

## 2021-09-20 ENCOUNTER — PREP FOR PROCEDURE (OUTPATIENT)
Dept: SURGICAL ONCOLOGY | Facility: CLINIC | Age: 41
End: 2021-09-20

## 2021-09-20 ENCOUNTER — ANESTHESIA EVENT (OUTPATIENT)
Dept: PERIOP | Facility: AMBULARY SURGERY CENTER | Age: 41
End: 2021-09-20
Payer: COMMERCIAL

## 2021-09-20 DIAGNOSIS — Z87.898 HISTORY OF DIFFICULT VENOUS ACCESS: Primary | ICD-10-CM

## 2021-09-20 RX ORDER — ACETAMINOPHEN 160 MG
2000 TABLET,DISINTEGRATING ORAL DAILY
COMMUNITY

## 2021-09-20 RX ORDER — UBIDECARENONE 75 MG
CAPSULE ORAL DAILY
COMMUNITY

## 2021-09-20 RX ORDER — ELECTROLYTES/DEXTROSE
SOLUTION, ORAL ORAL
COMMUNITY

## 2021-09-20 NOTE — PRE-PROCEDURE INSTRUCTIONS
Pre-Surgery Instructions:   Medication Instructions    Cholecalciferol (Vitamin D3) 50 MCG (2000 UT) capsule Instructed patient per Anesthesia Guidelines   cyanocobalamin (VITAMIN B-12) 100 mcg tablet Instructed patient per Anesthesia Guidelines   Dulaglutide 1 5 MG/0 5ML SOPN Instructed patient to continue taking as prescribed     Empagliflozin (Jardiance) 25 MG TABS Instructed patient to continue taking as prescribed up to but NOT including DOS   morphine (MSIR) 15 mg tablet Instructed patient to continue taking as prescribed    Pyridoxine HCl (Vitamin B6) 100 MG TABS Instructed patient per Anesthesia Guidelines   senna (SENOKOT) 8 6 MG tablet Instructed patient to continue taking as prescribed up to but NOT including DOS   Sodium Fluoride 5000 PPM 1 1 % PSTE Instructed patient to continue taking as prescribed     Med list reviewed as above  Also instructed pt not to start any new vitamins/supplements preoperatively and to avoid NSAID's  3 days prior to surgery  Tylenol is acceptable if needed  Pt has and/or is getting CHG surgical soap and verbalizes understanding of preoperative showering protocol  Reviewed [facility] current covid visitor policy and pt understands that it may change at any time  All information within "My Surgical Experience" pamphlet reviewed and patient verbalizes understanding and compliance  All questions answered

## 2021-09-22 ENCOUNTER — HOSPITAL ENCOUNTER (OUTPATIENT)
Facility: AMBULARY SURGERY CENTER | Age: 41
Setting detail: OUTPATIENT SURGERY
Discharge: HOME | End: 2021-09-22
Attending: STUDENT IN AN ORGANIZED HEALTH CARE EDUCATION/TRAINING PROGRAM | Admitting: STUDENT IN AN ORGANIZED HEALTH CARE EDUCATION/TRAINING PROGRAM
Payer: COMMERCIAL

## 2021-09-22 ENCOUNTER — ANESTHESIA (OUTPATIENT)
Dept: PERIOP | Facility: AMBULARY SURGERY CENTER | Age: 41
End: 2021-09-22
Payer: COMMERCIAL

## 2021-09-22 VITALS
RESPIRATION RATE: 18 BRPM | DIASTOLIC BLOOD PRESSURE: 65 MMHG | HEIGHT: 70 IN | WEIGHT: 180 LBS | OXYGEN SATURATION: 100 % | BODY MASS INDEX: 25.77 KG/M2 | SYSTOLIC BLOOD PRESSURE: 108 MMHG | HEART RATE: 80 BPM | TEMPERATURE: 97.3 F

## 2021-09-22 LAB — GLUCOSE SERPL-MCNC: 126 MG/DL (ref 65–140)

## 2021-09-22 PROCEDURE — 36589 REMOVAL TUNNELED CV CATH: CPT | Performed by: STUDENT IN AN ORGANIZED HEALTH CARE EDUCATION/TRAINING PROGRAM

## 2021-09-22 PROCEDURE — 82948 REAGENT STRIP/BLOOD GLUCOSE: CPT

## 2021-09-22 RX ORDER — LIDOCAINE HYDROCHLORIDE 10 MG/ML
0.5 INJECTION, SOLUTION EPIDURAL; INFILTRATION; INTRACAUDAL; PERINEURAL ONCE AS NEEDED
Status: DISCONTINUED | OUTPATIENT
Start: 2021-09-22 | End: 2021-09-22 | Stop reason: HOSPADM

## 2021-09-22 RX ORDER — PROPOFOL 10 MG/ML
INJECTION, EMULSION INTRAVENOUS CONTINUOUS PRN
Status: DISCONTINUED | OUTPATIENT
Start: 2021-09-22 | End: 2021-09-22

## 2021-09-22 RX ORDER — FENTANYL CITRATE/PF 50 MCG/ML
25 SYRINGE (ML) INJECTION
Status: DISCONTINUED | OUTPATIENT
Start: 2021-09-22 | End: 2021-09-22 | Stop reason: HOSPADM

## 2021-09-22 RX ORDER — MIDAZOLAM HYDROCHLORIDE 2 MG/2ML
INJECTION, SOLUTION INTRAMUSCULAR; INTRAVENOUS AS NEEDED
Status: DISCONTINUED | OUTPATIENT
Start: 2021-09-22 | End: 2021-09-22

## 2021-09-22 RX ORDER — LIDOCAINE HYDROCHLORIDE 10 MG/ML
INJECTION, SOLUTION EPIDURAL; INFILTRATION; INTRACAUDAL; PERINEURAL AS NEEDED
Status: DISCONTINUED | OUTPATIENT
Start: 2021-09-22 | End: 2021-09-22

## 2021-09-22 RX ORDER — PROPOFOL 10 MG/ML
INJECTION, EMULSION INTRAVENOUS AS NEEDED
Status: DISCONTINUED | OUTPATIENT
Start: 2021-09-22 | End: 2021-09-22

## 2021-09-22 RX ORDER — MAGNESIUM HYDROXIDE 1200 MG/15ML
LIQUID ORAL AS NEEDED
Status: DISCONTINUED | OUTPATIENT
Start: 2021-09-22 | End: 2021-09-22 | Stop reason: HOSPADM

## 2021-09-22 RX ORDER — SODIUM CHLORIDE, SODIUM LACTATE, POTASSIUM CHLORIDE, CALCIUM CHLORIDE 600; 310; 30; 20 MG/100ML; MG/100ML; MG/100ML; MG/100ML
125 INJECTION, SOLUTION INTRAVENOUS CONTINUOUS
Status: DISCONTINUED | OUTPATIENT
Start: 2021-09-22 | End: 2021-09-22 | Stop reason: HOSPADM

## 2021-09-22 RX ADMIN — MIDAZOLAM 2 MG: 1 INJECTION INTRAMUSCULAR; INTRAVENOUS at 10:08

## 2021-09-22 RX ADMIN — PROPOFOL 100 MCG/KG/MIN: 10 INJECTION, EMULSION INTRAVENOUS at 10:14

## 2021-09-22 RX ADMIN — PROPOFOL 50 MG: 10 INJECTION, EMULSION INTRAVENOUS at 10:14

## 2021-09-22 RX ADMIN — LIDOCAINE HYDROCHLORIDE 50 MG: 10 INJECTION, SOLUTION EPIDURAL; INFILTRATION; INTRACAUDAL; PERINEURAL at 10:14

## 2021-09-22 RX ADMIN — SODIUM CHLORIDE, SODIUM LACTATE, POTASSIUM CHLORIDE, AND CALCIUM CHLORIDE: .6; .31; .03; .02 INJECTION, SOLUTION INTRAVENOUS at 10:05

## 2021-09-22 RX ADMIN — PROPOFOL 50 MG: 10 INJECTION, EMULSION INTRAVENOUS at 10:29

## 2021-09-22 NOTE — ANESTHESIA PREPROCEDURE EVALUATION
Procedure:  REMOVAL VENOUS PORT (PORT-A-CATH) (Left Chest)    Relevant Problems   ANESTHESIA (within normal limits)      CARDIO (within normal limits)      GI/HEPATIC   (+) Cancer of sigmoid colon (HCC)   (+) Colon cancer metastasized to intra-abdominal lymph node (HCC)      NEURO/PSYCH   (+) History of drug abuse (HCC)      PULMONARY   (-) Sleep apnea   (-) Smoking   (-) URI (upper respiratory infection)      Physical Exam    Airway    Mallampati score: I  TM Distance: >3 FB  Neck ROM: full     Dental   No notable dental hx     Cardiovascular      Pulmonary      Other Findings       Lab Results   Component Value Date    WBC 7 17 08/30/2021    HGB 13 7 08/30/2021     08/30/2021     Lab Results   Component Value Date    SODIUM 136 08/30/2021    K 4 3 08/30/2021    BUN 10 08/30/2021    CREATININE 0 95 08/30/2021    EGFR 100 08/30/2021     Lab Results   Component Value Date    HGBA1C 6 0 09/10/2021         Anesthesia Plan  ASA Score- 2     Anesthesia Type- IV sedation with anesthesia with ASA Monitors  Additional Monitors:   Airway Plan:           Plan Factors-Exercise tolerance (METS): >4 METS  Chart reviewed  Existing labs reviewed  Patient summary reviewed  Patient is not a current smoker  Induction- intravenous  Postoperative Plan-     Informed Consent- Anesthetic plan and risks discussed with patient  I personally reviewed this patient with the CRNA  Discussed and agreed on the Anesthesia Plan with the CRNA  Jaison Lozada

## 2021-09-22 NOTE — ANESTHESIA POSTPROCEDURE EVALUATION
Post-Op Assessment Note    CV Status:  Stable    Pain management: adequate     Mental Status:  Alert and awake   Hydration Status:  Euvolemic   PONV Controlled:  Controlled   Airway Patency:  Patent      Post Op Vitals Reviewed: Yes      Staff: CRNA         No complications documented      BP   101/56   Temp     Pulse  82   Resp   16   SpO2   100

## 2021-09-22 NOTE — OP NOTE
OPERATIVE REPORT  PATIENT NAME: Cristina Pena    :  1980  MRN: [id number]  Pt Location: [facility]    SURGERY DATE: 2021    Surgeon(s) and Role:     * Brady Carbajal MD - Primary     * Charmayne Bourgeois, MD - Assisting    Preop Diagnosis:  History of difficult venous access [Z87 898]    Post-Op Diagnosis Codes:     * History of difficult venous access [Z87 898]    Procedure(s) (LRB):  REMOVAL VENOUS PORT (PORT-A-CATH) (Left)    Specimen(s):  * No specimens in log *    Estimated Blood Loss:   Minimal    Drains:  * No LDAs found *    Anesthesia Type:   Conscious Sedation     Operative Indications:  History of difficult venous access [Z87 898]    Operative Findings:  Left-sided port removed in entirety    Complications:   None    Procedure and Technique:  Patient was brought to the operating room and identified verbally and via wristband  He was transferred the operating table in supine position  MAC anesthesia was induced successfully  The left chest and neck were prepped and draped in usual sterile fashion  Time-out was called confirming the patient, site, and procedure  All parties were in agreement  Local anesthesia was injected around the previous chest port site  A thin elliptical incision was made to excise the previous scar  Dissection was carried down through the subcutaneous tissue with cautery until the port was encountered  The cuff of the catheter was noted and freed circumferentially as was the reservoir  The port was then removed the passed off the field  Pressure was held at the left internal jugular puncture site  There was excellent hemostasis  The capsule was then ablated with cautery  The site was irrigated with sterile saline and hemostasis was assured with cautery  The deep dermal layers were closed with 3-0 Vicryl suture  The skin was closed with 4-0 Monocryl and dressed with Steri-Strips, gauze, and Tegaderm    The patient was then allowed to awaken, is and transferred to PACU having tolerated the procedure well  All instrument, needle, sponge counts were correct at the end the case  Radiofrequency detection was negative    Dr Jerica Pyle was present for the entire procedure    Patient Disposition:  PACU     SIGNATURE: Arlet Devries MD  DATE: September 22, 2021  TIME: 2:22 PM

## 2021-09-22 NOTE — INTERVAL H&P NOTE
H&P reviewed  After examining the patient I find no changes in the patients condition since the H&P had been written      Vitals:    09/22/21 0907   BP: 128/77   Pulse: 83   Resp: 18   Temp: (!) 97 1 °F (36 2 °C)   SpO2: 100%

## 2021-09-23 LAB — GLUCOSE SERPL-MCNC: 141 MG/DL (ref 65–140)

## 2021-09-24 DIAGNOSIS — G89.3 CANCER RELATED PAIN: ICD-10-CM

## 2021-09-24 DIAGNOSIS — Z51.5 PALLIATIVE CARE PATIENT: ICD-10-CM

## 2021-09-24 NOTE — TELEPHONE ENCOUNTER
Primary palliative medicine provider:   barbara ohara    Medication requested:  MSIR    If for pain, how has the patient been taking their pain medicine?  1 5 tabs every 6 hours prn    Last appointment: 09/08    Next scheduled appointment: 10/06    PDMP review:    09/14  84/14

## 2021-09-27 RX ORDER — MORPHINE SULFATE 15 MG/1
22.5 TABLET ORAL EVERY 6 HOURS PRN
Qty: 84 TABLET | Refills: 0 | OUTPATIENT
Start: 2021-09-27 | End: 2021-10-11

## 2021-10-06 ENCOUNTER — OFFICE VISIT (OUTPATIENT)
Dept: PALLIATIVE MEDICINE | Facility: CLINIC | Age: 41
End: 2021-10-06
Payer: COMMERCIAL

## 2021-10-06 VITALS
WEIGHT: 185.19 LBS | SYSTOLIC BLOOD PRESSURE: 110 MMHG | TEMPERATURE: 97.7 F | RESPIRATION RATE: 16 BRPM | OXYGEN SATURATION: 97 % | HEART RATE: 101 BPM | BODY MASS INDEX: 26.57 KG/M2 | DIASTOLIC BLOOD PRESSURE: 70 MMHG

## 2021-10-06 DIAGNOSIS — T45.1X5A CHEMOTHERAPY-INDUCED NEUROPATHY (HCC): ICD-10-CM

## 2021-10-06 DIAGNOSIS — C77.2 COLON CANCER METASTASIZED TO INTRA-ABDOMINAL LYMPH NODE (HCC): ICD-10-CM

## 2021-10-06 DIAGNOSIS — Z51.5 PALLIATIVE CARE PATIENT: ICD-10-CM

## 2021-10-06 DIAGNOSIS — G62.0 CHEMOTHERAPY-INDUCED NEUROPATHY (HCC): ICD-10-CM

## 2021-10-06 DIAGNOSIS — G89.3 CANCER RELATED PAIN: ICD-10-CM

## 2021-10-06 DIAGNOSIS — C18.9 COLON CANCER METASTASIZED TO INTRA-ABDOMINAL LYMPH NODE (HCC): ICD-10-CM

## 2021-10-06 DIAGNOSIS — D75.1 POLYCYTHEMIA: ICD-10-CM

## 2021-10-06 DIAGNOSIS — C18.7 CANCER OF SIGMOID COLON (HCC): Primary | ICD-10-CM

## 2021-10-06 PROCEDURE — 3078F DIAST BP <80 MM HG: CPT | Performed by: INTERNAL MEDICINE

## 2021-10-06 PROCEDURE — 3074F SYST BP LT 130 MM HG: CPT | Performed by: INTERNAL MEDICINE

## 2021-10-06 PROCEDURE — 99213 OFFICE O/P EST LOW 20 MIN: CPT | Performed by: INTERNAL MEDICINE

## 2021-10-06 PROCEDURE — 1036F TOBACCO NON-USER: CPT | Performed by: INTERNAL MEDICINE

## 2021-10-06 RX ORDER — MORPHINE SULFATE 15 MG/1
15 TABLET ORAL 2 TIMES DAILY PRN
Qty: 30 TABLET | Refills: 0 | Status: SHIPPED | OUTPATIENT
Start: 2021-10-06 | End: 2021-10-18 | Stop reason: SDUPTHER

## 2021-10-07 ENCOUNTER — TELEPHONE (OUTPATIENT)
Dept: PALLIATIVE MEDICINE | Age: 41
End: 2021-10-07

## 2021-10-10 DIAGNOSIS — E11.9 TYPE 2 DIABETES MELLITUS WITHOUT COMPLICATION, WITHOUT LONG-TERM CURRENT USE OF INSULIN (HCC): Chronic | ICD-10-CM

## 2021-10-13 RX ORDER — DULAGLUTIDE 1.5 MG/.5ML
1.5 INJECTION, SOLUTION SUBCUTANEOUS WEEKLY
Qty: 3 ML | Refills: 2 | Status: SHIPPED | OUTPATIENT
Start: 2021-10-13 | End: 2022-01-22 | Stop reason: SDUPTHER

## 2021-10-18 DIAGNOSIS — Z51.5 PALLIATIVE CARE PATIENT: ICD-10-CM

## 2021-10-18 DIAGNOSIS — G89.3 CANCER RELATED PAIN: ICD-10-CM

## 2021-10-18 RX ORDER — MORPHINE SULFATE 15 MG/1
15 TABLET ORAL 2 TIMES DAILY PRN
Qty: 30 TABLET | Refills: 0 | Status: SHIPPED | OUTPATIENT
Start: 2021-10-18 | End: 2021-11-01 | Stop reason: SDUPTHER

## 2021-11-01 DIAGNOSIS — G89.3 CANCER RELATED PAIN: ICD-10-CM

## 2021-11-01 DIAGNOSIS — Z51.5 PALLIATIVE CARE PATIENT: ICD-10-CM

## 2021-11-01 RX ORDER — MORPHINE SULFATE 15 MG/1
15 TABLET ORAL 2 TIMES DAILY PRN
Qty: 30 TABLET | Refills: 0 | Status: CANCELLED | OUTPATIENT
Start: 2021-11-01 | End: 2021-11-16

## 2021-11-17 ENCOUNTER — OFFICE VISIT (OUTPATIENT)
Dept: PALLIATIVE MEDICINE | Facility: CLINIC | Age: 41
End: 2021-11-17
Payer: COMMERCIAL

## 2021-11-17 VITALS
SYSTOLIC BLOOD PRESSURE: 110 MMHG | HEART RATE: 118 BPM | DIASTOLIC BLOOD PRESSURE: 90 MMHG | OXYGEN SATURATION: 98 % | BODY MASS INDEX: 27.43 KG/M2 | WEIGHT: 191.14 LBS | TEMPERATURE: 99.3 F

## 2021-11-17 DIAGNOSIS — G89.3 CANCER RELATED PAIN: ICD-10-CM

## 2021-11-17 DIAGNOSIS — C18.7 CANCER OF SIGMOID COLON (HCC): Primary | ICD-10-CM

## 2021-11-17 DIAGNOSIS — Z51.5 PALLIATIVE CARE PATIENT: ICD-10-CM

## 2021-11-17 PROCEDURE — 3074F SYST BP LT 130 MM HG: CPT | Performed by: INTERNAL MEDICINE

## 2021-11-17 PROCEDURE — 99212 OFFICE O/P EST SF 10 MIN: CPT | Performed by: INTERNAL MEDICINE

## 2021-11-17 PROCEDURE — 1036F TOBACCO NON-USER: CPT | Performed by: INTERNAL MEDICINE

## 2021-11-17 PROCEDURE — 3080F DIAST BP >= 90 MM HG: CPT | Performed by: INTERNAL MEDICINE

## 2021-12-14 ENCOUNTER — APPOINTMENT (OUTPATIENT)
Dept: LAB | Facility: CLINIC | Age: 41
End: 2021-12-14
Payer: COMMERCIAL

## 2021-12-14 ENCOUNTER — TELEPHONE (OUTPATIENT)
Dept: HEMATOLOGY ONCOLOGY | Facility: CLINIC | Age: 41
End: 2021-12-14

## 2021-12-14 DIAGNOSIS — C77.2 COLON CANCER METASTASIZED TO INTRA-ABDOMINAL LYMPH NODE (HCC): ICD-10-CM

## 2021-12-14 DIAGNOSIS — C18.7 CANCER OF SIGMOID COLON (HCC): ICD-10-CM

## 2021-12-14 DIAGNOSIS — E83.110 HEREDITARY HEMOCHROMATOSIS (HCC): ICD-10-CM

## 2021-12-14 DIAGNOSIS — C18.9 COLON CANCER METASTASIZED TO INTRA-ABDOMINAL LYMPH NODE (HCC): ICD-10-CM

## 2021-12-14 LAB
ALBUMIN SERPL BCP-MCNC: 4.3 G/DL (ref 3.5–5)
ALP SERPL-CCNC: 86 U/L (ref 46–116)
ALT SERPL W P-5'-P-CCNC: 34 U/L (ref 12–78)
ANION GAP SERPL CALCULATED.3IONS-SCNC: 3 MMOL/L (ref 4–13)
AST SERPL W P-5'-P-CCNC: 26 U/L (ref 5–45)
BASOPHILS # BLD AUTO: 0.03 THOUSANDS/ΜL (ref 0–0.1)
BASOPHILS NFR BLD AUTO: 1 % (ref 0–1)
BILIRUB SERPL-MCNC: 0.93 MG/DL (ref 0.2–1)
BUN SERPL-MCNC: 17 MG/DL (ref 5–25)
CALCIUM SERPL-MCNC: 9.4 MG/DL (ref 8.3–10.1)
CEA SERPL-MCNC: <0.5 NG/ML (ref 0–3)
CHLORIDE SERPL-SCNC: 108 MMOL/L (ref 100–108)
CO2 SERPL-SCNC: 26 MMOL/L (ref 21–32)
CREAT SERPL-MCNC: 1.1 MG/DL (ref 0.6–1.3)
EOSINOPHIL # BLD AUTO: 0.16 THOUSAND/ΜL (ref 0–0.61)
EOSINOPHIL NFR BLD AUTO: 3 % (ref 0–6)
ERYTHROCYTE [DISTWIDTH] IN BLOOD BY AUTOMATED COUNT: 13.1 % (ref 11.6–15.1)
FERRITIN SERPL-MCNC: 16 NG/ML (ref 8–388)
GFR SERPL CREATININE-BSD FRML MDRD: 83 ML/MIN/1.73SQ M
GLUCOSE P FAST SERPL-MCNC: 115 MG/DL (ref 65–99)
HCT VFR BLD AUTO: 46.8 % (ref 36.5–49.3)
HGB BLD-MCNC: 15.2 G/DL (ref 12–17)
IMM GRANULOCYTES # BLD AUTO: 0.01 THOUSAND/UL (ref 0–0.2)
IMM GRANULOCYTES NFR BLD AUTO: 0 % (ref 0–2)
IRON SATN MFR SERPL: 17 % (ref 20–50)
IRON SERPL-MCNC: 70 UG/DL (ref 65–175)
LYMPHOCYTES # BLD AUTO: 1.75 THOUSANDS/ΜL (ref 0.6–4.47)
LYMPHOCYTES NFR BLD AUTO: 31 % (ref 14–44)
MCH RBC QN AUTO: 28.8 PG (ref 26.8–34.3)
MCHC RBC AUTO-ENTMCNC: 32.5 G/DL (ref 31.4–37.4)
MCV RBC AUTO: 89 FL (ref 82–98)
MONOCYTES # BLD AUTO: 0.48 THOUSAND/ΜL (ref 0.17–1.22)
MONOCYTES NFR BLD AUTO: 9 % (ref 4–12)
NEUTROPHILS # BLD AUTO: 3.23 THOUSANDS/ΜL (ref 1.85–7.62)
NEUTS SEG NFR BLD AUTO: 56 % (ref 43–75)
NRBC BLD AUTO-RTO: 0 /100 WBCS
PLATELET # BLD AUTO: 187 THOUSANDS/UL (ref 149–390)
PMV BLD AUTO: 11.7 FL (ref 8.9–12.7)
POTASSIUM SERPL-SCNC: 4.3 MMOL/L (ref 3.5–5.3)
PROT SERPL-MCNC: 7.5 G/DL (ref 6.4–8.2)
RBC # BLD AUTO: 5.28 MILLION/UL (ref 3.88–5.62)
SODIUM SERPL-SCNC: 137 MMOL/L (ref 136–145)
TIBC SERPL-MCNC: 422 UG/DL (ref 250–450)
WBC # BLD AUTO: 5.66 THOUSAND/UL (ref 4.31–10.16)

## 2021-12-14 PROCEDURE — 83550 IRON BINDING TEST: CPT

## 2021-12-14 PROCEDURE — 82378 CARCINOEMBRYONIC ANTIGEN: CPT

## 2021-12-14 PROCEDURE — 80053 COMPREHEN METABOLIC PANEL: CPT

## 2021-12-14 PROCEDURE — 83540 ASSAY OF IRON: CPT

## 2021-12-14 PROCEDURE — 85025 COMPLETE CBC W/AUTO DIFF WBC: CPT

## 2021-12-14 PROCEDURE — 36415 COLL VENOUS BLD VENIPUNCTURE: CPT

## 2021-12-14 PROCEDURE — 82728 ASSAY OF FERRITIN: CPT

## 2021-12-15 ENCOUNTER — HOSPITAL ENCOUNTER (OUTPATIENT)
Dept: RADIOLOGY | Facility: HOSPITAL | Age: 41
Discharge: HOME | End: 2021-12-15
Attending: INTERNAL MEDICINE
Payer: COMMERCIAL

## 2021-12-15 DIAGNOSIS — C18.9 COLON CANCER METASTASIZED TO INTRA-ABDOMINAL LYMPH NODE (HCC): ICD-10-CM

## 2021-12-15 DIAGNOSIS — C77.2 COLON CANCER METASTASIZED TO INTRA-ABDOMINAL LYMPH NODE (HCC): ICD-10-CM

## 2021-12-15 DIAGNOSIS — C18.7 CANCER OF SIGMOID COLON (HCC): ICD-10-CM

## 2021-12-15 PROCEDURE — 71260 CT THORAX DX C+: CPT

## 2021-12-15 PROCEDURE — G1004 CDSM NDSC: HCPCS

## 2021-12-15 PROCEDURE — 74177 CT ABD & PELVIS W/CONTRAST: CPT

## 2021-12-15 RX ADMIN — IOHEXOL 100 ML: 350 INJECTION, SOLUTION INTRAVENOUS at 08:29

## 2022-01-03 ENCOUNTER — TELEPHONE (OUTPATIENT)
Dept: HEMATOLOGY ONCOLOGY | Facility: HOSPITAL | Age: 42
End: 2022-01-03

## 2022-01-03 NOTE — TELEPHONE ENCOUNTER
01/03/22      Spoke with pt regarding his doppler U/S test for his upper limb  Pt stated he has already got his port removed and has no any Sx now  Denied any pain/ swelling  At this point, I will still keep his Wed's appt w/o doppler exam  He can go ahead and discuss with Dr Rehan Franco if he has any further questions

## 2022-01-04 ENCOUNTER — TELEPHONE (OUTPATIENT)
Dept: HEMATOLOGY ONCOLOGY | Facility: CLINIC | Age: 42
End: 2022-01-04

## 2022-01-05 ENCOUNTER — OFFICE VISIT (OUTPATIENT)
Dept: HEMATOLOGY ONCOLOGY | Facility: CLINIC | Age: 42
End: 2022-01-05
Payer: COMMERCIAL

## 2022-01-05 VITALS
HEIGHT: 70 IN | WEIGHT: 201 LBS | HEART RATE: 82 BPM | TEMPERATURE: 97.4 F | BODY MASS INDEX: 28.77 KG/M2 | RESPIRATION RATE: 18 BRPM | SYSTOLIC BLOOD PRESSURE: 104 MMHG | DIASTOLIC BLOOD PRESSURE: 78 MMHG

## 2022-01-05 DIAGNOSIS — E83.110 HEREDITARY HEMOCHROMATOSIS (HCC): ICD-10-CM

## 2022-01-05 DIAGNOSIS — C77.2 COLON CANCER METASTASIZED TO INTRA-ABDOMINAL LYMPH NODE (HCC): Primary | ICD-10-CM

## 2022-01-05 DIAGNOSIS — C18.9 COLON CANCER METASTASIZED TO INTRA-ABDOMINAL LYMPH NODE (HCC): Primary | ICD-10-CM

## 2022-01-05 PROCEDURE — 3074F SYST BP LT 130 MM HG: CPT | Performed by: INTERNAL MEDICINE

## 2022-01-05 PROCEDURE — 99214 OFFICE O/P EST MOD 30 MIN: CPT | Performed by: INTERNAL MEDICINE

## 2022-01-05 PROCEDURE — 3078F DIAST BP <80 MM HG: CPT | Performed by: INTERNAL MEDICINE

## 2022-01-05 PROCEDURE — 3008F BODY MASS INDEX DOCD: CPT | Performed by: INTERNAL MEDICINE

## 2022-01-05 NOTE — PROGRESS NOTES
Hematology/Oncology Outpatient Follow- up Note  Don Preston 37 y o  male MRN: @ Encounter: [id number]        Date:  1/5/2022    Presenting Complaint/Diagnosis :  PT1N1a cancer of the descending colon    HPI:    The patient is a pleasant 52-year-old male who presented with GI bleeding and was found to have an adenocarcinoma of the descending colon status post hemicolectomy   Tumor was a grade 2 adenocarcinoma with 1/28 lymph nodes positive   It was a T1 lesion   The patient was started on adjuvant capecitabine and oxaliplatin  Zilphia Holiday has had 2 cycles so far   Cycle 3  Was canceled for his vacation per his wishes apparently  Previous Hematologic/ Oncologic History:    Oncology History   Cancer of sigmoid colon ([address] )   4/13/2021 Initial Diagnosis    [facility] )    RESULTS OF IMMUNOHISTOCHEMICAL ANALYSIS FOR MISMATCH REPAIR PROTEIN LOSS  INTERPRETATION: NO LOSS OF NUCLEAR EXPRESSION OF MMR PROTEINS: LOW PROBABILITY OF MSI-H   RESULTS:  AntibodyClone Description Results  MLH1 M1 Mismatch repair protein Intact nuclear expression  MSH2 [id number] Mismatch repair protein Intact nuclear expression  MSH6 40 Mismatch repair protein Intact nuclear expression  PMS2 [id number] Mismatch repair protein Intact nuclear expression     5/5/2021 Surgery    Descending colon, partial colectomy:  - Adenocarcinoma (1 9 cm), moderately differentiated, arising in adenocarcinoma with high grade dysplasia    - Tumor invades the submucosa (through the muscularis mucosa in to deeper one third of submucosa, pT1, Sm3)   - All margins are negative for tumor   - One  of twenty eight lymph nodes is positive for tumor (1/28)      6/4/2021 -  Chemotherapy    oxaliplatin (ELOXATIN) chemo infusion, 130 mg/m2 = 275 6 mg, Intravenous, Once, 4 of 4 cycles  Administration: 275 6 mg (6/4/2021), 275 6 mg (6/25/2021), 275 6 mg (8/11/2021), 275 6 mg (9/3/2021)     Colon cancer metastasized to intra-abdominal lymph node ([address] )   5/20/2021 Initial Diagnosis    Colon cancer metastasized to intra-abdominal lymph node ([facility] )     6/4/2021 -  Chemotherapy    oxaliplatin (ELOXATIN) chemo infusion, 130 mg/m2 = 275 6 mg, Intravenous, Once, 4 of 4 cycles  Administration: 275 6 mg (6/4/2021), 275 6 mg (6/25/2021), 275 6 mg (8/11/2021), 275 6 mg (9/3/2021)       Z locks every 3 weeks  A history of possible hemochromatosis for which he may need to resume phlebotomy down the road  Ferritin is still low  Current Hematologic/ Oncologic Treatment:    Observation    Interval History:    The patient returns for follow-up visit  He states he is doing reasonably well  Has recovered from his surgery  Still has some mild neuropathy  Denies any nausea denies any vomiting denies any diarrhea  Overall is otherwise at baseline  Does not have a gastroenterologist to follow-up with in terms of his routine colonoscopy so I will arrange this  He will need reimaging and blood work in 6 months  His most recent imaging showed no evidence of metastatic disease or progression  He had stable small lung nodules  Cancer Staging:  Cancer Staging  Cancer of sigmoid colon [facility])  Staging form: Colon and Rectum, AJCC 8th Edition  - Pathologic stage from 5/5/2021: Stage IIIA (pT1, pN1a, cM0) - Unsigned  Stage prefix: Initial diagnosis  Total positive nodes: 1  Total nodes examined: 28  Histologic grading system: 4 grade system  Histologic grade (G): G2  Carcinoembryonic antigen (CEA) (ng/mL): 0 5  Perineural invasion (PNI): Absent      Test Results:    Imaging: CT chest abdomen pelvis w contrast    Result Date: 12/18/2021  Narrative: CT CHEST, ABDOMEN AND PELVIS WITH IV CONTRAST INDICATION:   C18 9: Malignant neoplasm of colon, unspecified C77 2: Secondary and unspecified malignant neoplasm of intra-abdominal lymph nodes C18 7: Malignant neoplasm of sigmoid colon  COMPARISON:  CT 4/19/2021  TECHNIQUE: CT examination of the chest, abdomen and pelvis was performed   Axial, sagittal, and coronal 2D reformatted images were created from the source data and submitted for interpretation  Radiation dose length product (DLP) for this visit:  931 93 mGy-cm   This examination, like all CT scans performed in the [facility], was performed utilizing techniques to minimize radiation dose exposure, including the use of iterative  reconstruction and automated exposure control  IV Contrast:  100 mL of iohexol (OMNIPAQUE) Enteric Contrast: Enteric contrast was administered  FINDINGS: CHEST LUNGS:  Unchanged 3 mm right upper lobe nodule centrally on image 3/43, and 2 mm anterior left upper lobe nodule on image 3/48  No new nodules  There is no tracheal or endobronchial lesion  PLEURA:  Unremarkable  HEART/GREAT VESSELS: Heart is unremarkable for patient's age  No thoracic aortic aneurysm  MEDIASTINUM AND JAIME:  Unremarkable  CHEST WALL AND LOWER NECK:   Mild bilateral gynecomastia  ABDOMEN LIVER/BILIARY TREE:  Stable subcentimeter hypodensity in the posterolateral right hepatic lobe, too small to accurately characterize, but statistically most likely to represent subcentimeter hepatic cysts  No new hepatic lesions  Hepatic contours are normal   No biliary dilatation  GALLBLADDER:  No calcified gallstones  No pericholecystic inflammatory change  SPLEEN:  Unremarkable  PANCREAS:  Unremarkable  ADRENAL GLANDS:  Unremarkable  KIDNEYS/URETERS:  Unremarkable  No hydronephrosis  STOMACH AND BOWEL:  Status post partial left colectomy, with no evidence of recurrent mass  Otherwise, unremarkable  APPENDIX:  No findings to suggest appendicitis  ABDOMINOPELVIC CAVITY:  No ascites  No pneumoperitoneum  No lymphadenopathy  VESSELS:  Unremarkable for patient's age  PELVIS REPRODUCTIVE ORGANS:  Unremarkable for patient's age  URINARY BLADDER:  Unremarkable  ABDOMINAL WALL/INGUINAL REGIONS:  Unremarkable  OSSEOUS STRUCTURES:  No acute fracture or destructive osseous lesion    Bilateral pars defects at L5  Impression: Unchanged tiny pulmonary nodules  Stable indeterminate subcentimeter hepatic hypodensity  Status post partial colectomy  Workstation performed: [id number]       Labs:   Lab Results   Component Value Date    WBC 5 66 12/14/2021    HGB 15 2 12/14/2021    HCT 46 8 12/14/2021    MCV 89 12/14/2021     12/14/2021     Lab Results   Component Value Date    K 4 3 12/14/2021     12/14/2021    CO2 26 12/14/2021    BUN 17 12/14/2021    CREATININE 1 10 12/14/2021    GLUF 115 (H) 12/14/2021    CALCIUM 9 4 12/14/2021    AST 26 12/14/2021    ALT 34 12/14/2021    ALKPHOS 86 12/14/2021    EGFR 83 12/14/2021     Lab Results   Component Value Date    PSA 0 9 11/24/2020       Lab Results   Component Value Date    CEA <0 5 12/14/2021       Lab Results   Component Value Date    IRON 70 12/14/2021    TIBC 422 12/14/2021    FERRITIN 16 12/14/2021       ROS: As stated in the history of present illness otherwise his 14 point review of systems today was negative        Active Problems:   Patient Active Problem List   Diagnosis    Diabetes mellitus without complication [address] )    [facility]reditary hemochromatosis ([address] )    Testosterone deficiency    Thyroid nodule    Fatigue    Cancer of sigmoid colon ([address] )    Colon cancer metastasized to intra-abdominal lymph node (H[facility])    Liver nodule    Lung nodule    Polycythemia    Acute pain of left shoulder    History of drug abuse ([address] )    Chemotherapy-induced neuropathy ([address] )    Palliative care patient    Cancer related pain       Past Medical History:   Past Medical History:   Diagnosis Date    Cancer ([address] )     colon cancer    Diabetes mellitus ([address] )     Hemochromatosis     Testosterone deficiency        Surgical History:   Past Surgical History:   Procedure Laterality Date    COLONOSCOPY      HEMICOLOECTOMY W/ ANASTOMOSIS N/A 5/5/2021    Procedure: ROBOTIC SIGMOID COLECTOMY, MOBILIZATION OF SPLENIC FLEXURE;  Surgeon: John Sands MD; Location: [facility];  Service: Surgical Oncology    REMOVAL VENOUS PORT (PORT-A-CATH) Left 9/22/2021    Procedure: REMOVAL VENOUS PORT (PORT-A-CATH); Surgeon: Edouard Dominguez MD;  [facility];  Service: Surgical Oncology    TUNNELED VENOUS PORT PLACEMENT Left 6/1/2021    Procedure: INSERTION VENOUS PORT (PORT-A-CATH) Ultrasound guided Left Internal Jugular Vein Access;  Surgeon: Tristin Jackson MD;  Location: [facility];  Service: Surgical Oncology    WISDOM TOOTH EXTRACTION         Family History:    Family History   Problem Relation Age of Onset    Colon polyps Mother     No Known Problems Father     Colon polyps Maternal Grandmother     Prostate cancer Maternal Grandfather     Lung cancer Paternal Grandmother     Diabetes Paternal Grandmother     Prostate cancer Paternal Grandfather     Heart disease Paternal Grandfather     Kidney failure Paternal Grandfather     Diabetes Family     Hypertension Family        Cancer-related family history includes Lung cancer in his paternal grandmother; Prostate cancer in his maternal grandfather and paternal grandfather      Social History:   Social History     Socioeconomic History    Marital status:      Spouse name: Not on file    Number of children: Not on file    Years of education: Not on file    Highest education level: Not on file   Occupational History    Not on file   Tobacco Use    Smoking status: Former Smoker     Packs/day: 0 25     Years: 10 00     Pack years: 2 50     Types: Cigarettes     Quit date: 5/28/2011     Years since quitting: 10 6    Smokeless tobacco: Never Used   Vaping Use    Vaping Use: Never used   Substance and Sexual Activity    Alcohol use: Not Currently    Drug use: Not Currently    Sexual activity: Yes     Partners: Female     Birth control/protection: Condom Male   Other Topics Concern    Not on file   Social History Narrative    Not on file     Social Determinants of Health     Financial Resource Strain: Not on file   Food Insecurity: Not on file   Transportation Needs: Not on file   Physical Activity: Sufficiently Active    Days of Exercise per Week: 5 days    Minutes of Exercise per Session: 60 min   Stress: No Stress Concern Present    Feeling of Stress : Only a little   Social Connections: Not on file   Intimate Partner Violence: Not on file   Housing Stability: Not on file       Current Medications:   Current Outpatient Medications   Medication Sig Dispense Refill    Cholecalciferol (Vitamin D3) 50 MCG (2000 UT) capsule Take 2,000 Units by mouth daily      cyanocobalamin (VITAMIN B-12) 100 mcg tablet Take by mouth daily      Dulaglutide (Trulicity) 1 5 PF/3 2CD SOPN Inject 0 5 mL (1 5 mg total) under the skin once a week 3 mL 2    Jardiance 25 MG TABS TAKE 1 TABLET BY MOUTH EVERY DAY 90 tablet 1    Pyridoxine HCl (Vitamin B6) 100 MG TABS Take by mouth      senna (SENOKOT) 8 6 MG tablet Take 1 tablet (8 6 mg total) by mouth daily 30 tablet 1    Sodium Fluoride 5000 PPM 1 1 % PSTE BRUSH 2 TIMES A DAY FOR 2 3 MINUTES AND SPIT  NO RINSING FOR 30 MIN       No current facility-administered medications for this visit  Allergies: No Known Allergies    Physical Exam:    Body surface area is 2 09 meters squared  Wt Readings from Last 3 Encounters:   01/05/22 91 2 kg (201 lb)   11/17/21 86 7 kg (191 lb 2 2 oz)   10/06/21 84 kg (185 lb 3 oz)        Temp Readings from Last 3 Encounters:   01/05/22 (!) 97 4 °F (36 3 °C)   11/17/21 99 3 °F (37 4 °C) (Temporal)   10/06/21 97 7 °F (36 5 °C) (Temporal)        BP Readings from Last 3 Encounters:   01/05/22 104/78   11/17/21 110/90   10/06/21 110/70         Pulse Readings from Last 3 Encounters:   01/05/22 82   11/17/21 (!) 118   10/06/21 101       Physical Exam     Constitutional   General appearance: No acute distress, well appearing and well nourished  Eyes   Conjunctiva and lids: No swelling, erythema or discharge      Pupils and irises: Equal, round and reactive to light  Ears, Nose, Mouth, and Throat   External inspection of ears and nose: Normal     Nasal mucosa, septum, and turbinates: Normal without edema or erythema  Oropharynx: Normal with no erythema, edema, exudate or lesions  Pulmonary   Respiratory effort: No increased work of breathing or signs of respiratory distress  Auscultation of lungs: Clear to auscultation  Cardiovascular   Palpation of heart: Normal PMI, no thrills  Auscultation of heart: Normal rate and rhythm, normal S1 and S2, without murmurs  Examination of extremities for edema and/or varicosities: Normal     Carotid pulses: Normal     Abdomen   Abdomen: Non-tender, no masses  Liver and spleen: No hepatomegaly or splenomegaly  Lymphatic   Palpation of lymph nodes in neck: No lymphadenopathy  Musculoskeletal   Gait and station: Normal     Digits and nails: Normal without clubbing or cyanosis  Inspection/palpation of joints, bones, and muscles: Normal     Skin   Skin and subcutaneous tissue: Normal without rashes or lesions  Neurologic   Cranial nerves: Cranial nerves 2-12 intact  Sensation: No sensory loss  Psychiatric   Orientation to person, place, and time: Normal     Mood and affect: Normal         Assessment / Plan:      The patient is a pleasant 52-year-old male who was referred to see us because he wishes to transfer care from his previous oncologist for stage IIIA I e  T1 N1 descending colon adenocarcinoma with 1/28 lymph nodes positive   Histology was low risk   Based on the most current data the patient needs 3 months of adjuvant treatment I e  4 cycles of capecitabine with oxaliplatin  he finished this up and is now on observation  I advised him to continue monitoring  I will set him up to see gastroenterology for consideration of a colonoscopy and to be put on a monitoring schedule for his colonoscopies    I will see him back in 6 months with repeat blood work and imaging  I will check his iron studies along with his CEA  If he has any questions he will call our office  Otherwise I will see him back in 6 months  Goals and Barriers:  Current Goal:  Prolong Survival from colon cancer  Barriers: None  Patient's Capacity to Self Care:  Patient able to self care  Portions of the record may have been created with voice recognition software  Occasional wrong word or "sound a like" substitutions may have occurred due to the inherent limitations of voice recognition software  Read the chart carefully and recognize, using context, where substitutions have occurred

## 2022-01-12 ENCOUNTER — TELEPHONE (OUTPATIENT)
Dept: GASTROENTEROLOGY | Facility: AMBULARY SURGERY CENTER | Age: 42
End: 2022-01-12

## 2022-02-23 ENCOUNTER — PATIENT OUTREACH (OUTPATIENT)
Dept: CASE MANAGEMENT | Facility: HOSPITAL | Age: 42
End: 2022-02-23

## 2022-02-23 NOTE — PROGRESS NOTES
Chart review completed today, no contact with pt since 9/2021  Onc SW episode will be closed as of today, MSW will remain available as needed moving forward, no concerns at this time

## 2022-03-08 ENCOUNTER — OFFICE VISIT (OUTPATIENT)
Dept: ENDOCRINOLOGY | Facility: CLINIC | Age: 42
End: 2022-03-08
Payer: COMMERCIAL

## 2022-03-08 ENCOUNTER — TELEPHONE (OUTPATIENT)
Dept: SURGERY | Facility: CLINIC | Age: 42
End: 2022-03-08

## 2022-03-08 VITALS
BODY MASS INDEX: 28.63 KG/M2 | WEIGHT: 200 LBS | HEIGHT: 70 IN | HEART RATE: 86 BPM | DIASTOLIC BLOOD PRESSURE: 72 MMHG | SYSTOLIC BLOOD PRESSURE: 112 MMHG

## 2022-03-08 DIAGNOSIS — E04.1 THYROID NODULE: ICD-10-CM

## 2022-03-08 DIAGNOSIS — R53.83 FATIGUE, UNSPECIFIED TYPE: ICD-10-CM

## 2022-03-08 DIAGNOSIS — E34.9 TESTOSTERONE DEFICIENCY: ICD-10-CM

## 2022-03-08 DIAGNOSIS — E11.9 DIABETES MELLITUS WITHOUT COMPLICATION (HCC): Primary | ICD-10-CM

## 2022-03-08 LAB — SL AMB POCT HEMOGLOBIN AIC: 5.7 (ref ?–6.5)

## 2022-03-08 PROCEDURE — 3044F HG A1C LEVEL LT 7.0%: CPT | Performed by: NURSE PRACTITIONER

## 2022-03-08 PROCEDURE — 3008F BODY MASS INDEX DOCD: CPT | Performed by: NURSE PRACTITIONER

## 2022-03-08 PROCEDURE — 83036 HEMOGLOBIN GLYCOSYLATED A1C: CPT | Performed by: NURSE PRACTITIONER

## 2022-03-08 PROCEDURE — 1036F TOBACCO NON-USER: CPT | Performed by: NURSE PRACTITIONER

## 2022-03-08 PROCEDURE — 99214 OFFICE O/P EST MOD 30 MIN: CPT | Performed by: NURSE PRACTITIONER

## 2022-03-08 PROCEDURE — 3078F DIAST BP <80 MM HG: CPT | Performed by: NURSE PRACTITIONER

## 2022-03-08 PROCEDURE — 3074F SYST BP LT 130 MM HG: CPT | Performed by: NURSE PRACTITIONER

## 2022-03-08 NOTE — ASSESSMENT & PLAN NOTE
Patient is well controlled without episodes of hypoglycemia  Continue current regimen  Continue to focus on a healthy diet  Continue to focus on regular physical activity and weight management    Lab Results   Component Value Date    HGBA1C 5 7 03/08/2022

## 2022-03-08 NOTE — ASSESSMENT & PLAN NOTE
Patient continues to report symptoms of low testosterone  Levels have not been checked for 1 year  Repeat free and total testosterone along with labs ordered by Hematology-Oncology  Once again, we discussed the risk versus benefit of supplementing testosterone  Patient's primary symptom of low testosterone is fatigue  Given history of polycythemia as well as his young age, he is not an ideal candidate for testosterone replacement  Encouraged him to continue to focus on healthy diet, regular exercise, and weight loss

## 2022-03-08 NOTE — PROGRESS NOTES
Established Patient Progress Note      Chief Complaint   Patient presents with    Diabetes Mellitus        History of Present Illness:   Rachel Collins is a 37 y o  male with hypogonadism, vitamin-D deficiency, thyroid nodules, and type 2 diabetes without long-term use of insulin since 20/10  Denies complications of diabetes  Denies recent severe hypoglycemic or severe hyperglycemic episodes  Denies any issues with his current regimen  Home glucose monitoring is performed sporadically  Patient had been receiving treatment for cancer the sigmoid colon  He has been following with Hematology-Oncology for stage III T1 N1 adenocarcinoma of the colon status post resection on adjuvant chemotherapy- capecitabine and oxaliplatin  He has completed his chemotherapy  Overall, he reports feeling well  He recently returned from a skiing trip in [place]  He has made an effort to increase his physical activity  He states that his diet is not particularly healthy all the time, eating foods high in carbohydrates with some regularity  HgA1C 5 7%    Home blood glucose readings:  Patient did not bring glucometer glucose logs to today's appointment  Current regimen:   Jardiance 25 mg daily  Trulicity 1 5 mg once weekly      Last Eye Exam: Overdue; appointment scheduled for 03/31/2022  Last Foot Exam: 11/3/2020; overdue; + for neuropathy    For vitamin-D deficiency, he is taking 2000 units of vitamin-D daily  He is not currently on an ACE-inhibitor or statin  Patient has a history of low testosterone  When he was living in [place], he was supplementing his testosterone with weekly injections  Low Testosterone: 7/2020  Serum testosterone 173 ([id number])  Free 6 1 (8 7-25 1)    Since that time, when free and total testosterone have been evaluated, they are in the normal/low normal range    Component      Latest Ref Rng & Units 11/24/2020 1/7/2021   TESTOSTERONE FREE      8 7 - 25 1 pg/mL 11 0 9 4 Testosterone, Total, LC/MS      264 - 916 ng/dL 320 533   Primary symptom of low testosterone is fatigue  He denies decreased libido, erectile dysfunction, and loss of lean muscle mass  However, he notes that it is more challenging to lose weight and build up muscle than it had been in the past   Of note, it was initially thought that the patient had hemochromatosis  It now appears that this was not an accurate diagnosis  However, hematology oncology is following his levels closely  Patient also has a history of multiple thyroid nodules  He did undergo a fine-needle aspiration biopsy which was benign  He does have a repeat order for an ultrasound of the thyroid which he has yet to complete  Patient Active Problem List   Diagnosis    [facility]    Hereditary hemochromatosis ([facility] )    Testosterone deficiency    Thyroid nodule    Fatigue    [address]    Colon cancer metastasized to intra-abdominal lymph node (HCC)    Liver nodule    Lung nodule    Polycythemia    Acute pain of left shoulder    History of drug abuse ([facility]    Chemotherapy-induced neuropathy ([facility] )    Palliative care patient    Cancer related pain      Past Medical History:   Diagnosis Date    Cancer ([facility] )     colon cancer    Diabetes mellitus [facility]     Hemochromatosis     Testosterone deficiency       Past Surgical History:   Procedure Laterality Date    COLONOSCOPY      HEMICOLOECTOMY W/ ANASTOMOSIS N/A 5/5/2021    Procedure: ROBOTIC SIGMOID COLECTOMY, MOBILIZATION OF SPLENIC FLEXURE;  Surgeon: Kelly Lucero MD;  [facility];  Service: Surgical Oncology    REMOVAL VENOUS PORT (PORT-A-CATH) Left 9/22/2021    Procedure: REMOVAL VENOUS PORT (PORT-A-CATH);   Surgeon: Kelly Lucero MD;  [facility];  Service: Surgical Oncology    TUNNELED VENOUS PORT PLACEMENT Left 6/1/2021    Procedure: INSERTION VENOUS PORT (PORT-A-CATH) Ultrasound guided Left Internal Jugular Vein Access;  Surgeon: Chelsey Arora MD;  [facility];  Service: Surgical Oncology    WISDOM TOOTH EXTRACTION        Family History   Problem Relation Age of Onset    Colon polyps Mother     No Known Problems Father     Colon polyps Maternal Grandmother     Prostate cancer Maternal Grandfather     Lung cancer Paternal Grandmother     Diabetes Paternal Grandmother     Prostate cancer Paternal Grandfather     Heart disease Paternal Grandfather     Kidney failure Paternal Grandfather     Diabetes Family     Hypertension Family      Social History     Tobacco Use    Smoking status: Former Smoker     Packs/day: 0 25     Years: 10 00     Pack years: 2 50     Types: Cigarettes     Quit date: 5/28/2011     Years since quitting: 10 7    Smokeless tobacco: Never Used   Substance Use Topics    Alcohol use: Not Currently     No Known Allergies      Current Outpatient Medications:     Cholecalciferol (Vitamin D3) 50 MCG (2000 UT) capsule, Take 2,000 Units by mouth daily, Disp: , Rfl:     cyanocobalamin (VITAMIN B-12) 100 mcg tablet, Take by mouth daily, Disp: , Rfl:     Dulaglutide (Trulicity) 1 5 YY/5 4HX SOPN, Inject 0 5 mL (1 5 mg total) under the skin once a week, Disp: 3 mL, Rfl: 1    Jardiance 25 MG TABS, TAKE 1 TABLET BY MOUTH EVERY DAY, Disp: 90 tablet, Rfl: 1    Pyridoxine HCl (Vitamin B6) 100 MG TABS, Take by mouth, Disp: , Rfl:     senna (SENOKOT) 8 6 MG tablet, Take 1 tablet (8 6 mg total) by mouth daily, Disp: 30 tablet, Rfl: 1    Sodium Fluoride 5000 PPM 1 1 % PSTE, BRUSH 2 TIMES A DAY FOR 2 3 MINUTES AND SPIT  NO RINSING FOR 30 MIN, Disp: , Rfl:     Review of Systems   Constitutional: Positive for fatigue  Negative for activity change, appetite change and unexpected weight change  HENT: Negative for sore throat, trouble swallowing and voice change  Eyes: Negative for visual disturbance     Respiratory: Negative for cough, chest tightness and shortness of breath  Cardiovascular: Negative for chest pain, palpitations and leg swelling  Gastrointestinal: Negative for constipation, diarrhea, nausea and vomiting  Endocrine: Negative for cold intolerance, heat intolerance, polydipsia, polyphagia and polyuria  Genitourinary: Negative for frequency  Musculoskeletal: Positive for arthralgias  Negative for back pain, gait problem, joint swelling and myalgias  Skin: Negative for wound  Allergic/Immunologic: Negative for environmental allergies and food allergies  Neurological: Positive for numbness  Negative for dizziness, weakness, light-headedness and headaches  Hematological: Does not bruise/bleed easily  Psychiatric/Behavioral: Negative for decreased concentration, dysphoric mood and sleep disturbance  The patient is not nervous/anxious  Physical Exam:  Body mass index is 28 7 kg/m²  /72   Pulse 86   Ht 5' 10" (1 778 m)   Wt 90 7 kg (200 lb)   BMI 28 70 kg/m²    Wt Readings from Last 3 Encounters:   03/08/22 90 7 kg (200 lb)   01/05/22 91 2 kg (201 lb)   11/17/21 86 7 kg (191 lb 2 2 oz)       Physical Exam  Vitals reviewed  Constitutional:       General: He is not in acute distress  Appearance: He is well-developed  He is not ill-appearing  HENT:      Head: Normocephalic and atraumatic  Comments: Mask in place  Eyes:      Pupils: Pupils are equal, round, and reactive to light  Neck:      Thyroid: No thyromegaly  Cardiovascular:      Rate and Rhythm: Normal rate and regular rhythm  Pulses: Normal pulses  Heart sounds: Normal heart sounds  Pulmonary:      Effort: Pulmonary effort is normal       Breath sounds: Normal breath sounds  Abdominal:      General: Bowel sounds are normal  There is no distension  Palpations: Abdomen is soft  Tenderness: There is no abdominal tenderness  Musculoskeletal:      Cervical back: Normal range of motion and neck supple  Left lower leg: No edema  Lymphadenopathy:      Cervical: No cervical adenopathy  Skin:     General: Skin is warm and dry  Capillary Refill: Capillary refill takes less than 2 seconds  Neurological:      Mental Status: He is alert and oriented to person, place, and time  Gait: Gait normal    Psychiatric:         Mood and Affect: Mood normal          Behavior: Behavior normal            Labs:   Lab Results   Component Value Date    HGBA1C 5 7 03/08/2022    HGBA1C 6 0 09/10/2021    HGBA1C 5 8 (H) 03/09/2021     Lab Results   Component Value Date    CREATININE 1 10 12/14/2021    CREATININE 0 95 08/30/2021    CREATININE 1 07 08/15/2021    BUN 17 12/14/2021    K 4 3 12/14/2021     12/14/2021    CO2 26 12/14/2021     eGFR   Date Value Ref Range Status   12/14/2021 83 ml/min/1 73sq m Final     Lab Results   Component Value Date    HDL 42 03/09/2021    TRIG 147 03/09/2021     Lab Results   Component Value Date    ALT 34 12/14/2021    AST 26 12/14/2021    ALKPHOS 86 12/14/2021     Lab Results   Component Value Date    QLM1ZPJTFOIV 1 316 11/24/2020     No results found for: FREET4, TSI    Impression & Plan:    Problem List Items Addressed This Visit        Endocrine    Diabetes mellitus without complication ([facility] ) - Primary     Patient is well controlled without episodes of hypoglycemia  Continue current regimen  Continue to focus on a healthy diet  Continue to focus on regular physical activity and weight management  Lab Results   Component Value Date    HGBA1C 5 7 03/08/2022            Relevant Orders    POCT hemoglobin A1c (Completed)    HEMOGLOBIN A1C W/ EAG ESTIMATION Lab Collect    Comprehensive metabolic panel Lab Collect    Microalbumin / creatinine urine ratio Lab Collect    Lipid panel Lab Collect Lab Collect    Thyroid nodule     Repeat ultrasound of the thyroid  Other    Testosterone deficiency (Chronic)     Patient continues to report symptoms of low testosterone    Levels have not been checked for 1 year   Repeat free and total testosterone along with labs ordered by Hematology-Oncology  Once again, we discussed the risk versus benefit of supplementing testosterone  Patient's primary symptom of low testosterone is fatigue  Given history of polycythemia as well as his young age, he is not an ideal candidate for testosterone replacement  Encouraged him to continue to focus on healthy diet, regular exercise, and weight loss  Relevant Orders    Testosterone, free, total Lab Collect    Fatigue    Relevant Orders    TSH, 3rd generation with Free T4 reflex          Orders Placed This Encounter   Procedures    Testosterone, free, total Lab Collect     This is a patient instruction: Fasting preferred  Collections for men not undergoing treatment must be completed between 7am-9am ONLY  Collection time restrictions are not applicable to women or men already undergoing treatment  Standing Status:   Future     Standing Expiration Date:   3/8/2023    TSH, 3rd generation with Free T4 reflex     Standing Status:   Future     Standing Expiration Date:   3/8/2023    HEMOGLOBIN A1C W/ EAG ESTIMATION Lab Collect     Standing Status:   Future     Standing Expiration Date:   3/8/2023    Comprehensive metabolic panel Lab Collect     This is a patient instruction: Patient fasting for 8 hours or longer recommended  Standing Status:   Future     Standing Expiration Date:   3/8/2023    Microalbumin / creatinine urine ratio Lab Collect     Standing Status:   Future     Standing Expiration Date:   3/8/2023    Lipid panel Lab Collect Lab Collect     This is a patient instruction: This test requires patient fasting for 10-12 hours or longer  Drinking of black coffee or black tea is acceptable  Standing Status:   Future     Standing Expiration Date:   3/8/2023    POCT hemoglobin A1c       Patient Instructions   1  Get labs done in June  Discussed with the patient and all questioned fully answered   He will call me if any problems arise  Follow-up appointment in 6 months       Counseled patient on diagnostic results, prognosis, risk and benefit of treatment options, instruction for management, importance of treatment compliance, Risk  factor reduction and impressions    SHABANA Coleman

## 2022-03-09 ENCOUNTER — TELEPHONE (OUTPATIENT)
Dept: ENDOCRINOLOGY | Facility: CLINIC | Age: 42
End: 2022-03-09

## 2022-03-09 NOTE — TELEPHONE ENCOUNTER
----- Message from [address] [place] sent at 3/8/2022  5:29 PM EST -----  Regarding: Ultrasound of thyroid  Apologies  I forgot to remind the patient to complete the ultrasound of his thyroid for evaluation of thyroid nodules  Please remind him  Thank you

## 2022-04-20 ENCOUNTER — TELEPHONE (OUTPATIENT)
Dept: GASTROENTEROLOGY | Facility: AMBULARY SURGERY CENTER | Age: 42
End: 2022-04-20

## 2022-04-20 ENCOUNTER — CONSULT (OUTPATIENT)
Dept: GASTROENTEROLOGY | Facility: AMBULARY SURGERY CENTER | Age: 42
End: 2022-04-20
Payer: COMMERCIAL

## 2022-04-20 VITALS
BODY MASS INDEX: 28.35 KG/M2 | HEIGHT: 70 IN | WEIGHT: 198 LBS | SYSTOLIC BLOOD PRESSURE: 144 MMHG | DIASTOLIC BLOOD PRESSURE: 84 MMHG | OXYGEN SATURATION: 98 % | HEART RATE: 125 BPM

## 2022-04-20 DIAGNOSIS — Z85.038 HISTORY OF COLON CANCER, STAGE III: Primary | ICD-10-CM

## 2022-04-20 DIAGNOSIS — C77.2 COLON CANCER METASTASIZED TO INTRA-ABDOMINAL LYMPH NODE (HCC): ICD-10-CM

## 2022-04-20 DIAGNOSIS — C18.9 COLON CANCER METASTASIZED TO INTRA-ABDOMINAL LYMPH NODE (HCC): ICD-10-CM

## 2022-04-20 PROCEDURE — 99214 OFFICE O/P EST MOD 30 MIN: CPT | Performed by: INTERNAL MEDICINE

## 2022-04-20 PROCEDURE — 3077F SYST BP >= 140 MM HG: CPT | Performed by: INTERNAL MEDICINE

## 2022-04-20 PROCEDURE — 3079F DIAST BP 80-89 MM HG: CPT | Performed by: INTERNAL MEDICINE

## 2022-04-20 PROCEDURE — 1036F TOBACCO NON-USER: CPT | Performed by: INTERNAL MEDICINE

## 2022-04-20 PROCEDURE — 3008F BODY MASS INDEX DOCD: CPT | Performed by: INTERNAL MEDICINE

## 2022-04-20 RX ORDER — SODIUM PICOSULFATE, MAGNESIUM OXIDE, AND ANHYDROUS CITRIC ACID 10; 3.5; 12 MG/160ML; G/160ML; G/160ML
1 LIQUID ORAL SEE ADMIN INSTRUCTIONS
Qty: 320 ML | Refills: 0 | Status: SHIPPED | OUTPATIENT
Start: 2022-04-20 | End: 2022-07-13

## 2022-04-20 NOTE — TELEPHONE ENCOUNTER
Patient is scheduled for colonoscopy on July 13 , 2022 at [facility]  with Marnie Santiago MD  Patient is aware of pre-procedure prep of Clenpiq and they will be called the day prior between 2 and 6 pm for time to report for procedure  Pre-procedure prep has been given to the patient  in person  on April 20 , 2022

## 2022-04-20 NOTE — PROGRESS NOTES
Follow-up Note -  [facility]  Melissa Horne 1980 male         Reason: For colonoscopy    HPI:  52-year-old male with history of sigmoid colon cancer stage IIIA diagnosed last year status post surgery and chemotherapy was referred for surveillance colonoscopy  Patient reports doing well  Patient has regular bowel movements and denies any blood or mucus in the stool  Appetite is good and denies any recent weight loss  Denies any abdominal pain, nausea, or vomiting  Has no heartburn or acid reflux  Denies any difficulty swallowing  REVIEW OF SYSTEMS: Review of Systems   Constitutional: Negative for activity change, appetite change, chills, diaphoresis, fatigue, fever and unexpected weight change  HENT: Negative for ear discharge, ear pain, facial swelling, hearing loss, nosebleeds, sore throat, tinnitus and voice change  Eyes: Negative for pain, discharge, redness, itching and visual disturbance  Respiratory: Negative for apnea, cough, chest tightness, shortness of breath and wheezing  Cardiovascular: Negative for chest pain and palpitations  Gastrointestinal:        As noted in HPI   Endocrine: Negative for cold intolerance, heat intolerance and polyuria  Genitourinary: Negative for difficulty urinating, dysuria, flank pain, hematuria and urgency  Musculoskeletal: Negative for arthralgias, back pain, gait problem, joint swelling and myalgias  Skin: Negative for rash and wound  Neurological: Negative for dizziness, tremors, seizures, speech difficulty, light-headedness, numbness and headaches  Hematological: Negative for adenopathy  Does not bruise/bleed easily  Psychiatric/Behavioral: Negative for agitation, behavioral problems and confusion  The patient is not nervous/anxious           Past Medical History:   Diagnosis Date    Cancer [facility])     colon cancer    Colon cancer ([facility] )     Diabetes mellitus ([facility] )     Hemochromatosis     Testosterone deficiency       Past Surgical History:   Procedure Laterality Date    COLONOSCOPY      HEMICOLOECTOMY W/ ANASTOMOSIS N/A 5/5/2021    Procedure: ROBOTIC SIGMOID COLECTOMY, MOBILIZATION OF SPLENIC FLEXURE;  Surgeon: Conchita Lan MD;  [facility];  Service: Surgical Oncology    REMOVAL VENOUS PORT (PORT-A-CATH) Left 9/22/2021    Procedure: REMOVAL VENOUS PORT (PORT-A-CATH); Surgeon: Conchita Lan MD;  [facility];  Service: Surgical Oncology    TUNNELED VENOUS PORT PLACEMENT Left 6/1/2021    Procedure: INSERTION VENOUS PORT (PORT-A-CATH) Ultrasound guided Left Internal Jugular Vein Access;  Surgeon: Charisse Ma MD;  [facility];  Service: Surgical Oncology    WISDOM TOOTH EXTRACTION       Social History     Socioeconomic History    Marital status:      Spouse name: Not on file    Number of children: Not on file    Years of education: Not on file    Highest education level: Not on file   Occupational History    Not on file   Tobacco Use    Smoking status: Former Smoker     Packs/day: 0 25     Years: 10 00     Pack years: 2 50     Types: Cigarettes     Quit date: 5/28/2011     Years since quitting: 10 9    Smokeless tobacco: Never Used   Vaping Use    Vaping Use: Never used   Substance and Sexual Activity    Alcohol use: Not Currently    Drug use: Not Currently    Sexual activity: Yes     Partners: Female     Birth control/protection: Condom Male   Other Topics Concern    Not on file   Social History Narrative    Not on file     Social Determinants of Health     Financial Resource Strain: Not on file   Food Insecurity: Not on file   Transportation Needs: Not on file   Physical Activity: Sufficiently Active    Days of Exercise per Week: 5 days   [employer] of Exercise per Session: 60 min   Stress: No Stress Concern Present    Feeling of Stress :  Only a little   Social Connections: Not on file   Intimate Partner Violence: Not on file   Housing Stability: Not on file     Family History   Problem Relation Age of Onset    Colon polyps Mother     No Known Problems Father     Colon polyps Maternal Grandmother     Prostate cancer Maternal Grandfather     Lung cancer Paternal Grandmother     Diabetes Paternal Grandmother     Prostate cancer Paternal Grandfather     Heart disease Paternal Grandfather     Kidney failure Paternal Grandfather     Diabetes Family     Hypertension Family      Patient has no known allergies  Current Outpatient Medications   Medication Sig Dispense Refill    Cholecalciferol (Vitamin D3) 50 MCG (2000 UT) capsule Take 2,000 Units by mouth daily      cyanocobalamin (VITAMIN B-12) 100 mcg tablet Take by mouth daily      Dulaglutide (Trulicity) 1 5 PV/0 0ZW SOPN Inject 0 5 mL (1 5 mg total) under the skin once a week 6 mL 1    Empagliflozin (Jardiance) 25 MG TABS Take 1 tablet (25 mg total) by mouth daily 90 tablet 1    Pyridoxine HCl (Vitamin B6) 100 MG TABS Take by mouth      senna (SENOKOT) 8 6 MG tablet Take 1 tablet (8 6 mg total) by mouth daily (Patient not taking: Reported on 4/20/2022 ) 30 tablet 1    Sod Picosulfate-Mag Ox-Cit Acd (Clenpiq) 10-3 5-12 MG-GM -GM/160ML SOLN Take 1 Package by mouth see administration instructions 320 mL 0    Sodium Fluoride 5000 PPM 1 1 % PSTE BRUSH 2 TIMES A DAY FOR 2 3 MINUTES AND SPIT  NO RINSING FOR 30 MIN (Patient not taking: Reported on 4/20/2022)       No current facility-administered medications for this visit  Blood pressure 144/84, pulse (!) 125, height 5' 10" (1 778 m), weight 89 8 kg (198 lb), SpO2 98 %  PHYSICAL EXAM: Physical Exam  Constitutional:       Appearance: He is well-developed  HENT:      Head: Normocephalic and atraumatic  Eyes:      General: No scleral icterus  Right eye: No discharge  Left eye: No discharge  Conjunctiva/sclera: Conjunctivae normal       Pupils: Pupils are equal, round, and reactive to light     Neck: Thyroid: No thyromegaly  Vascular: No JVD  Trachea: No tracheal deviation  Cardiovascular:      Rate and Rhythm: Normal rate and regular rhythm  Heart sounds: Normal heart sounds  No murmur heard  No friction rub  No gallop  Pulmonary:      Effort: Pulmonary effort is normal  No respiratory distress  Breath sounds: Normal breath sounds  No wheezing or rales  Chest:      Chest wall: No tenderness  Abdominal:      General: Bowel sounds are normal  There is no distension  Palpations: Abdomen is soft  There is no mass  Tenderness: There is no abdominal tenderness  There is no guarding or rebound  Hernia: No hernia is present  Musculoskeletal:      Cervical back: Neck supple  Lymphadenopathy:      Cervical: No cervical adenopathy  Skin:     General: Skin is warm and dry  Findings: No erythema or rash  Neurological:      Mental Status: He is alert and oriented to person, place, and time  Psychiatric:         Behavior: Behavior normal          Thought Content: Thought content normal           Lab Results   Component Value Date    WBC 5 66 12/14/2021    HGB 15 2 12/14/2021    HCT 46 8 12/14/2021    MCV 89 12/14/2021     12/14/2021     Lab Results   Component Value Date    CALCIUM 9 4 12/14/2021    K 4 3 12/14/2021    CO2 26 12/14/2021     12/14/2021    BUN 17 12/14/2021    CREATININE 1 10 12/14/2021     Lab Results   Component Value Date    ALT 34 12/14/2021    AST 26 12/14/2021    ALKPHOS 86 12/14/2021     Lab Results   Component Value Date    INR 1 01 04/22/2021    PROTIME 13 3 04/22/2021       CT chest abdomen pelvis w contrast    Result Date: 12/18/2021  Impression: Unchanged tiny pulmonary nodules  Stable indeterminate subcentimeter hepatic hypodensity  Status post partial colectomy  Workstation performed: [id number]       ASSESSMENT & PLAN:    History of colon cancer, stage III  Patient is at increased risk for colon cancer screening    Rule out colorectal lesions including polyps or malignancy     -Schedule for colonoscopy  -High-fiber diet     -Patient was given instructions about the colonoscopy prep     -Patient was explained about  the risks and benefits of the procedure  Risks including but not limited to bleeding, infection, perforation were explained in detail  Also explained about less than 100% sensitivity with the exam and other alternatives

## 2022-04-20 NOTE — PATIENT INSTRUCTIONS
Scheduled date of colonoscopy (as of today): 7/13/2022  Physician performing colonoscopy: Dr Wood Huerta  Location of colonoscopy:  [facility]  Bowel prep reviewed with patient: Clenpiq  Instructions reviewed with patient by:  Eddie Parker  Clearances:  None

## 2022-06-20 ENCOUNTER — APPOINTMENT (OUTPATIENT)
Dept: LAB | Facility: CLINIC | Age: 42
End: 2022-06-20
Payer: COMMERCIAL

## 2022-06-20 DIAGNOSIS — C77.2 COLON CANCER METASTASIZED TO INTRA-ABDOMINAL LYMPH NODE (HCC): ICD-10-CM

## 2022-06-20 DIAGNOSIS — E83.110 HEREDITARY HEMOCHROMATOSIS (HCC): ICD-10-CM

## 2022-06-20 DIAGNOSIS — C18.9 COLON CANCER METASTASIZED TO INTRA-ABDOMINAL LYMPH NODE (HCC): ICD-10-CM

## 2022-06-20 LAB
ALBUMIN SERPL BCP-MCNC: 4.6 G/DL (ref 3.5–5)
ALP SERPL-CCNC: 77 U/L (ref 46–116)
ALT SERPL W P-5'-P-CCNC: 33 U/L (ref 12–78)
ANION GAP SERPL CALCULATED.3IONS-SCNC: 2 MMOL/L (ref 4–13)
AST SERPL W P-5'-P-CCNC: 24 U/L (ref 5–45)
BASOPHILS # BLD AUTO: 0.03 THOUSANDS/ΜL (ref 0–0.1)
BASOPHILS NFR BLD AUTO: 0 % (ref 0–1)
BILIRUB SERPL-MCNC: 1.38 MG/DL (ref 0.2–1)
BUN SERPL-MCNC: 12 MG/DL (ref 5–25)
CALCIUM SERPL-MCNC: 9.9 MG/DL (ref 8.3–10.1)
CEA SERPL-MCNC: <0.5 NG/ML (ref 0–3)
CHLORIDE SERPL-SCNC: 107 MMOL/L (ref 100–108)
CO2 SERPL-SCNC: 31 MMOL/L (ref 21–32)
CREAT SERPL-MCNC: 1.08 MG/DL (ref 0.6–1.3)
EOSINOPHIL # BLD AUTO: 0.09 THOUSAND/ΜL (ref 0–0.61)
EOSINOPHIL NFR BLD AUTO: 1 % (ref 0–6)
ERYTHROCYTE [DISTWIDTH] IN BLOOD BY AUTOMATED COUNT: 12.8 % (ref 11.6–15.1)
FERRITIN SERPL-MCNC: 26 NG/ML (ref 8–388)
GFR SERPL CREATININE-BSD FRML MDRD: 84 ML/MIN/1.73SQ M
GLUCOSE P FAST SERPL-MCNC: 89 MG/DL (ref 65–99)
HCT VFR BLD AUTO: 49.5 % (ref 36.5–49.3)
HGB BLD-MCNC: 15.8 G/DL (ref 12–17)
IMM GRANULOCYTES # BLD AUTO: 0.02 THOUSAND/UL (ref 0–0.2)
IMM GRANULOCYTES NFR BLD AUTO: 0 % (ref 0–2)
IRON SATN MFR SERPL: 24 % (ref 20–50)
IRON SERPL-MCNC: 106 UG/DL (ref 65–175)
LYMPHOCYTES # BLD AUTO: 2.07 THOUSANDS/ΜL (ref 0.6–4.47)
LYMPHOCYTES NFR BLD AUTO: 23 % (ref 14–44)
MCH RBC QN AUTO: 29 PG (ref 26.8–34.3)
MCHC RBC AUTO-ENTMCNC: 31.9 G/DL (ref 31.4–37.4)
MCV RBC AUTO: 91 FL (ref 82–98)
MONOCYTES # BLD AUTO: 0.44 THOUSAND/ΜL (ref 0.17–1.22)
MONOCYTES NFR BLD AUTO: 5 % (ref 4–12)
NEUTROPHILS # BLD AUTO: 6.37 THOUSANDS/ΜL (ref 1.85–7.62)
NEUTS SEG NFR BLD AUTO: 71 % (ref 43–75)
NRBC BLD AUTO-RTO: 0 /100 WBCS
PLATELET # BLD AUTO: 218 THOUSANDS/UL (ref 149–390)
PMV BLD AUTO: 11.5 FL (ref 8.9–12.7)
POTASSIUM SERPL-SCNC: 4.5 MMOL/L (ref 3.5–5.3)
PROT SERPL-MCNC: 7.8 G/DL (ref 6.4–8.2)
RBC # BLD AUTO: 5.45 MILLION/UL (ref 3.88–5.62)
SODIUM SERPL-SCNC: 140 MMOL/L (ref 136–145)
TIBC SERPL-MCNC: 445 UG/DL (ref 250–450)
WBC # BLD AUTO: 9.02 THOUSAND/UL (ref 4.31–10.16)

## 2022-06-20 PROCEDURE — 36415 COLL VENOUS BLD VENIPUNCTURE: CPT

## 2022-06-20 PROCEDURE — 82378 CARCINOEMBRYONIC ANTIGEN: CPT

## 2022-06-20 PROCEDURE — 83550 IRON BINDING TEST: CPT

## 2022-06-20 PROCEDURE — 83540 ASSAY OF IRON: CPT

## 2022-06-20 PROCEDURE — 85025 COMPLETE CBC W/AUTO DIFF WBC: CPT

## 2022-06-20 PROCEDURE — 80053 COMPREHEN METABOLIC PANEL: CPT

## 2022-06-20 PROCEDURE — 82728 ASSAY OF FERRITIN: CPT

## 2022-06-22 ENCOUNTER — HOSPITAL ENCOUNTER (OUTPATIENT)
Dept: CT IMAGING | Facility: HOSPITAL | Age: 42
Discharge: HOME | End: 2022-06-22
Attending: INTERNAL MEDICINE
Payer: COMMERCIAL

## 2022-06-22 DIAGNOSIS — C77.2 COLON CANCER METASTASIZED TO INTRA-ABDOMINAL LYMPH NODE (HCC): ICD-10-CM

## 2022-06-22 DIAGNOSIS — C18.9 COLON CANCER METASTASIZED TO INTRA-ABDOMINAL LYMPH NODE (HCC): ICD-10-CM

## 2022-06-22 PROCEDURE — G1004 CDSM NDSC: HCPCS

## 2022-06-22 PROCEDURE — 74177 CT ABD & PELVIS W/CONTRAST: CPT

## 2022-06-22 PROCEDURE — 71260 CT THORAX DX C+: CPT

## 2022-06-22 RX ADMIN — IOHEXOL 70 ML: 350 INJECTION, SOLUTION INTRAVENOUS at 09:51

## 2022-07-06 ENCOUNTER — OFFICE VISIT (OUTPATIENT)
Dept: HEMATOLOGY ONCOLOGY | Facility: CLINIC | Age: 42
End: 2022-07-06
Payer: COMMERCIAL

## 2022-07-06 VITALS
HEART RATE: 76 BPM | TEMPERATURE: 97.8 F | RESPIRATION RATE: 14 BRPM | BODY MASS INDEX: 27.2 KG/M2 | SYSTOLIC BLOOD PRESSURE: 110 MMHG | DIASTOLIC BLOOD PRESSURE: 64 MMHG | HEIGHT: 70 IN | OXYGEN SATURATION: 98 % | WEIGHT: 190 LBS

## 2022-07-06 DIAGNOSIS — E11.9 TYPE 2 DIABETES MELLITUS WITHOUT COMPLICATION, WITHOUT LONG-TERM CURRENT USE OF INSULIN (HCC): Chronic | ICD-10-CM

## 2022-07-06 DIAGNOSIS — C18.9 COLON CANCER METASTASIZED TO INTRA-ABDOMINAL LYMPH NODE (HCC): Primary | ICD-10-CM

## 2022-07-06 DIAGNOSIS — C77.2 COLON CANCER METASTASIZED TO INTRA-ABDOMINAL LYMPH NODE (HCC): Primary | ICD-10-CM

## 2022-07-06 PROCEDURE — 3074F SYST BP LT 130 MM HG: CPT | Performed by: INTERNAL MEDICINE

## 2022-07-06 PROCEDURE — 99214 OFFICE O/P EST MOD 30 MIN: CPT | Performed by: INTERNAL MEDICINE

## 2022-07-06 PROCEDURE — 3078F DIAST BP <80 MM HG: CPT | Performed by: INTERNAL MEDICINE

## 2022-07-06 RX ORDER — DULAGLUTIDE 1.5 MG/.5ML
1.5 INJECTION, SOLUTION SUBCUTANEOUS WEEKLY
Qty: 6 ML | Refills: 0 | Status: CANCELLED | OUTPATIENT
Start: 2022-07-06

## 2022-07-06 NOTE — PROGRESS NOTES
Hematology/Oncology Outpatient Follow- up Note  Kenia Hussein 40 y o  male MRN: @ Encounter: [id number]        Date:  7/6/2022    Presenting Complaint/Diagnosis :  PT1N1a cancer of the descending colon    HPI:    The patient is a pleasant 60-year-old male who presented with GI bleeding and was found to have an adenocarcinoma of the descending colon status post hemicolectomy   Tumor was a grade 2 adenocarcinoma with 1/28 lymph nodes positive   It was a T1 lesion   The patient was started on adjuvant capecitabine and oxaliplatin  [facility] has had 2 cycles so far   Cycle 3  Was canceled for his vacation per his wishes apparently  Previous Hematologic/ Oncologic History:    Oncology History   Cancer of sigmoid colon ([facility] [address] )   4/13/2021 Initial Diagnosis    [facility]    RESULTS OF IMMUNOHISTOCHEMICAL ANALYSIS FOR MISMATCH REPAIR PROTEIN LOSS  INTERPRETATION: NO LOSS OF NUCLEAR EXPRESSION OF MMR PROTEINS: LOW PROBABILITY OF MSI-H   RESULTS:  AntibodyClone Description Results  MLH1 M1 Mismatch repair protein Intact nuclear expression  MSH2 [id number] Mismatch repair protein Intact nuclear expression  MSH6 40 Mismatch repair protein Intact nuclear expression  PMS2 [id number] Mismatch repair protein Intact nuclear expression     5/5/2021 Surgery    Descending colon, partial colectomy:  - Adenocarcinoma (1 9 cm), moderately differentiated, arising in adenocarcinoma with high grade dysplasia    - Tumor invades the submucosa (through the muscularis mucosa in to deeper one third of submucosa, pT1, Sm3)   - All margins are negative for tumor   - One  of twenty eight lymph nodes is positive for tumor (1/28)      6/4/2021 - 9/3/2021 Chemotherapy    oxaliplatin (ELOXATIN) chemo infusion, 130 mg/m2 = 275 6 mg, Intravenous, Once, 4 of 4 cycles  Administration: 275 6 mg (6/4/2021), 275 6 mg (6/25/2021), 275 6 mg (8/11/2021), 275 6 mg (9/3/2021)     Colon cancer metastasized to intra-abdominal lymph node ([facility] [address] ) 5/20/2021 Initial Diagnosis    Colon cancer metastasized to intra-abdominal lymph node ([facility] [address] )     6/4/2021 - 9/3/2021 Chemotherapy    oxaliplatin (ELOXATIN) chemo infusion, 130 mg/m2 = 275 6 mg, Intravenous, Once, 4 of 4 cycles  Administration: 275 6 mg (6/4/2021), 275 6 mg (6/25/2021), 275 6 mg (8/11/2021), 275 6 mg (9/3/2021)       Xelox every 3 weeks  A history of possible hemochromatosis for which he may need to resume phlebotomy down the road  Ferritin is still low  Current Hematologic/ Oncologic Treatment:    Observation    Interval History:    Patient returns for follow-up visit  He is doing well  Does feel fatigued  He is diabetic any states when he comes back from the gym he gets a tired and sometimes a little dizzy  This then resolves  I advised him to talk to his primary care physician about these symptoms  They may need to check his other studies  I see they have already ordered a testosterone and thyroid function studies at his last visit  He needs to follow up with them and have this done  In terms of his colon cancer his CT scan shows no evidence of malignancy  Hemoglobin was in the 15 range with iron studies there on the low side of normal   I advised him to take an iron pill daily or every other day as this may help  Again based on hemoglobin he should have too much fatigue as he is not anemic at all  Otherwise he is doing well and is at baseline  The rest of his 14 point review of systems today was negative        Cancer Staging:  Cancer Staging  Cancer of sigmoid colon [facility])  Staging form: Colon and Rectum, AJCC 8th Edition  - Pathologic stage from 5/5/2021: Stage IIIA (pT1, pN1a, cM0) - Unsigned  Stage prefix: Initial diagnosis  Total positive nodes: 1  Total nodes examined: 28  Histologic grading system: 4 grade system  Histologic grade (G): G2  Carcinoembryonic antigen (CEA) (ng/mL): 0 5  Perineural invasion (PNI): Absent      Test Results:    Imaging: CT chest abdomen pelvis w contrast    Result Date: 6/24/2022  Narrative: CT CHEST, ABDOMEN AND PELVIS WITH IV CONTRAST INDICATION:   C18 9: Malignant neoplasm of colon, unspecified C77 2: Secondary and unspecified malignant neoplasm of intra-abdominal lymph nodes  COMPARISON:  12/15/2021  TECHNIQUE: CT examination of the chest, abdomen and pelvis was performed  Axial, sagittal, and coronal 2D reformatted images were created from the source data and submitted for interpretation  Radiation dose length product (DLP) for this visit:  643 mGy-cm   This examination, like all CT scans performed in the [facility], was performed utilizing techniques to minimize radiation dose exposure, including the use of iterative reconstruction and automated exposure control  IV Contrast:  70 mL of iohexol (OMNIPAQUE) Enteric Contrast: Enteric contrast was administered  FINDINGS: CHEST LUNGS:  Lungs are clear without airspace consolidation/pneumonia  Stable nonspecific 3 mm right upper lobe nodule and 2 mm left upper lobe groundglass nodule  No new nodules or suspicious pulmonary parenchymal mass noted  There is no tracheal or endobronchial lesion  PLEURA:  Unremarkable  HEART/GREAT VESSELS: Heart is unremarkable for patient's age  No thoracic aortic aneurysm  MEDIASTINUM AND JAIME:  No mediastinal mass/hematoma or mediastinal/hilar lymphadenopathy noted  Thyroid glands appear unremarkable  Esophagus is normal in course and caliber  No significant prevertebral soft tissue swelling or mass noted  CHEST WALL AND LOWER NECK:  Unremarkable  ABDOMEN LIVER/BILIARY TREE:  Liver is normal in size and contour without suspicious mass seen  Too small to characterize hypodensities again noted in the posterior right hepatic lobe  [place] GALLBLADDER:  No calcified gallstones  No pericholecystic inflammatory change  SPLEEN:  Unremarkable  PANCREAS:  Unremarkable  ADRENAL GLANDS:  Unremarkable  KIDNEYS/URETERS:  Unremarkable   No mass, nephrolithiasis, or hydronephrosis  STOMACH AND BOWEL:  Stomach is moderately distended with oral contrast and ingested heterogeneous density material   No intraluminal mass or abnormal wall thickening noted  The small and large bowel loops appear normal in course and caliber without evidence for obstruction or inflammation  Terminal ileum and appendix are unremarkable  Oral contrast has reached the descending colon  Post surgical changes from partial colectomy again noted  Sigmoid colon is contracted but otherwise unremarkable  APPENDIX:  No findings to suggest appendicitis  ABDOMINOPELVIC CAVITY:  No ascites  No pneumoperitoneum  No lymphadenopathy  VESSELS:  Atherosclerotic changes are present  No evidence of aneurysm  PELVIS REPRODUCTIVE ORGANS:  Unremarkable for patient's age  A few punctate prostatic calcifications and right pelvic phleboliths noted  URINARY BLADDER:  Moderately distended and grossly unremarkable    ABDOMINAL WALL/INGUINAL REGIONS:  Unremarkable  OSSEOUS STRUCTURES:  No acute fracture or destructive osseous lesion  Multilevel degenerative changes of the thoracolumbar spine and bilateral hip joints noted  Bilateral L5 spondylolysis again seen  Impression: *  Stable nonspecific 3 mm right upper lobe nodule and 2 mm left upper lobe groundglass nodule without new suspicious nodules or pulmonary parenchymal mass identified  No definitive evidence for intrathoracic metastatic disease or acute intrathoracic abnormality  *  No acute intra-abdominal/pelvic abnormality or evidence of metastatic disease noted with ancillary findings detailed above   Workstation performed: [id number]       Labs:   Lab Results   Component Value Date    WBC 9 02 06/20/2022    HGB 15 8 06/20/2022    HCT 49 5 (H) 06/20/2022    MCV 91 06/20/2022     06/20/2022     Lab Results   Component Value Date    K 4 5 06/20/2022     06/20/2022    CO2 31 06/20/2022    BUN 12 06/20/2022    CREATININE 1 08 06/20/2022    GLUF 89 06/20/2022    CALCIUM 9 9 06/20/2022    AST 24 06/20/2022    ALT 33 06/20/2022    ALKPHOS 77 06/20/2022    EGFR 84 06/20/2022       Lab Results   Component Value Date    PSA 0 9 11/24/2020       Lab Results   Component Value Date    CEA <0 5 06/20/2022       Lab Results   Component Value Date    IRON 106 06/20/2022    TIBC 445 06/20/2022    FERRITIN 26 06/20/2022         ROS: As stated in the history of present illness otherwise his 14 point review of systems today was negative  Active Problems:   Patient Active Problem List   Diagnosis    Diabetes mellitus without complication [facility] [address] )    Hereditary hemochromatosis ([address] )    Testosterone deficiency    Thyroid nodule    Fatigue    Cancer of sigmoid colon ([facility] [address] )    Colon cancer metastasized to intra-abdominal lymph node (HCC)    Liver nodule    Lung nodule    Polycythemia    Acute pain of left shoulder    History of drug abuse ([facility] [address] )    Chemotherapy-induced neuropathy ([facility] [address] )    Palliative care patient    Cancer related pain    History of colon cancer, stage III       Past Medical History:   Past Medical History:   Diagnosis Date    Cancer ([facility] [address] )     colon cancer    Colon cancer ([facility] [address] )     Diabetes mellitus ([facility] [address] )     Hemochromatosis     Testosterone deficiency        Surgical History:   Past Surgical History:   Procedure Laterality Date    COLONOSCOPY      HEMICOLOECTOMY W/ ANASTOMOSIS N/A 5/5/2021    Procedure: ROBOTIC SIGMOID COLECTOMY, MOBILIZATION OF SPLENIC FLEXURE;  Surgeon: Teri Donis MD;  [facility];  Service: Surgical Oncology    REMOVAL VENOUS PORT (PORT-A-CATH) Left 9/22/2021    Procedure: REMOVAL VENOUS PORT (PORT-A-CATH);   Surgeon: Teri Donis MD;  [facility];  Service: Surgical Oncology    TUNNELED VENOUS PORT PLACEMENT Left 6/1/2021    Procedure: INSERTION VENOUS PORT (PORT-A-CATH) Ultrasound guided Left Internal Jugular Vein Access;  Surgeon: Esperanza Montesinos MD;  Location: [facility];  Service: Surgical Oncology    WISDOM TOOTH EXTRACTION         Family History:    Family History   Problem Relation Age of Onset    Colon polyps Mother     No Known Problems Father     Colon polyps Maternal Grandmother     Prostate cancer Maternal Grandfather     Lung cancer Paternal Grandmother     Diabetes Paternal Grandmother     Prostate cancer Paternal Grandfather     Heart disease Paternal Grandfather     Kidney failure Paternal Grandfather     Diabetes Family     Hypertension Family        Cancer-related family history includes Lung cancer in his paternal grandmother; Prostate cancer in his maternal grandfather and paternal grandfather      Social History:   Social History     Socioeconomic History    Marital status:      Spouse name: Not on file    Number of children: Not on file    Years of education: Not on file    Highest education level: Not on file   Occupational History    Not on file   Tobacco Use    Smoking status: Former Smoker     Packs/day: 0 25     Years: 10 00     Pack years: 2 50     Types: Cigarettes     Quit date: 2011     Years since quittin 1    Smokeless tobacco: Never Used   Vaping Use    Vaping Use: Never used   Substance and Sexual Activity    Alcohol use: Not Currently    Drug use: Not Currently    Sexual activity: Yes     Partners: Female     Birth control/protection: Condom Male   Other Topics Concern    Not on file   Social History Narrative    Not on file     Social Determinants of Health     Financial Resource Strain: Not on file   Food Insecurity: Not on file   Transportation Needs: Not on file   Physical Activity: Not on file   Stress: Not on file   Social Connections: Not on file   Intimate Partner Violence: Not on file   Housing Stability: Not on file       Current Medications:   Current Outpatient Medications   Medication Sig Dispense Refill    Cholecalciferol (Vitamin D3) 50 MCG ( UT) capsule Take 2,000 Units by mouth daily      cyanocobalamin (VITAMIN B-12) 100 mcg tablet Take by mouth daily      Dulaglutide (Trulicity) 1 5 GD/2 5WN SOPN Inject 0 5 mL (1 5 mg total) under the skin once a week 6 mL 1    Empagliflozin (Jardiance) 25 MG TABS Take 1 tablet (25 mg total) by mouth daily 90 tablet 1    Pyridoxine HCl (Vitamin B6) 100 MG TABS Take by mouth      senna (SENOKOT) 8 6 MG tablet Take 1 tablet (8 6 mg total) by mouth daily 30 tablet 1    Sod Picosulfate-Mag Ox-Cit Acd (Clenpiq) 10-3 5-12 MG-GM -GM/160ML SOLN Take 1 Package by mouth see administration instructions (Patient not taking: Reported on 7/6/2022) 320 mL 0    Sodium Fluoride 5000 PPM 1 1 % PSTE BRUSH 2 TIMES A DAY FOR 2 3 MINUTES AND SPIT  NO RINSING FOR 30 MIN (Patient not taking: No sig reported)       No current facility-administered medications for this visit  Allergies: No Known Allergies    Physical Exam:    Body surface area is 2 04 meters squared  Wt Readings from Last 3 Encounters:   07/06/22 86 2 kg (190 lb)   04/20/22 89 8 kg (198 lb)   03/08/22 90 7 kg (200 lb)        Temp Readings from Last 3 Encounters:   07/06/22 97 8 °F (36 6 °C) (Temporal)   01/05/22 (!) 97 4 °F (36 3 °C)   11/17/21 99 3 °F (37 4 °C) (Temporal)        BP Readings from Last 3 Encounters:   07/06/22 110/64   04/20/22 144/84   03/08/22 112/72         Pulse Readings from Last 3 Encounters:   07/06/22 76   04/20/22 (!) 125   03/08/22 86        Physical Exam     Constitutional   General appearance: No acute distress, well appearing and well nourished  Eyes   Conjunctiva and lids: No swelling, erythema or discharge  Pupils and irises: Equal, round and reactive to light  Ears, Nose, Mouth, and Throat   External inspection of ears and nose: Normal     Nasal mucosa, septum, and turbinates: Normal without edema or erythema  Oropharynx: Normal with no erythema, edema, exudate or lesions      Pulmonary   Respiratory effort: No increased work of breathing or signs of respiratory distress  Auscultation of lungs: Clear to auscultation  Cardiovascular   Palpation of heart: Normal PMI, no thrills  Auscultation of heart: Normal rate and rhythm, normal S1 and S2, without murmurs  Examination of extremities for edema and/or varicosities: Normal     Carotid pulses: Normal     Abdomen   Abdomen: Non-tender, no masses  Liver and spleen: No hepatomegaly or splenomegaly  Lymphatic   Palpation of lymph nodes in neck: No lymphadenopathy  Musculoskeletal   Gait and station: Normal     Digits and nails: Normal without clubbing or cyanosis  Inspection/palpation of joints, bones, and muscles: Normal     Skin   Skin and subcutaneous tissue: Normal without rashes or lesions  Neurologic   Cranial nerves: Cranial nerves 2-12 intact  Sensation: No sensory loss  Psychiatric   Orientation to person, place, and time: Normal     Mood and affect: Normal         Assessment / Plan:    The patient is a pleasant 28-year-old male who was referred to see us because he wishes to transfer care from his previous oncologist for stage IIIA I e  T1 N1 descending colon adenocarcinoma with 1/28 lymph nodes positive   Histology was low risk   Based on the most current data the patient needed 3 months of adjuvant treatment I e  4 cycles of capecitabine with oxaliplatin   He finished this up and is now on observation  His most recent imaging shows a stable nonspecific 3 mm right upper lobe nodule and 2 mm left upper lobe ground-glass nodule without new suspicious nodules or pulmonary parenchymal mass identified  There is no definite evidence of intrathoracic metastatic disease or acute intrathoracic abnormality on the rest of his imaging either  I advised him to continue monitoring  I will see him back in 6 months with repeat blood work and imaging  I will check his iron studies along with his CEA  If he has any questions he will call our office    He is already seeing our colleagues in GI for his colonoscopy  Otherwise I will see him back in 6 months  Goals and Barriers:  Current Goal:  Prolong Survival from colon cancer  Barriers: None  Patient's Capacity to Self Care:  Patient able to self care  Portions of the record may have been created with voice recognition software  Occasional wrong word or "sound a like" substitutions may have occurred due to the inherent limitations of voice recognition software  Read the chart carefully and recognize, using context, where substitutions have occurred

## 2022-07-07 RX ORDER — DULAGLUTIDE 1.5 MG/.5ML
1.5 INJECTION, SOLUTION SUBCUTANEOUS WEEKLY
Qty: 6 ML | Refills: 0 | Status: SHIPPED | OUTPATIENT
Start: 2022-07-07 | End: 2022-09-18 | Stop reason: SDUPTHER

## 2022-07-07 NOTE — TELEPHONE ENCOUNTER
Jardiance and Tresiba:    Last filled: 4/5/2022  Last office visit: 3/8/2022  Last labs: Looks like never had March endo labs drawn    Medications pending to pharmacy

## 2022-07-11 ENCOUNTER — TELEPHONE (OUTPATIENT)
Dept: GASTROENTEROLOGY | Facility: AMBULARY SURGERY CENTER | Age: 42
End: 2022-07-11

## 2022-07-11 NOTE — TELEPHONE ENCOUNTER
Patients GI provider:  Dr Scotty Redding    Number to return call: ( [phone]    Reason for call: Pt calling to get alternate colon prep due to cost of clenpiq    Scheduled procedure/appointment date if applicable:  procedure   7-13-22

## 2022-07-13 ENCOUNTER — ANESTHESIA EVENT (OUTPATIENT)
Dept: GASTROENTEROLOGY | Facility: AMBULARY SURGERY CENTER | Age: 42
End: 2022-07-13

## 2022-07-13 ENCOUNTER — ANESTHESIA (OUTPATIENT)
Dept: GASTROENTEROLOGY | Facility: AMBULARY SURGERY CENTER | Age: 42
End: 2022-07-13

## 2022-07-13 ENCOUNTER — TELEPHONE (OUTPATIENT)
Dept: INTERNAL MEDICINE CLINIC | Facility: CLINIC | Age: 42
End: 2022-07-13

## 2022-07-13 ENCOUNTER — HOSPITAL ENCOUNTER (OUTPATIENT)
Dept: GASTROENTEROLOGY | Facility: AMBULARY SURGERY CENTER | Age: 42
Setting detail: OUTPATIENT SURGERY
Discharge: HOME | End: 2022-07-13
Attending: INTERNAL MEDICINE | Admitting: INTERNAL MEDICINE
Payer: COMMERCIAL

## 2022-07-13 VITALS
HEIGHT: 70 IN | DIASTOLIC BLOOD PRESSURE: 77 MMHG | RESPIRATION RATE: 19 BRPM | WEIGHT: 187 LBS | OXYGEN SATURATION: 99 % | BODY MASS INDEX: 26.77 KG/M2 | SYSTOLIC BLOOD PRESSURE: 112 MMHG | TEMPERATURE: 97.8 F | HEART RATE: 77 BPM

## 2022-07-13 DIAGNOSIS — Z85.038 HISTORY OF COLON CANCER, STAGE III: ICD-10-CM

## 2022-07-13 LAB — GLUCOSE SERPL-MCNC: 90 MG/DL (ref 65–140)

## 2022-07-13 PROCEDURE — 88305 TISSUE EXAM BY PATHOLOGIST: CPT | Performed by: PATHOLOGY

## 2022-07-13 PROCEDURE — 45380 COLONOSCOPY AND BIOPSY: CPT | Performed by: INTERNAL MEDICINE

## 2022-07-13 PROCEDURE — 82948 REAGENT STRIP/BLOOD GLUCOSE: CPT

## 2022-07-13 PROCEDURE — 45385 COLONOSCOPY W/LESION REMOVAL: CPT | Performed by: INTERNAL MEDICINE

## 2022-07-13 RX ORDER — PROPOFOL 10 MG/ML
INJECTION, EMULSION INTRAVENOUS AS NEEDED
Status: DISCONTINUED | OUTPATIENT
Start: 2022-07-13 | End: 2022-07-13

## 2022-07-13 RX ORDER — SODIUM CHLORIDE, SODIUM LACTATE, POTASSIUM CHLORIDE, CALCIUM CHLORIDE 600; 310; 30; 20 MG/100ML; MG/100ML; MG/100ML; MG/100ML
INJECTION, SOLUTION INTRAVENOUS CONTINUOUS PRN
Status: DISCONTINUED | OUTPATIENT
Start: 2022-07-13 | End: 2022-07-13

## 2022-07-13 RX ADMIN — PROPOFOL 20 MG: 10 INJECTION, EMULSION INTRAVENOUS at 07:39

## 2022-07-13 RX ADMIN — PROPOFOL 20 MG: 10 INJECTION, EMULSION INTRAVENOUS at 07:41

## 2022-07-13 RX ADMIN — PROPOFOL 20 MG: 10 INJECTION, EMULSION INTRAVENOUS at 07:46

## 2022-07-13 RX ADMIN — PROPOFOL 20 MG: 10 INJECTION, EMULSION INTRAVENOUS at 07:37

## 2022-07-13 RX ADMIN — SODIUM CHLORIDE, SODIUM LACTATE, POTASSIUM CHLORIDE, AND CALCIUM CHLORIDE: .6; .31; .03; .02 INJECTION, SOLUTION INTRAVENOUS at 07:31

## 2022-07-13 RX ADMIN — PROPOFOL 20 MG: 10 INJECTION, EMULSION INTRAVENOUS at 07:43

## 2022-07-13 RX ADMIN — PROPOFOL 150 MG: 10 INJECTION, EMULSION INTRAVENOUS at 07:35

## 2022-07-13 NOTE — TELEPHONE ENCOUNTER
Overdue for follow-up  Last seen over 1 year ago  See if he is interested in scheduling something before the end of the year

## 2022-07-13 NOTE — ANESTHESIA PREPROCEDURE EVALUATION
Procedure:  COLONOSCOPY    Relevant Problems   GI/HEPATIC   (+) Cancer of sigmoid colon (HCC)   (+) Colon cancer metastasized to intra-abdominal lymph node (HCC)      NEURO/PSYCH   (+) History of colon cancer, stage III   (+) History of drug abuse ([facility] [address] )      Endocrine   (+) [facility]      Other   (+) Acute pain of left shoulder   (+) Hereditary hemochromatosis (HCC)   (+) Testosterone deficiency      Adequately NPO  No prior anesthesia complications  Physical Exam    Airway  Comment: Mccrary  Mallampati score: II  TM Distance: >3 FB  Neck ROM: full     Dental   No notable dental hx     Cardiovascular  Rhythm: regular, Rate: normal,     Pulmonary  Pulmonary exam normal     Other Findings        Anesthesia Plan  ASA Score- 2     Anesthesia Type- general with ASA Monitors  Additional Monitors:   Airway Plan:     Comment: Spontaneous with supplemental O2  Plan Factors-Exercise tolerance (METS): >4 METS  Chart reviewed  EKG reviewed  Existing labs reviewed  Patient summary reviewed  Patient is not a current smoker  Obstructive sleep apnea risk education given perioperatively  Induction- intravenous  Postoperative Plan-     Informed Consent- Anesthetic plan and risks discussed with patient  I personally reviewed this patient with the CRNA  Discussed and agreed on the Anesthesia Plan with the DEL Walker

## 2022-07-13 NOTE — ANESTHESIA POSTPROCEDURE EVALUATION
Post-Op Assessment Note    CV Status:  Stable  Pain Score: 0    Pain management: adequate     Mental Status:  Alert and awake   Hydration Status:  Euvolemic   PONV Controlled:  Controlled   Airway Patency:  Patent      Post Op Vitals Reviewed: Yes      Staff: CRNA         No complications documented      BP   95/53   Temp 97   Pulse 72   Resp 12   SpO2 99

## 2022-07-13 NOTE — H&P
History and Physical - [facility]  Neelam Daniel 40 y o  male MRN: [id number]        HPI:  81-year-old male with history of colon cancer status post surgery and chemotherapy  He has been doing well  Regular bowel movements  Historical Information   Past Medical History:   Diagnosis Date    Cancer [facility])     colon cancer    Colon cancer [facility] [address] )     Colon polyp     [facility]     Hemochromatosis     Testosterone deficiency      Past Surgical History:   Procedure Laterality Date    COLONOSCOPY      HEMICOLOECTOMY W/ ANASTOMOSIS N/A 2021    Procedure: ROBOTIC SIGMOID COLECTOMY, MOBILIZATION OF SPLENIC FLEXURE;  Surgeon: Yoav George MD;  [facility];  Service: Surgical Oncology    REMOVAL VENOUS PORT (PORT-A-CATH) Left 2021    Procedure: REMOVAL VENOUS PORT (PORT-A-CATH);   Surgeon: Yoav George MD;  [facility];  Service: Surgical Oncology    TUNNELED VENOUS PORT PLACEMENT Left 2021    Procedure: INSERTION VENOUS PORT (PORT-A-CATH) Ultrasound guided Left Internal Jugular Vein Access;  Surgeon: Curly Donaldson MD;  [facility];  Service: Surgical Oncology    WISDOM TOOTH EXTRACTION       Social History   Social History     Substance and Sexual Activity   Alcohol Use Not Currently     Social History     Substance and Sexual Activity   Drug Use Not Currently     Social History     Tobacco Use   Smoking Status Former Smoker    Packs/day: 0 25    Years: 10 00    Pack years: 2 50    Types: Cigarettes    Quit date: 2011    Years since quittin 1   Smokeless Tobacco Never Used     Family History   Problem Relation Age of Onset    Colon polyps Mother     No Known Problems Father     Colon polyps Maternal Grandmother     Prostate cancer Maternal Grandfather     Lung cancer Paternal Grandmother     Diabetes Paternal Grandmother     Prostate cancer Paternal Grandfather     Heart disease Paternal Grandfather     Kidney failure Paternal Grandfather     Diabetes Family     Hypertension Family        Meds/Allergies     (Not in a hospital admission)      No Known Allergies    Objective     Blood pressure 124/81, pulse 74, temperature 97 7 °F (36 5 °C), temperature source Temporal, resp  rate 19, height 5' 10" (1 778 m), weight 84 8 kg (187 lb), SpO2 98 %      PHYSICAL EXAM:    Gen: NAD  CV: S1 & S2 normal, RRR  CHEST: Clear to auscultate  ABD: soft, NT/ND, good bowel sounds  EXT: no edema    ASSESSMENT:     History of colon cancer    PLAN:    Surveillance colonoscopy

## 2022-07-18 ENCOUNTER — TELEPHONE (OUTPATIENT)
Dept: GASTROENTEROLOGY | Facility: CLINIC | Age: 42
End: 2022-07-18

## 2022-07-18 ENCOUNTER — VBI (OUTPATIENT)
Dept: ADMINISTRATIVE | Facility: OTHER | Age: 42
End: 2022-07-18

## 2022-07-18 DIAGNOSIS — K62.5 RECTAL BLEEDING: Primary | ICD-10-CM

## 2022-07-18 NOTE — TELEPHONE ENCOUNTER
Patients GI provider:  Dr Dylan Wen    Number to return call: [phone]    Reason for call: Pt girlfriend (on communication consent) calling stating patient had colonoscopy 7/13 and has noticed blood in stool  She states pt is working so if he does not answer call her back so she can describe sx  Her number is listed in communication consent      Scheduled procedure/appointment date if applicable:  No upcoming procedures or apts

## 2022-07-18 NOTE — TELEPHONE ENCOUNTER
Spoke with patient, he received a call from [place] NP  Patient is going to have labs done and ED precautions reviewed  No further questions

## 2022-07-18 NOTE — TELEPHONE ENCOUNTER
Please Advise   Spoke with patient and his Rafiq Nguyen, reports having colonoscopy on 7/13 with removal of polyps  History of stage 3 colon cancer  Noticed over the last three days having "good amount" of dark red blood in stool and when he wipes  He has daily BMs which are soft- formed  He denies any lightheadedness, dizziness, abdominal pain, N/V   Advised I would defer recs to provider- this may be from the polyp removal and if bleeding worsens or becomes lightheaded/ dizzy to go to the ED    - Possibly getting CBC labs done to check hgb ?   - If he does not answer his phone he would like us to call his GF Alyssa Batista

## 2022-09-09 ENCOUNTER — TELEPHONE (OUTPATIENT)
Dept: ENDOCRINOLOGY | Facility: CLINIC | Age: 42
End: 2022-09-09

## 2022-09-12 PROBLEM — N18.2 CHRONIC KIDNEY DISEASE (CKD), STAGE II (MILD): Status: ACTIVE | Noted: 2022-09-12

## 2022-09-18 DIAGNOSIS — E11.9 TYPE 2 DIABETES MELLITUS WITHOUT COMPLICATION, WITHOUT LONG-TERM CURRENT USE OF INSULIN (HCC): Chronic | ICD-10-CM

## 2022-09-19 RX ORDER — DULAGLUTIDE 1.5 MG/.5ML
1.5 INJECTION, SOLUTION SUBCUTANEOUS WEEKLY
Qty: 6 ML | Refills: 0 | Status: SHIPPED | OUTPATIENT
Start: 2022-09-19

## 2022-12-12 DIAGNOSIS — E11.9 TYPE 2 DIABETES MELLITUS WITHOUT COMPLICATION, WITHOUT LONG-TERM CURRENT USE OF INSULIN (HCC): Chronic | ICD-10-CM

## 2022-12-12 RX ORDER — DULAGLUTIDE 1.5 MG/.5ML
1.5 INJECTION, SOLUTION SUBCUTANEOUS WEEKLY
Qty: 6 ML | Refills: 0 | Status: SHIPPED | OUTPATIENT
Start: 2022-12-12

## 2022-12-20 ENCOUNTER — APPOINTMENT (OUTPATIENT)
Dept: LAB | Facility: CLINIC | Age: 42
End: 2022-12-20

## 2022-12-20 DIAGNOSIS — R53.83 FATIGUE, UNSPECIFIED TYPE: ICD-10-CM

## 2022-12-20 DIAGNOSIS — E11.9 DIABETES MELLITUS WITHOUT COMPLICATION (HCC): ICD-10-CM

## 2022-12-20 DIAGNOSIS — E34.9 TESTOSTERONE DEFICIENCY: ICD-10-CM

## 2022-12-20 DIAGNOSIS — K62.5 RECTAL BLEEDING: ICD-10-CM

## 2022-12-20 LAB
ALBUMIN SERPL BCP-MCNC: 4.4 G/DL (ref 3.5–5)
ALP SERPL-CCNC: 71 U/L (ref 46–116)
ALT SERPL W P-5'-P-CCNC: 53 U/L (ref 12–78)
ANION GAP SERPL CALCULATED.3IONS-SCNC: 8 MMOL/L (ref 4–13)
AST SERPL W P-5'-P-CCNC: 33 U/L (ref 5–45)
BASOPHILS # BLD AUTO: 0.04 THOUSANDS/ÂΜL (ref 0–0.1)
BASOPHILS NFR BLD AUTO: 1 % (ref 0–1)
BILIRUB SERPL-MCNC: 1.12 MG/DL (ref 0.2–1)
BUN SERPL-MCNC: 17 MG/DL (ref 5–25)
CALCIUM SERPL-MCNC: 9.7 MG/DL (ref 8.3–10.1)
CHLORIDE SERPL-SCNC: 105 MMOL/L (ref 96–108)
CHOLEST SERPL-MCNC: 175 MG/DL
CO2 SERPL-SCNC: 25 MMOL/L (ref 21–32)
CREAT SERPL-MCNC: 1.1 MG/DL (ref 0.6–1.3)
CREAT UR-MCNC: 99 MG/DL
EOSINOPHIL # BLD AUTO: 0.2 THOUSAND/ÂΜL (ref 0–0.61)
EOSINOPHIL NFR BLD AUTO: 3 % (ref 0–6)
ERYTHROCYTE [DISTWIDTH] IN BLOOD BY AUTOMATED COUNT: 13.1 % (ref 11.6–15.1)
EST. AVERAGE GLUCOSE BLD GHB EST-MCNC: 105 MG/DL
GFR SERPL CREATININE-BSD FRML MDRD: 82 ML/MIN/1.73SQ M
GLUCOSE P FAST SERPL-MCNC: 114 MG/DL (ref 65–99)
HBA1C MFR BLD: 5.3 %
HCT VFR BLD AUTO: 48.1 % (ref 36.5–49.3)
HDLC SERPL-MCNC: 57 MG/DL
HGB BLD-MCNC: 15.9 G/DL (ref 12–17)
IMM GRANULOCYTES # BLD AUTO: 0.04 THOUSAND/UL (ref 0–0.2)
IMM GRANULOCYTES NFR BLD AUTO: 1 % (ref 0–2)
LDLC SERPL CALC-MCNC: 100 MG/DL (ref 0–100)
LYMPHOCYTES # BLD AUTO: 2.39 THOUSANDS/ÂΜL (ref 0.6–4.47)
LYMPHOCYTES NFR BLD AUTO: 36 % (ref 14–44)
MCH RBC QN AUTO: 29.6 PG (ref 26.8–34.3)
MCHC RBC AUTO-ENTMCNC: 33.1 G/DL (ref 31.4–37.4)
MCV RBC AUTO: 90 FL (ref 82–98)
MICROALBUMIN UR-MCNC: 8.6 MG/L (ref 0–20)
MICROALBUMIN/CREAT 24H UR: 9 MG/G CREATININE (ref 0–30)
MONOCYTES # BLD AUTO: 0.46 THOUSAND/ÂΜL (ref 0.17–1.22)
MONOCYTES NFR BLD AUTO: 7 % (ref 4–12)
NEUTROPHILS # BLD AUTO: 3.58 THOUSANDS/ÂΜL (ref 1.85–7.62)
NEUTS SEG NFR BLD AUTO: 52 % (ref 43–75)
NONHDLC SERPL-MCNC: 118 MG/DL
NRBC BLD AUTO-RTO: 0 /100 WBCS
PLATELET # BLD AUTO: 238 THOUSANDS/UL (ref 149–390)
PMV BLD AUTO: 10.8 FL (ref 8.9–12.7)
POTASSIUM SERPL-SCNC: 4.8 MMOL/L (ref 3.5–5.3)
PROT SERPL-MCNC: 7.6 G/DL (ref 6.4–8.4)
RBC # BLD AUTO: 5.37 MILLION/UL (ref 3.88–5.62)
SODIUM SERPL-SCNC: 138 MMOL/L (ref 135–147)
TRIGL SERPL-MCNC: 89 MG/DL
TSH SERPL DL<=0.05 MIU/L-ACNC: 1.28 UIU/ML (ref 0.45–4.5)
WBC # BLD AUTO: 6.71 THOUSAND/UL (ref 4.31–10.16)

## 2022-12-21 LAB
TESTOST FREE SERPL-MCNC: 13.9 PG/ML (ref 6.8–21.5)
TESTOST SERPL-MCNC: 327 NG/DL (ref 264–916)

## 2022-12-28 NOTE — PROGRESS NOTES
Established Patient Progress Note      Chief Complaint   Patient presents with   • Diabetes Mellitus        History of Present Illness:   Macho Lamb is a 40 y o  male with vitamin-D deficiency, thyroid nodules, and type 2 diabetes without long-term use of insulin since [age]   Denies complications of diabetes   Denies recent severe hypoglycemic or severe hyperglycemic episodes   Denies any issues with his current regimen   Home glucose monitoring is performed sporadically  Past medical history significant for sigmoid colon cancer for which she has completed treatment  He reports feeling well  He does admit to overeating at times but he is also exercising regularly and maintaining a healthy weight  Component      Latest Ref Rng & Units 3/8/2022 12/20/2022           4:44 PM  8:40 AM   Hemoglobin A1C      Normal 3 8-5 6%; PreDiabetic 5 7-6 4%; Diabetic >=6 5%; Glycemic control for adults with diabetes <7 0% % 5 7 5 3   eAG, EST AVG Glucose      mg/dl  105     Component      Latest Ref Rng & Units 6/20/2022 12/20/2022          12:51 PM  8:40 AM   eGFR      ml/min/1 73sq m 84 82       Home blood glucose readings: None for review     Current regimen:   Trulicity 1 5 mg weekly  Jardiance 25 mg daily    UTD on eye and foot exams  Patient has a reported history of low testosterone  When he was living in [place], he was supplementing with weekly injections  Recent free and total testosterone are stable without supplementation  Component      Latest Ref Rng & Units 12/20/2022           8:40 AM   TESTOSTERONE FREE      6 8 - 21 5 pg/mL 13 9   Testosterone, Total, LC/MS      264 - 916 ng/dL 327     Patient has a history of thyroid nodules  He has yet to complete his thyroid US  He denies any compressive symptoms  For Vit D deficiency, he is supplementing with 2,000 iu daily  He is not currently on an ACE or ARB  BP stable  Negative for microalbuminuria  He is not taking a statin          Patient Active Problem List   Diagnosis   • Diabetes mellitus without complication (HCC)   • Hereditary hemochromatosis ([address] )   • Thyroid nodule   • Fatigue   • Cancer of sigmoid colon (HCC)   • Colon cancer metastasized to intra-abdominal lymph node (HCC)   • Liver nodule   • Lung nodule   • Polycythemia   • Acute pain of left shoulder   • History of drug abuse (HCC)   • Chemotherapy-induced neuropathy (HCC)   • Palliative care patient   • Cancer related pain   • History of colon cancer, stage III   • Chronic kidney disease (mild)      Past Medical History:   Diagnosis Date   • Cancer ([facility] )     colon cancer   • [facility]    • Colon polyp    • Diabetes mellitus ([facility] )    • Hemochromatosis    • Testosterone deficiency       Past Surgical History:   Procedure Laterality Date   • COLONOSCOPY     • HEMICOLOECTOMY W/ ANASTOMOSIS N/A 5/5/2021    Procedure: ROBOTIC SIGMOID COLECTOMY, MOBILIZATION OF SPLENIC FLEXURE;  Surgeon: Good Alarcon MD;  Location: [facility];  Service: Surgical Oncology   • REMOVAL VENOUS PORT (PORT-A-CATH) Left 9/22/2021    Procedure: REMOVAL VENOUS PORT (PORT-A-CATH);   Surgeon: Good Alarcon MD;  [facility];  Service: Surgical Oncology   • TUNNELED VENOUS PORT PLACEMENT Left 6/1/2021    Procedure: INSERTION VENOUS PORT (PORT-A-CATH) Ultrasound guided Left Internal Jugular Vein Access;  Surgeon: Ana Duval MD;  Location: [facility];  Service: Surgical Oncology   • WISDOM TOOTH EXTRACTION        Family History   Problem Relation Age of Onset   • Colon polyps Mother    • No Known Problems Father    • Colon polyps Maternal Grandmother    • Prostate cancer Maternal Grandfather    • Lung cancer Paternal Grandmother    • Diabetes Paternal Grandmother    • Prostate cancer Paternal Grandfather    • Heart disease Paternal Grandfather    • Kidney failure Paternal Grandfather    • Diabetes Family    • Hypertension Family      Social History     Tobacco Use   • Smoking status: Former Packs/day: 0 25     Years: 10 00     Pack years: 2 50     Types: Cigarettes     Quit date: 2011     Years since quittin 5   • Smokeless tobacco: Never   Substance Use Topics   • Alcohol use: Not Currently     No Known Allergies      Current Outpatient Medications:   •  Cholecalciferol (Vitamin D3) 50 MCG (2000 UT) capsule, Take 2,000 Units by mouth daily, Disp: , Rfl:   •  cyanocobalamin (VITAMIN B-12) 100 mcg tablet, Take by mouth daily, Disp: , Rfl:   •  Dulaglutide (Trulicity) 1 5 EX/6 5QH SOPN, Inject 0 5 mL (1 5 mg total) under the skin once a week, Disp: 6 mL, Rfl: 0  •  Empagliflozin (Jardiance) 25 MG TABS, Take 1 tablet (25 mg total) by mouth daily, Disp: 90 tablet, Rfl: 0  •  Pyridoxine HCl (Vitamin B6) 100 MG TABS, Take by mouth, Disp: , Rfl:     Review of Systems   Constitutional: Negative for activity change, appetite change, fatigue and unexpected weight change  HENT: Negative for dental problem, sore throat, trouble swallowing and voice change  Eyes: Negative for visual disturbance  Respiratory: Negative for cough, chest tightness and shortness of breath  Cardiovascular: Negative for chest pain, palpitations and leg swelling  Gastrointestinal: Negative for constipation, diarrhea, nausea and vomiting  Endocrine: Negative for polydipsia, polyphagia and polyuria  Genitourinary: Negative for frequency  Musculoskeletal: Negative for arthralgias, back pain, gait problem and myalgias  Skin: Negative for wound  Allergic/Immunologic: Negative for environmental allergies and food allergies  Neurological: Negative for dizziness, weakness, light-headedness, numbness and headaches  Psychiatric/Behavioral: Negative for decreased concentration, dysphoric mood and sleep disturbance  The patient is not nervous/anxious  Physical Exam:  Body mass index is 28 35 kg/m²    /62 (BP Location: Right arm, Patient Position: Sitting, Cuff Size: Standard)   Pulse 80   Temp (!) 96 °F (35 6 °C) (Temporal)   Ht 5' 10" (1 778 m)   Wt 89 6 kg (197 lb 9 6 oz)   SpO2 98%   BMI 28 35 kg/m²    Wt Readings from Last 3 Encounters:   12/29/22 89 6 kg (197 lb 9 6 oz)   07/13/22 84 8 kg (187 lb)   07/06/22 86 2 kg (190 lb)       Physical Exam  Vitals reviewed  Constitutional:       General: He is not in acute distress  Appearance: He is well-developed  He is not ill-appearing  HENT:      Head: Normocephalic and atraumatic  Eyes:      Pupils: Pupils are equal, round, and reactive to light  Neck:      Thyroid: No thyromegaly  Cardiovascular:      Rate and Rhythm: Normal rate and regular rhythm  Pulses: Normal pulses  Heart sounds: Normal heart sounds  Pulmonary:      Effort: Pulmonary effort is normal       Breath sounds: Normal breath sounds  Abdominal:      General: Bowel sounds are normal  There is no distension  Palpations: Abdomen is soft  Tenderness: There is no abdominal tenderness  Musculoskeletal:      Cervical back: Normal range of motion and neck supple  Right lower leg: No edema  Left lower leg: No edema  Lymphadenopathy:      Cervical: No cervical adenopathy  Skin:     General: Skin is warm and dry  Capillary Refill: Capillary refill takes less than 2 seconds  Neurological:      Mental Status: He is alert and oriented to person, place, and time        Gait: Gait normal    Psychiatric:         Mood and Affect: Mood normal          Behavior: Behavior normal            Labs:   Lab Results   Component Value Date    HGBA1C 5 3 12/20/2022    HGBA1C 5 7 03/08/2022    HGBA1C 6 0 09/10/2021     Lab Results   Component Value Date    CREATININE 1 10 12/20/2022    CREATININE 1 08 06/20/2022    CREATININE 1 10 12/14/2021    BUN 17 12/20/2022    K 4 8 12/20/2022     12/20/2022    CO2 25 12/20/2022     eGFR   Date Value Ref Range Status   12/20/2022 82 ml/min/1 73sq m Final     Lab Results   Component Value Date    HDL 57 12/20/2022 TRIG 89 12/20/2022     Lab Results   Component Value Date    ALT 53 12/20/2022    AST 33 12/20/2022    ALKPHOS 71 12/20/2022     Lab Results   Component Value Date    [id number] 1 280 12/20/2022    [id number] 1 316 11/24/2020     No results found for: FREET4, TSI    Impression & Plan:    Problem List Items Addressed This Visit        Endocrine    Diabetes mellitus without complication [facility]) - Primary     Patient is very well controlled  Continue current regimen  Patient knows to notify me with persistent hyperglycemia or episodes of hypoglycemia  Continue to follow a healthy diet  Continue with regular physical activity  F/U in 1 year  Lab Results   Component Value Date    HGBA1C 5 3 12/20/2022            Relevant Orders    Comprehensive metabolic panel    Hemoglobin A1C    Lipid Panel with Direct LDL reflex    Microalbumin / creatinine urine ratio    Thyroid nodule     Reminded to complete US of thyroid  Orders Placed This Encounter   Procedures   • Comprehensive metabolic panel     This is a patient instruction: Patient fasting for 8 hours or longer recommended  Standing Status:   Future     Standing Expiration Date:   12/29/2023   • Hemoglobin A1C     Standing Status:   Future     Standing Expiration Date:   12/29/2023   • Lipid Panel with Direct LDL reflex     This is a patient instruction: This test requires patient fasting for 10-12 hours or longer  Drinking of black coffee or black tea is acceptable  Standing Status:   Future     Standing Expiration Date:   12/29/2023   • Microalbumin / creatinine urine ratio     Standing Status:   Future     Standing Expiration Date:   12/29/2023       There are no Patient Instructions on file for this visit  Discussed with the patient and all questioned fully answered  He will call me if any problems arise  Follow-up appointment in 12 months       Counseled patient on diagnostic results, prognosis, risk and benefit of treatment options, instruction for management, importance of treatment compliance, Risk  factor reduction and impressions    SHABANA Rodríguez

## 2022-12-29 ENCOUNTER — OFFICE VISIT (OUTPATIENT)
Dept: ENDOCRINOLOGY | Facility: CLINIC | Age: 42
End: 2022-12-29

## 2022-12-29 VITALS
TEMPERATURE: 96 F | OXYGEN SATURATION: 98 % | HEIGHT: 70 IN | WEIGHT: 197.6 LBS | DIASTOLIC BLOOD PRESSURE: 62 MMHG | SYSTOLIC BLOOD PRESSURE: 118 MMHG | HEART RATE: 80 BPM | BODY MASS INDEX: 28.29 KG/M2

## 2022-12-29 DIAGNOSIS — E04.1 THYROID NODULE: ICD-10-CM

## 2022-12-29 DIAGNOSIS — E11.9 DIABETES MELLITUS WITHOUT COMPLICATION (HCC): Primary | ICD-10-CM

## 2022-12-29 PROBLEM — E34.9 TESTOSTERONE DEFICIENCY: Chronic | Status: RESOLVED | Noted: 2020-11-03 | Resolved: 2022-12-29

## 2022-12-29 NOTE — ASSESSMENT & PLAN NOTE
Patient is very well controlled  Continue current regimen  Patient knows to notify me with persistent hyperglycemia or episodes of hypoglycemia  Continue to follow a healthy diet  Continue with regular physical activity  F/U in 1 year     Lab Results   Component Value Date    HGBA1C 5 3 12/20/2022

## 2023-01-11 ENCOUNTER — APPOINTMENT (OUTPATIENT)
Dept: LAB | Facility: CLINIC | Age: 43
End: 2023-01-11

## 2023-01-11 ENCOUNTER — HOSPITAL ENCOUNTER (OUTPATIENT)
Dept: CT IMAGING | Facility: HOSPITAL | Age: 43
Discharge: HOME | End: 2023-01-11
Attending: INTERNAL MEDICINE

## 2023-01-11 DIAGNOSIS — C77.2 COLON CANCER METASTASIZED TO INTRA-ABDOMINAL LYMPH NODE (HCC): ICD-10-CM

## 2023-01-11 DIAGNOSIS — C18.9 COLON CANCER METASTASIZED TO INTRA-ABDOMINAL LYMPH NODE (HCC): ICD-10-CM

## 2023-01-11 LAB
ALBUMIN SERPL BCP-MCNC: 4.5 G/DL (ref 3.5–5)
ALP SERPL-CCNC: 60 U/L (ref 34–104)
ALT SERPL W P-5'-P-CCNC: 30 U/L (ref 7–52)
ANION GAP SERPL CALCULATED.3IONS-SCNC: 6 MMOL/L (ref 4–13)
AST SERPL W P-5'-P-CCNC: 23 U/L (ref 13–39)
BASOPHILS # BLD AUTO: 0.03 THOUSANDS/ÂΜL (ref 0–0.1)
BASOPHILS NFR BLD AUTO: 0 % (ref 0–1)
BILIRUB SERPL-MCNC: 1.27 MG/DL (ref 0.2–1)
BUN SERPL-MCNC: 14 MG/DL (ref 5–25)
CALCIUM SERPL-MCNC: 9.5 MG/DL (ref 8.4–10.2)
CEA SERPL-MCNC: <0.5 NG/ML (ref 0–3)
CHLORIDE SERPL-SCNC: 100 MMOL/L (ref 96–108)
CO2 SERPL-SCNC: 28 MMOL/L (ref 21–32)
CREAT SERPL-MCNC: 1.04 MG/DL (ref 0.6–1.3)
EOSINOPHIL # BLD AUTO: 0.15 THOUSAND/ÂΜL (ref 0–0.61)
EOSINOPHIL NFR BLD AUTO: 2 % (ref 0–6)
ERYTHROCYTE [DISTWIDTH] IN BLOOD BY AUTOMATED COUNT: 12.7 % (ref 11.6–15.1)
GFR SERPL CREATININE-BSD FRML MDRD: 88 ML/MIN/1.73SQ M
GLUCOSE SERPL-MCNC: 106 MG/DL (ref 65–140)
HCT VFR BLD AUTO: 47.3 % (ref 36.5–49.3)
HGB BLD-MCNC: 15.6 G/DL (ref 12–17)
IMM GRANULOCYTES # BLD AUTO: 0.02 THOUSAND/UL (ref 0–0.2)
IMM GRANULOCYTES NFR BLD AUTO: 0 % (ref 0–2)
LYMPHOCYTES # BLD AUTO: 1.73 THOUSANDS/ÂΜL (ref 0.6–4.47)
LYMPHOCYTES NFR BLD AUTO: 24 % (ref 14–44)
MCH RBC QN AUTO: 29.3 PG (ref 26.8–34.3)
MCHC RBC AUTO-ENTMCNC: 33 G/DL (ref 31.4–37.4)
MCV RBC AUTO: 89 FL (ref 82–98)
MONOCYTES # BLD AUTO: 0.41 THOUSAND/ÂΜL (ref 0.17–1.22)
MONOCYTES NFR BLD AUTO: 6 % (ref 4–12)
NEUTROPHILS # BLD AUTO: 5.02 THOUSANDS/ÂΜL (ref 1.85–7.62)
NEUTS SEG NFR BLD AUTO: 68 % (ref 43–75)
NRBC BLD AUTO-RTO: 0 /100 WBCS
PLATELET # BLD AUTO: 210 THOUSANDS/UL (ref 149–390)
PMV BLD AUTO: 10.3 FL (ref 8.9–12.7)
POTASSIUM SERPL-SCNC: 4 MMOL/L (ref 3.5–5.3)
PROT SERPL-MCNC: 7 G/DL (ref 6.4–8.4)
RBC # BLD AUTO: 5.32 MILLION/UL (ref 3.88–5.62)
SODIUM SERPL-SCNC: 134 MMOL/L (ref 135–147)
WBC # BLD AUTO: 7.36 THOUSAND/UL (ref 4.31–10.16)

## 2023-01-11 RX ADMIN — IOHEXOL 100 ML: 350 INJECTION, SOLUTION INTRAVENOUS at 10:29

## 2023-01-17 ENCOUNTER — RA CDI HCC (OUTPATIENT)
Dept: OTHER | Facility: HOSPITAL | Age: 43
End: 2023-01-17

## 2023-01-17 NOTE — PROGRESS NOTES
History of drug abuse (HCC)Noted 6/30/2021  [F19 11]    Hereditary hemochromatosis (HCC)Noted 11/3/2020  [E83 110]      [facility]  coding opportunities          Chart Reviewed number of suggestions sent to Provider: 2     Patients Insurance        Commercial Insurance: [employer]

## 2023-01-18 ENCOUNTER — OFFICE VISIT (OUTPATIENT)
Dept: HEMATOLOGY ONCOLOGY | Facility: CLINIC | Age: 43
End: 2023-01-18

## 2023-01-18 VITALS
HEART RATE: 83 BPM | OXYGEN SATURATION: 98 % | BODY MASS INDEX: 28.49 KG/M2 | HEIGHT: 70 IN | SYSTOLIC BLOOD PRESSURE: 120 MMHG | DIASTOLIC BLOOD PRESSURE: 76 MMHG | TEMPERATURE: 98 F | RESPIRATION RATE: 14 BRPM | WEIGHT: 199 LBS

## 2023-01-18 DIAGNOSIS — C77.2 COLON CANCER METASTASIZED TO INTRA-ABDOMINAL LYMPH NODE (HCC): ICD-10-CM

## 2023-01-18 DIAGNOSIS — C18.7 CANCER OF SIGMOID COLON (HCC): Primary | ICD-10-CM

## 2023-01-18 DIAGNOSIS — C18.9 COLON CANCER METASTASIZED TO INTRA-ABDOMINAL LYMPH NODE (HCC): ICD-10-CM

## 2023-01-18 NOTE — PROGRESS NOTES
Hematology/Oncology Outpatient Follow- up Note  Nik Abdi 40 y o  male MRN: @ Encounter: [id number]        Date:  1/18/2023    Presenting Complaint/Diagnosis : PT1N1a cancer of the descending colon    HPI:    The patient is a pleasant 45-year-old male who presented with GI bleeding and was found to have an adenocarcinoma of the descending colon status post hemicolectomy   Tumor was a grade 2 adenocarcinoma with 1/28 lymph nodes positive   It was a T1 lesion   The patient was started on adjuvant capecitabine and oxaliplatin  Barbie Seen has had 2 cycles so far   Cycle 3  Was canceled for his vacation per his wishes apparently  Previous Hematologic/ Oncologic History:    Oncology History   Cancer of sigmoid colon ([facility] [address] )   4/13/2021 Initial Diagnosis    [facility] )    RESULTS OF IMMUNOHISTOCHEMICAL ANALYSIS FOR MISMATCH REPAIR PROTEIN LOSS  INTERPRETATION: NO LOSS OF NUCLEAR EXPRESSION OF MMR PROTEINS: LOW PROBABILITY OF MSI-H   RESULTS:  AntibodyClone Description Results  MLH1 M1 Mismatch repair protein Intact nuclear expression  MSH2 [id number] Mismatch repair protein Intact nuclear expression  MSH6 40 Mismatch repair protein Intact nuclear expression  PMS2 [id number] Mismatch repair protein Intact nuclear expression     5/5/2021 Surgery    Descending colon, partial colectomy:  - Adenocarcinoma (1 9 cm), moderately differentiated, arising in adenocarcinoma with high grade dysplasia    - Tumor invades the submucosa (through the muscularis mucosa in to deeper one third of submucosa, pT1, Sm3)   - All margins are negative for tumor   - One  of twenty eight lymph nodes is positive for tumor (1/28)      6/4/2021 - 9/3/2021 Chemotherapy    oxaliplatin (ELOXATIN) chemo infusion, 130 mg/m2 = 275 6 mg, Intravenous, Once, 4 of 4 cycles  Administration: 275 6 mg (6/4/2021), 275 6 mg (6/25/2021), 275 6 mg (8/11/2021), 275 6 mg (9/3/2021)     Colon cancer metastasized to intra-abdominal lymph node ([facility] [address] ) 5/20/2021 Initial Diagnosis    Colon cancer metastasized to intra-abdominal lymph node ([facility] [address] )     6/4/2021 - 9/3/2021 Chemotherapy    oxaliplatin (ELOXATIN) chemo infusion, 130 mg/m2 = 275 6 mg, Intravenous, Once, 4 of 4 cycles  Administration: 275 6 mg (6/4/2021), 275 6 mg (6/25/2021), 275 6 mg (8/11/2021), 275 6 mg (9/3/2021)       Xelox every 3 weeks  A history of possible hemochromatosis for which he may need to resume phlebotomy down the road   Ferritin is still low  Current Hematologic/ Oncologic Treatment:      Observation    Interval History:    Patient returns for follow-up visit  He is doing well  Most recent imaging and blood work shows no evidence of progression  He did have some gynecomastia but no breast masses  Denies any nausea denies any vomiting denies any diarrhea  Overall is doing well  Is up-to-date on his colonoscopies  Will need reimaging in 6 months  The rest of his 14 point review of systems today was negative  Cancer Staging:  Cancer Staging   Cancer of sigmoid colon [facility])  Staging form: Colon and Rectum, AJCC 8th Edition  - Pathologic stage from 5/5/2021: Stage IIIA (pT1, pN1a, cM0) - Unsigned  Stage prefix: Initial diagnosis  Total positive nodes: 1  Total nodes examined: 28  Histologic grading system: 4 grade system  Histologic grade (G): G2  Carcinoembryonic antigen (CEA) (ng/mL): 0 5  Perineural invasion (PNI): Absent    Test Results:    Imaging: CT chest abdomen pelvis w contrast    Result Date: 1/16/2023  Narrative: CT CHEST, ABDOMEN AND PELVIS WITH IV CONTRAST INDICATION:   C18 9: Malignant neoplasm of colon, unspecified C77 2: Secondary and unspecified malignant neoplasm of intra-abdominal lymph nodes  Status post resection of the sigmoid colon on 4/22/2021  COMPARISON:  Multiple prior studies, most recent is dated June 22, 2022  TECHNIQUE: CT examination of the chest, abdomen and pelvis was performed   Axial, sagittal, and coronal 2D reformatted images were created from the source data and submitted for interpretation  Radiation dose length product (DLP) for this visit:  682 mGy-cm   This examination, like all CT scans performed in the [facility], was performed utilizing techniques to minimize radiation dose exposure, including the use of iterative reconstruction and automated exposure control  IV Contrast:  100 mL of iohexol (OMNIPAQUE) Enteric Contrast: Enteric contrast was administered  FINDINGS: CHEST LUNGS:  Lungs are clear  There is no tracheal or endobronchial lesion  PLEURA:  Unremarkable  HEART/GREAT VESSELS: Heart is unremarkable for patient's age  No thoracic aortic aneurysm  MEDIASTINUM AND JAIME:  Unremarkable  CHEST WALL AND LOWER NECK:  Bilateral gynecomastia  ABDOMEN LIVER/BILIARY TREE:  Unremarkable  GALLBLADDER:  No calcified gallstones  No pericholecystic inflammatory change  SPLEEN:  Unremarkable  PANCREAS:  Unremarkable  ADRENAL GLANDS:  Unremarkable  KIDNEYS/URETERS:  Unremarkable  No hydronephrosis  STOMACH AND BOWEL:  Unremarkable  No findings to suggest locally recurrent tumor  APPENDIX:  A normal appendix was visualized  ABDOMINOPELVIC CAVITY:  No ascites  No pneumoperitoneum  No lymphadenopathy  VESSELS:  Unremarkable for patient's age  PELVIS REPRODUCTIVE ORGANS:  Unremarkable for patient's age  URINARY BLADDER:  Unremarkable  ABDOMINAL WALL/INGUINAL REGIONS:  Unremarkable  OSSEOUS STRUCTURES:  Bilateral L5 pars defects  No destructive skeletal lesions  Impression: No metastatic disease in the chest abdomen or pelvis  No evidence of locally recurrent tumor   Workstation performed: [id number]       Labs:   Lab Results   Component Value Date    WBC 7 36 01/11/2023    HGB 15 6 01/11/2023    HCT 47 3 01/11/2023    MCV 89 01/11/2023     01/11/2023     Lab Results   Component Value Date    K 4 0 01/11/2023     01/11/2023    CO2 28 01/11/2023    BUN 14 01/11/2023    CREATININE 1 04 01/11/2023    GLUF 114 (H) 12/20/2022    CALCIUM 9 5 01/11/2023    AST 23 01/11/2023    ALT 30 01/11/2023    ALKPHOS 60 01/11/2023    EGFR 88 01/11/2023       Lab Results   Component Value Date    PSA 0 9 11/24/2020       Lab Results   Component Value Date    CEA <0 5 01/11/2023       Lab Results   Component Value Date    IRON 106 06/20/2022    TIBC 445 06/20/2022    FERRITIN 26 06/20/2022       No results found for: Shy Gomezdbeo      ROS: As stated in the history of present illness otherwise his 14 point review of systems today was negative  Active Problems:   Patient Active Problem List   Diagnosis   • Diabetes mellitus without complication ([address] )   • Hereditary hemochromatosis ([facility] [address] )   • Thyroid nodule   • Fatigue   • Cancer of sigmoid colon (HCC)   • Colon cancer metastasized to intra-abdominal lymph node (HCC)   • Liver nodule   • Lung nodule   • Polycythemia   • Acute pain of left shoulder   • History of drug abuse (HCC)   • Chemotherapy-induced neuropathy (HCC)   • Palliative care patient   • Cancer related pain   • History of colon cancer, stage III   • Chronic kidney disease (mild)       Past Medical History:   Past Medical History:   Diagnosis Date   • Cancer ([facility] [address] )     colon cancer   • Colon cancer ([facility] [address] )    • Colon polyp    • Diabetes mellitus ([address] )    • Hemochromatosis    • Testosterone deficiency        Surgical History:   Past Surgical History:   Procedure Laterality Date   • COLONOSCOPY     • HEMICOLOECTOMY W/ ANASTOMOSIS N/A 5/5/2021    Procedure: ROBOTIC SIGMOID COLECTOMY, MOBILIZATION OF SPLENIC FLEXURE;  Surgeon: Good Alarcon MD;  Location: [facility];  Service: Surgical Oncology   • REMOVAL VENOUS PORT (PORT-A-CATH) Left 9/22/2021    Procedure: REMOVAL VENOUS PORT (PORT-A-CATH);   Surgeon: Good Alarcon MD;  [facility];  Service: Surgical Oncology   • TUNNELED VENOUS PORT PLACEMENT Left 6/1/2021    Procedure: INSERTION VENOUS PORT (PORT-A-CATH) Ultrasound guided Left Internal Jugular Vein Access; Surgeon: Holly Cao MD;  Location: [facility] OR;  Service: Surgical Oncology   • WISDOM TOOTH EXTRACTION         Family History:    Family History   Problem Relation Age of Onset   • Colon polyps Mother    • No Known Problems Father    • Colon polyps Maternal Grandmother    • Prostate cancer Maternal Grandfather    • Lung cancer Paternal Grandmother    • Diabetes Paternal Grandmother    • Prostate cancer Paternal Grandfather    • Heart disease Paternal Grandfather    • Kidney failure Paternal Grandfather    • Diabetes Family    • Hypertension Family        Cancer-related family history includes Lung cancer in his paternal grandmother; Prostate cancer in his maternal grandfather and paternal grandfather      Social History:   Social History     Socioeconomic History   • Marital status:      Spouse name: Not on file   • Number of children: Not on file   • Years of education: Not on file   • Highest education level: Not on file   Occupational History   • Not on file   Tobacco Use   • Smoking status: Former     Packs/day: 0 25     Years: 10 00     Pack years: 2 50     Types: Cigarettes     Quit date: 2011     Years since quittin 6   • Smokeless tobacco: Never   Vaping Use   • Vaping Use: Never used   Substance and Sexual Activity   • Alcohol use: Not Currently   • Drug use: Not Currently   • Sexual activity: Yes     Partners: Female     Birth control/protection: Condom Male   Other Topics Concern   • Not on file   Social History Narrative   • Not on file     Social Determinants of Health     Financial Resource Strain: Not on file   Food Insecurity: Not on file   Transportation Needs: Not on file   Physical Activity: Not on file   Stress: Not on file   Social Connections: Not on file   Intimate Partner Violence: Not on file   Housing Stability: Not on file       Current Medications:   Current Outpatient Medications   Medication Sig Dispense Refill   • Cholecalciferol (Vitamin D3) 50 MCG (2000) capsule Take 2,000 Units by mouth daily     • cyanocobalamin (VITAMIN B-12) 100 mcg tablet Take by mouth daily     • Dulaglutide (Trulicity) 1 5 ZS/1 4RN SOPN Inject 0 5 mL (1 5 mg total) under the skin once a week 6 mL 0   • Empagliflozin (Jardiance) 25 MG TABS Take 1 tablet (25 mg total) by mouth daily 90 tablet 0   • Pyridoxine HCl (Vitamin B6) 100 MG TABS Take by mouth       No current facility-administered medications for this visit  Allergies: No Known Allergies    Physical Exam:    Body surface area is 2 08 meters squared  Wt Readings from Last 3 Encounters:   01/18/23 90 3 kg (199 lb)   12/29/22 89 6 kg (197 lb 9 6 oz)   07/13/22 84 8 kg (187 lb)        Temp Readings from Last 3 Encounters:   01/18/23 98 °F (36 7 °C) (Temporal)   12/29/22 (!) 96 °F (35 6 °C) (Temporal)   07/13/22 97 8 °F (36 6 °C) (Temporal)        BP Readings from Last 3 Encounters:   01/18/23 120/76   12/29/22 118/62   07/13/22 112/77         Pulse Readings from Last 3 Encounters:   01/18/23 83   12/29/22 80   07/13/22 77        Physical Exam     Constitutional   General appearance: No acute distress, well appearing and well nourished  Eyes   Conjunctiva and lids: No swelling, erythema or discharge  Pupils and irises: Equal, round and reactive to light  Ears, Nose, Mouth, and Throat   External inspection of ears and nose: Normal     Nasal mucosa, septum, and turbinates: Normal without edema or erythema  Oropharynx: Normal with no erythema, edema, exudate or lesions  Pulmonary   Respiratory effort: No increased work of breathing or signs of respiratory distress  Auscultation of lungs: Clear to auscultation  Cardiovascular   Palpation of heart: Normal PMI, no thrills  Auscultation of heart: Normal rate and rhythm, normal S1 and S2, without murmurs  Examination of extremities for edema and/or varicosities: Normal     Carotid pulses: Normal     Abdomen   Abdomen: Non-tender, no masses      Liver and spleen: No hepatomegaly or splenomegaly  Lymphatic   Palpation of lymph nodes in neck: No lymphadenopathy  Musculoskeletal   Gait and station: Normal     Digits and nails: Normal without clubbing or cyanosis  Inspection/palpation of joints, bones, and muscles: Normal     Skin   Skin and subcutaneous tissue: Normal without rashes or lesions  Neurologic   Cranial nerves: Cranial nerves 2-12 intact  Sensation: No sensory loss  Psychiatric   Orientation to person, place, and time: Normal     Mood and affect: Normal         Assessment / Plan:      The patient is a pleasant 41-year-old male who was referred to see us because he wishes to transfer care from his previous oncologist for stage IIIA I e  T1 N1 descending colon adenocarcinoma with 1/28 lymph nodes positive   Histology was low risk   Based on the most current data the patient needed 3 months of adjuvant treatment I e  4 cycles of capecitabine with oxaliplatin       He finished this up and is now on observation  Most recent imaging shows no evidence of progression  Blood work is within acceptable limits including the CEA  The patient will stay on observation  We will see him back in 6 months with repeat blood work and imaging  If the patient has any questions he will call our office  Goals and Barriers:  Current Goal:  Prolong Survival from colon cancer  Barriers: None  Patient's Capacity to Self Care:  Patient able to self care  Portions of the record may have been created with voice recognition software  Occasional wrong word or "sound a like" substitutions may have occurred due to the inherent limitations of voice recognition software  Read the chart carefully and recognize, using context, where substitutions have occurred

## 2023-01-24 PROBLEM — R91.1 LUNG NODULE: Status: RESOLVED | Noted: 2021-05-20 | Resolved: 2023-01-24

## 2023-01-24 PROBLEM — K76.89 LIVER NODULE: Status: RESOLVED | Noted: 2021-05-20 | Resolved: 2023-01-24

## 2023-01-24 PROBLEM — R53.83 FATIGUE: Status: RESOLVED | Noted: 2020-11-12 | Resolved: 2023-01-24

## 2023-01-24 PROBLEM — M25.512 ACUTE PAIN OF LEFT SHOULDER: Status: RESOLVED | Noted: 2021-06-29 | Resolved: 2023-01-24

## 2023-01-24 PROBLEM — G89.3 CANCER RELATED PAIN: Status: RESOLVED | Noted: 2021-07-14 | Resolved: 2023-01-24

## 2023-02-02 NOTE — OP NOTE
OPERATIVE REPORT  PATIENT NAME: Kei Ribera    :  1980  MRN: [id number]  Pt Location: [facility] OR ROOM 14    SURGERY DATE: 2021    Surgeon(s) and Role:     * Fermin Lee MD - Primary     * Lior Aburto PA-C - Assisting     * Minoo Layton PA-C - Assisting     * Atul Quinones MD - Assisting    Preop Diagnosis:  Cancer of sigmoid colon ([address] ) [C18 7]    Post-Op Diagnosis Codes:     * Cancer of sigmoid colon ([address] ) [C18 7]    Procedure(s) (LRB):  ROBOTIC SIGMOID COLECTOMY, MOBILIZATION OF SPLENIC FLEXURE (N/A)    Specimen(s):  ID Type Source Tests Collected by Time Destination   1 : descending colon, partial coloectomy Tissue Colon TISSUE EXAM Fermin Lee MD 2021 [id number]        Estimated Blood Loss:   Minimal    Drains:  NG/OG/Enteral Tube Orogastric 18 Fr Center mouth (Active)   Number of days: 0       Urethral Catheter Double-lumen; Latex 16 Fr  (Active)   Number of days: 0       Anesthesia Type:   General w/ Epidural    Operative Indications:  Cancer of sigmoid colon ([facility] [address] ) [C18 7]    Operative Findings:  Cancer of descending colon amenable with robotic removal    Complications:   None    Procedure and Technique:  Patient is a 37 y o male who presented with left sided colon cancer  Staging workup was negative and he presented today for elective robotic left partial colectomy  The indications, alternatives, risks, and benefits of the surgery were discussed with the patient and informed consent was obtained  The patient was brought to the operating room and placed on operating table in the lithotomy position  A Campo was placed  The abdomen was prepped and draped in the usual sterile fashion  Preoperative antibiotics were given  A time-out was completed verifying correct patient, procedure, site, positioning, and special equipment required  A Veress needle was introduced into the abdominal cavity and pneumoperitoneum was achieved    An 8 mm trocar was placed into the abdominal cavity in the right lower quadrant  Three additional 8 mm trocars were placed under direct visualization, 1 in the right lower quadrant and 2 others in the left upper quadrant  An additional 5 mm trocar assistant port was placed in the right upper quadrant  The patient was positioned in 30 degree Trendelenburg with the left side up to allow for the small bowel to drop out of the operative field  A surveillance of the abdominal cavity revealed no evidence of carcinomatosis or metastatic disease  The sigmoid colon was also surveyed and no tattoo ink was identified  More proximally high up on the descending colon close to the splenic flexure the tattooed area of the colon was identified coinciding with the area identified on prior colonoscopy  It was at this point we decided the patient would only need this left portion of the colon with mesentery removed  The sigmoid was grasped and lifted up to tent the MATT  The takeoff point of the left colic vessel from the MATT was identified below the surface of the peritoneum  The perineum overlying the MATT/left colic branch point was opened using the vessel sealer  The left colic branch was identified and ligated with the vessel sealer  This incision was carried all the way up to the base of the ligament of Treitz  The dissection was continued in the avascular plane anterior to the Toldt fascia cephalad to the level of the ligament of Treitz and distally to the level of the peritoneal reflection  The lateral mobilization of the colon was carried out by dividing any lateral attachments of the descending and sigmoid colon, including the white line of Toldt  This dissection was carried retroperitoneally until it met with the medial to lateral dissection that had previously been done  This dissection was also carried out caudally to the level of the splenic flexure    The mobilization of the splenic flexure was performed to allow sufficient length of the colon to perform a tension-free anastomosis  The greater omentum was reflected cranially and the lesser sac was entered by dividing the gastro colic ligament attachments to the distal transverse colon  The dissection was continued in a medial to lateral fashion all the way to the spleen and tail of the pancreas  The retroperitoneal attachments of the mesentery of the transverse colon were incised at the inferior border of the tail of the pancreas  The remaining lateral attachments were also transected in a lateral to medial fashion completing the splenic flexure mobilization  Using indocyanine green dye we identified a location on the sigmoid colon that was well perfused and appropriate for transection  The most inferior right lower quadrant port was then upsized to 12 mm port in order to accommodate the robotic stapler  Sigmoid colon at the level previous identified was transected with an robotic sure Form 60 mm stapler with green load  The indocyanine green dye was then used again to identify the location of the transverse colon well perfused and appropriate for transection  Robotic 60 mm stapler with green load was used to transect the transverse colon at this level  The transverse colon was noted to be sufficiently mobilize as well as the sigmoid colon in order to achieve a tension-free anastomosis  At this point the procedure the robot was undocked  A 6 cm periumbilical incision was made with scalpel and the dissection was carried down to the fascia with electrocautery  The fascia was divided in the peritoneal cavity was entered  A wound protector was placed  The descending colon with splenic flexure specimen was removed from the operating field and sent to pathology  The stapled ends of the transverse and sigmoid colon were delivered through the wound protector    The 2 ends were aligned ensuring that the mesentery was not twisted and a stapled end-to-end anastomosis was creation in the usual manner with 75 mm CHANTE linear stapler  A crotch stitch was placed using a single interrupted 000 silk suture  A serosal defect was noted about the mid section of the anastomosis staple line and this was oversewn with interrupted 000 silk suture  The bowel was then reintroduced to the peritoneal cavity  Pneumoperitoneum was reestablished and using laparoscopy the abdomen was surveyed and noted to be hemostatic  The anastomosed bowel was not twisted and the anastomosis did not appear to be under any tension  The patient was repositioned back in normal anatomical position  The fascia of the midline incision was closed using 1 PDS in a running fashion and the skin was closed with 0000 Monocryl in a subcuticular fashion  The fascia of the 12 mm right lower quadrant port was closed with an 0 Vicryl in a figure-of-eight fashion  The skin of the port sites were closed using 0000 Monocryl interrupted subcuticular sutures  All incisions were covered by surgical glue and then Steri-Strips  The sponge and instrument counts were all correct and RF Wand was negative for retained sponges  Blood loss was minimal and there were no immediate complications  The patient tolerated the procedure well, was extubated in the operating room, and transferred to the PACU in stable condition  Dr Zane Galarza was present for and performed all critical portions of the procedure        Patient Disposition:  PACU     SIGNATURE: Garry Prasad MD  DATE: May 5, 2021  TIME: 6:16 PM Bilobed Flap Text: The defect edges were debeveled with a #15 scalpel blade.  Given the location of the defect and the proximity to free margins a bilobe flap was deemed most appropriate.  Using a sterile surgical marker, an appropriate bilobe flap drawn around the defect.    The area thus outlined was incised deep to adipose tissue with a #15 scalpel blade.  The skin margins were undermined to an appropriate distance in all directions utilizing iris scissors.

## 2023-02-07 ENCOUNTER — OFFICE VISIT (OUTPATIENT)
Dept: FAMILY MEDICINE CLINIC | Facility: CLINIC | Age: 43
End: 2023-02-07

## 2023-02-07 VITALS
BODY MASS INDEX: 28.49 KG/M2 | SYSTOLIC BLOOD PRESSURE: 118 MMHG | WEIGHT: 199 LBS | DIASTOLIC BLOOD PRESSURE: 72 MMHG | HEIGHT: 70 IN

## 2023-02-07 DIAGNOSIS — E83.110 FAMILIAL HEMOCHROMATOSIS (HCC): ICD-10-CM

## 2023-02-07 DIAGNOSIS — G62.0 CHEMOTHERAPY-INDUCED NEUROPATHY (HCC): ICD-10-CM

## 2023-02-07 DIAGNOSIS — C77.2 COLON CANCER METASTASIZED TO INTRA-ABDOMINAL LYMPH NODE (HCC): ICD-10-CM

## 2023-02-07 DIAGNOSIS — F19.11 HISTORY OF DRUG ABUSE (HCC): ICD-10-CM

## 2023-02-07 DIAGNOSIS — T45.1X5A CHEMOTHERAPY-INDUCED NEUROPATHY (HCC): ICD-10-CM

## 2023-02-07 DIAGNOSIS — D22.9 MULTIPLE NEVI: ICD-10-CM

## 2023-02-07 DIAGNOSIS — E04.1 THYROID NODULE: Primary | ICD-10-CM

## 2023-02-07 DIAGNOSIS — M25.612 DECREASED ROM OF LEFT SHOULDER: ICD-10-CM

## 2023-02-07 DIAGNOSIS — M25.512 ACUTE PAIN OF LEFT SHOULDER: ICD-10-CM

## 2023-02-07 DIAGNOSIS — D22.9 MULTIPLE ATYPICAL NEVI: ICD-10-CM

## 2023-02-07 DIAGNOSIS — C18.7 CANCER OF SIGMOID COLON (HCC): ICD-10-CM

## 2023-02-07 DIAGNOSIS — E11.9 CONTROLLED TYPE 2 DIABETES MELLITUS WITHOUT COMPLICATION, WITHOUT LONG-TERM CURRENT USE OF INSULIN (HCC): ICD-10-CM

## 2023-02-07 DIAGNOSIS — C18.9 COLON CANCER METASTASIZED TO INTRA-ABDOMINAL LYMPH NODE (HCC): ICD-10-CM

## 2023-02-07 DIAGNOSIS — S43.432D TEAR OF LEFT GLENOID LABRUM, SUBSEQUENT ENCOUNTER: ICD-10-CM

## 2023-02-07 NOTE — PROGRESS NOTES
New Patient Outpatient Note    HPI:     Kristin Dorman, 40 y o  male  presents today as a new patient and to establish care  The patient has a significant history associated with multiple chronic issues  He was previously diagnosed with colon cancer s/p hemicolectomy  He continues to be followed by a local hematologist and remains cancer free  During his treatment for colon cancer the patient started having issues with type 2 diabetes  He was initially very uncontrolled, but with the transition from insulin to Trulicity/Januvia, the patient has very well-controlled type 2 diabetes  He was previously following with endocrinology as well due to hemochromatosis  He no longer required any therapeutic phlebotomy after the patient moved from the [facility] to here  He does admit to having a strong history of alcohol abuse and intravenous drug use in the past   He has been sober now for over 4 years, and continues to avoid the use substances  He is interested in placing information on his chart to avoid use of opiates or other medications that may have higher rates of addiction or dependence  Family Hx  UTD in chart    Past Medical History:   Diagnosis Date   • Cancer [facility])    • Colon cancer ([facility] )    • Colon polyp    • Diabetes mellitus ([facility] )    • Hemochromatosis    • Testosterone deficiency         Past Surgical History:   Procedure Laterality Date   • COLON SURGERY     • COLONOSCOPY     • HEMICOLOECTOMY W/ ANASTOMOSIS N/A 05/05/2021    Procedure: ROBOTIC SIGMOID COLECTOMY, MOBILIZATION OF SPLENIC FLEXURE;  Surgeon: Bobbi Bauer MD;  Location: [facility];  Service: Surgical Oncology   • REMOVAL VENOUS PORT (PORT-A-CATH) Left 09/22/2021    Procedure: REMOVAL VENOUS PORT (PORT-A-CATH);   Surgeon: Bobbi Bauer MD;  Location[facility] MA[facility];  Service: Surgical Oncology   • TUNNELED VENOUS PORT PLACEMENT Left 06/01/2021    Procedure: INSERTION VENOUS PORT (PORT-A-CATH) Ultrasound guided Left Internal Jugular Vein Access;  Surgeon: Hi Chris MD;  Location: [facility] MAIN OR;  Service: Surgical Oncology   • WISDOM TOOTH EXTRACTION            Current Outpatient Medications:   •  Cholecalciferol (Vitamin D3) 50 MCG (2000 UT) capsule, Take 2,000 Units by mouth daily, Disp: , Rfl:   •  cyanocobalamin (VITAMIN B-12) 100 mcg tablet, Take by mouth daily, Disp: , Rfl:   •  Dulaglutide (Trulicity) 1 5 IO/2 5EK SOPN, Inject 0 5 mL (1 5 mg total) under the skin once a week, Disp: 6 mL, Rfl: 0  •  Empagliflozin (Jardiance) 25 MG TABS, Take 1 tablet (25 mg total) by mouth daily, Disp: 90 tablet, Rfl: 0  •  Pyridoxine HCl (Vitamin B6) 100 MG TABS, Take by mouth, Disp: , Rfl:      SOCIAL:   Rent/Own: Own  Currently living with: Girlfriend  Stable food: Yes  Safe At Home: Yes  Hobbies: Athletics, skiing, soccer, hiking  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  Restriction to medical procedures:  none    SEXUAL HISTORY:   Preference: Women  Sexually Active: Yes  Birth Control:  Male Condoms    Psychological History  Psychiatric history: History of substance of use, history of depression  History of inpatient: None  Current Therapy/ Provider: None  Current Medications: none    Substance History  Smoking: former, 0 25ppd, 10 years, occasionally vaped  Alcohol Use: Sober 4 years  Substance use:  Sober 4 years, previous use of multiple drugs and IV drug abuse  ROS:   Review of Systems   Constitutional: Negative for chills, fever and unexpected weight change  HENT: Negative for congestion, ear discharge, ear pain, hearing loss, postnasal drip, rhinorrhea, sinus pressure, sinus pain and sore throat  Eyes: Negative for pain and visual disturbance  Respiratory: Negative for cough, chest tightness and shortness of breath  Cardiovascular: Negative for chest pain and palpitations  Gastrointestinal: Negative for abdominal distention, abdominal pain, blood in stool, constipation, diarrhea, nausea and vomiting  Genitourinary: Negative for dysuria and frequency  Skin: Negative for rash and wound  Neurological: Negative for dizziness, light-headedness and headaches  Psychiatric/Behavioral: Negative for self-injury and suicidal ideas  OBJECTIVE  Vitals:    02/07/23 0845   BP: 118/72      Physical Exam  Constitutional:       General: He is not in acute distress  Appearance: Normal appearance  He is not ill-appearing, toxic-appearing or diaphoretic  HENT:      Head: Normocephalic and atraumatic  Right Ear: Tympanic membrane, ear canal and external ear normal  There is no impacted cerumen  Left Ear: Tympanic membrane, ear canal and external ear normal  There is no impacted cerumen  Nose: Nose normal  No congestion or rhinorrhea  Mouth/Throat:      Mouth: Mucous membranes are moist       Pharynx: No oropharyngeal exudate or posterior oropharyngeal erythema  Eyes:      General:         Right eye: No discharge  Left eye: No discharge  Extraocular Movements: Extraocular movements intact  Pupils: Pupils are equal, round, and reactive to light  Cardiovascular:      Rate and Rhythm: Normal rate and regular rhythm  Heart sounds: Normal heart sounds  No murmur heard  No friction rub  No gallop  Pulmonary:      Effort: No respiratory distress  Breath sounds: Normal breath sounds  No stridor  No wheezing, rhonchi or rales  Abdominal:      General: Bowel sounds are normal  There is no distension  Palpations: Abdomen is soft  Tenderness: There is no abdominal tenderness  Musculoskeletal:      Cervical back: Neck supple  No tenderness  Lymphadenopathy:      Cervical: No cervical adenopathy  Skin:     General: Skin is warm  Capillary Refill: Capillary refill takes less than 2 seconds  Neurological:      Mental Status: He is alert  Sensory: No sensory deficit ( light touch in all 4 extremities)        Motor: No weakness ( 5/5 in all 4 extremities)  Deep Tendon Reflexes: Reflexes normal (2/4, intact, C5, C6, L4, S1)  ASSESSMENT AND PLAN   Lyudmila Rao was seen today for establish care  Diagnoses and all orders for this visit:    Thyroid nodule  Patient has a history of thyroid dysfunction and a strong family history of hypothyroidism  He has prior diagnosis of thyroid nodule and was following endocrinology in [place]  Is looking to continue to follow-up for diabetes, thyroid, and hemochromatosis  -     Ambulatory Referral to Endocrinology; Future  -      thyroid; Future    Controlled type 2 diabetes mellitus without complication, without long-term current use of insulin ([facility] )  Patient is currently controlled, but had multiple other issues that his previous endocrinologist was following  He is well maintained on Januvia and Trulicity, he does not require any refills today  Patient to continue with current medication regiment  -     Ambulatory Referral to Endocrinology; Future    Acute pain of left shoulder  Decreased ROM of left shoulder  Tear of left glenoid labrum, subsequent encounter  History of labral tear in the past   He has previously received a cortisone injection into the site with improvement  Interested in repeating this process  Referral to orthopedic surgery for injection placed  -     Ambulatory Referral to Orthopedic Surgery; Future    Multiple atypical nevi  Multiple nevi  Patient has a history of multiple atypical nevi  With his history of colon cancer, and multiple family members with different types of cancer (non dermatological), he is interested in closely monitoring his multiple darker nevi on multiple areas of body  -     Ambulatory Referral to Dermatology; Future    Chemotherapy-induced neuropathy (HCC)  Familial hemochromatosis ([facility] )  Cancer of sigmoid colon ([facility] )  Colon cancer metastasized to intra-abdominal lymph node [facility])  Patient has history of colon cancer    He has been following with hematology  He does admit to some complications from the colon cancer including chemotherapy induce neuropathy  He is not on any medication currently, and has stated he would like to avoid pain medications or interventions that may lead to dependence  History of drug abuse ([facility] [facility] )  See social history  He has a history of drug and alcohol abuse  It is important to avoid medications or opiates per patient request   He is sober for > 4 years and hopes to remain this way         Zuri Rivera DO  [facility]  2/7/2023 9:29 AM

## 2023-03-08 DIAGNOSIS — E11.9 TYPE 2 DIABETES MELLITUS WITHOUT COMPLICATION, WITHOUT LONG-TERM CURRENT USE OF INSULIN (HCC): Chronic | ICD-10-CM

## 2023-03-09 RX ORDER — DULAGLUTIDE 1.5 MG/.5ML
1.5 INJECTION, SOLUTION SUBCUTANEOUS WEEKLY
Qty: 6 ML | Refills: 0 | Status: SHIPPED | OUTPATIENT
Start: 2023-03-09

## 2023-03-24 ENCOUNTER — TELEPHONE (OUTPATIENT)
Dept: HEMATOLOGY ONCOLOGY | Facility: CLINIC | Age: 43
End: 2023-03-24

## 2023-03-24 NOTE — TELEPHONE ENCOUNTER
DANIEL  Route to [facility]                        Patient appointment rescheduled due to Transfer of care      Speaking with   Patient   If you not speaking with the patient, is the person listed on patients Medical Communication Consent?  N/A   Physician patient is established with Dr Yael Orozco appointment date and time 7/18/23 @ 8:20am   Appointment Location [place]   Physician patient is transferring care to   Dr Alayna Mcnamara appointment date and time 7/18/23 @ 8:40am   Reason for BETHANY Griffith Sites left the practice

## 2023-05-22 DIAGNOSIS — E11.9 TYPE 2 DIABETES MELLITUS WITHOUT COMPLICATION, WITHOUT LONG-TERM CURRENT USE OF INSULIN (HCC): Chronic | ICD-10-CM

## 2023-05-22 RX ORDER — DULAGLUTIDE 1.5 MG/.5ML
1.5 INJECTION, SOLUTION SUBCUTANEOUS WEEKLY
Qty: 6 ML | Refills: 0 | Status: SHIPPED | OUTPATIENT
Start: 2023-05-22

## 2023-07-05 ENCOUNTER — APPOINTMENT (OUTPATIENT)
Dept: LAB | Facility: CLINIC | Age: 43
End: 2023-07-05
Payer: COMMERCIAL

## 2023-07-05 DIAGNOSIS — C18.7 CANCER OF SIGMOID COLON (HCC): ICD-10-CM

## 2023-07-05 DIAGNOSIS — C77.2 COLON CANCER METASTASIZED TO INTRA-ABDOMINAL LYMPH NODE (HCC): ICD-10-CM

## 2023-07-05 DIAGNOSIS — C18.9 COLON CANCER METASTASIZED TO INTRA-ABDOMINAL LYMPH NODE (HCC): ICD-10-CM

## 2023-07-05 LAB
ALBUMIN SERPL BCP-MCNC: 4.3 G/DL (ref 3.5–5)
ALP SERPL-CCNC: 68 U/L (ref 46–116)
ALT SERPL W P-5'-P-CCNC: 45 U/L (ref 12–78)
ANION GAP SERPL CALCULATED.3IONS-SCNC: 2 MMOL/L
AST SERPL W P-5'-P-CCNC: 19 U/L (ref 5–45)
BASOPHILS # BLD AUTO: 0.05 THOUSANDS/ÂΜL (ref 0–0.1)
BASOPHILS NFR BLD AUTO: 1 % (ref 0–1)
BILIRUB SERPL-MCNC: 0.95 MG/DL (ref 0.2–1)
BUN SERPL-MCNC: 14 MG/DL (ref 5–25)
CALCIUM SERPL-MCNC: 9.3 MG/DL (ref 8.3–10.1)
CEA SERPL-MCNC: 0.9 NG/ML (ref 0–3)
CHLORIDE SERPL-SCNC: 110 MMOL/L (ref 96–108)
CO2 SERPL-SCNC: 30 MMOL/L (ref 21–32)
CREAT SERPL-MCNC: 1.09 MG/DL (ref 0.6–1.3)
EOSINOPHIL # BLD AUTO: 0.2 THOUSAND/ÂΜL (ref 0–0.61)
EOSINOPHIL NFR BLD AUTO: 2 % (ref 0–6)
ERYTHROCYTE [DISTWIDTH] IN BLOOD BY AUTOMATED COUNT: 12.3 % (ref 11.6–15.1)
GFR SERPL CREATININE-BSD FRML MDRD: 83 ML/MIN/1.73SQ M
GLUCOSE SERPL-MCNC: 120 MG/DL (ref 65–140)
HCT VFR BLD AUTO: 47.5 % (ref 36.5–49.3)
HGB BLD-MCNC: 16.1 G/DL (ref 12–17)
IMM GRANULOCYTES # BLD AUTO: 0.03 THOUSAND/UL (ref 0–0.2)
IMM GRANULOCYTES NFR BLD AUTO: 0 % (ref 0–2)
LYMPHOCYTES # BLD AUTO: 2.26 THOUSANDS/ÂΜL (ref 0.6–4.47)
LYMPHOCYTES NFR BLD AUTO: 27 % (ref 14–44)
MCH RBC QN AUTO: 30.3 PG (ref 26.8–34.3)
MCHC RBC AUTO-ENTMCNC: 33.9 G/DL (ref 31.4–37.4)
MCV RBC AUTO: 90 FL (ref 82–98)
MONOCYTES # BLD AUTO: 0.41 THOUSAND/ÂΜL (ref 0.17–1.22)
MONOCYTES NFR BLD AUTO: 5 % (ref 4–12)
NEUTROPHILS # BLD AUTO: 5.5 THOUSANDS/ÂΜL (ref 1.85–7.62)
NEUTS SEG NFR BLD AUTO: 65 % (ref 43–75)
NRBC BLD AUTO-RTO: 0 /100 WBCS
PLATELET # BLD AUTO: 221 THOUSANDS/UL (ref 149–390)
PMV BLD AUTO: 11.4 FL (ref 8.9–12.7)
POTASSIUM SERPL-SCNC: 4.5 MMOL/L (ref 3.5–5.3)
PROT SERPL-MCNC: 8.6 G/DL (ref 6.4–8.4)
RBC # BLD AUTO: 5.31 MILLION/UL (ref 3.88–5.62)
SODIUM SERPL-SCNC: 142 MMOL/L (ref 135–147)
WBC # BLD AUTO: 8.45 THOUSAND/UL (ref 4.31–10.16)

## 2023-07-05 PROCEDURE — 80053 COMPREHEN METABOLIC PANEL: CPT

## 2023-07-05 PROCEDURE — 36415 COLL VENOUS BLD VENIPUNCTURE: CPT

## 2023-07-05 PROCEDURE — 85025 COMPLETE CBC W/AUTO DIFF WBC: CPT

## 2023-07-05 PROCEDURE — 82378 CARCINOEMBRYONIC ANTIGEN: CPT

## 2023-07-11 ENCOUNTER — HOSPITAL ENCOUNTER (OUTPATIENT)
Dept: CT IMAGING | Facility: HOSPITAL | Age: 43
Discharge: HOME | End: 2023-07-11
Attending: INTERNAL MEDICINE
Payer: COMMERCIAL

## 2023-07-11 DIAGNOSIS — C77.2 COLON CANCER METASTASIZED TO INTRA-ABDOMINAL LYMPH NODE (HCC): ICD-10-CM

## 2023-07-11 DIAGNOSIS — C18.7 CANCER OF SIGMOID COLON (HCC): ICD-10-CM

## 2023-07-11 DIAGNOSIS — C18.9 COLON CANCER METASTASIZED TO INTRA-ABDOMINAL LYMPH NODE (HCC): ICD-10-CM

## 2023-07-11 PROCEDURE — 74177 CT ABD & PELVIS W/CONTRAST: CPT

## 2023-07-11 PROCEDURE — G1004 CDSM NDSC: HCPCS

## 2023-07-11 PROCEDURE — 71260 CT THORAX DX C+: CPT

## 2023-07-11 RX ADMIN — IOHEXOL 100 ML: 350 INJECTION, SOLUTION INTRAVENOUS at 08:19

## 2023-07-14 ENCOUNTER — TELEPHONE (OUTPATIENT)
Dept: HEMATOLOGY ONCOLOGY | Facility: CLINIC | Age: 43
End: 2023-07-14

## 2023-07-14 NOTE — TELEPHONE ENCOUNTER
I spoke with Isaac Schmitt from Radiology Reading room in regards to having the patients CT on 7/11/23 being read prior to his appt on 7/18/23. Isaac Schmitt states she will have the CT sent out.

## 2023-07-17 LAB
ALBUMIN SERPL BCP-MCNC: 4.3 G/DL (ref 3.5–5)
ALP SERPL-CCNC: 68 U/L (ref 46–116)
ALT SERPL W P-5'-P-CCNC: 45 U/L (ref 12–78)
ANION GAP SERPL CALCULATED.3IONS-SCNC: 2 MMOL/L
AST SERPL W P-5'-P-CCNC: 19 U/L (ref 5–45)
BILIRUB SERPL-MCNC: 0.95 MG/DL (ref 0.2–1)
BUN SERPL-MCNC: 14 MG/DL (ref 5–25)
CALCIUM SERPL-MCNC: 9.3 MG/DL (ref 8.3–10.1)
CHLORIDE SERPL-SCNC: 110 MMOL/L (ref 96–108)
CO2 SERPL-SCNC: 30 MMOL/L (ref 21–32)
CREAT SERPL-MCNC: 1.09 MG/DL (ref 0.6–1.3)
GFR SERPL CREATININE-BSD FRML MDRD: 83 ML/MIN/1.73SQ M
GLUCOSE SERPL-MCNC: 120 MG/DL (ref 65–140)
POTASSIUM SERPL-SCNC: 4.5 MMOL/L (ref 3.5–5.3)
PROT SERPL-MCNC: 7.3 G/DL (ref 6.4–8.4)
SODIUM SERPL-SCNC: 142 MMOL/L (ref 135–147)

## 2023-07-18 ENCOUNTER — OFFICE VISIT (OUTPATIENT)
Dept: HEMATOLOGY ONCOLOGY | Facility: CLINIC | Age: 43
End: 2023-07-18
Payer: COMMERCIAL

## 2023-07-18 ENCOUNTER — PREP FOR PROCEDURE (OUTPATIENT)
Age: 43
End: 2023-07-18

## 2023-07-18 VITALS
BODY MASS INDEX: 28.42 KG/M2 | HEIGHT: 70 IN | WEIGHT: 198.5 LBS | SYSTOLIC BLOOD PRESSURE: 140 MMHG | DIASTOLIC BLOOD PRESSURE: 82 MMHG | HEART RATE: 77 BPM | OXYGEN SATURATION: 97 % | RESPIRATION RATE: 18 BRPM | TEMPERATURE: 98.7 F

## 2023-07-18 DIAGNOSIS — C18.9 COLON CANCER METASTASIZED TO INTRA-ABDOMINAL LYMPH NODE (HCC): ICD-10-CM

## 2023-07-18 DIAGNOSIS — C18.9 MALIGNANT NEOPLASM OF COLON, UNSPECIFIED PART OF COLON (HCC): Primary | ICD-10-CM

## 2023-07-18 DIAGNOSIS — C77.2 COLON CANCER METASTASIZED TO INTRA-ABDOMINAL LYMPH NODE (HCC): ICD-10-CM

## 2023-07-18 DIAGNOSIS — C18.7 CANCER OF SIGMOID COLON (HCC): Primary | ICD-10-CM

## 2023-07-18 PROCEDURE — 99215 OFFICE O/P EST HI 40 MIN: CPT | Performed by: INTERNAL MEDICINE

## 2023-07-18 NOTE — TELEPHONE ENCOUNTER
Scheduled date of colonoscopy (as of today): 9/26    Physician performing colonoscopy: Bharathi Ernst    Location of colonoscopy:  [place]    Bowel prep reviewed with patient: Rx needs to be sent to Pharmacy     Instructions reviewed with patient by: [facility]     Clearances: diabetic

## 2023-07-18 NOTE — TELEPHONE ENCOUNTER
07/18/23  Screened by: Margaret Bautista    Referring Provider     Pre- Screening: There is no height or weight on file to calculate BMI.  28.48    Has patient been referred for a routine screening Colonoscopy? yes  Is the patient between 43-73 years old? no      Previous Colonoscopy yes   If yes:    Date: 1 YR AGO    Facility: [facility]    Reason: COLON CANCER      SCHEDULING STAFF: If the patient is between 45yrs-49yrs, please advise patient to confirm benefits/coverage with their insurance company for a routine screening colonoscopy, some insurance carriers will only cover at [address] [place] [address] or older. If the patient is over 66years old, please schedule an office visit. Does the patient want to see a Gastroenterologist prior to their procedure OR are they having any GI symptoms? no    Has the patient been hospitalized or had abdominal surgery in the past 6 months? no    Does the patient use supplemental oxygen? no    Does the patient take Coumadin, Lovenox, Plavix, Elliquis, Xarelto, or other blood thinning medication? no    Has the patient had a stroke, cardiac event, or stent placed in the past year? no    PT PASS OA     SCHEDULING STAFF: If patient answers NO to above questions, then schedule procedure. If patient answers YES to above questions, then schedule office appointment. If patient is between 45yrs - 49yrs, please advise patient that we will have to confirm benefits & coverage with their insurance company for a routine screening colonoscopy.

## 2023-07-18 NOTE — PROGRESS NOTES
Marine Jimenez  1980  [address] [facility] [place]  [address] [facility] [place] [id number]    DISCUSSION/SUMMARY:    69-year-old male with history of T1 N1 M0 (stage IIIA) sigmoid colon adenocarcinoma status post resection and 4 cycles of adjuvant capecitabine/oxaliplatin. Presently Mr. Macrina Moore feels well and clinically there are no concerning findings. Recent CAT scans and blood work/CEA level were within normal limits. The plan is to continue with surveillance. The port has been removed. We discussed what to monitor for as far as unplanned weight loss, abdominal pain, change in bowel habits, bleeding etc. Patient will make an appointment with Dr. Homer Wells, GI (history of colon polyps) to schedule the next colonoscopy. Patient is to return in 6 months with repeat blood work before. Patient knows to call the hematology/oncology office if there are any other questions or concerns. Carefully review your medication list and verify that the list is accurate and up-to-date. Please call the hematology/oncology office if there are medications missing from the list, medications on the list that you are not currently taking or if there is a dosage or instruction that is different from how you're taking that medication.     Patient goals and areas of care: Colon cancer surveillance  Barriers to care: None  Patient is able to self-care  _____________________________________________________________________________________    Chief Complaint   Patient presents with   • Follow-up     Cancer of sigmoid colon      Oncology History   Cancer of sigmoid colon ([address])   4/13/2021 Initial Diagnosis    Cancer of sigmoid colon ([address])    RESULTS OF IMMUNOHISTOCHEMICAL ANALYSIS FOR MISMATCH REPAIR PROTEIN LOSS  INTERPRETATION: NO LOSS OF NUCLEAR EXPRESSION OF MMR PROTEINS: LOW PROBABILITY OF MSI-H.  RESULTS:  AntibodyClone Description Results  MLH1 M1 Mismatch repair protein Intact nuclear expression  MSH2 [id number] Mismatch repair protein Intact nuclear expression  MSH6 40 Mismatch repair protein Intact nuclear expression  PMS2 [id number] Mismatch repair protein Intact nuclear expression     5/5/2021 Surgery    Descending colon, partial colectomy:  - Adenocarcinoma (1.9 cm), moderately differentiated, arising in adenocarcinoma with high grade dysplasia. - Tumor invades the submucosa (through the muscularis mucosa in to deeper one third of submucosa, pT1, Sm3)   - All margins are negative for tumor.  - One  of twenty eight lymph nodes is positive for tumor (1/28).     6/4/2021 - 9/3/2021 Chemotherapy    oxaliplatin (ELOXATIN) chemo infusion, 130 mg/m2 = 275.6 mg, Intravenous, Once, 4 of 4 cycles  Administration: 275.6 mg (6/4/2021), 275.6 mg (6/25/2021), 275.6 mg (8/11/2021), 275.6 mg (9/3/2021)     Colon cancer metastasized to intra-abdominal lymph node ([address])   5/20/2021 Initial Diagnosis    Colon cancer metastasized to intra-abdominal lymph node ([address])     6/4/2021 - 9/3/2021 Chemotherapy    oxaliplatin (ELOXATIN) chemo infusion, 130 mg/m2 = 275.6 mg, Intravenous, Once, 4 of 4 cycles  Administration: 275.6 mg (6/4/2021), 275.6 mg (6/25/2021), 275.6 mg (8/11/2021), 275.6 mg (9/3/2021)       History of Present Illness: 27-year-old male transfer care from Dr. Dayanara Singh. Patient was recently diagnosed with [id number] (1/28 positive lymph nodes) M0 G2 R0 sigmoid colon adenocarcinoma status post resection. Patient received 4 cycles of adjuvant capecitabine and oxaliplatin. Mr. Peace Toribio was then placed on surveillance and returns for follow-up. The patient states feeling quite well, baseline. No GI issues, bowel movements are normal, no bleeding. Appetite is good, weight is stable. No body aches, no headaches, no respiratory issues, no pain control issues. No other active medical issues. Mr. Loomis's mother with a history of colon polyps.   Patient's paternal aunt with history of colon cancer. No other family members with any malignancies. Patient was seen by [facility]. [facility]'s genetics at the time of diagnosis. Review of Systems   Constitutional: Negative. HENT: Negative. Eyes: Negative. Respiratory: Negative. Cardiovascular: Negative. Gastrointestinal: Negative. Endocrine: Negative. Genitourinary: Negative. Musculoskeletal: Negative. Skin: Negative. Allergic/Immunologic: Negative. Neurological: Negative. Hematological: Negative. Psychiatric/Behavioral: Negative. All other systems reviewed and are negative. Patient Active Problem List   Diagnosis   • Controlled type 2 diabetes mellitus without complication, without long-term current use of insulin ([address])   • Familial hemochromatosis ([address])   • Thyroid nodule   • Cancer of sigmoid colon ([address])   • Colon cancer metastasized to intra-abdominal lymph node (HCC)   • Polycythemia   • History of drug abuse ([address])   • Chemotherapy-induced neuropathy (HCC)   • History of colon cancer, stage III     Past Medical History:   Diagnosis Date   • Cancer ([address])    • Colon cancer ([address])    • Colon polyp    • Diabetes mellitus ([address])    • Hemochromatosis    • Testosterone deficiency      Past Surgical History:   Procedure Laterality Date   • COLON SURGERY     • COLONOSCOPY     • HEMICOLOECTOMY W/ ANASTOMOSIS N/A 05/05/2021    Procedure: ROBOTIC SIGMOID COLECTOMY, MOBILIZATION OF SPLENIC FLEXURE;  Surgeon: Dennie Crease, MD;  [facility];  Service: Surgical Oncology   • REMOVAL VENOUS PORT (PORT-A-CATH) Left 09/22/2021    Procedure: REMOVAL VENOUS PORT (PORT-A-CATH);   Surgeon: Dennie Crease, MD;  [facility];  Service: Surgical Oncology   • TUNNELED VENOUS PORT PLACEMENT Left 06/01/2021    Procedure: INSERTION VENOUS PORT (PORT-A-CATH) Ultrasound guided Left Internal Jugular Vein Access;  Surgeon: Piyush Reyes MD;  Location: [facility];  Service: Surgical Oncology   • WISDOM TOOTH EXTRACTION Family History   Problem Relation Age of Onset   • Colon polyps Mother    • Prostate cancer Father    • Colon polyps Maternal Grandmother    • Prostate cancer Maternal Grandfather    • Lung cancer Paternal Grandmother    • Diabetes Paternal Grandmother    • Hypertension Paternal Grandmother    • Prostate cancer Paternal Grandfather    • Heart disease Paternal Grandfather    • Kidney failure Paternal Grandfather    • Diabetes Family    • Hypertension Family      Social History     Socioeconomic History   • Marital status:      Spouse name: Not on file   • Number of children: Not on file   • Years of education: Not on file   • Highest education level: Not on file   Occupational History   • Not on file   Tobacco Use   • Smoking status: Former     Packs/day: 0.25     Years: 10.00     Total pack years: 2.50     Types: Cigarettes     Quit date: 2011     Years since quittin.1   • Smokeless tobacco: Never   Vaping Use   • Vaping Use: Never used   Substance and Sexual Activity   • Alcohol use: Not Currently     Comment: Sober 4 years   • Drug use: Not Currently     Comment: IVDU in past, Sober- 4 years   • Sexual activity: Yes     Partners: Female     Birth control/protection: Condom Male   Other Topics Concern   • Not on file   Social History Narrative   • Not on file     Social Determinants of Health     Financial Resource Strain: Not on file   Food Insecurity: Not on file   Transportation Needs: Not on file   Physical Activity: Sufficiently Active (2021)    Exercise Vital Sign    • Days of Exercise per Week: 5 days    • Minutes of Exercise per Session: 60 min   Stress: No Stress Concern Present (2021)    [place] [email or web address] [address]    • Feeling of Stress :  Only a little   Social Connections: Not on file   Intimate Partner Violence: Not on file   Housing Stability: Not on file       Current Outpatient Medications:   •  Cholecalciferol (Vitamin D3) 50 MCG (2000 UT) capsule, Take 2,000 Units by mouth daily, Disp: , Rfl:   •  cyanocobalamin (VITAMIN B-12) 100 mcg tablet, Take by mouth daily, Disp: , Rfl:   •  dulaglutide (Trulicity) 1.5 EW/3.7SJ injection, Inject 0.5 mL (1.5 mg total) under the skin once a week, Disp: 6 mL, Rfl: 0  •  Empagliflozin (Jardiance) 25 MG TABS, Take 1 tablet (25 mg total) by mouth daily, Disp: 90 tablet, Rfl: 0  •  Pyridoxine HCl (Vitamin B6) 100 MG TABS, Take by mouth, Disp: , Rfl:     No Known Allergies    Vitals:    07/18/23 0854   BP: 140/82   Pulse: 77   Resp: 18   Temp: 98.7 °F (37.1 °C)   SpO2: 97%     Physical Exam  Constitutional:       Appearance: He is well-developed. HENT:      Head: Normocephalic and atraumatic. Right Ear: External ear normal.      Left Ear: External ear normal.   Eyes:      Conjunctiva/sclera: Conjunctivae normal.      Pupils: Pupils are equal, round, and reactive to light. Cardiovascular:      Rate and Rhythm: Normal rate and regular rhythm. Heart sounds: Normal heart sounds. Pulmonary:      Effort: Pulmonary effort is normal.      Breath sounds: Normal breath sounds. Abdominal:      General: Bowel sounds are normal.      Palpations: Abdomen is soft. Musculoskeletal:         General: Normal range of motion. Cervical back: Normal range of motion and neck supple. Skin:     General: Skin is warm. Comments: Warm, moist, good color, no petechiae or ecchymoses   Neurological:      Mental Status: He is alert and oriented to person, place, and time. Deep Tendon Reflexes: Reflexes are normal and symmetric. Psychiatric:         Behavior: Behavior normal.         Thought Content:  Thought content normal.         Judgment: Judgment normal.     Extremities: No lower extremity mid bilaterally, no cords, pulses are 2+  Lymphatics: No adenopathy in the neck, supraclavicular region, axilla bilaterally    Performance Status: 0 - Asymptomatic    Labs    7/5/2023 WBC = 8.45 hemoglobin = 16.1 hematocrit = 47.5 platelet = 165 neutrophil = 65% CEA = 0.9 BUN = 14 creatinine = 1.09 calcium = 9.3 LFTs WNL    Imaging    7/11/2023 CAT scan chest abdomen pelvis with contrast.  Impression stated no findings of locally recurrent or metastatic disease in the chest, abdomen or pelvis. Comparison was made to the CAT scans from January 11, 2023. Pathology    Case Report   Surgical Pathology Report                         Case: [id number]                                    Authorizing Provider: Joaquín Paniagua MD          Collected:           07/13/2022 0744               Ordering Location:     [facility]        Received:            07/13/2022 1217                                      [facility]                                                     Pathologist:           Marilee Valencia MD                                                     Specimens:   A) - Polyp, Colorectal, polypoid lesion at anasomosis hot snare                                      B) - Polyp, Colorectal, rectal polyp x2 cold snare(1) and cold forcep(1)                   Final Diagnosis   A. Colonic anastomosis:  - Benign polypoid colonic mucosa with features of an inflammatory pseudopolyp.  - No epithelial dysplasia and no evidence of malignancy.     B. Rectum polyp x 2:  - Hyperplastic polyp x 2.  - No epithelial dysplasia and no evidence of malignancy.    Electronically signed by Marilee Valencia MD on 7/26/2022 at  3:07 PM       Case Report   Surgical Pathology Report                         Case: [id number]                                    Authorizing Provider: Cinthya Horvath MD       Collected:           05/05/2021 1638               Ordering Location:     [facility]      Received:            05/05/2021 2057                                      [facility]                                                       Pathologist:           Suni Herrmann MD                                                                  Specimen:    Colon, descending colon, partial coloectomy                                                Final Diagnosis   A. Descending colon, partial colectomy:  - Adenocarcinoma (1.9 cm), moderately differentiated, arising in adenocarcinoma with high grade dysplasia. - Tumor invades the submucosa (through the muscularis mucosa in to deeper one third of submucosa, pT1, Sm3)   - All margins are negative for tumor.  - One  of twenty eight lymph nodes is positive for tumor (1/28).    Note:   If clinically indicated, the most appropriate tissue block(s) for ancillary testing are block(s):  A12  Intradepartmental consultation is in agreement.     Immunohistochemistry for desmin is performed on tissue block A12 to help in the assessment of this case.      Electronically signed by Rocío Graham MD on 5/10/2021 at 10:10 AM   Additional Information    All reported additional testing was performed with appropriately reactive controls.  These tests were developed and their performance characteristics determined by [facility] or appropriate performing facility, though some tests may be performed on tissues which have not been validated for performance characteristics (such as staining performed on alcohol exposed cell blocks and decalcified tissues).  Results should be interpreted with caution and in the context of the patients’ clinical condition. These tests may not be cleared or approved by the U.S. Food and Drug Administration, though the FDA has determined that such clearance or approval is not necessary. These tests are used for clinical purposes and they should not be regarded as investigational or for research. This laboratory has been approved by CLIA 88, designated as a high-complexity laboratory and is qualified to perform these tests.   .   Synoptic Checklist   COLON AND RECTUM: Resection, Including Transanal Disk Excision of Rectal Neoplasms  8th Edition - Protocol posted: 2/26/2020  COLON AND RECTUM: RESECTION - All Specimens  SPECIMEN   Procedure  Left hemicolectomy    TUMOR   Tumor Site  Sigmoid colon: at 40 cm per biopsy specimen.     Histologic Type  Adenocarcinoma    Histologic Grade  G2: Moderately differentiated    Tumor Size  Greatest dimension (Centimeters): 1.9 cm   Additional Dimension (Centimeters)  1.8 cm     0.6 cm   Tumor Extension  Tumor invades submucosa    Macroscopic Tumor Perforation  Not identified    Lymphovascular Invasion  Not identified    Perineural Invasion  Not identified    Tumor Budding  Number of tumor buds in one ‘hotspot’ field (total number in area = 0.785 mm2): 1      Low score (0-4)    Type of Polyp in Which Invasive Carcinoma Arose  Tubular adenoma    Treatment Effect  No known presurgical therapy    MARGINS   Margins  All margins are uninvolved by invasive carcinoma, high grade dysplasia / intramucosal carcinoma, and low grade dysplasia    Margins Examined  Proximal      Distal      Radial (circumferential) or Mesenteric    Distance of Invasive Carcinoma from Closest Margin  >5 cm cm   Closest Margin  Cannot be determined: unoriented specimen    LYMPH NODES   Number of Lymph Nodes Involved  1    Number of Lymph Nodes Examined  28    Tumor Deposits  Not identified    PATHOLOGIC STAGE CLASSIFICATION (pTNM, AJCC 8th Edition)      Primary Tumor (pT)  pT1    Regional Lymph Nodes (pN)  pN1a    ADDITIONAL FINDINGS   Additional Findings  Adenoma(s)

## 2023-08-20 DIAGNOSIS — E11.9 TYPE 2 DIABETES MELLITUS WITHOUT COMPLICATION, WITHOUT LONG-TERM CURRENT USE OF INSULIN (HCC): Chronic | ICD-10-CM

## 2023-08-22 RX ORDER — DULAGLUTIDE 1.5 MG/.5ML
1.5 INJECTION, SOLUTION SUBCUTANEOUS WEEKLY
Qty: 6 ML | Refills: 0 | Status: SHIPPED | OUTPATIENT
Start: 2023-08-22

## 2023-09-26 ENCOUNTER — HOSPITAL ENCOUNTER (OUTPATIENT)
Dept: GASTROENTEROLOGY | Facility: AMBULARY SURGERY CENTER | Age: 43
Setting detail: OUTPATIENT SURGERY
Discharge: HOME | End: 2023-09-26
Attending: INTERNAL MEDICINE
Payer: COMMERCIAL

## 2023-09-26 ENCOUNTER — ANESTHESIA (OUTPATIENT)
Dept: GASTROENTEROLOGY | Facility: AMBULARY SURGERY CENTER | Age: 43
End: 2023-09-26

## 2023-09-26 ENCOUNTER — ANESTHESIA EVENT (OUTPATIENT)
Dept: GASTROENTEROLOGY | Facility: AMBULARY SURGERY CENTER | Age: 43
End: 2023-09-26

## 2023-09-26 VITALS
DIASTOLIC BLOOD PRESSURE: 63 MMHG | HEIGHT: 70 IN | TEMPERATURE: 96.5 F | RESPIRATION RATE: 20 BRPM | HEART RATE: 65 BPM | OXYGEN SATURATION: 100 % | WEIGHT: 191 LBS | BODY MASS INDEX: 27.35 KG/M2 | SYSTOLIC BLOOD PRESSURE: 117 MMHG

## 2023-09-26 DIAGNOSIS — C18.9 MALIGNANT NEOPLASM OF COLON, UNSPECIFIED PART OF COLON (HCC): ICD-10-CM

## 2023-09-26 LAB — GLUCOSE SERPL-MCNC: 95 MG/DL (ref 65–140)

## 2023-09-26 PROCEDURE — 82948 REAGENT STRIP/BLOOD GLUCOSE: CPT

## 2023-09-26 RX ORDER — PROPOFOL 10 MG/ML
INJECTION, EMULSION INTRAVENOUS AS NEEDED
Status: DISCONTINUED | OUTPATIENT
Start: 2023-09-26 | End: 2023-09-26

## 2023-09-26 RX ORDER — SODIUM CHLORIDE, SODIUM LACTATE, POTASSIUM CHLORIDE, CALCIUM CHLORIDE 600; 310; 30; 20 MG/100ML; MG/100ML; MG/100ML; MG/100ML
INJECTION, SOLUTION INTRAVENOUS CONTINUOUS PRN
Status: DISCONTINUED | OUTPATIENT
Start: 2023-09-26 | End: 2023-09-26

## 2023-09-26 RX ADMIN — PROPOFOL 20 MG: 10 INJECTION, EMULSION INTRAVENOUS at 07:42

## 2023-09-26 RX ADMIN — PROPOFOL 20 MG: 10 INJECTION, EMULSION INTRAVENOUS at 07:44

## 2023-09-26 RX ADMIN — PROPOFOL 150 MG: 10 INJECTION, EMULSION INTRAVENOUS at 07:33

## 2023-09-26 RX ADMIN — PROPOFOL 50 MG: 10 INJECTION, EMULSION INTRAVENOUS at 07:37

## 2023-09-26 RX ADMIN — PROPOFOL 50 MG: 10 INJECTION, EMULSION INTRAVENOUS at 07:35

## 2023-09-26 RX ADMIN — SODIUM CHLORIDE, SODIUM LACTATE, POTASSIUM CHLORIDE, AND CALCIUM CHLORIDE: .6; .31; .03; .02 INJECTION, SOLUTION INTRAVENOUS at 07:20

## 2023-09-26 RX ADMIN — PROPOFOL 20 MG: 10 INJECTION, EMULSION INTRAVENOUS at 07:39

## 2023-09-26 NOTE — H&P
History and Physical - [facility]  Dequan Nava 44 y.o. male MRN: [id number]        HPI: 45-year-old male with history of colon cancer status post sigmoid colectomy. He reports doing well. Historical Information   Past Medical History:   Diagnosis Date   • Cancer [facility])    • Colon cancer ([address])    • Colon polyp    • Diabetes mellitus ([address])    • Hemochromatosis    • Testosterone deficiency      Past Surgical History:   Procedure Laterality Date   • COLON SURGERY     • COLONOSCOPY     • HEMICOLOECTOMY W/ ANASTOMOSIS N/A 2021    Procedure: ROBOTIC SIGMOID COLECTOMY, MOBILIZATION OF SPLENIC FLEXURE;  Surgeon: Brandi Bianchi MD;  [facility];  Service: Surgical Oncology   • REMOVAL VENOUS PORT (PORT-A-CATH) Left 2021    Procedure: REMOVAL VENOUS PORT (PORT-A-CATH);   Surgeon: Brandi Bianchi MD;  [facility];  Service: Surgical Oncology   • TUNNELED VENOUS PORT PLACEMENT Left 2021    Procedure: INSERTION VENOUS PORT (PORT-A-CATH) Ultrasound guided Left Internal Jugular Vein Access;  Surgeon: Kailash Shankar MD;  Location: [facility];  Service: Surgical Oncology   • WISDOM TOOTH EXTRACTION       Social History   Social History     Substance and Sexual Activity   Alcohol Use Not Currently    Comment: Sober 4 years     Social History     Substance and Sexual Activity   Drug Use Not Currently    Comment: IVDU in past, Sober- 4 years     Social History     Tobacco Use   Smoking Status Former   • Packs/day: 0.25   • Years: 10.00   • Total pack years: 2.50   • Types: Cigarettes   • Quit date: 2011   • Years since quittin.3   Smokeless Tobacco Never     Family History   Problem Relation Age of Onset   • Colon polyps Mother    • Prostate cancer Father    • Colon polyps Maternal Grandmother    • Prostate cancer Maternal Grandfather    • Lung cancer Paternal Grandmother    • Diabetes Paternal Grandmother    • Hypertension Paternal Grandmother    • Prostate cancer Paternal Grandfather    • Heart disease Paternal Grandfather    • Kidney failure Paternal Grandfather    • Diabetes Family    • Hypertension Family        Meds/Allergies     (Not in a hospital admission)      No Known Allergies    Objective     Blood pressure 120/64, pulse 88, temperature (!) 96.9 °F (36.1 °C), temperature source Temporal, resp. rate 18, height 5' 10" (1.778 m), weight 86.6 kg (191 lb), SpO2 98 %.     PHYSICAL EXAM:    Gen: NAD  CV: S1 & S2 normal, RRR  CHEST: Clear to auscultate  ABD: soft, NT/ND, good bowel sounds  EXT: no edema    ASSESSMENT:     History of colon cancer     PLAN:    Colonoscopy

## 2023-09-26 NOTE — ANESTHESIA PREPROCEDURE EVALUATION
Procedure:  COLONOSCOPY    Relevant Problems   ENDO   (+) Controlled type 2 diabetes mellitus without complication, without long-term current use of insulin (HCC)      GI/HEPATIC   (+) Cancer of sigmoid colon (HCC)      Other   (+) Colon cancer metastasized to intra-abdominal lymph node (HCC)      Adequately NPO. No prior anesthesia complications  Physical Exam    Airway  Comment: Mccrary  Mallampati score: II  TM Distance: >3 FB  Neck ROM: full     Dental   No notable dental hx     Cardiovascular  Rhythm: regular, Rate: normal,     Pulmonary  Pulmonary exam normal     Other Findings        Anesthesia Plan  ASA Score- 2     Anesthesia Type- IV sedation with anesthesia with ASA Monitors. Additional Monitors:   Airway Plan:           Plan Factors-Exercise tolerance (METS): >4 METS. Chart reviewed. EKG reviewed. Existing labs reviewed. Patient summary reviewed. Patient is not a current smoker. Obstructive sleep apnea risk education given perioperatively. Induction- intravenous. Postoperative Plan-     Informed Consent- Anesthetic plan and risks discussed with patient. I personally reviewed this patient with the CRNA. Discussed and agreed on the Anesthesia Plan with the CRNA. Abimael Arriaga

## 2023-09-26 NOTE — ANESTHESIA POSTPROCEDURE EVALUATION
Post-Op Assessment Note    CV Status:  Stable  Pain Score: 0    Pain management: adequate     Mental Status:  Alert and awake   Hydration Status:  Euvolemic   PONV Controlled:  Controlled   Airway Patency:  Patent      Post Op Vitals Reviewed: Yes      Staff: CRNA         No notable events documented.     BP   94/63   Temp 97   Pulse 71   Resp 16   SpO2 100

## 2023-10-26 ENCOUNTER — VBI (OUTPATIENT)
Dept: ADMINISTRATIVE | Facility: OTHER | Age: 43
End: 2023-10-26

## 2023-11-16 DIAGNOSIS — E11.9 TYPE 2 DIABETES MELLITUS WITHOUT COMPLICATION, WITHOUT LONG-TERM CURRENT USE OF INSULIN (HCC): Chronic | ICD-10-CM

## 2023-11-16 RX ORDER — DULAGLUTIDE 1.5 MG/.5ML
1.5 INJECTION, SOLUTION SUBCUTANEOUS WEEKLY
Qty: 6 ML | Refills: 0 | Status: SHIPPED | OUTPATIENT
Start: 2023-11-16

## 2023-11-16 NOTE — TELEPHONE ENCOUNTER
Called patient to make an appointment to be seen by Endo to continue ordering medication. Left message on machine to return call.

## 2023-12-28 ENCOUNTER — VBI (OUTPATIENT)
Dept: ADMINISTRATIVE | Facility: OTHER | Age: 43
End: 2023-12-28

## 2024-01-11 ENCOUNTER — TELEPHONE (OUTPATIENT)
Dept: HEMATOLOGY ONCOLOGY | Facility: MEDICAL CENTER | Age: 44
End: 2024-01-11

## 2024-01-11 NOTE — TELEPHONE ENCOUNTER
Spoke with patient regarding labs needed to be drawn prior to appointment.  Indicated the scripts are in the system, they are non-fasting and patient can go to any [facility] facility to have the labs drawn.  Patient verbalized understanding.

## 2024-01-15 ENCOUNTER — APPOINTMENT (OUTPATIENT)
Dept: LAB | Facility: CLINIC | Age: 44
End: 2024-01-15
Payer: COMMERCIAL

## 2024-01-15 DIAGNOSIS — C18.7 CANCER OF SIGMOID COLON (HCC): ICD-10-CM

## 2024-01-15 LAB
ALBUMIN SERPL BCP-MCNC: 4.5 G/DL (ref 3.5–5)
ALP SERPL-CCNC: 73 U/L (ref 34–104)
ALT SERPL W P-5'-P-CCNC: 50 U/L (ref 7–52)
ANION GAP SERPL CALCULATED.3IONS-SCNC: 6 MMOL/L
AST SERPL W P-5'-P-CCNC: 25 U/L (ref 13–39)
BASOPHILS # BLD AUTO: 0.03 THOUSANDS/ÂΜL (ref 0–0.1)
BASOPHILS NFR BLD AUTO: 0 % (ref 0–1)
BILIRUB SERPL-MCNC: 0.78 MG/DL (ref 0.2–1)
BUN SERPL-MCNC: 14 MG/DL (ref 5–25)
CALCIUM SERPL-MCNC: 9.7 MG/DL (ref 8.4–10.2)
CEA SERPL-MCNC: 0.8 NG/ML (ref 0–3)
CHLORIDE SERPL-SCNC: 104 MMOL/L (ref 96–108)
CO2 SERPL-SCNC: 29 MMOL/L (ref 21–32)
CREAT SERPL-MCNC: 0.91 MG/DL (ref 0.6–1.3)
EOSINOPHIL # BLD AUTO: 0.16 THOUSAND/ÂΜL (ref 0–0.61)
EOSINOPHIL NFR BLD AUTO: 2 % (ref 0–6)
ERYTHROCYTE [DISTWIDTH] IN BLOOD BY AUTOMATED COUNT: 12.1 % (ref 11.6–15.1)
GFR SERPL CREATININE-BSD FRML MDRD: 103 ML/MIN/1.73SQ M
GLUCOSE P FAST SERPL-MCNC: 111 MG/DL (ref 65–99)
HCT VFR BLD AUTO: 47.5 % (ref 36.5–49.3)
HGB BLD-MCNC: 15.8 G/DL (ref 12–17)
IMM GRANULOCYTES # BLD AUTO: 0.03 THOUSAND/UL (ref 0–0.2)
IMM GRANULOCYTES NFR BLD AUTO: 0 % (ref 0–2)
LYMPHOCYTES # BLD AUTO: 1.94 THOUSANDS/ÂΜL (ref 0.6–4.47)
LYMPHOCYTES NFR BLD AUTO: 23 % (ref 14–44)
MCH RBC QN AUTO: 28.9 PG (ref 26.8–34.3)
MCHC RBC AUTO-ENTMCNC: 33.3 G/DL (ref 31.4–37.4)
MCV RBC AUTO: 87 FL (ref 82–98)
MONOCYTES # BLD AUTO: 0.39 THOUSAND/ÂΜL (ref 0.17–1.22)
MONOCYTES NFR BLD AUTO: 5 % (ref 4–12)
NEUTROPHILS # BLD AUTO: 5.84 THOUSANDS/ÂΜL (ref 1.85–7.62)
NEUTS SEG NFR BLD AUTO: 70 % (ref 43–75)
NRBC BLD AUTO-RTO: 0 /100 WBCS
PLATELET # BLD AUTO: 274 THOUSANDS/UL (ref 149–390)
PMV BLD AUTO: 10.7 FL (ref 8.9–12.7)
POTASSIUM SERPL-SCNC: 4.6 MMOL/L (ref 3.5–5.3)
PROT SERPL-MCNC: 7.4 G/DL (ref 6.4–8.4)
RBC # BLD AUTO: 5.47 MILLION/UL (ref 3.88–5.62)
SODIUM SERPL-SCNC: 139 MMOL/L (ref 135–147)
WBC # BLD AUTO: 8.39 THOUSAND/UL (ref 4.31–10.16)

## 2024-01-15 PROCEDURE — 80053 COMPREHEN METABOLIC PANEL: CPT

## 2024-01-15 PROCEDURE — 85025 COMPLETE CBC W/AUTO DIFF WBC: CPT

## 2024-01-15 PROCEDURE — 82378 CARCINOEMBRYONIC ANTIGEN: CPT

## 2024-01-15 PROCEDURE — 36415 COLL VENOUS BLD VENIPUNCTURE: CPT

## 2024-01-18 ENCOUNTER — OFFICE VISIT (OUTPATIENT)
Dept: HEMATOLOGY ONCOLOGY | Facility: CLINIC | Age: 44
End: 2024-01-18
Payer: COMMERCIAL

## 2024-01-18 VITALS
WEIGHT: 204 LBS | OXYGEN SATURATION: 97 % | HEART RATE: 97 BPM | HEIGHT: 70 IN | TEMPERATURE: 98.6 F | RESPIRATION RATE: 17 BRPM | DIASTOLIC BLOOD PRESSURE: 84 MMHG | BODY MASS INDEX: 29.2 KG/M2 | SYSTOLIC BLOOD PRESSURE: 114 MMHG

## 2024-01-18 DIAGNOSIS — C77.2 COLON CANCER METASTASIZED TO INTRA-ABDOMINAL LYMPH NODE (HCC): ICD-10-CM

## 2024-01-18 DIAGNOSIS — Z79.4 TYPE 2 DIABETES MELLITUS WITHOUT COMPLICATION, WITH LONG-TERM CURRENT USE OF INSULIN (HCC): ICD-10-CM

## 2024-01-18 DIAGNOSIS — C18.7 CANCER OF SIGMOID COLON (HCC): Primary | ICD-10-CM

## 2024-01-18 DIAGNOSIS — C18.9 COLON CANCER METASTASIZED TO INTRA-ABDOMINAL LYMPH NODE (HCC): ICD-10-CM

## 2024-01-18 DIAGNOSIS — E11.9 TYPE 2 DIABETES MELLITUS WITHOUT COMPLICATION, WITH LONG-TERM CURRENT USE OF INSULIN (HCC): ICD-10-CM

## 2024-01-18 PROCEDURE — 99214 OFFICE O/P EST MOD 30 MIN: CPT | Performed by: INTERNAL MEDICINE

## 2024-01-18 NOTE — PROGRESS NOTES
Betito Loomis  1980  [address] [facility] [place]  [address] [facility]  [place] [id number]    DISCUSSION/SUMMARY:    43-year-old male with history of T1 N1 M0 (stage IIIA) sigmoid colon adenocarcinoma status post resection and 4 cycles of adjuvant capecitabine/oxaliplatin.  Mr. Loomis continues to feel well and clinically there are no concerning findings.  Recent blood work was good/acceptable, prior CAT scans were also WNL.  The plan is to continue with surveillance.    We re-discussed what to monitor for as far as unplanned weight loss, abdominal pain, change in bowel habits, bleeding etc. Patient will continue his routine follow-ups with GI, schedule colonoscopy with Dr. Glass as directed.  Mr. Loomis is to return in 1 year with repeat blood work and CAT scans before.    Patient knows to call the hematology/oncology office if there are any other questions or concerns.    Carefully review your medication list and verify that the list is accurate and up-to-date. Please call the hematology/oncology office if there are medications missing from the list, medications on the list that you are not currently taking or if there is a dosage or instruction that is different from how you're taking that medication.    Patient goals and areas of care: Colon cancer surveillance  Barriers to care: None  Patient is able to self-care  _____________________________________________________________________________________    Chief Complaint   Patient presents with    Follow-up    History of colon cancer on surveillance     Oncology History   Cancer of sigmoid colon (HCC)   4/13/2021 Initial Diagnosis    Cancer of sigmoid colon (HCC)    RESULTS OF IMMUNOHISTOCHEMICAL ANALYSIS FOR MISMATCH REPAIR PROTEIN LOSS  INTERPRETATION: NO LOSS OF NUCLEAR EXPRESSION OF MMR PROTEINS: LOW PROBABILITY OF MSI-H.  RESULTS:  AntibodyClone Description Results  MLH1 M1 Mismatch repair protein Intact  nuclear expression  MSH2 [id number] Mismatch repair protein Intact nuclear expression  MSH6 44 Mismatch repair protein Intact nuclear expression  PMS2 [id number] Mismatch repair protein Intact nuclear expression     5/5/2021 Surgery    Descending colon, partial colectomy:  - Adenocarcinoma (1.9 cm), moderately differentiated, arising in adenocarcinoma with high grade dysplasia.  - Tumor invades the submucosa (through the muscularis mucosa in to deeper one third of submucosa, pT1, Sm3)   - All margins are negative for tumor.  - One  of twenty eight lymph nodes is positive for tumor (1/28).     6/4/2021 - 9/3/2021 Chemotherapy    oxaliplatin (ELOXATIN) chemo infusion, 130 mg/m2 = 275.6 mg, Intravenous, Once, 4 of 4 cycles  Administration: 275.6 mg (6/4/2021), 275.6 mg (6/25/2021), 275.6 mg (8/11/2021), 275.6 mg (9/3/2021)     Colon cancer metastasized to intra-abdominal lymph node (HCC)   5/20/2021 Initial Diagnosis    Colon cancer metastasized to intra-abdominal lymph node (HCC)     6/4/2021 - 9/3/2021 Chemotherapy    oxaliplatin (ELOXATIN) chemo infusion, 130 mg/m2 = 275.6 mg, Intravenous, Once, 4 of 4 cycles  Administration: 275.6 mg (6/4/2021), 275.6 mg (6/25/2021), 275.6 mg (8/11/2021), 275.6 mg (9/3/2021)       History of Present Illness: 43-year-old male transfer care from Dr. Celis.      Patient was previously diagnosed with [id number] (1/28 positive lymph nodes) M0 G2 R0 sigmoid colon adenocarcinoma status post resection.  Patient received 4 cycles of adjuvant capecitabine and oxaliplatin.  Mr. Loomis was then placed on surveillance and returns for follow-up.    The patient presently states feeling well, baseline.  Appetite is good, weight is stable.  Bowel movements are normal, no bleeding.  No abdominal pain.  Patient recently with the flu over the holiday season, now better.  Activities are back to baseline.  No pain control issues.    Mr. Loomis's mother with a history of colon polyps.  Patient's paternal aunt  with history of colon cancer.  No other family members with any malignancies.  Patient was seen by [facility] at the time of diagnosis.    Review of Systems   Constitutional: Negative.    HENT: Negative.     Eyes: Negative.    Respiratory: Negative.     Cardiovascular: Negative.    Gastrointestinal: Negative.    Endocrine: Negative.    Genitourinary: Negative.    Musculoskeletal: Negative.    Skin: Negative.    Allergic/Immunologic: Negative.    Neurological: Negative.    Hematological: Negative.    Psychiatric/Behavioral: Negative.     All other systems reviewed and are negative.    Patient Active Problem List   Diagnosis    Controlled type 2 diabetes mellitus without complication, without long-term current use of insulin (HCC)    Familial hemochromatosis (HCC)    Thyroid nodule    Cancer of sigmoid colon (HCC)    Colon cancer metastasized to intra-abdominal lymph node (HCC)    Polycythemia    History of drug abuse (HCC)    Chemotherapy-induced neuropathy     History of colon cancer, stage III     Past Medical History:   Diagnosis Date    Cancer (HCC)     Colon cancer (HCC)     Colon polyp     Diabetes mellitus (HCC)     Hemochromatosis     Testosterone deficiency      Past Surgical History:   Procedure Laterality Date    COLON SURGERY      COLONOSCOPY      HEMICOLOECTOMY W/ ANASTOMOSIS N/A 05/05/2021    Procedure: ROBOTIC SIGMOID COLECTOMY, MOBILIZATION OF SPLENIC FLEXURE;  Surgeon: Michelle Beltre MD;  Location: [facility];  Service: Surgical Oncology    REMOVAL VENOUS PORT (PORT-A-CATH) Left 09/22/2021    Procedure: REMOVAL VENOUS PORT (PORT-A-CATH);  Surgeon: Michelle Beltre MD;  [facility];  Service: Surgical Oncology    TUNNELED VENOUS PORT PLACEMENT Left 06/01/2021    Procedure: INSERTION VENOUS PORT (PORT-A-CATH) Ultrasound guided Left Internal Jugular Vein Access;  Surgeon: Sylvie Vines MD;  Location: [facility];  Service: Surgical Oncology    WISDOM TOOTH EXTRACTION        Family History   Problem Relation Age of Onset    Colon polyps Mother     Prostate cancer Father     Colon polyps Maternal Grandmother     Prostate cancer Maternal Grandfather     Lung cancer Paternal Grandmother     Diabetes Paternal Grandmother     Hypertension Paternal Grandmother     Prostate cancer Paternal Grandfather     Heart disease Paternal Grandfather     Kidney failure Paternal Grandfather     Diabetes Family     Hypertension Family      Social History     Socioeconomic History    Marital status:      Spouse name: Not on file    Number of children: Not on file    Years of education: Not on file    Highest education level: Not on file   Occupational History    Not on file   Tobacco Use    Smoking status: Former     Current packs/day: 0.00     Average packs/day: 0.3 packs/day for 10.0 years (2.5 ttl pk-yrs)     Types: Cigarettes     Start date: 2001     Quit date: 2011     Years since quittin.6    Smokeless tobacco: Never   Vaping Use    Vaping status: Never Used   Substance and Sexual Activity    Alcohol use: Not Currently     Comment: Sober 4 years    Drug use: Not Currently     Comment: IVDU in past, Sober- 4 years    Sexual activity: Yes     Partners: Female     Birth control/protection: Condom Male   Other Topics Concern    Not on file   Social History Narrative    Not on file     Social Determinants of Health     Financial Resource Strain: Not on file   Food Insecurity: Not on file   Transportation Needs: Not on file   Physical Activity: Sufficiently Active (2021)    Exercise Vital Sign     Days of Exercise per Week: 5 days     Minutes of Exercise per Session: 60 min   Stress: No Stress Concern Present (2021)    [place] [employer] - Occupational Stress Questionnaire     Feeling of Stress : Only a little   Social Connections: Not on file   Intimate Partner Violence: Not on file   Housing Stability: Not on file       Current Outpatient  Medications:     Cholecalciferol (Vitamin D3) 50 MCG (2000 UT) capsule, Take 2,000 Units by mouth daily, Disp: , Rfl:     cyanocobalamin (VITAMIN B-12) 100 mcg tablet, Take by mouth daily, Disp: , Rfl:     dulaglutide (Trulicity) 1.5 MG/0.5ML injection, Inject 0.5 mL (1.5 mg total) under the skin once a week, Disp: 6 mL, Rfl: 0    Empagliflozin (Jardiance) 25 MG TABS, Take 1 tablet (25 mg total) by mouth daily, Disp: 90 tablet, Rfl: 0    Pyridoxine HCl (Vitamin B6) 100 MG TABS, Take by mouth, Disp: , Rfl:     polyethylene glycol (GOLYTELY) 4000 mL solution, Take 4,000 mL by mouth once for 1 dose, Disp: 4000 mL, Rfl: 0    No Known Allergies    Vitals:    01/18/24 0845   BP: 114/84   Pulse: 97   Resp: 17   Temp: 98.6 °F (37 °C)   SpO2: 97%     Physical Exam  Constitutional:       Appearance: He is well-developed.   HENT:      Head: Normocephalic and atraumatic.      Right Ear: External ear normal.      Left Ear: External ear normal.   Eyes:      Conjunctiva/sclera: Conjunctivae normal.      Pupils: Pupils are equal, round, and reactive to light.   Cardiovascular:      Rate and Rhythm: Normal rate and regular rhythm.      Heart sounds: Normal heart sounds.   Pulmonary:      Effort: Pulmonary effort is normal.      Breath sounds: Normal breath sounds.   Abdominal:      General: Bowel sounds are normal.      Palpations: Abdomen is soft.   Musculoskeletal:         General: Normal range of motion.      Cervical back: Normal range of motion and neck supple.   Skin:     General: Skin is warm.      Comments: Warm, moist, good color, no petechiae or ecchymoses   Neurological:      Mental Status: He is alert and oriented to person, place, and time.      Deep Tendon Reflexes: Reflexes are normal and symmetric.   Psychiatric:         Behavior: Behavior normal.         Thought Content: Thought content normal.         Judgment: Judgment normal.     Extremities: No lower extremity mid bilaterally, no cords, pulses are  2+  Lymphatics: No adenopathy in the neck, supraclavicular region, axilla bilaterally    Performance Status: 0 - Asymptomatic    Labs    1/15/2024 WBC = 8.39 hemoglobin = 15.8 hematocrit = 47.5 platelet = 274 neutrophil = 70% CEA = 0.8 BUN = 14 creatinine = 0.91 calcium = 9.7 LFTs WNL    7/5/2023 WBC = 8.45 hemoglobin = 16.1 hematocrit = 47.5 platelet = 221 neutrophil = 65% CEA = 0.9 BUN = 14 creatinine = 1.09 calcium = 9.3 LFTs WNL    Imaging    7/11/2023 CAT scan chest abdomen pelvis with contrast.  Impression stated no findings of locally recurrent or metastatic disease in the chest, abdomen or pelvis.  Comparison was made to the CAT scans from January 11, 2023.    Pathology    Case Report   Surgical Pathology Report                         Case: [id number]                                    Authorizing Provider:  Eboni Glass MD          Collected:           07/13/2022 0744               Ordering Location:     [facility]        Received:            07/13/2022 [facility]                                      [facility]                                                     Pathologist:           Zackary Holt MD                                                     Specimens:   A) - Polyp, Colorectal, polypoid lesion at anasomosis hot snare                                      B) - Polyp, Colorectal, rectal polyp x2 cold snare(1) and cold forcep(1)                   Final Diagnosis   A. Colonic anastomosis:  - Benign polypoid colonic mucosa with features of an inflammatory pseudopolyp.  - No epithelial dysplasia and no evidence of malignancy.     B. Rectum polyp x 2:  - Hyperplastic polyp x 2.  - No epithelial dysplasia and no evidence of malignancy.   Electronically signed by Zackary Holt MD on 7/26/2022 at  3:07 PM       Case Report   Surgical Pathology Report                         Case: [id number]                                    Authorizing Provider:  Michelle Beltre MD        Collected:           05/05/2021 1638               Ordering Location:     [facility]      Received:            05/05/2021 2057                                      Hospital Operating Room                                                       Pathologist:           Ramón Upton MD                                                                  Specimen:    Colon, descending colon, partial coloectomy                                                Final Diagnosis   A. Descending colon, partial colectomy:  - Adenocarcinoma (1.9 cm), moderately differentiated, arising in adenocarcinoma with high grade dysplasia.  - Tumor invades the submucosa (through the muscularis mucosa in to deeper one third of submucosa, pT1, Sm3)   - All margins are negative for tumor.  - One  of twenty eight lymph nodes is positive for tumor (1/28).     Note:   If clinically indicated, the most appropriate tissue block(s) for ancillary testing are block(s):  A12  Intradepartmental consultation is in agreement.     Immunohistochemistry for desmin is performed on tissue block A12 to help in the assessment of this case.      Electronically signed by Ramón Upton MD on 5/10/2021 at 10:10 AM   Additional Information    All reported additional testing was performed with appropriately reactive controls.  These tests were developed and their performance characteristics determined by [facility] or appropriate performing facility, though some tests may be performed on tissues which have not been validated for performance characteristics (such as staining performed on alcohol exposed cell blocks and decalcified tissues).  Results should be interpreted with caution and in the context of the patients’ clinical condition. These tests may not be cleared or approved by the U.S. Food and Drug Administration, though the FDA has determined that such clearance or approval is not necessary. These tests are used for clinical purposes and  they should not be regarded as investigational or for research. This laboratory has been approved by [place] [address], designated as a high-complexity laboratory and is qualified to perform these tests.  .   Synoptic Checklist   COLON AND RECTUM: Resection, Including Transanal Disk Excision of Rectal Neoplasms  8th Edition - Protocol posted: 2/26/2020  COLON AND RECTUM: RESECTION - All Specimens  SPECIMEN   Procedure  Left hemicolectomy    TUMOR   Tumor Site  Sigmoid colon: at 40 cm per biopsy specimen.    Histologic Type  Adenocarcinoma    Histologic Grade  G2: Moderately differentiated    Tumor Size  Greatest dimension (Centimeters): 1.9 cm   Additional Dimension (Centimeters)  1.8 cm     0.6 cm   Tumor Extension  Tumor invades submucosa    Macroscopic Tumor Perforation  Not identified    Lymphovascular Invasion  Not identified    Perineural Invasion  Not identified    Tumor Budding  Number of tumor buds in one ‘hotspot’ field (total number in area = 0.785 mm2): 1      Low score (0-4)    Type of Polyp in Which Invasive Carcinoma Arose  Tubular adenoma    Treatment Effect  No known presurgical therapy    MARGINS   Margins  All margins are uninvolved by invasive carcinoma, high grade dysplasia / intramucosal carcinoma, and low grade dysplasia    Margins Examined  Proximal      Distal      Radial (circumferential) or Mesenteric    Distance of Invasive Carcinoma from Closest Margin  >5 cm cm   Closest Margin  Cannot be determined: unoriented specimen    LYMPH NODES   Number of Lymph Nodes Involved  1    Number of Lymph Nodes Examined  28    Tumor Deposits  Not identified    PATHOLOGIC STAGE CLASSIFICATION (pTNM, AJCC 8th Edition)      Primary Tumor (pT)  pT1    Regional Lymph Nodes (pN)  pN1a    ADDITIONAL FINDINGS   Additional Findings  Adenoma(s)

## 2024-01-28 DIAGNOSIS — E11.9 TYPE 2 DIABETES MELLITUS WITHOUT COMPLICATION, WITHOUT LONG-TERM CURRENT USE OF INSULIN (HCC): Chronic | ICD-10-CM

## 2024-01-30 RX ORDER — DULAGLUTIDE 1.5 MG/.5ML
1.5 INJECTION, SOLUTION SUBCUTANEOUS WEEKLY
Qty: 6 ML | Refills: 0 | Status: SHIPPED | OUTPATIENT
Start: 2024-01-30

## 2024-03-03 DIAGNOSIS — E11.9 TYPE 2 DIABETES MELLITUS WITHOUT COMPLICATION, WITHOUT LONG-TERM CURRENT USE OF INSULIN (HCC): Chronic | ICD-10-CM

## 2024-03-04 ENCOUNTER — TELEPHONE (OUTPATIENT)
Dept: ENDOCRINOLOGY | Facility: CLINIC | Age: 44
End: 2024-03-04

## 2024-03-04 DIAGNOSIS — E04.1 THYROID NODULE: ICD-10-CM

## 2024-03-04 DIAGNOSIS — E11.9 CONTROLLED TYPE 2 DIABETES MELLITUS WITHOUT COMPLICATION, WITHOUT LONG-TERM CURRENT USE OF INSULIN (HCC): Primary | ICD-10-CM

## 2024-03-04 NOTE — TELEPHONE ENCOUNTER
Please ask the patient to call other pharmacies. Smaller pharmacies (non-chain) are better. Recommend [facility], [facility], and [place] [facility]. He can also inquire if they have the 0.75 mg or 3 mg dose as we could certainly order that.    In addition, he needs an appointment and to complete labs if he would like us to continue to fill his medications. I have ordered the appropriate labs. Please schedule an appointment.

## 2024-03-06 NOTE — TELEPHONE ENCOUNTER
Pt checked around to pharmacies in the area and all are out of stock. Pt has been out for 2 weeks and requesting a call back to advise.    Pt also scheduled for follow up appointment and made aware that labs were ordered.

## 2024-03-07 RX ORDER — DULAGLUTIDE 1.5 MG/.5ML
1.5 INJECTION, SOLUTION SUBCUTANEOUS WEEKLY
Qty: 6 ML | Refills: 0 | OUTPATIENT
Start: 2024-03-07

## 2024-03-07 NOTE — TELEPHONE ENCOUNTER
Recommend 100 mg of Januvia daily. This is a tablet that can be taken every day. Similar effects to Trulicity. I will order 30 days worth with the hope that his Trulicity will become available within the next month. If it does not, will continue Januvia daily until Trulicity is available.

## 2024-04-11 DIAGNOSIS — E11.9 TYPE 2 DIABETES MELLITUS WITHOUT COMPLICATION, WITHOUT LONG-TERM CURRENT USE OF INSULIN (HCC): Chronic | ICD-10-CM

## 2024-04-11 RX ORDER — DULAGLUTIDE 1.5 MG/.5ML
1.5 INJECTION, SOLUTION SUBCUTANEOUS WEEKLY
Qty: 6 ML | Refills: 0 | Status: SHIPPED | OUTPATIENT
Start: 2024-04-11

## 2024-04-11 NOTE — TELEPHONE ENCOUNTER
Reason for call:   [x] Refill   [] Prior Auth  [x] Other: pt wants to stick with trulicity - says he will track it down. Please send this into new pharmacy     Office:   [] PCP/Provider -   [x] Specialty/Provider - Jaclyn KULKARNI     Medication: Trulicity     Dose/Frequency: 1.5mg - weekly     Quantity: 6ml     Pharmacy: [facility]     Does the patient have enough for 3 days?   [] Yes   [x] No - Send as HP to POD

## 2024-04-19 NOTE — PROGRESS NOTES
HEMATOLOGY / ONCOLOGY CLINIC NOTE    Primary Care Provider: Julio Mahan DO  Referring Provider:    MRN: [id number]  : 1980    Reason for Encounter:    Chief Complaint   Patient presents with    Follow-up     metastatic colon cancer         History of Hematology / Oncology Illness:     Isela Pittman is a 37 y o  male who came in  to establish care with oncology    1, stage III T1 N1 descending colon adenocarcinoma  - oncology genetics neg      - presented with GI bleeding, biopsy showed adenocarcinoma status post hemicolectomy, without colostomy bag  Biopsy showed adenocarcinoma grade 2   lymph node positive  CT chest has no metastatic disease however showed lung nodules and liver nodule    - No strong family history of malignancy,  genetics evaluation to be done  -  adjuvant chemotherapy CapOx x8 cycles C# 1 : 6/3  ( last cycle 10/29)   - 2021 :  Oxaliplatin was canceled due to neuropathy    2, hemochromatosis, heterozygous, H63D positive  - diagnosed in , status post monthly phlebotomy  Last in 2021, this was managed in [place]  - 2021 : partially due to GI bleeding, patient actually having iron deficiency now, hemoglobin is normal   - 2021: ferritin  33      3, polycythemia  - likely due to testosterone supplement  Patient has been of testosterone  4, lung nodule    5, liver nodule    6, alcohol drinking habits in the past       Assessment / Plan:        1  Cancer of sigmoid colon ([facility] [address] )    - will continue oral capecitabine, hold oxaliplatin,  Due to neuropathy  - will follow patient as scheduled to re-evaluate patient, if neuropathy resolve,  We can  restart oxaliplatin with lower dose    2  Neuropathy  - as above    3  Acute pain of left shoulder  - unclear etiology, unclear if this is related with port are muscle inflammation  Low threshold for CT scan                Made patient aware regarding side effects of chemotherapy, including but not limited to fatigue endocrinologist. RN informed Charge RN.   cytopenia, increased risk for infection, potential kidney, liver injuries neuropathies et al    Made patient aware to call MD or go to ED for any fever,  Chills, bleeding, easy bruise, unhealed wound, chest pain, abdominal pain et al                       minutes were spent face to face with patient with greater than 50% of the time spent in counseling or coordination of care including discussions of treatment instructions  All of the patient's questions were answered to their satisfactory during this discussion  Advised pt to call if there is any further questions  Interval History:     5/20/2021 :  Patient came in for follow-up, subjectively has been doing very well no issues recovered from surgery very well  Passing gas normal bowel movement habits  No abdominal pain  No skin color change  Patient does not smoke or drink alcohol regular basis anymore  Patient had 2 kids, 6and 9years old     6/24/2021 : patient came in for F/U; doing well  Weight stable  No abd pain  Mild neuropathy  Reported having shoulder pain since port placed  7/16/2021 :  Patient came for follow-up doing okay  Still have mild shoulder pain  Reported having numbness and tingling is of hands and feet    Otherwise afebrile no change of bowel movement habits         Problem list:       Patient Active Problem List   Diagnosis    Diabetes mellitus without complication ([facility] [address] )    Hereditary hemochromatosis ([facility] [address] )    Testosterone deficiency    Thyroid nodule    Fatigue    Cancer of sigmoid colon ([facility] [address] )    Colon cancer metastasized to intra-abdominal lymph node (HCC)    Liver nodule    Lung nodule    Polycythemia    Acute pain of left shoulder    History of drug abuse ([facility] [address] )    Chemotherapy-induced neuropathy ([facility] [address] )    Palliative care patient    Cancer related pain         PHYSICIAL EXAMINATION:     Vital Signs:   /76   Pulse 88   Temp (!) 96 °F (35 6 °C)   Resp 18   Ht 5' 10" (1 778 m)   Wt 89 4 kg (197 lb) SpO2 99%   BMI 28 27 kg/m²   Body surface area is 2 07 meters squared  Ht Readings from Last 3 Encounters:   07/16/21 5' 10" (1 778 m)   07/12/21 5' 10" (1 778 m)   06/29/21 5' 10" (1 778 m)       Wt Readings from Last 3 Encounters:   07/16/21 89 4 kg (197 lb)   07/14/21 90 1 kg (198 lb 10 2 oz)   07/12/21 90 3 kg (199 lb)        Temp Readings from Last 3 Encounters:   07/16/21 (!) 96 °F (35 6 °C)   07/14/21 99 °F (37 2 °C) (Temporal)   07/12/21 (!) 96 4 °F (35 8 °C) (Tympanic)        BP Readings from Last 3 Encounters:   07/16/21 102/76   07/14/21 110/70   07/12/21 124/88         Pulse Readings from Last 3 Encounters:   07/16/21 88   07/14/21 100   07/12/21 94         Appears comfortable, no leg swelling, no upper extremity swelling or skin color change, essentially no major finding compared to previous visit       GEN: Alert, awake oriented x3, in no acute distress  HEENT- No pallor, icterus, cyanosis, no oral mucosal lesions,   LAD - no palpable cervical, clavicle, axillary, inguinal LAD  Heart- normal S1 S2, regular rate and rhythm, No murmur, rubs  Lungs- decreased breathing sound bilateral    Abdomen- soft, Non tender, bowel sounds present  Extremities- No cyanosis, clubbing, edema  Neuro- No focal neurological deficit           PAST MEDICAL HISTORY:   has a past medical history of Cancer ([facility] [address] ), Diabetes mellitus ([facility] [address] ), Hemochromatosis, and Testosterone deficiency  PAST SURGICAL HISTORY:   has a past surgical history that includes [place] tooth extraction; Colonoscopy; Hemicoloectomy w/ anastomosis (N/A, 5/5/2021); and Tunneled venous port placement (Left, 6/1/2021)      CURRENT MEDICATIONS:     Current Outpatient Medications   Medication Sig Dispense Refill    acetaminophen (TYLENOL) 325 mg tablet Take 3 tablets (975 mg total) by mouth every 6 (six) hours as needed for mild pain or moderate pain (Patient not taking: Reported on 7/12/2021) 60 tablet 0    capecitabine (XELODA) 500 MG tablet Take 3 tablets (1,500 mg total) by mouth 2 (two) times a day For 2 weeks on then 1 week off 84 tablet 3    Dulaglutide 1 5 MG/0 5ML SOPN Inject 0 5 mL (1 5 mg total) under the skin once a week 6 mL 1    Empagliflozin (Jardiance) 25 MG TABS Take 1 tablet (25 mg total) by mouth daily 90 tablet 0    gabapentin (NEURONTIN) 300 mg capsule Take 1 capsule (300 mg total) by mouth 3 (three) times a day 90 capsule 0    lidocaine-prilocaine (EMLA) cream Apply topically as needed for mild pain On day of chemotherapy treatment  (Patient not taking: Reported on 6/30/2021) 30 g 0    morphine (MSIR) 15 mg tablet Taker one tablet (15mg) PO for moderate pain Q6H PRN or one and half tablets (22 5mg) PO Q6H PRN for severe pain 84 tablet 0    ondansetron (ZOFRAN-ODT) 4 mg disintegrating tablet Take 1 tablet (4 mg total) by mouth every 6 (six) hours as needed for nausea or vomiting 20 tablet 0    senna (SENOKOT) 8 6 MG tablet Take 1 tablet (8 6 mg total) by mouth daily (Patient not taking: Reported on 7/12/2021) 30 tablet 1     No current facility-administered medications for this visit  [unfilled]    SOCIAL HISTORY:   reports that he quit smoking about 10 years ago  His smoking use included cigarettes  He has a 2 50 pack-year smoking history  He has never used smokeless tobacco  He reports previous alcohol use  He reports previous drug use  FAMILY HISTORY:  family history includes Colon polyps in his maternal grandmother and mother; Diabetes in his family and paternal grandmother; Heart disease in his paternal grandfather; Hypertension in his family; Kidney failure in his paternal grandfather; Lung cancer in his paternal grandmother; No Known Problems in his father; Prostate cancer in his maternal grandfather and paternal grandfather  ALLERGIES:  has No Known Allergies      REVIEW OF SYSTEMS:  Please note that a 14-point review of systems was performed to include Constitutional, HEENT, Respiratory, CVS, GI, , Musculoskeletal, Integumentary, Neurologic, Rheumatologic, Endocrinologic, Psychiatric, Lymphatic, and Hematologic/Oncologic systems were reviewed and are negative unless otherwise stated in HPI  Positive and negative findings pertinent to this evaluation are incorporated into the history of present illness  Lab Re  Lab Results   Component Value Date    WBC 5 13 07/14/2021    HGB 14 2 07/14/2021    HCT 43 2 07/14/2021    MCV 88 07/14/2021     07/14/2021   sults   Component Value Date    WBC 7 03 06/22/2021    HGB 13 6 06/22/2021    HCT 41 1 06/22/2021    MCV 86 06/22/2021     06/22/2021      Component Value Date    SODIUM 137 07/14/2021    K 4 5 07/14/2021     07/14/2021    CO2 29 07/14/2021    AGAP 3 (L) 07/14/2021    BUN 14 07/14/2021    CREATININE 1 04 07/14/2021    GLUC 101 07/14/2021    GLUF 126 (H) 06/22/2021    CALCIUM 9 8 07/14/2021    AST 41 07/14/2021    ALT 59 07/14/2021    ALKPHOS 86 07/14/2021    TP 7 7 07/14/2021    TBILI 1 29 (H) 07/14/2021    EGFR 89 07/14/2021       CBC with diff:   Results from last 7 days   Lab Units 07/14/21  0854   WBC Thousand/uL 5 13   HEMOGLOBIN g/dL 14 2   HEMATOCRIT % 43 2   MCV fL 88   PLATELETS Thousands/uL 188   MCH pg 28 9   MCHC g/dL 32 9   RDW % 17 7*   MPV fL 11 0   NRBC AUTO /100 WBCs 0       CMP:  Results from last 7 days   Lab Units 07/14/21  0854   POTASSIUM mmol/L 4 5   CHLORIDE mmol/L 105   CO2 mmol/L 29   BUN mg/dL 14   CREATININE mg/dL 1 04   CALCIUM mg/dL 9 8   AST U/L 41   ALT U/L 59   ALK PHOS U/L 86   EGFR ml/min/1 73sq m 89       IMAGING:    No orders to display     No results found

## 2024-05-20 ENCOUNTER — APPOINTMENT (OUTPATIENT)
Dept: LAB | Facility: CLINIC | Age: 44
End: 2024-05-20
Payer: COMMERCIAL

## 2024-05-20 DIAGNOSIS — C18.7 CANCER OF SIGMOID COLON (HCC): ICD-10-CM

## 2024-05-20 LAB
ALBUMIN SERPL BCP-MCNC: 4.8 G/DL (ref 3.5–5)
ALP SERPL-CCNC: 59 U/L (ref 34–104)
ALT SERPL W P-5'-P-CCNC: 29 U/L (ref 7–52)
ANION GAP SERPL CALCULATED.3IONS-SCNC: 12 MMOL/L (ref 4–13)
AST SERPL W P-5'-P-CCNC: 26 U/L (ref 13–39)
BASOPHILS # BLD AUTO: 0.05 THOUSANDS/ÂΜL (ref 0–0.1)
BASOPHILS NFR BLD AUTO: 1 % (ref 0–1)
BILIRUB SERPL-MCNC: 1.04 MG/DL (ref 0.2–1)
BUN SERPL-MCNC: 13 MG/DL (ref 5–25)
CALCIUM SERPL-MCNC: 9.9 MG/DL (ref 8.4–10.2)
CEA SERPL-MCNC: 1 NG/ML (ref 0–3)
CHLORIDE SERPL-SCNC: 102 MMOL/L (ref 96–108)
CO2 SERPL-SCNC: 27 MMOL/L (ref 21–32)
CREAT SERPL-MCNC: 1.1 MG/DL (ref 0.6–1.3)
CREAT UR-MCNC: 111 MG/DL
EOSINOPHIL # BLD AUTO: 0.16 THOUSAND/ÂΜL (ref 0–0.61)
EOSINOPHIL NFR BLD AUTO: 2 % (ref 0–6)
ERYTHROCYTE [DISTWIDTH] IN BLOOD BY AUTOMATED COUNT: 12.3 % (ref 11.6–15.1)
EST. AVERAGE GLUCOSE BLD GHB EST-MCNC: 134 MG/DL
GFR SERPL CREATININE-BSD FRML MDRD: 81 ML/MIN/1.73SQ M
GLUCOSE P FAST SERPL-MCNC: 105 MG/DL (ref 65–99)
HBA1C MFR BLD: 6.3 %
HCT VFR BLD AUTO: 50.6 % (ref 36.5–49.3)
HGB BLD-MCNC: 16.9 G/DL (ref 12–17)
IMM GRANULOCYTES # BLD AUTO: 0.05 THOUSAND/UL (ref 0–0.2)
IMM GRANULOCYTES NFR BLD AUTO: 1 % (ref 0–2)
LYMPHOCYTES # BLD AUTO: 2.25 THOUSANDS/ÂΜL (ref 0.6–4.47)
LYMPHOCYTES NFR BLD AUTO: 27 % (ref 14–44)
MCH RBC QN AUTO: 29.4 PG (ref 26.8–34.3)
MCHC RBC AUTO-ENTMCNC: 33.4 G/DL (ref 31.4–37.4)
MCV RBC AUTO: 88 FL (ref 82–98)
MICROALBUMIN UR-MCNC: <7 MG/L
MICROALBUMIN/CREAT 24H UR: <6 MG/G CREATININE (ref 0–30)
MONOCYTES # BLD AUTO: 0.51 THOUSAND/ÂΜL (ref 0.17–1.22)
MONOCYTES NFR BLD AUTO: 6 % (ref 4–12)
NEUTROPHILS # BLD AUTO: 5.41 THOUSANDS/ÂΜL (ref 1.85–7.62)
NEUTS SEG NFR BLD AUTO: 63 % (ref 43–75)
NRBC BLD AUTO-RTO: 0 /100 WBCS
PLATELET # BLD AUTO: 203 THOUSANDS/UL (ref 149–390)
PMV BLD AUTO: 11.6 FL (ref 8.9–12.7)
POTASSIUM SERPL-SCNC: 4.2 MMOL/L (ref 3.5–5.3)
PROT SERPL-MCNC: 7.3 G/DL (ref 6.4–8.4)
RBC # BLD AUTO: 5.75 MILLION/UL (ref 3.88–5.62)
SODIUM SERPL-SCNC: 141 MMOL/L (ref 135–147)
TSH SERPL DL<=0.05 MIU/L-ACNC: 1.3 UIU/ML (ref 0.45–4.5)
WBC # BLD AUTO: 8.43 THOUSAND/UL (ref 4.31–10.16)

## 2024-05-20 PROCEDURE — 80053 COMPREHEN METABOLIC PANEL: CPT

## 2024-05-20 PROCEDURE — 36415 COLL VENOUS BLD VENIPUNCTURE: CPT

## 2024-05-20 PROCEDURE — 85025 COMPLETE CBC W/AUTO DIFF WBC: CPT

## 2024-05-20 PROCEDURE — 82378 CARCINOEMBRYONIC ANTIGEN: CPT

## 2024-05-22 ENCOUNTER — OFFICE VISIT (OUTPATIENT)
Dept: ENDOCRINOLOGY | Facility: CLINIC | Age: 44
End: 2024-05-22
Payer: COMMERCIAL

## 2024-05-22 VITALS
HEIGHT: 70 IN | RESPIRATION RATE: 18 BRPM | OXYGEN SATURATION: 97 % | TEMPERATURE: 98.7 F | DIASTOLIC BLOOD PRESSURE: 82 MMHG | SYSTOLIC BLOOD PRESSURE: 126 MMHG | HEART RATE: 105 BPM | WEIGHT: 202 LBS | BODY MASS INDEX: 28.92 KG/M2

## 2024-05-22 DIAGNOSIS — E11.9 CONTROLLED TYPE 2 DIABETES MELLITUS WITHOUT COMPLICATION, WITHOUT LONG-TERM CURRENT USE OF INSULIN (HCC): Primary | ICD-10-CM

## 2024-05-22 DIAGNOSIS — E04.1 THYROID NODULE: ICD-10-CM

## 2024-05-22 PROCEDURE — 99213 OFFICE O/P EST LOW 20 MIN: CPT | Performed by: NURSE PRACTITIONER

## 2024-05-22 RX ORDER — DULAGLUTIDE 1.5 MG/.5ML
1.5 INJECTION, SOLUTION SUBCUTANEOUS WEEKLY
Qty: 6 ML | Refills: 3 | Status: SHIPPED | OUTPATIENT
Start: 2024-05-22

## 2024-05-22 NOTE — PROGRESS NOTES
Established Patient Progress Note    Chief Complaint:  Diabetes follow up visit    Impression & Plan:    Problem List Items Addressed This Visit       Controlled type 2 diabetes mellitus without complication, without long-term current use of insulin ([facility]) - Primary     Patient remains well controlled. Admits he needs to improve diet and increase exercise, which he will do. Continue Trulicity 1.5 mg once weekly and Jardiance 25 mg daily. Will repeat labs in 1 year prior to follow up visit. Patient knows to call with any questions or concerns.   Lab Results   Component Value Date    HGBA1C 6.3 (H) 05/20/2024            Relevant Medications    Empagliflozin (Jardiance) 25 MG TABS    dulaglutide (Trulicity) 1.5 MG/0.5ML injection    Other Relevant Orders    Hemoglobin A1C    Comprehensive metabolic panel    Albumin / creatinine urine ratio    Thyroid nodule     Denies compressive symptoms. Reminded patient to complete thyroid US.            History of Present Illness:   Betito Loomis is a 44 y.o. male with vitamin-D deficiency, thyroid nodules, and type 2 diabetes without long-term use of insulin since 20/10.  Denies complications of diabetes.  Denies recent severe hypoglycemic or severe hyperglycemic episodes.  Denies any issues with his current regimen.  Home glucose monitoring is performed sporadically.     Past medical history significant for sigmoid colon cancer.     Hemoglobin A1C   Latest Ref Rng Normal 4.0-5.6%; PreDiabetic 5.7-6.4%; Diabetic >=6.5%; Glycemic control for adults with diabetes <7.0% %   3/8/2022 5.7    12/20/2022 5.3    5/20/2024 6.3 (H)       eAG, EST AVG Glucose   Latest Ref Rng mg/dl   3/8/2022    12/20/2022 105    5/20/2024 134       Legend:  (H) High      Current regimen:  Trulicity 1.5 mg once weekly  Jardiance 25 mg daily    Due to national shortage, patient was prescribed a 30-day course of Januvia to take while he was waiting for his Trulicity.    Due to repeat US for thyroid  nodules.   Patient Active Problem List   Diagnosis    Controlled type 2 diabetes mellitus without complication, without long-term current use of insulin (HCC)    Familial hemochromatosis (HCC)    Thyroid nodule    Cancer of sigmoid colon (HCC)    Colon cancer metastasized to intra-abdominal lymph node (HCC)    Polycythemia    History of drug abuse (HCC)    Chemotherapy-induced neuropathy (HCC)    History of colon cancer, stage III      Past Medical History:   Diagnosis Date    Cancer (HCC)     Colon cancer (HCC)     Colon polyp     Diabetes mellitus (HCC)     Hemochromatosis     Testosterone deficiency       Past Surgical History:   Procedure Laterality Date    COLON SURGERY      COLONOSCOPY      HEMICOLOECTOMY W/ ANASTOMOSIS N/A 05/05/2021    Procedure: ROBOTIC SIGMOID COLECTOMY, MOBILIZATION OF SPLENIC FLEXURE;  Surgeon: Michelle Beltre MD;  Location: [facility];  Service: Surgical Oncology    REMOVAL VENOUS PORT (PORT-A-CATH) Left 09/22/2021    Procedure: REMOVAL VENOUS PORT (PORT-A-CATH);  Surgeon: Michelle Beltre MD;  [facility];  Service: Surgical Oncology    TUNNELED VENOUS PORT PLACEMENT Left 06/01/2021    Procedure: INSERTION VENOUS PORT (PORT-A-CATH) Ultrasound guided Left Internal Jugular Vein Access;  Surgeon: Sylvie Vines MD;  Location: [facility];  Service: Surgical Oncology    WISDOM TOOTH EXTRACTION        Family History   Problem Relation Age of Onset    Colon polyps Mother     Prostate cancer Father     Colon polyps Maternal Grandmother     Prostate cancer Maternal Grandfather     Lung cancer Paternal Grandmother     Diabetes Paternal Grandmother     Hypertension Paternal Grandmother     Prostate cancer Paternal Grandfather     Heart disease Paternal Grandfather     Kidney failure Paternal Grandfather     Diabetes Family     Hypertension Family      Social History     Tobacco Use    Smoking status: Former     Current packs/day: 0.00     Average packs/day: 0.3 packs/day for 10.0  years (2.5 ttl pk-yrs)     Types: Cigarettes     Start date: 2001     Quit date: 2011     Years since quittin.9    Smokeless tobacco: Never   Substance Use Topics    Alcohol use: Not Currently     Comment: Sober 4 years     No Known Allergies      Current Outpatient Medications:     Cholecalciferol (Vitamin D3) 50 MCG (2000 UT) capsule, Take 2,000 Units by mouth daily, Disp: , Rfl:     cyanocobalamin (VITAMIN B-12) 100 mcg tablet, Take by mouth daily, Disp: , Rfl:     dulaglutide (Trulicity) 1.5 MG/0.5ML injection, Inject 0.5 mL (1.5 mg total) under the skin once a week, Disp: 6 mL, Rfl: 3    Empagliflozin (Jardiance) 25 MG TABS, Take 1 tablet (25 mg total) by mouth daily, Disp: 90 tablet, Rfl: 3    Pyridoxine HCl (Vitamin B6) 100 MG TABS, Take by mouth, Disp: , Rfl:     Review of Systems   Constitutional:  Negative for activity change, appetite change, fatigue and unexpected weight change.   HENT:  Negative for dental problem, sore throat, trouble swallowing and voice change.    Eyes:  Negative for visual disturbance.   Respiratory:  Negative for cough, chest tightness and shortness of breath.    Cardiovascular:  Negative for chest pain, palpitations and leg swelling.   Gastrointestinal:  Negative for constipation, diarrhea, nausea and vomiting.   Endocrine: Negative for cold intolerance, heat intolerance, polydipsia, polyphagia and polyuria.   Genitourinary:  Negative for frequency.   Musculoskeletal:  Negative for arthralgias, back pain, gait problem and myalgias.   Skin:  Negative for wound.   Allergic/Immunologic: Negative for environmental allergies and food allergies.   Neurological:  Negative for dizziness, weakness, light-headedness, numbness and headaches.   Psychiatric/Behavioral:  Negative for decreased concentration, dysphoric mood and sleep disturbance. The patient is not nervous/anxious.        Physical Exam:  Body mass index is 28.98 kg/m².  /82   Pulse 105   Temp 98.7 °F (37.1  "°C)   Resp 18   Ht 5' 10\" (1.778 m)   Wt 91.6 kg (202 lb)   SpO2 97%   BMI 28.98 kg/m²    Wt Readings from Last 3 Encounters:   05/22/24 91.6 kg (202 lb)   01/18/24 92.5 kg (204 lb)   09/26/23 86.6 kg (191 lb)       Physical Exam  Vitals reviewed.   Constitutional:       General: He is not in acute distress.     Appearance: He is well-developed. He is not ill-appearing.   HENT:      Head: Normocephalic and atraumatic.   Eyes:      Pupils: Pupils are equal, round, and reactive to light.   Neck:      Thyroid: No thyromegaly.   Cardiovascular:      Rate and Rhythm: Normal rate.      Pulses: Normal pulses. no weak pulses.           Dorsalis pedis pulses are 2+ on the right side and 2+ on the left side.        Posterior tibial pulses are 2+ on the right side and 2+ on the left side.   Pulmonary:      Effort: Pulmonary effort is normal.   Musculoskeletal:      Cervical back: Normal range of motion and neck supple.      Right lower leg: No edema.      Left lower leg: No edema.   Feet:      Right foot:      Skin integrity: Dry skin present. No ulcer, skin breakdown, erythema, warmth or callus.      Left foot:      Skin integrity: Dry skin present. No ulcer, skin breakdown, erythema, warmth or callus.   Lymphadenopathy:      Cervical: No cervical adenopathy.   Skin:     General: Skin is warm and dry.      Capillary Refill: Capillary refill takes less than 2 seconds.   Neurological:      Mental Status: He is alert and oriented to person, place, and time.      Gait: Gait normal.   Psychiatric:         Mood and Affect: Mood normal.         Behavior: Behavior normal.       Patient's shoes and socks removed.    Right Foot/Ankle   Right Foot Inspection  Skin Exam: skin normal, skin intact and dry skin. No warmth, no callus, no erythema, no maceration, no abnormal color, no pre-ulcer, no ulcer and no callus.     Toe Exam: ROM and strength within normal limits.     Sensory   Vibration: intact  Proprioception: " "intact  Monofilament testing: intact    Vascular  Capillary refills: < 3 seconds  The right DP pulse is 2+. The right PT pulse is 2+.     Left Foot/Ankle  Left Foot Inspection  Skin Exam: skin normal, skin intact and dry skin. No warmth, no erythema, no maceration, normal color, no pre-ulcer, no ulcer and no callus.     Toe Exam: ROM and strength within normal limits.     Sensory   Vibration: intact  Proprioception: intact  Monofilament testing: intact    Vascular  Capillary refills: < 3 seconds  The left DP pulse is 2+. The left PT pulse is 2+.     Assign Risk Category  No deformity present  No loss of protective sensation  No weak pulses  Risk: 0      Labs:   Lab Results   Component Value Date    HGBA1C 6.3 (H) 05/20/2024    HGBA1C 5.3 12/20/2022    HGBA1C 5.7 03/08/2022     Lab Results   Component Value Date    CREATININE 1.10 05/20/2024    CREATININE 0.91 01/15/2024    CREATININE 1.09 07/05/2023    BUN 13 05/20/2024    K 4.2 05/20/2024     05/20/2024    CO2 27 05/20/2024     eGFR   Date Value Ref Range Status   05/20/2024 81 ml/min/1.73sq m Final     Lab Results   Component Value Date    HDL 57 12/20/2022    TRIG 89 12/20/2022     Lab Results   Component Value Date    ALT 29 05/20/2024    AST 26 05/20/2024    ALKPHOS 59 05/20/2024     Lab Results   Component Value Date    [id number] 1.301 05/20/2024    XPP5IRCSPRPX 1.280 12/20/2022    [id number] 1.316 11/24/2020     No results found for: \"FREET4\", \"TSI\"    Discussed with the patient and all questioned fully answered. He will call me if any problems arise.    Follow-up appointment in 12 months.     Counseled patient on diagnostic results, prognosis, risk and benefit of treatment options, instruction for management, importance of treatment compliance, Risk  factor reduction and impressions    SHABANA Ojeda    "

## 2024-05-22 NOTE — ASSESSMENT & PLAN NOTE
Patient remains well controlled. Admits he needs to improve diet and increase exercise, which he will do. Continue Trulicity 1.5 mg once weekly and Jardiance 25 mg daily. Will repeat labs in 1 year prior to follow up visit. Patient knows to call with any questions or concerns.   Lab Results   Component Value Date    HGBA1C 6.3 (H) 05/20/2024

## 2024-07-24 DIAGNOSIS — E11.9 CONTROLLED TYPE 2 DIABETES MELLITUS WITHOUT COMPLICATION, WITHOUT LONG-TERM CURRENT USE OF INSULIN (HCC): ICD-10-CM

## 2024-07-24 RX ORDER — DULAGLUTIDE 1.5 MG/.5ML
INJECTION, SOLUTION SUBCUTANEOUS
Qty: 6 ML | Refills: 1 | Status: SHIPPED | OUTPATIENT
Start: 2024-07-24

## 2024-09-26 DIAGNOSIS — E11.9 CONTROLLED TYPE 2 DIABETES MELLITUS WITHOUT COMPLICATION, WITHOUT LONG-TERM CURRENT USE OF INSULIN (HCC): ICD-10-CM

## 2024-09-26 RX ORDER — DULAGLUTIDE 1.5 MG/.5ML
INJECTION, SOLUTION SUBCUTANEOUS
Qty: 6 ML | Refills: 1 | Status: SHIPPED | OUTPATIENT
Start: 2024-09-26

## 2024-09-27 ENCOUNTER — TELEPHONE (OUTPATIENT)
Dept: ENDOCRINOLOGY | Facility: CLINIC | Age: 44
End: 2024-09-27

## 2024-09-27 NOTE — TELEPHONE ENCOUNTER
Prior auth needed for Jardiance 25mg    Scanned document into media    Key [id number]  Cover my meds

## 2024-10-02 NOTE — TELEPHONE ENCOUNTER
PA for Empagliflozin (Jardiance) 25 MG TABS SUBMITTED     via    []CMM-KEY:    []Surescripts-Case ID # SS  []Availity-Auth ID #  NDC #    []Faxed to plan   []Other website    []Phone call Case ID #      Office notes sent, clinical questions answered. Awaiting determination    Turnaround time for your insurance to make a decision on your Prior Authorization can take 7-21 business days.

## 2024-10-04 NOTE — TELEPHONE ENCOUNTER
PA for Empagliflozin (Jardiance) 25 MG TABS  APPROVED     Date(s) approved October 2, 2024 to October 2, 2025     Case #     Patient advised by          []NEON Conciergehart Message  [x]Phone call   []LMOM  []L/M to call office as no active Communication consent on file  []Unable to leave detailed message as VM not approved on Communication consent       Pharmacy advised by    [x]Fax  []Phone call    Approval letter scanned into Media No

## 2024-10-08 ENCOUNTER — VBI (OUTPATIENT)
Dept: ADMINISTRATIVE | Facility: OTHER | Age: 44
End: 2024-10-08

## 2024-10-08 NOTE — TELEPHONE ENCOUNTER
10/08/24 8:09 AM     Chart reviewed for Diabetic Eye Exam ; nothing is submitted to the patient's insurance at this time.     AINSLEY RO MA   [facility]

## 2024-10-09 ENCOUNTER — TELEPHONE (OUTPATIENT)
Dept: GASTROENTEROLOGY | Facility: CLINIC | Age: 44
End: 2024-10-09

## 2024-10-09 NOTE — TELEPHONE ENCOUNTER
Pt is due for a colonoscopy with Dr Glass for personal history of colon cancer. I lmom for pt to please call back to schedule. Will call again if do not hear back from pt.

## 2024-10-10 ENCOUNTER — PREP FOR PROCEDURE (OUTPATIENT)
Age: 44
End: 2024-10-10

## 2024-10-10 ENCOUNTER — TELEPHONE (OUTPATIENT)
Age: 44
End: 2024-10-10

## 2024-10-10 DIAGNOSIS — Z85.038 PERSONAL HISTORY OF COLON CANCER: Primary | ICD-10-CM

## 2024-10-10 NOTE — TELEPHONE ENCOUNTER
Scheduled date of colonoscopy (as of today):01.06.25    Physician performing colonoscopy:Maria Luisa    [facility]    Bowel prep reviewed with patient:Prosper/Filiberto    Instructions reviewed with patient by:Lsims and sent to chart

## 2024-10-10 NOTE — TELEPHONE ENCOUNTER
10/10/24  Screened by: Margarito Palomino    Referring Provider     Pre- Screening:     There is no height or weight on file to calculate BMI.  Has patient been referred for a routine screening Colonoscopy? no  Is the patient between 45-75 years old? no      Previous Colonoscopy yes   If yes:    Date: 2023      Does the patient want to see a Gastroenterologist prior to their procedure OR are they having any GI symptoms? no    Has the patient been hospitalized or had abdominal surgery in the past 6 months? no    Does the patient use supplemental oxygen? no    Does the patient take Coumadin, Lovenox, Plavix, Elliquis, Xarelto, or other blood thinning medication? no    Has the patient had a stroke, cardiac event, or stent placed in the past year? no    If patient is between 45yrs - 49yrs, please advise patient that we will have to confirm benefits & coverage with their insurance company for a routine screening colonoscopy.

## 2024-10-10 NOTE — LETTER
Attached are your prep instructions for your upcoming procedure on 01.06.24. If you have any questions or concerns please contact us at [phone].    Thank you,     [facility]        COLONOSCOPY  MIRALAX/Dulcolax Bowel Preparation Instructions    The OR/GI Lab will contact you the evening prior to your procedure with your exact arrival time.    Our practice requires a 1 week notice for any cancellations or rescheduling. We kindly ask that you immediately notify us of any changes including any new medications that are prescribed. Thank you for your cooperation.     WEEK BEFORE YOUR PROCEDURE:  Stop taking Iron tablets.  5 days prior, AVOID vegetables and fruits with skins or seeds, nuts, corn, popcorn and whole grain breads.   Purchase: One (1) 238-gram container of Miralax (polyethylene glycol 3350), four (4) 5 mg Dulcolax (bisacodyl) tablets, and one (1) 64-ounce bottle of Gatorade (sports drink) - no red, orange, or purple. These may be purchased at any pharmacy without a prescription. Generic products are permissible.   Arrange responsible transportation for day of the procedure.     DAY BEFORE THE PROCEDURE:   CLEAR liquids only for entire day prior. Nothing red, orange or purple.    You MAY have:                                                               Soda  Water  Broth Gatorade  Jello  Popsicles Coffee/tea without milk/creamer     YOU MAY NOT HAVE:  Solid foods   Milk and milk products    Juice with pulp    BOWEL PREPARATION:  Includes: One (1) 238-gram container of Miralax (polyethylene glycol 3350), four (4) 5 mg Dulcolax (bisacodyl) tablets, and one (1) 64-ounce bottle of Gatorade (sports drink).  Preparation may be refrigerated.  Entire bowel prep should be completed.     Afternoon before the procedure (2:00 pm - 5:00 pm):    Take two (2) 5 mg Dulcolax laxative tablets.     Evening before the procedure (6:00 pm):  Mix entire container of  Miralax with one (1) 64-ounce bottle of Gatorade and shake until all medication is dissolved.   Begin drinking solution. Drink an eight (8) ounce cup every 10-15 minutes until you have consumed half (32 ounces) of the solution.  Refrigerate remaining solution.    Night before the procedure (8:00 pm):  Take two (2) 5 mg Dulcolax laxative tablets.     Beginning 5 hours before your procedure:  Drink the remaining amount of prepared solution (32 ounces).  Drink an eight (8) ounce cup every 10-15 minutes until you have consumed the remaining solution.     Bowel prep should be completed 4 hours prior to procedure time.    NOTHING TO EAT OR DRINK AFTER MIDNIGHT- EXCEPT FOR YOUR PREP    DAY OF THE PROCEDURE:  You may brush your teeth.  Leave all jewelry at home.  Please arrive for your procedure as indicated by the OR / GI Lab / Endoscopy Unit. The hospital will contact you the day before with your exact arrival time.   Make sure you have arranged ahead of time for a responsible adult (18 or older) to accompany and drive you home after the procedure.  Please discuss any transportation concerns with our staff prior to your procedure.    The effects of the anesthesia can persist for 24 hours.  After receiving the sedation, you must exercise caution before engaging in any activity that could harm yourself and others (such as driving a car).  Do not make any important decisions or do not drink any alcoholic beverages during this time period.  After your procedure, you may have anything you'd like to eat or drink.  You will probably want to start with something light.  Please include plenty of fluids.  Avoid items that cause gas such as sodas and salads.    SPECIAL INSTRUCTIONS:    For patients currently taking blood thinners and/or antiplatelet therapy our office will contact the prescribing provider.  Our office will contact you with any required changes to your medication regimen.     Blood thinner (i.e. - Coumadin, Pradaxa,  Lovenox, Xarelto, Eliquis)  ?  Continue (Do Not Stop)  ? Stop______________for_____________days prior to the procedure.    Antiplatelet (i.e. - Plavix, Aggrenox, Effient, Brilinta)  ?  Continue (Do Not Stop)  ? Stop______________for_____________days prior to the procedure.       Diabetes:   If you are Diabetic, please see separate Diabetic Instruction Sheet.          Prescribed medications:  Do not stop your aspirin, or any of your other medications (unless instructed otherwise).    Take the rest of your prescribed medications with small sips of water at least 2 hours prior to your procedure.      For any questions or concerns related to your bowel preparation or pre-procedure instructions, please contact our office at [phone].  Thank you for choosing [facility]!

## 2024-11-06 ENCOUNTER — PATIENT MESSAGE (OUTPATIENT)
Dept: ENDOCRINOLOGY | Facility: CLINIC | Age: 44
End: 2024-11-06

## 2024-11-06 ENCOUNTER — TELEPHONE (OUTPATIENT)
Age: 44
End: 2024-11-06

## 2024-11-06 DIAGNOSIS — E11.9 CONTROLLED TYPE 2 DIABETES MELLITUS WITHOUT COMPLICATION, WITHOUT LONG-TERM CURRENT USE OF INSULIN (HCC): ICD-10-CM

## 2024-11-06 NOTE — TELEPHONE ENCOUNTER
Patient called in because he needs the auth re-done on his CT.  He has to reschedule his radiology appointment because he will be away and probably wont be back until the end of December.  When he tried to reschedule the appointment they said that the auth expires on 12/6/24.   Please reauthorize it then call him so he can reschedule.

## 2024-12-02 DIAGNOSIS — E11.9 CONTROLLED TYPE 2 DIABETES MELLITUS WITHOUT COMPLICATION, WITHOUT LONG-TERM CURRENT USE OF INSULIN (HCC): ICD-10-CM

## 2024-12-16 ENCOUNTER — VBI (OUTPATIENT)
Dept: ADMINISTRATIVE | Facility: OTHER | Age: 44
End: 2024-12-16

## 2024-12-16 NOTE — TELEPHONE ENCOUNTER
12/16/24 7:44 AM     Chart reviewed for Hemoglobin A1c was/were submitted to the patient's insurance.     Jannet Bruce MA   [facility]

## 2024-12-23 ENCOUNTER — TELEPHONE (OUTPATIENT)
Age: 44
End: 2024-12-23

## 2024-12-23 NOTE — TELEPHONE ENCOUNTER
New Patient    What is the reason for the patient's appointment?: Vasectomy Consult     What office location does the patient prefer?: [place]    Does patient have Imaging/Lab Results:  No     INSURANCE:   Do we accept the patient's insurance or is the patient Self-Pay?: Yes    Insurance Provider: [employer]  Plan Type/Number:   Member ID#: [id number]     HISTORY:   Has the patient had any previous Urologist(s)?: No    Was the patient seen in the ED?: No    Does the patient have a personal history of cancer?: N/A    Pt scheduled for 3/18/25 with  and placed on wait list.

## 2024-12-30 ENCOUNTER — PATIENT MESSAGE (OUTPATIENT)
Dept: ENDOCRINOLOGY | Facility: CLINIC | Age: 44
End: 2024-12-30

## 2024-12-30 DIAGNOSIS — E11.9 CONTROLLED TYPE 2 DIABETES MELLITUS WITHOUT COMPLICATION, WITHOUT LONG-TERM CURRENT USE OF INSULIN (HCC): ICD-10-CM

## 2024-12-30 NOTE — TELEPHONE ENCOUNTER
Patient called requesting refill on Dulaglutide 1.5 MG/0.5ML SOAJ  to the [facility] in [place].     Patient is requesting a 3 month supply

## 2024-12-31 DIAGNOSIS — E11.9 CONTROLLED TYPE 2 DIABETES MELLITUS WITHOUT COMPLICATION, WITHOUT LONG-TERM CURRENT USE OF INSULIN (HCC): ICD-10-CM

## 2024-12-31 NOTE — PATIENT COMMUNICATION
PA for Trulicity 1.5mg SUBMITTED to [facility]    via    []CMM-KEY:   [x]Surescripts-Case ID #   []Availity-Auth ID # NDC #   []Faxed to plan   []Other website   []Phone call Case ID #     [x]PA sent as URGENT    All office notes, labs and other pertaining documents and studies sent. Clinical questions answered. Awaiting determination from insurance company.     Turnaround time for your insurance to make a decision on your Prior Authorization can take 7-21 business days.

## 2025-01-03 NOTE — PATIENT COMMUNICATION
PA for Trulicity 1.5mg APPROVED     Date(s) approved 12/31/2024-12/31/2025      Patient advised by          []MyChart Message  [x]Phone call   []LMOM  []L/M to call office as no active Communication consent on file  []Unable to leave detailed message as VM not approved on Communication consent       Pharmacy advised by    [x]Fax  []Phone call    Approval letter scanned into [employer] Yes

## 2025-01-30 ENCOUNTER — TELEPHONE (OUTPATIENT)
Dept: GASTROENTEROLOGY | Facility: CLINIC | Age: 45
End: 2025-01-30

## 2025-01-30 NOTE — TELEPHONE ENCOUNTER
Pt was scheduled on 1/6/25 with Dr Glass at [facility], however, cancelled via automated system. I lmom for pt to please call back to reschedule the colonoscopy.  Recall letter mailed.

## 2025-03-11 DIAGNOSIS — E11.9 CONTROLLED TYPE 2 DIABETES MELLITUS WITHOUT COMPLICATION, WITHOUT LONG-TERM CURRENT USE OF INSULIN (HCC): ICD-10-CM

## 2025-03-17 ENCOUNTER — TELEPHONE (OUTPATIENT)
Dept: UROLOGY | Facility: CLINIC | Age: 45
End: 2025-03-17

## 2025-03-17 NOTE — TELEPHONE ENCOUNTER
Left message for patient to return call to reschedule his appointment that was canceled tomorrow.

## 2025-03-25 DIAGNOSIS — E11.9 CONTROLLED TYPE 2 DIABETES MELLITUS WITHOUT COMPLICATION, WITHOUT LONG-TERM CURRENT USE OF INSULIN (HCC): Primary | ICD-10-CM

## 2025-04-11 DIAGNOSIS — E11.9 CONTROLLED TYPE 2 DIABETES MELLITUS WITHOUT COMPLICATION, WITHOUT LONG-TERM CURRENT USE OF INSULIN (HCC): ICD-10-CM

## 2025-04-11 RX ORDER — EMPAGLIFLOZIN 25 MG/1
25 TABLET, FILM COATED ORAL DAILY
Qty: 30 TABLET | Refills: 5 | Status: SHIPPED | OUTPATIENT
Start: 2025-04-11

## 2025-04-22 ENCOUNTER — APPOINTMENT (OUTPATIENT)
Dept: LAB | Facility: CLINIC | Age: 45
End: 2025-04-22
Attending: NURSE PRACTITIONER
Payer: COMMERCIAL

## 2025-04-22 DIAGNOSIS — E11.9 CONTROLLED TYPE 2 DIABETES MELLITUS WITHOUT COMPLICATION, WITHOUT LONG-TERM CURRENT USE OF INSULIN (HCC): ICD-10-CM

## 2025-04-22 LAB
ANION GAP SERPL CALCULATED.3IONS-SCNC: 9 MMOL/L (ref 4–13)
BUN SERPL-MCNC: 13 MG/DL (ref 5–25)
CALCIUM SERPL-MCNC: 9.6 MG/DL (ref 8.4–10.2)
CHLORIDE SERPL-SCNC: 101 MMOL/L (ref 96–108)
CO2 SERPL-SCNC: 29 MMOL/L (ref 21–32)
CREAT SERPL-MCNC: 1.05 MG/DL (ref 0.6–1.3)
CREAT UR-MCNC: 138.7 MG/DL
EST. AVERAGE GLUCOSE BLD GHB EST-MCNC: 151 MG/DL
GFR SERPL CREATININE-BSD FRML MDRD: 85 ML/MIN/1.73SQ M
GLUCOSE SERPL-MCNC: 93 MG/DL (ref 65–140)
HBA1C MFR BLD: 6.9 %
MICROALBUMIN UR-MCNC: 13.3 MG/L
MICROALBUMIN/CREAT 24H UR: 10 MG/G CREATININE (ref 0–30)
POTASSIUM SERPL-SCNC: 3.8 MMOL/L (ref 3.5–5.3)
SODIUM SERPL-SCNC: 139 MMOL/L (ref 135–147)

## 2025-04-22 PROCEDURE — 82570 ASSAY OF URINE CREATININE: CPT

## 2025-04-22 PROCEDURE — 82043 UR ALBUMIN QUANTITATIVE: CPT

## 2025-04-23 ENCOUNTER — RESULTS FOLLOW-UP (OUTPATIENT)
Dept: ENDOCRINOLOGY | Facility: CLINIC | Age: 45
End: 2025-04-23

## 2025-04-27 DIAGNOSIS — E11.9 CONTROLLED TYPE 2 DIABETES MELLITUS WITHOUT COMPLICATION, WITHOUT LONG-TERM CURRENT USE OF INSULIN (HCC): ICD-10-CM

## 2025-04-29 RX ORDER — DULAGLUTIDE 3 MG/.5ML
INJECTION, SOLUTION SUBCUTANEOUS
Qty: 6 ML | Refills: 1 | Status: SHIPPED | OUTPATIENT
Start: 2025-04-29

## 2025-04-29 NOTE — TELEPHONE ENCOUNTER
Patient comment: Request 3.0 mg dosage to lower a1c- alao requesting 3 month supply refill

## 2025-06-15 DIAGNOSIS — E11.9 CONTROLLED TYPE 2 DIABETES MELLITUS WITHOUT COMPLICATION, WITHOUT LONG-TERM CURRENT USE OF INSULIN (HCC): ICD-10-CM

## 2025-06-18 ENCOUNTER — TELEPHONE (OUTPATIENT)
Dept: UROLOGY | Facility: CLINIC | Age: 45
End: 2025-06-18

## 2025-06-18 ENCOUNTER — OFFICE VISIT (OUTPATIENT)
Dept: UROLOGY | Facility: CLINIC | Age: 45
End: 2025-06-18
Payer: COMMERCIAL

## 2025-06-18 VITALS
WEIGHT: 228 LBS | SYSTOLIC BLOOD PRESSURE: 128 MMHG | HEIGHT: 70 IN | HEART RATE: 101 BPM | OXYGEN SATURATION: 93 % | DIASTOLIC BLOOD PRESSURE: 68 MMHG | BODY MASS INDEX: 32.64 KG/M2

## 2025-06-18 DIAGNOSIS — E11.9 CONTROLLED TYPE 2 DIABETES MELLITUS WITHOUT COMPLICATION, WITHOUT LONG-TERM CURRENT USE OF INSULIN (HCC): ICD-10-CM

## 2025-06-18 DIAGNOSIS — Z30.09 VASECTOMY EVALUATION: Primary | ICD-10-CM

## 2025-06-18 PROCEDURE — 99203 OFFICE O/P NEW LOW 30 MIN: CPT | Performed by: UROLOGY

## 2025-06-18 RX ORDER — LIDOCAINE AND PRILOCAINE 25; 25 MG/G; MG/G
CREAM TOPICAL ONCE
Qty: 5 G | Refills: 0 | Status: SHIPPED | OUTPATIENT
Start: 2025-06-18 | End: 2025-06-18

## 2025-06-18 RX ORDER — CHLORHEXIDINE GLUCONATE 40 MG/ML
2 SOLUTION TOPICAL ONCE
Qty: 236 ML | Refills: 0 | Status: SHIPPED | OUTPATIENT
Start: 2025-06-18 | End: 2025-06-18

## 2025-06-18 RX ORDER — ALPRAZOLAM 1 MG/1
2 TABLET ORAL ONCE
Qty: 2 TABLET | Refills: 0 | Status: SHIPPED | OUTPATIENT
Start: 2025-06-18 | End: 2025-06-18

## 2025-06-18 NOTE — TELEPHONE ENCOUNTER
Per Dr Meadows  Please look at his schedule to see if there is time to get the patient in earlier.  Ask to reach out to you to schedule.    Return for for office vasectomy.

## 2025-06-18 NOTE — PROGRESS NOTES
Name: Betito Loomis      : 1980      MRN: [id number]  Encounter Provider: Codey Meadows MD  Encounter Date: 2025   Encounter department: [facility]  :  Assessment & Plan  Vasectomy evaluation  The patient presents requesting elective sterilization vasectomy. We discussed that vasectomy is an operation performed in the office in order to provide elective sterilization.  Risks include infection, bleeding, pain, damage to surrounding structures, risk of sperm granuloma, risk of recannulization and failure of the procedure, and risk that both Vasa deferentia cannot be accessed via a procedure under local anesthesia and a completion vasectomy may become necessary in the future in the operating room under general anesthesia.     This procedure should be considered a permanent option. Although there are subspecialists who perform vasectomy reversals, these operations are not 100% successful and are often not covered by insurance meaning they can come with a large out-of-pocket cost. The patient understands this.      We reviewed the procedure in depth. Risk and benefits of the procedure were discussed and reviewed. Informed consent was obtained in the office today. The patient was prescribed alprazolam to take one hour prior to the procedure to assist with his comfort. He understands that he will require transportation to and from the office that day if he is to use the alprazolam.     He also understands he will require a post procedure semen analysis to ensure full sterilization. In the interim, he will require contraception during intercourse to avoid an undesired pregnancy.Usually, patients are out of work for 2-3 days. We recommend tight fitting scrotal support following the procedure along with ice packs applied to the scrotum 15 minutes on and 15 minutes off for the first 24 hours. We discussed that we sometimes do send the patient home with short course of narcotic  "pain medication but that most men require only Tylenol and ibuprofen.     After this discussion, the patient agrees to proceed and informed consent was signed and is on the chart. We will schedule him in the near future      Orders:    ALPRAZolam (XANAX) 1 mg tablet; Take 2 tablets (2 mg total) by mouth once for 1 dose Take 1 tablet by mouth 1 hour prior to your scheduled vasectomy    chlorhexidine gluconate (HIBICLENS) 4 % external liquid; Apply 2 Applications topically once for 1 dose Shower/bathe with this soap the night prior to and the morning of your vasectomy    lidocaine-prilocaine (EMLA) cream; Apply topically 1 (one) time for 1 dose Apply to scrotum 1 hour prior to vasectomy    Controlled type 2 diabetes mellitus without complication, without long-term current use of insulin ([facility])    Lab Results   Component Value Date    HGBA1C 6.9 (H) 04/22/2025                    History of Present Illness   Betito Loomis is a 45 y.o. male who presents for vasectomy evaluation   1 child  No history of easy bruising or bleeding  Bilateral palpable vasa deferentia    The following portions of the patient's history were reviewed and updated as appropriate: allergies, current medications, past family history, past medical history, past social history, past surgical history and problem list.      Review of Systems   Constitutional: Negative.    HENT: Negative.     Eyes: Negative.    Respiratory: Negative.     Cardiovascular: Negative.    Gastrointestinal: Negative.    Endocrine: Negative.    Genitourinary: Negative.    Musculoskeletal: Negative.    Skin: Negative.    Allergic/Immunologic: Negative.    Neurological: Negative.    Hematological: Negative.    Psychiatric/Behavioral: Negative.            Objective   /68 (BP Location: Left arm, Patient Position: Sitting, Cuff Size: Large)   Pulse 101   Ht 5' 10\" (1.778 m)   Wt 103 kg (228 lb)   SpO2 93%   BMI 32.71 kg/m²     Physical Exam  Constitutional:       " General: He is not in acute distress.     Appearance: Normal appearance. He is not ill-appearing, toxic-appearing or diaphoretic.     Eyes:      General: No scleral icterus.    Pulmonary:      Effort: Pulmonary effort is normal.   Abdominal:      General: There is no distension.   Genitourinary:     Comments: Normal jonas stage, bilateral palpable vasa    Musculoskeletal:         General: No deformity.     Skin:     Coloration: Skin is not jaundiced.     Neurological:      General: No focal deficit present.      Mental Status: He is alert and oriented to person, place, and time.     Psychiatric:         Mood and Affect: Mood normal.         Behavior: Behavior normal.         Thought Content: Thought content normal.         Judgment: Judgment normal.          Results   Lab Results   Component Value Date    PSA 0.9 11/24/2020     Lab Results   Component Value Date    CALCIUM 9.6 04/22/2025    K 3.8 04/22/2025    CO2 29 04/22/2025     04/22/2025    BUN 13 04/22/2025    CREATININE 1.05 04/22/2025     Lab Results   Component Value Date    WBC 8.43 05/20/2024    HGB 16.9 05/20/2024    HCT 50.6 (H) 05/20/2024    MCV 88 05/20/2024     05/20/2024       Office Urine Dip  No results found for this or any previous visit (from the past hour).

## 2025-06-18 NOTE — ASSESSMENT & PLAN NOTE
The patient presents requesting elective sterilization vasectomy. We discussed that vasectomy is an operation performed in the office in order to provide elective sterilization.  Risks include infection, bleeding, pain, damage to surrounding structures, risk of sperm granuloma, risk of recannulization and failure of the procedure, and risk that both Vasa deferentia cannot be accessed via a procedure under local anesthesia and a completion vasectomy may become necessary in the future in the operating room under general anesthesia.     This procedure should be considered a permanent option. Although there are subspecialists who perform vasectomy reversals, these operations are not 100% successful and are often not covered by insurance meaning they can come with a large out-of-pocket cost. The patient understands this.      We reviewed the procedure in depth. Risk and benefits of the procedure were discussed and reviewed. Informed consent was obtained in the office today. The patient was prescribed alprazolam to take one hour prior to the procedure to assist with his comfort. He understands that he will require transportation to and from the office that day if he is to use the alprazolam.     He also understands he will require a post procedure semen analysis to ensure full sterilization. In the interim, he will require contraception during intercourse to avoid an undesired pregnancy.Usually, patients are out of work for 2-3 days. We recommend tight fitting scrotal support following the procedure along with ice packs applied to the scrotum 15 minutes on and 15 minutes off for the first 24 hours. We discussed that we sometimes do send the patient home with short course of narcotic pain medication but that most men require only Tylenol and ibuprofen.     After this discussion, the patient agrees to proceed and informed consent was signed and is on the chart. We will schedule him in the near future      Orders:     ALPRAZolam (XANAX) 1 mg tablet; Take 2 tablets (2 mg total) by mouth once for 1 dose Take 1 tablet by mouth 1 hour prior to your scheduled vasectomy    chlorhexidine gluconate (HIBICLENS) 4 % external liquid; Apply 2 Applications topically once for 1 dose Shower/bathe with this soap the night prior to and the morning of your vasectomy    lidocaine-prilocaine (EMLA) cream; Apply topically 1 (one) time for 1 dose Apply to scrotum 1 hour prior to vasectomy

## 2025-07-13 DIAGNOSIS — E11.9 CONTROLLED TYPE 2 DIABETES MELLITUS WITHOUT COMPLICATION, WITHOUT LONG-TERM CURRENT USE OF INSULIN (HCC): ICD-10-CM

## 2025-07-15 RX ORDER — DULAGLUTIDE 3 MG/.5ML
INJECTION, SOLUTION SUBCUTANEOUS
Qty: 6 ML | Refills: 1 | Status: SHIPPED | OUTPATIENT
Start: 2025-07-15

## 2025-07-16 NOTE — PROGRESS NOTES
Procedures      No Scalpel Vasectomy Procedure Note    Indications: 45 y.o.  y.o. male desiring permanent sterilization    Pre-operative Diagnosis: Undesired fertility    Post-operative Diagnosis: Undesired fertility    Anesthesia: Lidocaine 2% without epinephrine      Procedure Details       Risks and benefits of vasectomy were previously discussed. Informed consent was obtained at his prior office visit. The patient had taken a benzodiazepine as prescribed for anxiolysis and he had showered the morning of the procedure and clipped excess pubic hair.  Music of the patient's choosing was also played for additional anxiolysis.    The patient was placed in the supine position. His genitalia were prepped and draped in the usual sterile fashion.     The left vas deferens was grasped and presented to the area of interest on the left hemiscrotum. Next, 2% lidocaine was used to anesthetize the skin, subcutaneous tissue, and left vas deferens. The left vas deferens was grasped and presented into the surgical field with a vas clamp.   A #15 blade was used to make a longitudinal incision in the sheath of the vas deferens. The vas itself was then dissected free from the surrounding tissue. Clamps were placed proximally and distally and a 1 cm segment of the vas deferens was excised. Silk ties were applied and the exposed ends were fulgurated as was the lumen of the vas. Hemostasis was excellent. The vas was returned to its natural anatomic position after ensuring meticulous hemostasis.  A single stitch of 3-0 chromic was placed through the skin and dartos to reapproximate the defect in a horizontal mattress fashion.    The right vas deferens was then grasped and presented to the area of interest on the right hemiscrotum. Next, 2% lidocaine was used to anesthetize the skin, subcutaneous tissue, and right vas deferens. The right vas deferens was grasped and presented into the surgical field with a vas clamp.   A #15 blade was  used to make a longitudinal incision in the sheath of the vas deferens. The vas itself was then dissected free from the surrounding tissue. Clamps were placed proximally and distally and a 1 cm segment of the vas deferens was excised. Silk ties were applied and the exposed ends were fulgurated as was the lumen of the vas. Hemostasis was excellent. The vas was returned to its natural anatomic position after ensuring meticulous hemostasis.  A single stitch of 3-0 chromic was placed through the skin and dartos to reapproximate the defect in a horizontal mattress fashion.        Specimen:   1.  LEFT deferens  2.  RIGHT vas deferens    Condition: Stable    Complications: none    Plan:      He did very well with the procedure today. Postprocedural instructions were provided. He will follow-up PRN. He will continue to use any form of birth control that he is currently using until his sterility is confirmed            Vasectomy     Date/Time  7/17/2025 9:00 AM     Performed by  Codey Meadows MD   Authorized by  Codey Meadows MD     Universal Protocol   procedure performed by consultantConsent: Verbal consent obtained. Written consent obtained  Risks and benefits: risks, benefits and alternatives were discussed  Consent given by: patient  Patient understanding: patient states understanding of the procedure being performed  Patient consent: the patient's understanding of the procedure matches consent given  Procedure consent: procedure consent matches procedure scheduled  Relevant documents: relevant documents present and verified  Test results: test results available and properly labeled  Site marked: the operative site was not marked  Radiology Images displayed and confirmed. If images not available, report reviewed: imaging studies available  Required items: required blood products, implants, devices, and special equipment available  Patient identity confirmed: verbally with patient and provided demographic data       Local anesthesia used: yes      Anesthesia: local infiltration     Anesthesia   Local anesthesia used: yes  Local Anesthetic: lidocaine 1% without epinephrine     Sedation   Patient sedated: yes  Sedation type: anxiolysis        Specimen: yes    Culture: no   Procedure Details   Procedure Notes: Procedures      No Scalpel Vasectomy Procedure Note    Indications: 45 y.o.  y.o. male desiring permanent sterilization    Pre-operative Diagnosis: Undesired fertility    Post-operative Diagnosis: Undesired fertility    Anesthesia: Lidocaine 2% without epinephrine      Procedure Details       Risks and benefits of vasectomy were previously discussed. Informed consent was obtained at his prior office visit. The patient had taken a benzodiazepine as prescribed for anxiolysis and he had showered the morning of the procedure and clipped excess pubic hair.  Music of the patient's choosing was also played for additional anxiolysis.    The patient was placed in the supine position. His genitalia were prepped and draped in the usual sterile fashion.     The left vas deferens was grasped and presented to the area of interest on the left hemiscrotum. Next, 2% lidocaine was used to anesthetize the skin, subcutaneous tissue, and left vas deferens. The left vas deferens was grasped and presented into the surgical field with a vas clamp.   A #15 blade was used to make a longitudinal incision in the sheath of the vas deferens. The vas itself was then dissected free from the surrounding tissue. Clamps were placed proximally and distally and a 1 cm segment of the vas deferens was excised. Silk ties were applied and the exposed ends were fulgurated as was the lumen of the vas. Hemostasis was excellent. The vas was returned to its natural anatomic position after ensuring meticulous hemostasis.  A single stitch of 3-0 chromic was placed through the skin and dartos to reapproximate the defect in a horizontal mattress fashion.    The right vas  deferens was then grasped and presented to the area of interest on the right hemiscrotum. Next, 2% lidocaine was used to anesthetize the skin, subcutaneous tissue, and right vas deferens. The right vas deferens was grasped and presented into the surgical field with a vas clamp.   A #15 blade was used to make a longitudinal incision in the sheath of the vas deferens. The vas itself was then dissected free from the surrounding tissue. Clamps were placed proximally and distally and a 1 cm segment of the vas deferens was excised. Silk ties were applied and the exposed ends were fulgurated as was the lumen of the vas. Hemostasis was excellent. The vas was returned to its natural anatomic position after ensuring meticulous hemostasis.  A single stitch of 3-0 chromic was placed through the skin and dartos to reapproximate the defect in a horizontal mattress fashion.        Specimen:   1.  LEFT deferens  2.  RIGHT vas deferens    Condition: Stable    Complications: none    Plan:      He did very well with the procedure today. Postprocedural instructions were provided. He will follow-up PRN. He will continue to use any form of birth control that he is currently using until his sterility is confirmed       Patient Transportation: confirmed  Patient tolerance: patient tolerated the procedure well with no immediate complications

## 2025-07-17 ENCOUNTER — PROCEDURE VISIT (OUTPATIENT)
Dept: UROLOGY | Facility: CLINIC | Age: 45
End: 2025-07-17
Payer: COMMERCIAL

## 2025-07-17 VITALS
HEART RATE: 93 BPM | HEIGHT: 70 IN | SYSTOLIC BLOOD PRESSURE: 124 MMHG | OXYGEN SATURATION: 92 % | WEIGHT: 228 LBS | BODY MASS INDEX: 32.64 KG/M2 | DIASTOLIC BLOOD PRESSURE: 64 MMHG

## 2025-07-17 DIAGNOSIS — Z30.2 ENCOUNTER FOR VASECTOMY: Primary | ICD-10-CM

## 2025-07-17 PROCEDURE — 88302 TISSUE EXAM BY PATHOLOGIST: CPT | Performed by: PATHOLOGY

## 2025-07-17 PROCEDURE — 55250 REMOVAL OF SPERM DUCT(S): CPT | Performed by: UROLOGY

## 2025-08-21 LAB
LEFT EYE DIABETIC RETINOPATHY: NORMAL
RIGHT EYE DIABETIC RETINOPATHY: NORMAL

## (undated) DEVICE — REDUCER: Brand: ENDOWRIST

## (undated) DEVICE — SUT MONOCRYL 4-0 PS-2 18 IN Y496G

## (undated) DEVICE — FENESTRATED BIPOLAR FORCEPS: Brand: ENDOWRIST

## (undated) DEVICE — MEDI-VAC YANK SUCT HNDL W/TPRD BULBOUS TIP: Brand: CARDINAL HEALTH

## (undated) DEVICE — TUBING SUCTION 5MM X 12 FT

## (undated) DEVICE — GLOVE SRG BIOGEL 7

## (undated) DEVICE — GAUZE SPONGES,USP TYPE VII GAUZE, 12 PLY: Brand: CURITY

## (undated) DEVICE — SYRINGE 10ML LL

## (undated) DEVICE — TIP COVER ACCESSORY

## (undated) DEVICE — 3M™ TEGADERM™ TRANSPARENT FILM DRESSING FRAME STYLE, 1628, 6 IN X 8 IN (15 CM X 20 CM), 10/CT 8CT/CASE: Brand: 3M™ TEGADERM™

## (undated) DEVICE — COLUMN DRAPE

## (undated) DEVICE — SMOKE EVACUATION TUBING WITH 8 IN INTEGRAL WAND AND SPONGE GUARD: Brand: BUFFALO FILTER

## (undated) DEVICE — INTENDED FOR TISSUE SEPARATION, AND OTHER PROCEDURES THAT REQUIRE A SHARP SURGICAL BLADE TO PUNCTURE OR CUT.: Brand: BARD-PARKER SAFETY BLADES SIZE 15, STERILE

## (undated) DEVICE — SPONGE STICK WITH PVP-I: Brand: KENDALL

## (undated) DEVICE — CANNULA SEAL

## (undated) DEVICE — BETHLEHEM UNIVERSAL OUTPATIENT: Brand: CARDINAL HEALTH

## (undated) DEVICE — GLOVE SRG BIOGEL 6.5

## (undated) DEVICE — GAUZE SPONGES,16 PLY: Brand: CURITY

## (undated) DEVICE — SUT VICRYL 3-0 SH 27 IN J416H

## (undated) DEVICE — 3M™ STERI-STRIP™ REINFORCED ADHESIVE SKIN CLOSURES, R1547, 1/2 IN X 4 IN (12 MM X 100 MM), 6 STRIPS/ENVELOPE: Brand: 3M™ STERI-STRIP™

## (undated) DEVICE — SUT PROLENE 3-0 SH 36 IN 8522H

## (undated) DEVICE — SUT PDS II 0 CTB-1 27 IN ZB340

## (undated) DEVICE — MAYO STAND COVER: Brand: CONVERTORS

## (undated) DEVICE — ADHESIVE SKIN HIGH VISCOSITY EXOFIN 1ML

## (undated) DEVICE — ENDOPATH PNEUMONEEDLE INSUFFLATION NEEDLES WITH LUER LOCK CONNECTORS 120MM: Brand: ENDOPATH

## (undated) DEVICE — CHLORAPREP HI-LITE 26ML ORANGE

## (undated) DEVICE — MINOR PROCEDURE DRAPE: Brand: CONVERTORS

## (undated) DEVICE — SUT PROLENE 2-0 SH 30 IN 8833H

## (undated) DEVICE — NEEDLE 25G X 1 1/2

## (undated) DEVICE — DRAPE ADOLESCENT LAPAROTOMY

## (undated) DEVICE — VISUALIZATION SYSTEM: Brand: CLEARIFY

## (undated) DEVICE — 3000CC GUARDIAN II: Brand: GUARDIAN

## (undated) DEVICE — SUT VICRYL 2-0 D-SPECIAL 27 IN D8114

## (undated) DEVICE — WOUND RETRACTOR AND PROTECTOR: Brand: ALEXIS O WOUND PROTECTOR-RETRACTOR

## (undated) DEVICE — 3M™ TEGADERM™ TRANSPARENT FILM DRESSING FRAME STYLE, 1626W, 4 IN X 4-3/4 IN (10 CM X 12 CM), 50/CT 4CT/CASE: Brand: 3M™ TEGADERM™

## (undated) DEVICE — STAPLER 60 RELOAD GREEN: Brand: SUREFORM

## (undated) DEVICE — BLADELESS OBTURATOR: Brand: WECK VISTA

## (undated) DEVICE — STAPLER SHEATH: Brand: ENDOWRIST

## (undated) DEVICE — SUT VICRYL 2-0 REEL 54 IN J286G

## (undated) DEVICE — LAPAROSCOPIC ACCESS SYSTEM: Brand: ALEXIS LAPAROSCOPIC SYSTEM WITH KII FIOS FIRST ENTRY

## (undated) DEVICE — VESSEL SEALER EXTEND: Brand: ENDOWRIST

## (undated) DEVICE — SUT SILK 2-0 TIES 144 IN LA55G

## (undated) DEVICE — VIAL DECANTER

## (undated) DEVICE — SEAL

## (undated) DEVICE — MICROPUNCTURE 501

## (undated) DEVICE — AIRSEAL TUBE SMOKE EVAC LUMENX3 FILTERED

## (undated) DEVICE — PENCIL ELECTROSURG E-Z CLEAN -0035H

## (undated) DEVICE — STAPLER 60: Brand: SUREFORM

## (undated) DEVICE — SUT MONOCRYL 4-0 PS-2 27 IN Y426H

## (undated) DEVICE — INTENDED FOR TISSUE SEPARATION, AND OTHER PROCEDURES THAT REQUIRE A SHARP SURGICAL BLADE TO PUNCTURE OR CUT.: Brand: BARD-PARKER SAFETY BLADES SIZE 11, STERILE

## (undated) DEVICE — LIGHT HANDLE COVER SLEEVE DISP BLUE STELLAR

## (undated) DEVICE — SUT VICRYL PLUS 0 UR-6 27IN VCP603H

## (undated) DEVICE — 3M™ STERI-STRIP™ REINFORCED ADHESIVE SKIN CLOSURES, R1546, 1/4 IN X 4 IN (6 MM X 100 MM), 10 STRIPS/ENVELOPE: Brand: 3M™ STERI-STRIP™

## (undated) DEVICE — SUT PDS PLUS 3-0 SH 27IN PDP316H

## (undated) DEVICE — TIP-UP FENESTRATED GRASPER: Brand: ENDOWRIST

## (undated) DEVICE — PROXIMATE RELOADABLE LINEAR CUTTER WITH SAFETY LOCK-OUT, 75MM: Brand: PROXIMATE

## (undated) DEVICE — SPECIMEN CONTAINER STERILE PEEL PACK

## (undated) DEVICE — PAD GROUNDING ADULT

## (undated) DEVICE — KIT, BETHLEHEM ROBOTIC PROST: Brand: CARDINAL HEALTH

## (undated) DEVICE — LUBRICANT INST ELECTROLUBE ANTISTK WO PAD

## (undated) DEVICE — TRAY FOLEY 16FR URIMETER SURESTEP

## (undated) DEVICE — PROXIMATE LINEAR CUTTER RELOAD, BLUE, 75MM: Brand: PROXIMATE

## (undated) DEVICE — ARM DRAPE

## (undated) DEVICE — VESSEL LOOPS X-RAY DETECTABLE: Brand: DEROYAL

## (undated) DEVICE — 40595 XL TRENDELENBURG POSITIONING KIT: Brand: 40595 XL TRENDELENBURG POSITIONING KIT